# Patient Record
Sex: MALE | Race: BLACK OR AFRICAN AMERICAN | NOT HISPANIC OR LATINO | Employment: FULL TIME | URBAN - METROPOLITAN AREA
[De-identification: names, ages, dates, MRNs, and addresses within clinical notes are randomized per-mention and may not be internally consistent; named-entity substitution may affect disease eponyms.]

---

## 2023-03-13 ENCOUNTER — NURSING HOME VISIT (OUTPATIENT)
Dept: POST ACUTE CARE | Facility: EXTERNAL LOCATION | Age: 63
End: 2023-03-13
Payer: COMMERCIAL

## 2023-03-13 DIAGNOSIS — I63.9 CEREBROVASCULAR ACCIDENT (CVA), UNSPECIFIED MECHANISM (MULTI): ICD-10-CM

## 2023-03-13 DIAGNOSIS — R53.1 WEAKNESS: ICD-10-CM

## 2023-03-13 DIAGNOSIS — H10.33 ACUTE BACTERIAL CONJUNCTIVITIS OF BOTH EYES: Primary | ICD-10-CM

## 2023-03-13 PROCEDURE — 99308 SBSQ NF CARE LOW MDM 20: CPT | Performed by: NURSE PRACTITIONER

## 2023-03-13 NOTE — LETTER
Patient: Edenilson Ness  : 1960    Encounter Date: 2023    Progress Note  Subjective  Chief complaint: Edenilson Ness is a 62 y.o. male who is a long term resident patient being seen and evaluated for eye irritation    HPI:  Patient presents with complaint of eyes red and itchy with drainage.  Nursing staff reports that the patient's eye was glued shut yesterday morning.      Objective  Physical Exam  Bilateral eyes reddened with yellow drainage noted  Alert  Neck supple  Respirations nonlabored  Mood appropriate      Assessment/Plan  Problem List Items Addressed This Visit       Conjunctivitis - Primary     Start TobraDex drops         CVA (cerebral vascular accident) (CMS/[facility])     Stable         Weakness     Wheelchair for mobility          Medications, treatments, and labs reviewed  Continue medications and treatments as listed in PCC        Electronically Signed By: LUCRETIA Griffiths   3/16/23  7:33 PM

## 2023-03-16 PROBLEM — R53.1 WEAKNESS: Status: ACTIVE | Noted: 2023-03-16

## 2023-03-16 PROBLEM — H10.9 CONJUNCTIVITIS: Status: ACTIVE | Noted: 2023-03-16

## 2023-03-16 PROBLEM — I63.9 CVA (CEREBRAL VASCULAR ACCIDENT) (MULTI): Status: ACTIVE | Noted: 2023-03-16

## 2023-03-16 NOTE — PROGRESS NOTES
Progress Note  Subjective   Chief complaint: Edenilson Ness is a 62 y.o. male who is a long term resident patient being seen and evaluated for eye irritation    HPI:  Patient presents with complaint of eyes red and itchy with drainage.  Nursing staff reports that the patient's eye was glued shut yesterday morning.      Objective   Physical Exam  Bilateral eyes reddened with yellow drainage noted  Alert  Neck supple  Respirations nonlabored  Mood appropriate      Assessment/Plan   Problem List Items Addressed This Visit       Conjunctivitis - Primary     Start TobraDex drops         CVA (cerebral vascular accident) (CMS/[facility])     Stable         Weakness     Wheelchair for mobility          Medications, treatments, and labs reviewed  Continue medications and treatments as listed in PCC

## 2023-03-17 ENCOUNTER — NURSING HOME VISIT (OUTPATIENT)
Dept: POST ACUTE CARE | Facility: EXTERNAL LOCATION | Age: 63
End: 2023-03-17
Payer: COMMERCIAL

## 2023-03-17 DIAGNOSIS — Z79.4 TYPE 2 DIABETES MELLITUS WITHOUT COMPLICATION, WITH LONG-TERM CURRENT USE OF INSULIN (MULTI): ICD-10-CM

## 2023-03-17 DIAGNOSIS — Z01.89 ROUTINE LAB DRAW: ICD-10-CM

## 2023-03-17 DIAGNOSIS — E11.9 TYPE 2 DIABETES MELLITUS WITHOUT COMPLICATION, WITH LONG-TERM CURRENT USE OF INSULIN (MULTI): ICD-10-CM

## 2023-03-17 PROCEDURE — 99308 SBSQ NF CARE LOW MDM 20: CPT | Performed by: INTERNAL MEDICINE

## 2023-03-17 NOTE — LETTER
Patient: Edenilson Ness  : 1960    Encounter Date: 2023    Subjective  Chief complaint: Edenilson Ness is a 62 y.o. male who is a long term resident patient being seen and evaluated for multiple medical problems.  Patient presents for general medical care    HPI:  Patient's recent labs cholesterol 92, triglycerides 63, HDL 32, LDL 1.5, VLDL 13.  Patient presents for f/u DM, Patient denies vision changes, excessive thirst, sweating, urinary frequency. A1c is 6.7. No acute distress.         Review of Systems  All systems reviewed and negative except for what was mentioned in the HPI    Vital signs: 134/73    Objective  Physical Exam  Constitutional:       General: He is not in acute distress.  Eyes:      Extraocular Movements: Extraocular movements intact.   Cardiovascular:      Rate and Rhythm: Regular rhythm.   Pulmonary:      Effort: Pulmonary effort is normal.      Breath sounds: Normal breath sounds.   Abdominal:      General: Bowel sounds are normal.      Palpations: Abdomen is soft.   Musculoskeletal:      Cervical back: Neck supple.      Right lower leg: No edema.      Left lower leg: No edema.   Neurological:      Mental Status: He is alert.   Psychiatric:         Mood and Affect: Mood normal.         Behavior: Behavior is cooperative.         Assessment/Plan  Problem List Items Addressed This Visit          Endocrine/Metabolic    Type 2 diabetes mellitus without complication, with long-term current use of insulin (CMS/[facility])     Blood sugars controlled  Continue to monitor BS            Other    Routine lab draw     WNL - NNO          Medications, treatments, and labs reviewed  Continue medications and treatments as listed in PCC    Scribe Attestation  By signing my name below, IHeather Scribe   attest that this documentation has been prepared under the direction and in the presence of Alfredo Bro MD.    Provider Attestation - Scribe documentation  All medical record entries made by the  Scribe were at my direction and personally dictated by me. I have reviewed the chart and agree that the record accurately reflects my personal performance of the history, physical exam, discussion and plan.      Electronically Signed By: Alfredo Bro MD   3/22/23  2:44 PM

## 2023-03-21 PROBLEM — Z01.89 ROUTINE LAB DRAW: Status: ACTIVE | Noted: 2023-03-21

## 2023-03-21 PROBLEM — Z79.4 TYPE 2 DIABETES MELLITUS WITHOUT COMPLICATION, WITH LONG-TERM CURRENT USE OF INSULIN (MULTI): Status: ACTIVE | Noted: 2023-03-21

## 2023-03-21 PROBLEM — E11.9 TYPE 2 DIABETES MELLITUS WITHOUT COMPLICATION, WITH LONG-TERM CURRENT USE OF INSULIN (MULTI): Status: ACTIVE | Noted: 2023-03-21

## 2023-03-21 NOTE — PROGRESS NOTES
Subjective   Chief complaint: Edenilson Ness is a 62 y.o. male who is a long term resident patient being seen and evaluated for multiple medical problems.  Patient presents for general medical care    HPI:  Patient's recent labs cholesterol 92, triglycerides 63, HDL 32, LDL 1.5, VLDL 13.  Patient presents for f/u DM, Patient denies vision changes, excessive thirst, sweating, urinary frequency. A1c is 6.7. No acute distress.         Review of Systems  All systems reviewed and negative except for what was mentioned in the HPI    Vital signs: 134/73    Objective   Physical Exam  Constitutional:       General: He is not in acute distress.  Eyes:      Extraocular Movements: Extraocular movements intact.   Cardiovascular:      Rate and Rhythm: Regular rhythm.   Pulmonary:      Effort: Pulmonary effort is normal.      Breath sounds: Normal breath sounds.   Abdominal:      General: Bowel sounds are normal.      Palpations: Abdomen is soft.   Musculoskeletal:      Cervical back: Neck supple.      Right lower leg: No edema.      Left lower leg: No edema.   Neurological:      Mental Status: He is alert.   Psychiatric:         Mood and Affect: Mood normal.         Behavior: Behavior is cooperative.         Assessment/Plan   Problem List Items Addressed This Visit          Endocrine/Metabolic    Type 2 diabetes mellitus without complication, with long-term current use of insulin (CMS/[facility])     Blood sugars controlled  Continue to monitor BS            Other    Routine lab draw     WNL - NNO          Medications, treatments, and labs reviewed  Continue medications and treatments as listed in PCC    Scribe Attestation  By signing my name below, IHeather Scribe   attest that this documentation has been prepared under the direction and in the presence of Alfredo Bro MD.    Provider Attestation - Scribe documentation  All medical record entries made by the Scribe were at my direction and personally dictated by me. I have  reviewed the chart and agree that the record accurately reflects my personal performance of the history, physical exam, discussion and plan.

## 2023-04-19 ENCOUNTER — NURSING HOME VISIT (OUTPATIENT)
Dept: POST ACUTE CARE | Facility: EXTERNAL LOCATION | Age: 63
End: 2023-04-19
Payer: COMMERCIAL

## 2023-04-19 DIAGNOSIS — R53.1 WEAKNESS: ICD-10-CM

## 2023-04-19 PROCEDURE — 99308 SBSQ NF CARE LOW MDM 20: CPT | Performed by: INTERNAL MEDICINE

## 2023-04-19 NOTE — LETTER
Patient: Edenilson Ness  : 1960    Encounter Date: 2023    PROGRESS NOTE    Subjective  Chief complaint: Edenilson Ness is a 62 y.o. male who is a long term care patient being seen and evaluated for weakness    HPI:  Patient working in OT for weakness.She requires minimal to contact guard assist for transfers and ADL's No new issues at this time. She uses wheelchair for mobility. No acute distress.       Objective  Vital signs: 124/76, 98%    Physical Exam  Constitutional:       General: He is not in acute distress.  Eyes:      Extraocular Movements: Extraocular movements intact.   Cardiovascular:      Rate and Rhythm: Normal rate and regular rhythm.   Pulmonary:      Effort: Pulmonary effort is normal.      Breath sounds: Normal breath sounds.   Abdominal:      General: Bowel sounds are normal.      Palpations: Abdomen is soft.   Musculoskeletal:      Cervical back: Neck supple.      Right lower leg: No edema.      Left lower leg: No edema.   Neurological:      Mental Status: He is alert.   Psychiatric:         Mood and Affect: Mood normal.         Behavior: Behavior is cooperative.         Assessment/Plan  Problem List Items Addressed This Visit          Other    Weakness     Medications, treatments, and labs reviewed  Continue medications and treatments as listed in [facility]    Scribe Attestation  By signing my name below, IHeather Scribe   attest that this documentation has been prepared under the direction and in the presence of Alfredo Bro MD.    Provider Attestation - Scribe documentation  All medical record entries made by the Scribe were at my direction and personally dictated by me. I have reviewed the chart and agree that the record accurately reflects my personal performance of the history, physical exam, discussion and plan.    1. Weakness               Electronically Signed By: Alfredo Bro MD   23 10:51 AM

## 2023-04-21 NOTE — PROGRESS NOTES
PROGRESS NOTE    Subjective   Chief complaint: Edenilson Ness is a 62 y.o. male who is a long term care patient being seen and evaluated for weakness    HPI:  Patient working in OT for weakness.She requires minimal to contact guard assist for transfers and ADL's No new issues at this time. She uses wheelchair for mobility. No acute distress.       Objective   Vital signs: 124/76, 98%    Physical Exam  Constitutional:       General: He is not in acute distress.  Eyes:      Extraocular Movements: Extraocular movements intact.   Cardiovascular:      Rate and Rhythm: Normal rate and regular rhythm.   Pulmonary:      Effort: Pulmonary effort is normal.      Breath sounds: Normal breath sounds.   Abdominal:      General: Bowel sounds are normal.      Palpations: Abdomen is soft.   Musculoskeletal:      Cervical back: Neck supple.      Right lower leg: No edema.      Left lower leg: No edema.   Neurological:      Mental Status: He is alert.   Psychiatric:         Mood and Affect: Mood normal.         Behavior: Behavior is cooperative.         Assessment/Plan   Problem List Items Addressed This Visit          Other    Weakness     Medications, treatments, and labs reviewed  Continue medications and treatments as listed in [facility]    Scribe Attestation  By signing my name below, I, Meryl Blackburnibdhara   attest that this documentation has been prepared under the direction and in the presence of Alfredo Bro MD.    Provider Attestation - Scribe documentation  All medical record entries made by the Scribe were at my direction and personally dictated by me. I have reviewed the chart and agree that the record accurately reflects my personal performance of the history, physical exam, discussion and plan.    1. Weakness

## 2023-04-26 ENCOUNTER — NURSING HOME VISIT (OUTPATIENT)
Dept: POST ACUTE CARE | Facility: EXTERNAL LOCATION | Age: 63
End: 2023-04-26
Payer: COMMERCIAL

## 2023-04-26 DIAGNOSIS — I63.9 CEREBROVASCULAR ACCIDENT (CVA), UNSPECIFIED MECHANISM (MULTI): ICD-10-CM

## 2023-04-26 DIAGNOSIS — R53.1 WEAKNESS: ICD-10-CM

## 2023-04-26 PROCEDURE — 99308 SBSQ NF CARE LOW MDM 20: CPT | Performed by: INTERNAL MEDICINE

## 2023-04-26 NOTE — LETTER
Patient: Edenilson Ness  : 1960    Encounter Date: 2023    PROGRESS NOTE    Subjective  Chief complaint: Edenilson Ness is a 62 y.o. male who is a long term care patient being seen and evaluated for weakness    HPI:  Patient working in therapy due to weakness. HE is doing well and ambulates several feet with walker and stand by to contact guard assistance. No new issues or concerns at this time.       Objective  Vital signs: 123/74%, 97%    Physical Exam  Constitutional:       General: He is not in acute distress.  Eyes:      Extraocular Movements: Extraocular movements intact.   Cardiovascular:      Rate and Rhythm: Normal rate and regular rhythm.   Pulmonary:      Effort: Pulmonary effort is normal.      Breath sounds: Normal breath sounds.   Abdominal:      General: Bowel sounds are normal.      Palpations: Abdomen is soft.   Musculoskeletal:      Cervical back: Neck supple.      Right lower leg: No edema.      Left lower leg: No edema.   Neurological:      Mental Status: He is alert.   Psychiatric:         Mood and Affect: Mood normal.         Behavior: Behavior is cooperative.         Assessment/Plan  Problem List Items Addressed This Visit          Nervous    CVA (cerebral vascular accident) (CMS/[facility])     Stable  therapy            Other    Weakness     Wheelchair for mobility  Therapy          Medications, treatments, and labs reviewed  Continue medications and treatments as listed in PCC    Scribe Attestation  By signing my name below, I, Mitch Blackburn   attest that this documentation has been prepared under the direction and in the presence of Alfredo Bro MD.    Provider Attestation - Scribe documentation  All medical record entries made by the Scribe were at my direction and personally dictated by me. I have reviewed the chart and agree that the record accurately reflects my personal performance of the history, physical exam, discussion and plan.    1. Cerebrovascular accident (CVA),  unspecified mechanism (CMS/HCC)        2. Weakness               Electronically Signed By: Alfredo Bro MD   4/27/23  1:49 PM

## 2023-04-27 NOTE — PROGRESS NOTES
PROGRESS NOTE    Subjective   Chief complaint: Edenilson Ness is a 62 y.o. male who is a long term care patient being seen and evaluated for weakness    HPI:  Patient working in therapy due to weakness. HE is doing well and ambulates several feet with walker and stand by to contact guard assistance. No new issues or concerns at this time.       Objective   Vital signs: 123/74%, 97%    Physical Exam  Constitutional:       General: He is not in acute distress.  Eyes:      Extraocular Movements: Extraocular movements intact.   Cardiovascular:      Rate and Rhythm: Normal rate and regular rhythm.   Pulmonary:      Effort: Pulmonary effort is normal.      Breath sounds: Normal breath sounds.   Abdominal:      General: Bowel sounds are normal.      Palpations: Abdomen is soft.   Musculoskeletal:      Cervical back: Neck supple.      Right lower leg: No edema.      Left lower leg: No edema.   Neurological:      Mental Status: He is alert.   Psychiatric:         Mood and Affect: Mood normal.         Behavior: Behavior is cooperative.         Assessment/Plan   Problem List Items Addressed This Visit          Nervous    CVA (cerebral vascular accident) (CMS/HCC)     Stable  therapy            Other    Weakness     Wheelchair for mobility  Therapy          Medications, treatments, and labs reviewed  Continue medications and treatments as listed in [facility]    Scribe Attestation  By signing my name below, IHeather Scribe   attest that this documentation has been prepared under the direction and in the presence of Alfredo Bro MD.    Provider Attestation - Scribe documentation  All medical record entries made by the Scribe were at my direction and personally dictated by me. I have reviewed the chart and agree that the record accurately reflects my personal performance of the history, physical exam, discussion and plan.    1. Cerebrovascular accident (CVA), unspecified mechanism (CMS/HCC)        2. Weakness

## 2023-05-03 ENCOUNTER — NURSING HOME VISIT (OUTPATIENT)
Dept: POST ACUTE CARE | Facility: EXTERNAL LOCATION | Age: 63
End: 2023-05-03
Payer: COMMERCIAL

## 2023-05-03 DIAGNOSIS — E11.9 TYPE 2 DIABETES MELLITUS WITHOUT COMPLICATION, WITH LONG-TERM CURRENT USE OF INSULIN (MULTI): ICD-10-CM

## 2023-05-03 DIAGNOSIS — Z79.4 TYPE 2 DIABETES MELLITUS WITHOUT COMPLICATION, WITH LONG-TERM CURRENT USE OF INSULIN (MULTI): ICD-10-CM

## 2023-05-03 DIAGNOSIS — I10 PRIMARY HYPERTENSION: ICD-10-CM

## 2023-05-03 DIAGNOSIS — I63.9 CEREBROVASCULAR ACCIDENT (CVA), UNSPECIFIED MECHANISM (MULTI): ICD-10-CM

## 2023-05-03 PROCEDURE — 99309 SBSQ NF CARE MODERATE MDM 30: CPT | Performed by: INTERNAL MEDICINE

## 2023-05-03 NOTE — LETTER
Patient: Edenilson Ness  : 1960    Encounter Date: 2023    PROGRESS NOTE    Subjective  Chief complaint: Edenilson Ness is a 62 y.o. male who is a long term care patient being seen and evaluated for monthly general medical care and follow-up.    HPI:      Patient presents for general medical care and f/u.  Patient seen and examined at bedside.  No issues per nursing.  Patient has no acute complaints.  HTN, Denies chest pain and headache.   patient with DM, denies vision changes, excessive thirst, sweating, urinary frequency. hx CVA, denies changes in weakness and speech. No acute distress      Objective  Vital signs: 129/75, 98%    Physical Exam  Constitutional:       General: He is not in acute distress.  Eyes:      Extraocular Movements: Extraocular movements intact.   Cardiovascular:      Rate and Rhythm: Normal rate and regular rhythm.   Pulmonary:      Effort: Pulmonary effort is normal.      Breath sounds: Normal breath sounds.   Abdominal:      General: Bowel sounds are normal.      Palpations: Abdomen is soft.   Musculoskeletal:      Cervical back: Neck supple.      Right lower leg: No edema.      Left lower leg: No edema.   Neurological:      Mental Status: He is alert.   Psychiatric:         Mood and Affect: Mood normal.         Behavior: Behavior is cooperative.         Assessment/Plan  Problem List Items Addressed This Visit          Nervous    CVA (cerebral vascular accident) (CMS/HCC)     Stable  therapy            Circulatory    HTN (hypertension)     Monitor BP  Continue antihypertensives            Endocrine/Metabolic    Type 2 diabetes mellitus without complication, with long-term current use of insulin (CMS/[facility])     Blood sugars controlled  Continue to monitor BS          Medications, treatments, and labs reviewed  Continue medications and treatments as listed in [facility]    Scribe Attestation  By signing my name below, I, Heather Obrien, Scribe   attest that this documentation has been  prepared under the direction and in the presence of Alfredo Bro MD.    Provider Attestation - Scribe documentation  All medical record entries made by the Scribe were at my direction and personally dictated by me. I have reviewed the chart and agree that the record accurately reflects my personal performance of the history, physical exam, discussion and plan.    1. Cerebrovascular accident (CVA), unspecified mechanism (CMS/HCC)        2. Type 2 diabetes mellitus without complication, with long-term current use of insulin (CMS/HCC)        3. Primary hypertension               Electronically Signed By: Alfredo Bro MD   5/5/23  3:18 PM

## 2023-05-04 PROBLEM — I10 HTN (HYPERTENSION): Status: ACTIVE | Noted: 2023-05-04

## 2023-05-05 NOTE — PROGRESS NOTES
PROGRESS NOTE    Subjective   Chief complaint: Edenilson Ness is a 62 y.o. male who is a long term care patient being seen and evaluated for monthly general medical care and follow-up.    HPI:      Patient presents for general medical care and f/u.  Patient seen and examined at bedside.  No issues per nursing.  Patient has no acute complaints.  HTN, Denies chest pain and headache.   patient with DM, denies vision changes, excessive thirst, sweating, urinary frequency. hx CVA, denies changes in weakness and speech. No acute distress      Objective   Vital signs: 129/75, 98%    Physical Exam  Constitutional:       General: He is not in acute distress.  Eyes:      Extraocular Movements: Extraocular movements intact.   Cardiovascular:      Rate and Rhythm: Normal rate and regular rhythm.   Pulmonary:      Effort: Pulmonary effort is normal.      Breath sounds: Normal breath sounds.   Abdominal:      General: Bowel sounds are normal.      Palpations: Abdomen is soft.   Musculoskeletal:      Cervical back: Neck supple.      Right lower leg: No edema.      Left lower leg: No edema.   Neurological:      Mental Status: He is alert.   Psychiatric:         Mood and Affect: Mood normal.         Behavior: Behavior is cooperative.         Assessment/Plan   Problem List Items Addressed This Visit          Nervous    CVA (cerebral vascular accident) (CMS/HCC)     Stable  therapy            Circulatory    HTN (hypertension)     Monitor BP  Continue antihypertensives            Endocrine/Metabolic    Type 2 diabetes mellitus without complication, with long-term current use of insulin (CMS/[facility])     Blood sugars controlled  Continue to monitor BS          Medications, treatments, and labs reviewed  Continue medications and treatments as listed in PCC    Scribe Attestation  By signing my name below, Heather RAMIREZ, Scribdhara   attest that this documentation has been prepared under the direction and in the presence of Alfredo Bro  MD.    Provider Attestation - Scribe documentation  All medical record entries made by the Scribe were at my direction and personally dictated by me. I have reviewed the chart and agree that the record accurately reflects my personal performance of the history, physical exam, discussion and plan.    1. Cerebrovascular accident (CVA), unspecified mechanism (CMS/HCC)        2. Type 2 diabetes mellitus without complication, with long-term current use of insulin (CMS/HCC)        3. Primary hypertension

## 2023-05-10 ENCOUNTER — NURSING HOME VISIT (OUTPATIENT)
Dept: POST ACUTE CARE | Facility: EXTERNAL LOCATION | Age: 63
End: 2023-05-10
Payer: COMMERCIAL

## 2023-05-10 DIAGNOSIS — R53.1 WEAKNESS: ICD-10-CM

## 2023-05-10 DIAGNOSIS — I63.9 CEREBROVASCULAR ACCIDENT (CVA), UNSPECIFIED MECHANISM (MULTI): ICD-10-CM

## 2023-05-10 PROCEDURE — 99308 SBSQ NF CARE LOW MDM 20: CPT | Performed by: INTERNAL MEDICINE

## 2023-05-10 NOTE — LETTER
Patient: Edenilson Ness  : 1960    Encounter Date: 05/10/2023    PROGRESS NOTE    Subjective  Chief complaint: Edenilson Ness is a 62 y.o. male who is a long term care patient being seen and evaluated for weakness    HPI:    Patient  with history of CVA and right-sided weakness continues to work  in therapy due to weakness.  Patient does stand and walk with walker and standby to contact-guard assist.  Patient is getting stronger and has no new issues or concerns.  No acute distress.      Objective  Vital signs:   127/69, 99%    Physical Exam  Constitutional:       General: He is not in acute distress.  Eyes:      Extraocular Movements: Extraocular movements intact.   Cardiovascular:      Rate and Rhythm: Normal rate and regular rhythm.   Pulmonary:      Effort: Pulmonary effort is normal.      Breath sounds: Normal breath sounds.   Abdominal:      General: Bowel sounds are normal.      Palpations: Abdomen is soft.   Musculoskeletal:      Cervical back: Neck supple.      Right lower leg: Edema present.      Left lower leg: Edema present.   Neurological:      Mental Status: He is alert.   Psychiatric:         Mood and Affect: Mood normal.         Behavior: Behavior is cooperative.         Assessment/Plan  Problem List Items Addressed This Visit          Nervous    CVA (cerebral vascular accident) (CMS/[facility])     Stable  therapy            Other    Weakness     Wheelchair for mobility  Therapy          Medications, treatments, and labs reviewed  Continue medications and treatments as listed in PCC    Scribe Attestation  By signing my name below, I, Mitch Blackburn   attest that this documentation has been prepared under the direction and in the presence of Heather Obrien MA.    Provider Attestation - Scribe documentation  All medical record entries made by the Scribe were at my direction and personally dictated by me. I have reviewed the chart and agree that the record accurately reflects my personal  performance of the history, physical exam, discussion and plan.    1. Cerebrovascular accident (CVA), unspecified mechanism (CMS/HCC)        2. Weakness               Electronically Signed By: Heather Obrien MA   5/15/23  3:09 PM

## 2023-05-15 NOTE — PROGRESS NOTES
PROGRESS NOTE    Subjective   Chief complaint: Edenilson Ness is a 62 y.o. male who is a long term care patient being seen and evaluated for weakness    HPI:    Patient  with history of CVA and right-sided weakness continues to work  in therapy due to weakness.  Patient does stand and walk with walker and standby to contact-guard assist.  Patient is getting stronger and has no new issues or concerns.  No acute distress.      Objective   Vital signs:   127/69, 99%    Physical Exam  Constitutional:       General: He is not in acute distress.  Eyes:      Extraocular Movements: Extraocular movements intact.   Cardiovascular:      Rate and Rhythm: Normal rate and regular rhythm.   Pulmonary:      Effort: Pulmonary effort is normal.      Breath sounds: Normal breath sounds.   Abdominal:      General: Bowel sounds are normal.      Palpations: Abdomen is soft.   Musculoskeletal:      Cervical back: Neck supple.      Right lower leg: Edema present.      Left lower leg: Edema present.   Neurological:      Mental Status: He is alert.   Psychiatric:         Mood and Affect: Mood normal.         Behavior: Behavior is cooperative.         Assessment/Plan   Problem List Items Addressed This Visit          Nervous    CVA (cerebral vascular accident) (CMS/HCC)     Stable  therapy            Other    Weakness     Wheelchair for mobility  Therapy          Medications, treatments, and labs reviewed  Continue medications and treatments as listed in [facility]    Scribe Attestation  By signing my name below, I, Mitch Blackburn   attest that this documentation has been prepared under the direction and in the presence of Heather Obrien MA.    Provider Attestation - Scribe documentation  All medical record entries made by the Scribe were at my direction and personally dictated by me. I have reviewed the chart and agree that the record accurately reflects my personal performance of the history, physical exam, discussion and plan.    1.  Cerebrovascular accident (CVA), unspecified mechanism (CMS/HCC)        2. Weakness

## 2023-05-17 ENCOUNTER — NURSING HOME VISIT (OUTPATIENT)
Dept: POST ACUTE CARE | Facility: EXTERNAL LOCATION | Age: 63
End: 2023-05-17
Payer: COMMERCIAL

## 2023-05-17 DIAGNOSIS — Z79.4 TYPE 2 DIABETES MELLITUS WITHOUT COMPLICATION, WITH LONG-TERM CURRENT USE OF INSULIN (MULTI): ICD-10-CM

## 2023-05-17 DIAGNOSIS — I10 PRIMARY HYPERTENSION: ICD-10-CM

## 2023-05-17 DIAGNOSIS — E11.9 TYPE 2 DIABETES MELLITUS WITHOUT COMPLICATION, WITH LONG-TERM CURRENT USE OF INSULIN (MULTI): ICD-10-CM

## 2023-05-17 PROCEDURE — 99308 SBSQ NF CARE LOW MDM 20: CPT | Performed by: INTERNAL MEDICINE

## 2023-05-17 NOTE — LETTER
Patient: Edenilson Ness  : 1960    Encounter Date: 2023    PROGRESS NOTE    Subjective  Chief complaint: Edenilson Ness is a 62 y.o. male who is a long term care patient being seen and evaluated for general medical care    HPI:   I was asked to see patient and evaluate recent labs. patient with diabetes recent A1c is 8.7. Patient Hgb is slightly down from previous at 10.8. All other labs unremarkable. He is doing well and denies constitutional symptoms.       Objective  Vital signs: 136/79, 98%    Physical Exam  Constitutional:       General: He is not in acute distress.  Eyes:      Extraocular Movements: Extraocular movements intact.   Cardiovascular:      Rate and Rhythm: Normal rate and regular rhythm.   Pulmonary:      Effort: Pulmonary effort is normal.      Breath sounds: Normal breath sounds.   Abdominal:      General: Bowel sounds are normal.      Palpations: Abdomen is soft.   Musculoskeletal:      Cervical back: Normal range of motion and neck supple.      Right lower leg: Edema present.      Left lower leg: Edema present.      Comments: Left sided weakness   Neurological:      Mental Status: He is alert.   Psychiatric:         Mood and Affect: Mood normal.         Behavior: Behavior is cooperative.         Assessment/Plan  Problem List Items Addressed This Visit          Circulatory    HTN (hypertension)     Monitor BP  Continue antihypertensives            Endocrine/Metabolic    Type 2 diabetes mellitus without complication, with long-term current use of insulin (CMS/[facility])     Monitor sweets and carb intake  Continue to monitor BS  Continue current medications for DM          Medications, treatments, and labs reviewed  Continue medications and treatments as listed in PCC    Scribe Attestation  By signing my name below, IHeather Scribe   attest that this documentation has been prepared under the direction and in the presence of Alfredo Bro MD.    Provider Attestation - Scribe  documentation  All medical record entries made by the Scribe were at my direction and personally dictated by me. I have reviewed the chart and agree that the record accurately reflects my personal performance of the history, physical exam, discussion and plan.    1. Primary hypertension        2. Type 2 diabetes mellitus without complication, with long-term current use of insulin (CMS/[facility])               Electronically Signed By: Alfredo Bro MD   5/18/23  4:52 PM

## 2023-05-18 PROBLEM — D63.8 ANEMIA OF CHRONIC DISEASE: Status: ACTIVE | Noted: 2023-05-18

## 2023-05-18 NOTE — PROGRESS NOTES
PROGRESS NOTE    Subjective   Chief complaint: Edenilson Ness is a 62 y.o. male who is a long term care patient being seen and evaluated for general medical care    HPI:   I was asked to see patient and evaluate recent labs. patient with diabetes recent A1c is 8.7. Patient Hgb is slightly down from previous at 10.8. All other labs unremarkable. He is doing well and denies constitutional symptoms.       Objective   Vital signs: 136/79, 98%    Physical Exam  Constitutional:       General: He is not in acute distress.  Eyes:      Extraocular Movements: Extraocular movements intact.   Cardiovascular:      Rate and Rhythm: Normal rate and regular rhythm.   Pulmonary:      Effort: Pulmonary effort is normal.      Breath sounds: Normal breath sounds.   Abdominal:      General: Bowel sounds are normal.      Palpations: Abdomen is soft.   Musculoskeletal:      Cervical back: Normal range of motion and neck supple.      Right lower leg: Edema present.      Left lower leg: Edema present.      Comments: Left sided weakness   Neurological:      Mental Status: He is alert.   Psychiatric:         Mood and Affect: Mood normal.         Behavior: Behavior is cooperative.         Assessment/Plan   Problem List Items Addressed This Visit          Circulatory    HTN (hypertension)     Monitor BP  Continue antihypertensives            Endocrine/Metabolic    Type 2 diabetes mellitus without complication, with long-term current use of insulin (CMS/[facility])     Monitor sweets and carb intake  Continue to monitor BS  Continue current medications for DM          Medications, treatments, and labs reviewed  Continue medications and treatments as listed in P[facility]    Scribe Attestation  By signing my name below, Heather RAMIREZ Scribe   attest that this documentation has been prepared under the direction and in the presence of Alfredo Bro MD.    Provider Attestation - Scribe documentation  All medical record entries made by the Scribe were at my  direction and personally dictated by me. I have reviewed the chart and agree that the record accurately reflects my personal performance of the history, physical exam, discussion and plan.    1. Primary hypertension        2. Type 2 diabetes mellitus without complication, with long-term current use of insulin (CMS/[facility])

## 2023-05-19 ENCOUNTER — NURSING HOME VISIT (OUTPATIENT)
Dept: POST ACUTE CARE | Facility: EXTERNAL LOCATION | Age: 63
End: 2023-05-19
Payer: COMMERCIAL

## 2023-05-19 DIAGNOSIS — D63.8 ANEMIA OF CHRONIC DISEASE: ICD-10-CM

## 2023-05-19 PROCEDURE — 99308 SBSQ NF CARE LOW MDM 20: CPT | Performed by: INTERNAL MEDICINE

## 2023-05-19 NOTE — LETTER
Patient: Edenilson Ness  : 1960    Encounter Date: 2023    PROGRESS NOTE    Subjective  Chief complaint: Edenilson Ness is a 62 y.o. male who is a long term care patient being seen and evaluated for [facility] medical care    HPI:  Patient recent labs revealed anemia. Denies fatigue. His H&H is 10.8 and 31.9. No other concerns at this time. No acute distress.       Objective  Vital signs: 124/78, 98%    Physical Exam  Constitutional:       General: He is not in acute distress.  Eyes:      Extraocular Movements: Extraocular movements intact.   Cardiovascular:      Rate and Rhythm: Normal rate and regular rhythm.   Pulmonary:      Effort: Pulmonary effort is normal.      Breath sounds: Normal breath sounds.   Abdominal:      General: Bowel sounds are normal.      Palpations: Abdomen is soft.   Musculoskeletal:      Cervical back: Neck supple.      Right lower leg: No edema.      Left lower leg: No edema.      Comments: Left sided weakness   Neurological:      Mental Status: He is alert.   Psychiatric:         Mood and Affect: Mood normal.         Behavior: Behavior is cooperative.         Assessment/Plan  Problem List Items Addressed This Visit          Hematologic    Anemia of chronic disease     Labs reviewed  Monitor for now  H&H low if continues to drop will obtain anemia work up          Medications, treatments, and labs reviewed  Continue medications and treatments as listed in PCC    Scribe Attestation  By signing my name below, I, Mitch Blackburn   attest that this documentation has been prepared under the direction and in the presence of Alfredo Bro MD.    Provider Attestation - Scribe documentation  All medical record entries made by the Scribe were at my direction and personally dictated by me. I have reviewed the chart and agree that the record accurately reflects my personal performance of the history, physical exam, discussion and plan.    1. Anemia of chronic disease                Electronically Signed By: Alfredo Bro MD   5/23/23  3:51 PM

## 2023-05-23 NOTE — PROGRESS NOTES
PROGRESS NOTE    Subjective   Chief complaint: Edenilson Ness is a 62 y.o. male who is a long term care patient being seen and evaluated for [facility] medical care    HPI:  Patient recent labs revealed anemia. Denies fatigue. His H&H is 10.8 and 31.9. No other concerns at this time. No acute distress.       Objective   Vital signs: 124/78, 98%    Physical Exam  Constitutional:       General: He is not in acute distress.  Eyes:      Extraocular Movements: Extraocular movements intact.   Cardiovascular:      Rate and Rhythm: Normal rate and regular rhythm.   Pulmonary:      Effort: Pulmonary effort is normal.      Breath sounds: Normal breath sounds.   Abdominal:      General: Bowel sounds are normal.      Palpations: Abdomen is soft.   Musculoskeletal:      Cervical back: Neck supple.      Right lower leg: No edema.      Left lower leg: No edema.      Comments: Left sided weakness   Neurological:      Mental Status: He is alert.   Psychiatric:         Mood and Affect: Mood normal.         Behavior: Behavior is cooperative.         Assessment/Plan   Problem List Items Addressed This Visit          Hematologic    Anemia of chronic disease     Labs reviewed  Monitor for now  H&H low if continues to drop will obtain anemia work up          Medications, treatments, and labs reviewed  Continue medications and treatments as listed in [facility]    Scribe Attestation  By signing my name below, IHeather Scribe   attest that this documentation has been prepared under the direction and in the presence of Alfredo Bro MD.    Provider Attestation - Scribe documentation  All medical record entries made by the Scribe were at my direction and personally dictated by me. I have reviewed the chart and agree that the record accurately reflects my personal performance of the history, physical exam, discussion and plan.    1. Anemia of chronic disease

## 2023-05-24 ENCOUNTER — NURSING HOME VISIT (OUTPATIENT)
Dept: POST ACUTE CARE | Facility: EXTERNAL LOCATION | Age: 63
End: 2023-05-24
Payer: COMMERCIAL

## 2023-05-24 DIAGNOSIS — R53.1 WEAKNESS: ICD-10-CM

## 2023-05-24 DIAGNOSIS — I63.9 CEREBROVASCULAR ACCIDENT (CVA), UNSPECIFIED MECHANISM (MULTI): ICD-10-CM

## 2023-05-24 PROCEDURE — 99308 SBSQ NF CARE LOW MDM 20: CPT | Performed by: INTERNAL MEDICINE

## 2023-05-24 NOTE — LETTER
Patient: Edenilson Ness  : 1960    Encounter Date: 2023    PROGRESS NOTE    Subjective  Chief complaint: Edenilson Ness is a 62 y.o. male who is a long term care patient being seen and evaluated for weakness    HPI:  Patient with hx of R CVA and left sided weakness is working in therapy due. He does ambulate with walker and SBA to [facility]. No new concerns at this time. No acute distress.  No acute distress.       Objective  Vital signs: 124/78, 98%    Physical Exam  Constitutional:       General: He is not in acute distress.  Eyes:      Extraocular Movements: Extraocular movements intact.   Cardiovascular:      Rate and Rhythm: Normal rate and regular rhythm.   Pulmonary:      Effort: Pulmonary effort is normal.      Breath sounds: Normal breath sounds.   Abdominal:      General: Bowel sounds are normal.      Palpations: Abdomen is soft.   Musculoskeletal:      Cervical back: Neck supple.      Right lower leg: No edema.      Left lower leg: No edema.      Comments: Left sided weakness   Neurological:      Mental Status: He is alert.   Psychiatric:         Mood and Affect: Mood normal.         Behavior: Behavior is cooperative.         Assessment/Plan  Problem List Items Addressed This Visit          Nervous    CVA (cerebral vascular accident) (CMS/HCC)     Stable, monitor for changes in weakness and speech  Continue therapy            Other    Weakness     Wheelchair for mobility  Continue Therapy        Medications, treatments, and labs reviewed  Continue medications and treatments as listed in P[facility]    Scribe Attestation  By signing my name below, IHeather Scribe   attest that this documentation has been prepared under the direction and in the presence of Alfredo Bro MD.    Provider Attestation - Scribe documentation  All medical record entries made by the Scribe were at my direction and personally dictated by me. I have reviewed the chart and agree that the record accurately reflects my personal  performance of the history, physical exam, discussion and plan.    1. Cerebrovascular accident (CVA), unspecified mechanism (CMS/HCC)        2. Weakness               Electronically Signed By: Alfredo Bro MD   5/25/23  2:49 PM

## 2023-05-25 NOTE — PROGRESS NOTES
PROGRESS NOTE    Subjective   Chief complaint: Edenilson Ness is a 62 y.o. male who is a long term care patient being seen and evaluated for weakness    HPI:  Patient with hx of R CVA and left sided weakness is working in therapy due. He does ambulate with walker and SBA to [facility]. No new concerns at this time. No acute distress.  No acute distress.       Objective   Vital signs: 124/78, 98%    Physical Exam  Constitutional:       General: He is not in acute distress.  Eyes:      Extraocular Movements: Extraocular movements intact.   Cardiovascular:      Rate and Rhythm: Normal rate and regular rhythm.   Pulmonary:      Effort: Pulmonary effort is normal.      Breath sounds: Normal breath sounds.   Abdominal:      General: Bowel sounds are normal.      Palpations: Abdomen is soft.   Musculoskeletal:      Cervical back: Neck supple.      Right lower leg: No edema.      Left lower leg: No edema.      Comments: Left sided weakness   Neurological:      Mental Status: He is alert.   Psychiatric:         Mood and Affect: Mood normal.         Behavior: Behavior is cooperative.         Assessment/Plan   Problem List Items Addressed This Visit          Nervous    CVA (cerebral vascular accident) (CMS/HCC)     Stable, monitor for changes in weakness and speech  Continue therapy            Other    Weakness     Wheelchair for mobility  Continue Therapy        Medications, treatments, and labs reviewed  Continue medications and treatments as listed in PCC    Scribe Attestation  By signing my name below, I, Mitch Blackburn   attest that this documentation has been prepared under the direction and in the presence of Alfredo Bro MD.    Provider Attestation - Scribe documentation  All medical record entries made by the Scribe were at my direction and personally dictated by me. I have reviewed the chart and agree that the record accurately reflects my personal performance of the history, physical exam, discussion and  plan.    1. Cerebrovascular accident (CVA), unspecified mechanism (CMS/HCC)        2. Weakness

## 2023-05-31 ENCOUNTER — NURSING HOME VISIT (OUTPATIENT)
Dept: POST ACUTE CARE | Facility: EXTERNAL LOCATION | Age: 63
End: 2023-05-31
Payer: COMMERCIAL

## 2023-05-31 DIAGNOSIS — R53.1 WEAKNESS: ICD-10-CM

## 2023-05-31 PROCEDURE — 99308 SBSQ NF CARE LOW MDM 20: CPT | Performed by: INTERNAL MEDICINE

## 2023-06-02 ENCOUNTER — NURSING HOME VISIT (OUTPATIENT)
Dept: POST ACUTE CARE | Facility: EXTERNAL LOCATION | Age: 63
End: 2023-06-02
Payer: COMMERCIAL

## 2023-06-02 DIAGNOSIS — Z79.4 TYPE 2 DIABETES MELLITUS WITHOUT COMPLICATION, WITH LONG-TERM CURRENT USE OF INSULIN (MULTI): ICD-10-CM

## 2023-06-02 DIAGNOSIS — I10 PRIMARY HYPERTENSION: ICD-10-CM

## 2023-06-02 DIAGNOSIS — E11.9 TYPE 2 DIABETES MELLITUS WITHOUT COMPLICATION, WITH LONG-TERM CURRENT USE OF INSULIN (MULTI): ICD-10-CM

## 2023-06-02 DIAGNOSIS — I63.9 CEREBROVASCULAR ACCIDENT (CVA), UNSPECIFIED MECHANISM (MULTI): ICD-10-CM

## 2023-06-02 PROCEDURE — 99309 SBSQ NF CARE MODERATE MDM 30: CPT | Performed by: INTERNAL MEDICINE

## 2023-06-02 NOTE — LETTER
Patient: Edenilson Ness  : 1960    Encounter Date: 2023    PROGRESS NOTE    Subjective  Chief complaint: Edenilson Ness is a 62 y.o. male who is a long term care patient being seen and evaluated for monthly general medical care and follow-up.    HPI:      Patient presents for general medical care and f/u.  Patient seen and examined at bedside.  No issues per nursing.  Patient has no acute complaints.  HTN, Denies chest pain and headache.   patient with DM, denies vision changes, excessive thirst, sweating, urinary frequency. hx CVA, denies changes in weakness and speech. No acute distress      Objective  Vital signs: 133/74, 98%    Physical Exam  Constitutional:       General: He is not in acute distress.  Eyes:      Extraocular Movements: Extraocular movements intact.   Cardiovascular:      Rate and Rhythm: Normal rate and regular rhythm.   Pulmonary:      Effort: Pulmonary effort is normal.      Breath sounds: Normal breath sounds.   Abdominal:      General: Bowel sounds are normal.      Palpations: Abdomen is soft.   Musculoskeletal:      Cervical back: Neck supple.      Right lower leg: No edema.      Left lower leg: No edema.   Neurological:      Mental Status: He is alert.   Psychiatric:         Mood and Affect: Mood normal.         Behavior: Behavior is cooperative.         Assessment/Plan  Problem List Items Addressed This Visit          Nervous    CVA (cerebral vascular accident) (CMS/HCC)     Stable,   monitor for changes in weakness and speech              Circulatory    HTN (hypertension)     Monitor BP  Continue antihypertensives            Endocrine/Metabolic    Type 2 diabetes mellitus without complication, with long-term current use of insulin (CMS/HCC)     Monitor sweets and carb intake  Continue to monitor BS  Continue current medications for DM        Medications, treatments, and labs reviewed  Continue medications and treatments as listed in P[facility]    Scribe Attestation  By signing my name  below, I, Mitch Blackburn   attest that this documentation has been prepared under the direction and in the presence of Alfredo Bro MD.    Provider Attestation - Scribe documentation  All medical record entries made by the Scribe were at my direction and personally dictated by me. I have reviewed the chart and agree that the record accurately reflects my personal performance of the history, physical exam, discussion and plan.    1. Type 2 diabetes mellitus without complication, with long-term current use of insulin (CMS/[facility])        2. Primary hypertension        3. Cerebrovascular accident (CVA), unspecified mechanism (CMS/[facility])               Electronically Signed By: Alfredo Bro MD   6/6/23  5:24 PM

## 2023-06-02 NOTE — PROGRESS NOTES
PROGRESS NOTE    Subjective   Chief complaint: Edenilson Ness is a 62 y.o. male who is a long term care patient being seen and evaluated for weakness    HPI:  Patient working with therapy due to weakness. Denies pain or discomfort. He is walking further distance with walker and SBA to [facility]. No new concerns today.       Objective   Vital signs: 123/76, 98%    Physical Exam  Constitutional:       General: He is not in acute distress.  Eyes:      Extraocular Movements: Extraocular movements intact.   Cardiovascular:      Rate and Rhythm: Normal rate and regular rhythm.   Pulmonary:      Effort: Pulmonary effort is normal.      Breath sounds: Normal breath sounds.   Abdominal:      General: Bowel sounds are normal.      Palpations: Abdomen is soft.   Musculoskeletal:      Cervical back: Neck supple.      Right lower leg: Edema present.      Left lower leg: Edema present.   Neurological:      Mental Status: He is alert.   Psychiatric:         Mood and Affect: Mood normal.         Behavior: Behavior is cooperative.         Assessment/Plan   Problem List Items Addressed This Visit          Other    Weakness     Wheelchair for mobility  Continue Therapy          Medications, treatments, and labs reviewed  Continue medications and treatments as listed in [facility]    Scribe Attestation  By signing my name below, I, Mitch Blackburn   attest that this documentation has been prepared under the direction and in the presence of Alfredo Bro MD.    Provider Attestation - Scribe documentation  All medical record entries made by the Scribe were at my direction and personally dictated by me. I have reviewed the chart and agree that the record accurately reflects my personal performance of the history, physical exam, discussion and plan.    1. Weakness

## 2023-06-06 NOTE — PROGRESS NOTES
PROGRESS NOTE    Subjective   Chief complaint: Edenlison Ness is a 62 y.o. male who is a long term care patient being seen and evaluated for monthly general medical care and follow-up.    HPI:      Patient presents for general medical care and f/u.  Patient seen and examined at bedside.  No issues per nursing.  Patient has no acute complaints.  HTN, Denies chest pain and headache.   patient with DM, denies vision changes, excessive thirst, sweating, urinary frequency. hx CVA, denies changes in weakness and speech. No acute distress      Objective   Vital signs: 133/74, 98%    Physical Exam  Constitutional:       General: He is not in acute distress.  Eyes:      Extraocular Movements: Extraocular movements intact.   Cardiovascular:      Rate and Rhythm: Normal rate and regular rhythm.   Pulmonary:      Effort: Pulmonary effort is normal.      Breath sounds: Normal breath sounds.   Abdominal:      General: Bowel sounds are normal.      Palpations: Abdomen is soft.   Musculoskeletal:      Cervical back: Neck supple.      Right lower leg: No edema.      Left lower leg: No edema.   Neurological:      Mental Status: He is alert.   Psychiatric:         Mood and Affect: Mood normal.         Behavior: Behavior is cooperative.         Assessment/Plan   Problem List Items Addressed This Visit          Nervous    CVA (cerebral vascular accident) (CMS/HCC)     Stable,   monitor for changes in weakness and speech              Circulatory    HTN (hypertension)     Monitor BP  Continue antihypertensives            Endocrine/Metabolic    Type 2 diabetes mellitus without complication, with long-term current use of insulin (CMS/[facility])     Monitor sweets and carb intake  Continue to monitor BS  Continue current medications for DM        Medications, treatments, and labs reviewed  Continue medications and treatments as listed in P[facility]    Scribe Attestation  By signing my name below, I, Heather Obrien, Scribe   attest that this documentation  has been prepared under the direction and in the presence of Alfredo Bro MD.    Provider Attestation - Scribe documentation  All medical record entries made by the Scribe were at my direction and personally dictated by me. I have reviewed the chart and agree that the record accurately reflects my personal performance of the history, physical exam, discussion and plan.    1. Type 2 diabetes mellitus without complication, with long-term current use of insulin (CMS/HCC)        2. Primary hypertension        3. Cerebrovascular accident (CVA), unspecified mechanism (CMS/HCC)

## 2023-06-07 ENCOUNTER — NURSING HOME VISIT (OUTPATIENT)
Dept: POST ACUTE CARE | Facility: EXTERNAL LOCATION | Age: 63
End: 2023-06-07
Payer: COMMERCIAL

## 2023-06-07 DIAGNOSIS — R53.1 WEAKNESS: ICD-10-CM

## 2023-06-07 DIAGNOSIS — I63.9 CEREBROVASCULAR ACCIDENT (CVA), UNSPECIFIED MECHANISM (MULTI): ICD-10-CM

## 2023-06-07 PROCEDURE — 99308 SBSQ NF CARE LOW MDM 20: CPT | Performed by: INTERNAL MEDICINE

## 2023-06-07 NOTE — LETTER
Patient: Edenilson Ness  : 1960    Encounter Date: 2023    PROGRESS NOTE    Subjective  Chief complaint: Edenilson Ness is a 62 y.o. male who is a long term care patient being seen and evaluated for weakness    HPI:  Patient with hx of CVA, working with therapy due to weakness. Denies pain or discomfort. He is walking with walker and SBA to [facility]. No new concerns today. Denies constitutional symptoms.      Objective  Vital signs: 123/76, 98%    Physical Exam  Constitutional:       General: He is not in acute distress.  Eyes:      Extraocular Movements: Extraocular movements intact.   Cardiovascular:      Rate and Rhythm: Normal rate and regular rhythm.   Pulmonary:      Effort: Pulmonary effort is normal.      Breath sounds: Normal breath sounds.   Abdominal:      General: Bowel sounds are normal.      Palpations: Abdomen is soft.   Musculoskeletal:      Cervical back: Neck supple.      Right lower leg: Edema present.      Left lower leg: Edema present.   Neurological:      Mental Status: He is alert.   Psychiatric:         Mood and Affect: Mood normal.         Behavior: Behavior is cooperative.         Assessment/Plan  Problem List Items Addressed This Visit          Nervous    CVA (cerebral vascular accident) (CMS/HCC)     Stable,   monitor for changes in weakness and speech  Therapy to continue            Other    Weakness     Wheelchair for mobility  Continue Therapy        Medications, treatments, and labs reviewed  Continue medications and treatments as listed in [facility]    Scribe Attestation  By signing my name below, I, Mitch Blackburn   attest that this documentation has been prepared under the direction and in the presence of Alfredo Bro MD.    Provider Attestation - Scribe documentation  All medical record entries made by the Scribe were at my direction and personally dictated by me. I have reviewed the chart and agree that the record accurately reflects my personal performance of the  history, physical exam, discussion and plan.    1. Weakness        2. Cerebrovascular accident (CVA), unspecified mechanism (CMS/HCC)               Electronically Signed By: Alfredo Bro MD   6/9/23  6:58 PM

## 2023-06-09 NOTE — PROGRESS NOTES
PROGRESS NOTE    Subjective   Chief complaint: Edenilson Ness is a 62 y.o. male who is a long term care patient being seen and evaluated for weakness    HPI:  Patient with hx of CVA, working with therapy due to weakness. Denies pain or discomfort. He is walking with walker and SBA to [facility]. No new concerns today. Denies constitutional symptoms.      Objective   Vital signs: 123/76, 98%    Physical Exam  Constitutional:       General: He is not in acute distress.  Eyes:      Extraocular Movements: Extraocular movements intact.   Cardiovascular:      Rate and Rhythm: Normal rate and regular rhythm.   Pulmonary:      Effort: Pulmonary effort is normal.      Breath sounds: Normal breath sounds.   Abdominal:      General: Bowel sounds are normal.      Palpations: Abdomen is soft.   Musculoskeletal:      Cervical back: Neck supple.      Right lower leg: Edema present.      Left lower leg: Edema present.   Neurological:      Mental Status: He is alert.   Psychiatric:         Mood and Affect: Mood normal.         Behavior: Behavior is cooperative.         Assessment/Plan   Problem List Items Addressed This Visit          Nervous    CVA (cerebral vascular accident) (CMS/HCC)     Stable,   monitor for changes in weakness and speech  Therapy to continue            Other    Weakness     Wheelchair for mobility  Continue Therapy        Medications, treatments, and labs reviewed  Continue medications and treatments as listed in [facility]    Scribe Attestation  By signing my name below, I, Mitch Blackburn   attest that this documentation has been prepared under the direction and in the presence of Alfredo Bro MD.    Provider Attestation - Scribe documentation  All medical record entries made by the Scribe were at my direction and personally dictated by me. I have reviewed the chart and agree that the record accurately reflects my personal performance of the history, physical exam, discussion and plan.    1. Weakness        2.  Cerebrovascular accident (CVA), unspecified mechanism (CMS/HCC)

## 2023-06-14 ENCOUNTER — NURSING HOME VISIT (OUTPATIENT)
Dept: POST ACUTE CARE | Facility: EXTERNAL LOCATION | Age: 63
End: 2023-06-14
Payer: COMMERCIAL

## 2023-06-14 DIAGNOSIS — R53.1 WEAKNESS: ICD-10-CM

## 2023-06-14 DIAGNOSIS — I63.9 CEREBROVASCULAR ACCIDENT (CVA), UNSPECIFIED MECHANISM (MULTI): ICD-10-CM

## 2023-06-14 PROCEDURE — 99308 SBSQ NF CARE LOW MDM 20: CPT | Performed by: INTERNAL MEDICINE

## 2023-06-14 NOTE — LETTER
Patient: Edenilson Ness  : 1960    Encounter Date: 2023    PROGRESS NOTE    Subjective  Chief complaint: Edenilson Ness is a 62 y.o. male who is a long term care patient being seen and evaluated for weakness    HPI:  Patient with hx of CVA, working with therapy due to weakness.  He is able to ambulate several feet with walker and SBA to [facility]. No new concerns today. Denies constitutional symptoms.      Objective  Vital signs: 124/73, 98%    Physical Exam  Constitutional:       General: He is not in acute distress.  Eyes:      Extraocular Movements: Extraocular movements intact.   Cardiovascular:      Rate and Rhythm: Normal rate and regular rhythm.   Pulmonary:      Effort: Pulmonary effort is normal.      Breath sounds: Normal breath sounds.   Abdominal:      General: Bowel sounds are normal.      Palpations: Abdomen is soft.   Musculoskeletal:      Cervical back: Neck supple.      Right lower leg: Edema present.      Left lower leg: Edema present.   Neurological:      Mental Status: He is alert.   Psychiatric:         Mood and Affect: Mood normal.         Behavior: Behavior is cooperative.         Assessment/Plan  Problem List Items Addressed This Visit          Nervous    CVA (cerebral vascular accident) (CMS/HCC)     Stable,   monitor for changes in weakness and speech  Therapy to continue            Other    Weakness     Wheelchair for mobility  Continue Therapy        Medications, treatments, and labs reviewed  Continue medications and treatments as listed in [facility]    Scribe Attestation  By signing my name below, I, Mitch Blackburn   attest that this documentation has been prepared under the direction and in the presence of Alfredo Bro MD.    Provider Attestation - Scribe documentation  All medical record entries made by the Scribe were at my direction and personally dictated by me. I have reviewed the chart and agree that the record accurately reflects my personal performance of the history,  physical exam, discussion and plan.    1. Cerebrovascular accident (CVA), unspecified mechanism (CMS/HCC)        2. Weakness               Electronically Signed By: Alfredo Bro MD   6/19/23 12:00 PM

## 2023-06-19 NOTE — PROGRESS NOTES
PROGRESS NOTE    Subjective   Chief complaint: Edenilson Ness is a 62 y.o. male who is a long term care patient being seen and evaluated for weakness    HPI:  Patient with hx of CVA, working with therapy due to weakness.  He is able to ambulate several feet with walker and SBA to [facility]. No new concerns today. Denies constitutional symptoms.      Objective   Vital signs: 124/73, 98%    Physical Exam  Constitutional:       General: He is not in acute distress.  Eyes:      Extraocular Movements: Extraocular movements intact.   Cardiovascular:      Rate and Rhythm: Normal rate and regular rhythm.   Pulmonary:      Effort: Pulmonary effort is normal.      Breath sounds: Normal breath sounds.   Abdominal:      General: Bowel sounds are normal.      Palpations: Abdomen is soft.   Musculoskeletal:      Cervical back: Neck supple.      Right lower leg: Edema present.      Left lower leg: Edema present.   Neurological:      Mental Status: He is alert.   Psychiatric:         Mood and Affect: Mood normal.         Behavior: Behavior is cooperative.         Assessment/Plan   Problem List Items Addressed This Visit          Nervous    CVA (cerebral vascular accident) (CMS/HCC)     Stable,   monitor for changes in weakness and speech  Therapy to continue            Other    Weakness     Wheelchair for mobility  Continue Therapy        Medications, treatments, and labs reviewed  Continue medications and treatments as listed in [facility]    Scribe Attestation  By signing my name below, I, Mitch Blackburn   attest that this documentation has been prepared under the direction and in the presence of Alfredo Bro MD.    Provider Attestation - Scribe documentation  All medical record entries made by the Scribe were at my direction and personally dictated by me. I have reviewed the chart and agree that the record accurately reflects my personal performance of the history, physical exam, discussion and plan.    1. Cerebrovascular accident  (CVA), unspecified mechanism (CMS/HCC)        2. Weakness

## 2023-06-23 ENCOUNTER — NURSING HOME VISIT (OUTPATIENT)
Dept: POST ACUTE CARE | Facility: EXTERNAL LOCATION | Age: 63
End: 2023-06-23
Payer: COMMERCIAL

## 2023-06-23 DIAGNOSIS — I63.9 CEREBROVASCULAR ACCIDENT (CVA), UNSPECIFIED MECHANISM (MULTI): ICD-10-CM

## 2023-06-23 DIAGNOSIS — R53.1 WEAKNESS: ICD-10-CM

## 2023-06-23 PROCEDURE — 99308 SBSQ NF CARE LOW MDM 20: CPT | Performed by: INTERNAL MEDICINE

## 2023-06-23 NOTE — PROGRESS NOTES
PROGRESS NOTE    Subjective   Chief complaint: Edenilson Ness is a 62 y.o. male who is a long term care patient being seen and evaluated for weakness    HPI:    Patient with history of CVA continues to work in physical therapy.  Patient is getting stronger and is up ambulating with walker and standby to contact-guard assist.  No new issues or concerns.  No acute distress.      Objective   Vital signs: 133/76, 98%    Physical Exam  Constitutional:       General: He is not in acute distress.  Eyes:      Extraocular Movements: Extraocular movements intact.   Cardiovascular:      Rate and Rhythm: Normal rate and regular rhythm.   Pulmonary:      Effort: Pulmonary effort is normal.      Breath sounds: Normal breath sounds.   Abdominal:      General: Bowel sounds are normal.      Palpations: Abdomen is soft.   Musculoskeletal:      Cervical back: Neck supple.      Right lower leg: Edema present.      Left lower leg: Edema present.   Neurological:      Mental Status: He is alert.   Psychiatric:         Mood and Affect: Mood normal.         Behavior: Behavior is cooperative.         Assessment/Plan   Problem List Items Addressed This Visit          Nervous    CVA (cerebral vascular accident) (CMS/HCC)     Stable,   monitor for changes in weakness and speech  Therapy to continue            Other    Weakness     Wheelchair for mobility  Continue Therapy          Medications, treatments, and labs reviewed  Continue medications and treatments as listed in [facility]    Scribe Attestation  By signing my name below, I, Mitch Blackburn   attest that this documentation has been prepared under the direction and in the presence of Alfredo Bro MD.    Provider Attestation - Scribe documentation  All medical record entries made by the Scribe were at my direction and personally dictated by me. I have reviewed the chart and agree that the record accurately reflects my personal performance of the history, physical exam, discussion and  plan.    1. Cerebrovascular accident (CVA), unspecified mechanism (CMS/HCC)        2. Weakness

## 2023-06-23 NOTE — LETTER
Patient: Edenilson Ness  : 1960    Encounter Date: 2023    PROGRESS NOTE    Subjective  Chief complaint: Edenilson Ness is a 62 y.o. male who is a long term care patient being seen and evaluated for weakness    HPI:    Patient with history of CVA continues to work in physical therapy.  Patient is getting stronger and is up ambulating with walker and standby to contact-guard assist.  No new issues or concerns.  No acute distress.      Objective  Vital signs: 133/76, 98%    Physical Exam  Constitutional:       General: He is not in acute distress.  Eyes:      Extraocular Movements: Extraocular movements intact.   Cardiovascular:      Rate and Rhythm: Normal rate and regular rhythm.   Pulmonary:      Effort: Pulmonary effort is normal.      Breath sounds: Normal breath sounds.   Abdominal:      General: Bowel sounds are normal.      Palpations: Abdomen is soft.   Musculoskeletal:      Cervical back: Neck supple.      Right lower leg: Edema present.      Left lower leg: Edema present.   Neurological:      Mental Status: He is alert.   Psychiatric:         Mood and Affect: Mood normal.         Behavior: Behavior is cooperative.         Assessment/Plan  Problem List Items Addressed This Visit          Nervous    CVA (cerebral vascular accident) (CMS/HCC)     Stable,   monitor for changes in weakness and speech  Therapy to continue            Other    Weakness     Wheelchair for mobility  Continue Therapy          Medications, treatments, and labs reviewed  Continue medications and treatments as listed in [facility]    Scribe Attestation  By signing my name below, IHeather Scribe   attest that this documentation has been prepared under the direction and in the presence of Alfredo Bro MD.    Provider Attestation - Scribe documentation  All medical record entries made by the Scribe were at my direction and personally dictated by me. I have reviewed the chart and agree that the record accurately reflects my  personal performance of the history, physical exam, discussion and plan.    1. Cerebrovascular accident (CVA), unspecified mechanism (CMS/HCC)        2. Weakness               Electronically Signed By: Alfredo Bro MD   6/23/23  5:54 PM

## 2023-06-28 ENCOUNTER — NURSING HOME VISIT (OUTPATIENT)
Dept: POST ACUTE CARE | Facility: EXTERNAL LOCATION | Age: 63
End: 2023-06-28
Payer: COMMERCIAL

## 2023-06-28 DIAGNOSIS — R53.1 WEAKNESS: ICD-10-CM

## 2023-06-28 DIAGNOSIS — I63.9 CEREBROVASCULAR ACCIDENT (CVA), UNSPECIFIED MECHANISM (MULTI): ICD-10-CM

## 2023-06-28 PROCEDURE — 99308 SBSQ NF CARE LOW MDM 20: CPT | Performed by: INTERNAL MEDICINE

## 2023-06-28 NOTE — LETTER
Patient: Edenilson Ness  : 1960    Encounter Date: 2023    PROGRESS NOTE    Subjective  Chief complaint: Edenilson Ness is a 62 y.o. male who is a long term care patient being seen and evaluated for weakness    HPI:    Patient with history of CVA continues to work in physical therapy.  Patient is up ambulating with walker and standby to contact-guard assist.  No new issues or concerns.  No acute distress.   Denies increased weakness or changes in speech.      Objective  Vital signs: 136/76, 98%    Physical Exam  Constitutional:       General: He is not in acute distress.  Eyes:      Extraocular Movements: Extraocular movements intact.   Cardiovascular:      Rate and Rhythm: Normal rate and regular rhythm.   Pulmonary:      Effort: Pulmonary effort is normal.      Breath sounds: Normal breath sounds.   Abdominal:      General: Bowel sounds are normal.      Palpations: Abdomen is soft.   Musculoskeletal:      Cervical back: Neck supple.      Right lower leg: Edema present.      Left lower leg: Edema present.   Neurological:      Mental Status: He is alert.   Psychiatric:         Mood and Affect: Mood normal.         Behavior: Behavior is cooperative.         Assessment/Plan  Problem List Items Addressed This Visit          Neuro    CVA (cerebral vascular accident) (CMS/HCC)     Stable,   monitor for changes in weakness and speech  Therapy to continue            Symptoms and Signs    Weakness     Wheelchair for mobility  Continue Therapy        Medications, treatments, and labs reviewed  Continue medications and treatments as listed in [facility]    Scribe Attestation  By signing my name below, IHeather Scribe   attest that this documentation has been prepared under the direction and in the presence of Alfredo Bro MD.    Provider Attestation - Scribe documentation  All medical record entries made by the Scribe were at my direction and personally dictated by me. I have reviewed the chart and agree that  the record accurately reflects my personal performance of the history, physical exam, discussion and plan.    1. Cerebrovascular accident (CVA), unspecified mechanism (CMS/HCC)        2. Weakness               Electronically Signed By: Alfredo Bro MD   6/28/23  5:28 PM

## 2023-06-28 NOTE — PROGRESS NOTES
PROGRESS NOTE    Subjective   Chief complaint: Edenilson Ness is a 62 y.o. male who is a long term care patient being seen and evaluated for weakness    HPI:    Patient with history of CVA continues to work in physical therapy.  Patient is up ambulating with walker and standby to contact-guard assist.  No new issues or concerns.  No acute distress.   Denies increased weakness or changes in speech.      Objective   Vital signs: 136/76, 98%    Physical Exam  Constitutional:       General: He is not in acute distress.  Eyes:      Extraocular Movements: Extraocular movements intact.   Cardiovascular:      Rate and Rhythm: Normal rate and regular rhythm.   Pulmonary:      Effort: Pulmonary effort is normal.      Breath sounds: Normal breath sounds.   Abdominal:      General: Bowel sounds are normal.      Palpations: Abdomen is soft.   Musculoskeletal:      Cervical back: Neck supple.      Right lower leg: Edema present.      Left lower leg: Edema present.   Neurological:      Mental Status: He is alert.   Psychiatric:         Mood and Affect: Mood normal.         Behavior: Behavior is cooperative.         Assessment/Plan   Problem List Items Addressed This Visit          Neuro    CVA (cerebral vascular accident) (CMS/HCC)     Stable,   monitor for changes in weakness and speech  Therapy to continue            Symptoms and Signs    Weakness     Wheelchair for mobility  Continue Therapy        Medications, treatments, and labs reviewed  Continue medications and treatments as listed in [facility]    Scribe Attestation  By signing my name below, I, Mitch Blackburn   attest that this documentation has been prepared under the direction and in the presence of Alfredo Bro MD.    Provider Attestation - Scribe documentation  All medical record entries made by the Scribe were at my direction and personally dictated by me. I have reviewed the chart and agree that the record accurately reflects my personal performance of the  history, physical exam, discussion and plan.    1. Cerebrovascular accident (CVA), unspecified mechanism (CMS/HCC)        2. Weakness

## 2023-07-05 ENCOUNTER — NURSING HOME VISIT (OUTPATIENT)
Dept: POST ACUTE CARE | Facility: EXTERNAL LOCATION | Age: 63
End: 2023-07-05
Payer: COMMERCIAL

## 2023-07-05 DIAGNOSIS — Z79.4 TYPE 2 DIABETES MELLITUS WITHOUT COMPLICATION, WITH LONG-TERM CURRENT USE OF INSULIN (MULTI): ICD-10-CM

## 2023-07-05 DIAGNOSIS — I10 PRIMARY HYPERTENSION: ICD-10-CM

## 2023-07-05 DIAGNOSIS — E11.9 TYPE 2 DIABETES MELLITUS WITHOUT COMPLICATION, WITH LONG-TERM CURRENT USE OF INSULIN (MULTI): ICD-10-CM

## 2023-07-05 DIAGNOSIS — I63.9 CEREBROVASCULAR ACCIDENT (CVA), UNSPECIFIED MECHANISM (MULTI): ICD-10-CM

## 2023-07-05 PROCEDURE — 99309 SBSQ NF CARE MODERATE MDM 30: CPT | Performed by: INTERNAL MEDICINE

## 2023-07-05 NOTE — PROGRESS NOTES
PROGRESS NOTE    Subjective   Chief complaint: Edenilson Ness is a 62 y.o. male who is a long term care patient being seen and evaluated for monthly general medical care and follow-up.    HPI:      Patient presents for general medical care and f/u.  Patient seen and examined at bedside.  No issues per nursing.  Patient has no acute complaints.  HTN, Denies chest pain and headache.   patient with DM, denies vision changes, excessive thirst, sweating, urinary frequency. hx CVA, denies changes in weakness and speech. No acute distress      Objective   Vital signs: 136/74, 98%    Physical Exam  Constitutional:       General: He is not in acute distress.  Eyes:      Extraocular Movements: Extraocular movements intact.   Cardiovascular:      Rate and Rhythm: Normal rate and regular rhythm.   Pulmonary:      Effort: Pulmonary effort is normal.      Breath sounds: Normal breath sounds.   Abdominal:      General: Bowel sounds are normal.      Palpations: Abdomen is soft.   Musculoskeletal:      Cervical back: Neck supple.      Right lower leg: No edema.      Left lower leg: No edema.   Neurological:      Mental Status: He is alert.   Psychiatric:         Mood and Affect: Mood normal.         Behavior: Behavior is cooperative.         Assessment/Plan   Problem List Items Addressed This Visit          Cardiac and Vasculature    HTN (hypertension)     Monitor BP  Continue antihypertensives            Endocrine/Metabolic    Type 2 diabetes mellitus without complication, with long-term current use of insulin (CMS/[facility])     Monitor sweets and carb intake  Continue to monitor BS  Continue current medications for DM            Neuro    CVA (cerebral vascular accident) (CMS/[facility])     Stable,   monitor for changes in weakness and speech          Medications, treatments, and labs reviewed  Continue medications and treatments as listed in [facility]    Scribe Attestation  By signing my name below, I, Heather Obrien, Scribe   attest that this  documentation has been prepared under the direction and in the presence of Alfredo Bro MD.    Provider Attestation - Scribe documentation  All medical record entries made by the Scribe were at my direction and personally dictated by me. I have reviewed the chart and agree that the record accurately reflects my personal performance of the history, physical exam, discussion and plan.    1. Cerebrovascular accident (CVA), unspecified mechanism (CMS/[facility])        2. Primary hypertension        3. Type 2 diabetes mellitus without complication, with long-term current use of insulin (CMS/HCC)

## 2023-07-05 NOTE — LETTER
Patient: Edenilson Ness  : 1960    Encounter Date: 2023    PROGRESS NOTE    Subjective  Chief complaint: Edenilson Ness is a 62 y.o. male who is a long term care patient being seen and evaluated for monthly general medical care and follow-up.    HPI:      Patient presents for general medical care and f/u.  Patient seen and examined at bedside.  No issues per nursing.  Patient has no acute complaints.  HTN, Denies chest pain and headache.   patient with DM, denies vision changes, excessive thirst, sweating, urinary frequency. hx CVA, denies changes in weakness and speech. No acute distress      Objective  Vital signs: 136/74, 98%    Physical Exam  Constitutional:       General: He is not in acute distress.  Eyes:      Extraocular Movements: Extraocular movements intact.   Cardiovascular:      Rate and Rhythm: Normal rate and regular rhythm.   Pulmonary:      Effort: Pulmonary effort is normal.      Breath sounds: Normal breath sounds.   Abdominal:      General: Bowel sounds are normal.      Palpations: Abdomen is soft.   Musculoskeletal:      Cervical back: Neck supple.      Right lower leg: No edema.      Left lower leg: No edema.   Neurological:      Mental Status: He is alert.   Psychiatric:         Mood and Affect: Mood normal.         Behavior: Behavior is cooperative.         Assessment/Plan  Problem List Items Addressed This Visit          Cardiac and Vasculature    HTN (hypertension)     Monitor BP  Continue antihypertensives            Endocrine/Metabolic    Type 2 diabetes mellitus without complication, with long-term current use of insulin (CMS/[facility])     Monitor sweets and carb intake  Continue to monitor BS  Continue current medications for DM            Neuro    CVA (cerebral vascular accident) (CMS/[facility])     Stable,   monitor for changes in weakness and speech          Medications, treatments, and labs reviewed  Continue medications and treatments as listed in [facility]    Scribe Attestation  By  signing my name below, I, Mitch Blackburn   attest that this documentation has been prepared under the direction and in the presence of Alfredo Bro MD.    Provider Attestation - Scribe documentation  All medical record entries made by the Scribe were at my direction and personally dictated by me. I have reviewed the chart and agree that the record accurately reflects my personal performance of the history, physical exam, discussion and plan.    1. Cerebrovascular accident (CVA), unspecified mechanism (CMS/HCC)        2. Primary hypertension        3. Type 2 diabetes mellitus without complication, with long-term current use of insulin (CMS/HCC)               Electronically Signed By: Alfredo Bro MD   7/5/23  5:44 PM

## 2023-07-07 ENCOUNTER — NURSING HOME VISIT (OUTPATIENT)
Dept: POST ACUTE CARE | Facility: EXTERNAL LOCATION | Age: 63
End: 2023-07-07
Payer: COMMERCIAL

## 2023-07-07 DIAGNOSIS — R53.1 WEAKNESS: ICD-10-CM

## 2023-07-07 DIAGNOSIS — I63.9 CEREBROVASCULAR ACCIDENT (CVA), UNSPECIFIED MECHANISM (MULTI): ICD-10-CM

## 2023-07-07 PROCEDURE — 99308 SBSQ NF CARE LOW MDM 20: CPT | Performed by: INTERNAL MEDICINE

## 2023-07-07 NOTE — PROGRESS NOTES
PROGRESS NOTE    Subjective   Chief complaint: Edenilson Ness is a 62 y.o. male who is a long term care patient being seen and evaluated for weakness    HPI:  Patient is working in physical therapy due to weakness and history of CVA.  He denies increased weakness or changes in speech.  He is ambulating several feet with walker and standby to contact-guard assist.  No acute distress.      Objective   Vital signs: 126/74, 98%    Physical Exam  Constitutional:       General: He is not in acute distress.  Eyes:      Extraocular Movements: Extraocular movements intact.   Cardiovascular:      Rate and Rhythm: Normal rate and regular rhythm.   Pulmonary:      Effort: Pulmonary effort is normal.      Breath sounds: Normal breath sounds.   Abdominal:      General: Bowel sounds are normal.      Palpations: Abdomen is soft.   Musculoskeletal:      Cervical back: Neck supple.      Right lower leg: No edema.      Left lower leg: No edema.   Neurological:      Mental Status: He is alert.   Psychiatric:         Mood and Affect: Mood normal.         Behavior: Behavior is cooperative.         Assessment/Plan   Problem List Items Addressed This Visit          Neuro    CVA (cerebral vascular accident) (CMS/HCC)     Stable,   monitor for changes in weakness and speech              Symptoms and Signs    Weakness     Wheelchair for mobility  Continue Therapy          Medications, treatments, and labs reviewed  Continue medications and treatments as listed in [facility]    Scribe Attestation  By signing my name below, I, Mitch Blackburn   attest that this documentation has been prepared under the direction and in the presence of Alfredo Bro MD.    Provider Attestation - Scribe documentation  All medical record entries made by the Scribe were at my direction and personally dictated by me. I have reviewed the chart and agree that the record accurately reflects my personal performance of the history, physical exam, discussion and  plan.    1. Cerebrovascular accident (CVA), unspecified mechanism (CMS/HCC)        2. Weakness

## 2023-07-07 NOTE — LETTER
Patient: Edenilson Ness  : 1960    Encounter Date: 2023    PROGRESS NOTE    Subjective  Chief complaint: Edenilson Ness is a 62 y.o. male who is a long term care patient being seen and evaluated for weakness    HPI:  Patient is working in physical therapy due to weakness and history of CVA.  He denies increased weakness or changes in speech.  He is ambulating several feet with walker and standby to contact-guard assist.  No acute distress.      Objective  Vital signs: 126/74, 98%    Physical Exam  Constitutional:       General: He is not in acute distress.  Eyes:      Extraocular Movements: Extraocular movements intact.   Cardiovascular:      Rate and Rhythm: Normal rate and regular rhythm.   Pulmonary:      Effort: Pulmonary effort is normal.      Breath sounds: Normal breath sounds.   Abdominal:      General: Bowel sounds are normal.      Palpations: Abdomen is soft.   Musculoskeletal:      Cervical back: Neck supple.      Right lower leg: No edema.      Left lower leg: No edema.   Neurological:      Mental Status: He is alert.   Psychiatric:         Mood and Affect: Mood normal.         Behavior: Behavior is cooperative.         Assessment/Plan  Problem List Items Addressed This Visit          Neuro    CVA (cerebral vascular accident) (CMS/HCC)     Stable,   monitor for changes in weakness and speech              Symptoms and Signs    Weakness     Wheelchair for mobility  Continue Therapy          Medications, treatments, and labs reviewed  Continue medications and treatments as listed in [facility]    Scribe Attestation  By signing my name below, IHeather Scribe   attest that this documentation has been prepared under the direction and in the presence of Alfredo Bro MD.    Provider Attestation - Scribe documentation  All medical record entries made by the Scribe were at my direction and personally dictated by me. I have reviewed the chart and agree that the record accurately reflects my personal  performance of the history, physical exam, discussion and plan.    1. Cerebrovascular accident (CVA), unspecified mechanism (CMS/HCC)        2. Weakness               Electronically Signed By: Alfredo Bro MD   7/8/23  2:23 PM

## 2023-07-12 ENCOUNTER — NURSING HOME VISIT (OUTPATIENT)
Dept: POST ACUTE CARE | Facility: EXTERNAL LOCATION | Age: 63
End: 2023-07-12
Payer: COMMERCIAL

## 2023-07-12 DIAGNOSIS — R53.1 WEAKNESS: ICD-10-CM

## 2023-07-12 DIAGNOSIS — I63.9 CEREBROVASCULAR ACCIDENT (CVA), UNSPECIFIED MECHANISM (MULTI): ICD-10-CM

## 2023-07-12 PROCEDURE — 99308 SBSQ NF CARE LOW MDM 20: CPT | Performed by: INTERNAL MEDICINE

## 2023-07-12 NOTE — PROGRESS NOTES
PROGRESS NOTE    Subjective   Chief complaint: Edenilson Ness is a 62 y.o. male who is a long term care patient being seen and evaluated for weakness    HPI:  Patient is working in physical therapy due to weakness and history of CVA.  He is able to ambulate several feet with walker and standby to contact-guard assist.  No acute distress. No new concerns at this time.       Objective   Vital signs: 124/76, 98%    Physical Exam  Constitutional:       General: He is not in acute distress.  Eyes:      Extraocular Movements: Extraocular movements intact.   Cardiovascular:      Rate and Rhythm: Normal rate and regular rhythm.   Pulmonary:      Effort: Pulmonary effort is normal.      Breath sounds: Normal breath sounds.   Abdominal:      General: Bowel sounds are normal.      Palpations: Abdomen is soft.   Musculoskeletal:      Cervical back: Neck supple.      Right lower leg: No edema.      Left lower leg: No edema.   Neurological:      Mental Status: He is alert.   Psychiatric:         Mood and Affect: Mood normal.         Behavior: Behavior is cooperative.         Assessment/Plan   Problem List Items Addressed This Visit          Neuro    CVA (cerebral vascular accident) (CMS/HCC)     Stable,   monitor for changes in weakness and speech  Continue therapy            Symptoms and Signs    Weakness     Wheelchair for mobility  Continue Therapy        Medications, treatments, and labs reviewed  Continue medications and treatments as listed in [facility]    Scribe Attestation  By signing my name below, I, Mitch Blackburn   attest that this documentation has been prepared under the direction and in the presence of Alfredo Bro MD.    Provider Attestation - Scribe documentation  All medical record entries made by the Scribe were at my direction and personally dictated by me. I have reviewed the chart and agree that the record accurately reflects my personal performance of the history, physical exam, discussion and plan.    1.  Cerebrovascular accident (CVA), unspecified mechanism (CMS/HCC)        2. Weakness

## 2023-07-12 NOTE — LETTER
Patient: Edenilson Ness  : 1960    Encounter Date: 2023    PROGRESS NOTE    Subjective  Chief complaint: Edenilson Ness is a 62 y.o. male who is a long term care patient being seen and evaluated for weakness    HPI:  Patient is working in physical therapy due to weakness and history of CVA.  He is able to ambulate several feet with walker and standby to contact-guard assist.  No acute distress. No new concerns at this time.       Objective  Vital signs: 124/76, 98%    Physical Exam  Constitutional:       General: He is not in acute distress.  Eyes:      Extraocular Movements: Extraocular movements intact.   Cardiovascular:      Rate and Rhythm: Normal rate and regular rhythm.   Pulmonary:      Effort: Pulmonary effort is normal.      Breath sounds: Normal breath sounds.   Abdominal:      General: Bowel sounds are normal.      Palpations: Abdomen is soft.   Musculoskeletal:      Cervical back: Neck supple.      Right lower leg: No edema.      Left lower leg: No edema.   Neurological:      Mental Status: He is alert.   Psychiatric:         Mood and Affect: Mood normal.         Behavior: Behavior is cooperative.         Assessment/Plan  Problem List Items Addressed This Visit          Neuro    CVA (cerebral vascular accident) (CMS/HCC)     Stable,   monitor for changes in weakness and speech  Continue therapy            Symptoms and Signs    Weakness     Wheelchair for mobility  Continue Therapy        Medications, treatments, and labs reviewed  Continue medications and treatments as listed in [facility]    Scribe Attestation  By signing my name below, IHeather Scribe   attest that this documentation has been prepared under the direction and in the presence of Alfredo Bro MD.    Provider Attestation - Scribe documentation  All medical record entries made by the Scribe were at my direction and personally dictated by me. I have reviewed the chart and agree that the record accurately reflects my personal  performance of the history, physical exam, discussion and plan.    1. Cerebrovascular accident (CVA), unspecified mechanism (CMS/HCC)        2. Weakness               Electronically Signed By: Alfredo Bro MD   7/12/23  5:11 PM

## 2023-08-02 ENCOUNTER — NURSING HOME VISIT (OUTPATIENT)
Dept: POST ACUTE CARE | Facility: EXTERNAL LOCATION | Age: 63
End: 2023-08-02
Payer: COMMERCIAL

## 2023-08-02 DIAGNOSIS — I10 PRIMARY HYPERTENSION: ICD-10-CM

## 2023-08-02 DIAGNOSIS — Z79.4 TYPE 2 DIABETES MELLITUS WITHOUT COMPLICATION, WITH LONG-TERM CURRENT USE OF INSULIN (MULTI): ICD-10-CM

## 2023-08-02 DIAGNOSIS — E11.9 TYPE 2 DIABETES MELLITUS WITHOUT COMPLICATION, WITH LONG-TERM CURRENT USE OF INSULIN (MULTI): ICD-10-CM

## 2023-08-02 DIAGNOSIS — I63.9 CEREBROVASCULAR ACCIDENT (CVA), UNSPECIFIED MECHANISM (MULTI): Primary | ICD-10-CM

## 2023-08-02 PROCEDURE — 99309 SBSQ NF CARE MODERATE MDM 30: CPT | Performed by: INTERNAL MEDICINE

## 2023-08-02 NOTE — LETTER
Patient: Edenilson Ness  : 1960    Encounter Date: 2023    PROGRESS NOTE    Subjective  Chief complaint: Edenilson Ness is a 62 y.o. male who is a long term care patient being seen and evaluated for monthly general medical care and follow-up.    HPI:      Patient presents for general medical care and f/u.  Patient seen and examined at bedside.  No issues per nursing.  Patient has no acute complaints.  HTN, Denies chest pain and headache.   patient with DM, denies vision changes, excessive thirst, sweating, urinary frequency. hx CVA, denies changes in weakness and speech. No acute distress      Objective  Vital signs: 132/73, 98%    Physical Exam  Constitutional:       General: He is not in acute distress.  Eyes:      Extraocular Movements: Extraocular movements intact.   Cardiovascular:      Rate and Rhythm: Normal rate and regular rhythm.   Pulmonary:      Effort: Pulmonary effort is normal.      Breath sounds: Normal breath sounds.   Abdominal:      General: Bowel sounds are normal.      Palpations: Abdomen is soft.   Musculoskeletal:      Cervical back: Neck supple.      Right lower leg: Edema present.      Left lower leg: Edema present.   Neurological:      Mental Status: He is alert.   Psychiatric:         Mood and Affect: Mood normal.         Behavior: Behavior is cooperative.         Assessment/Plan  Problem List Items Addressed This Visit       CVA (cerebral vascular accident) (CMS/[facility]) - Primary     Stable,   monitor for changes in weakness and speech  Continue therapy         Type 2 diabetes mellitus without complication, with long-term current use of insulin (CMS/[facility])     Monitor sweets and carb intake  Continue to monitor BS  Continue current medications for DM         HTN (hypertension)     Monitor BP  Continue antihypertensives        Medications, treatments, and labs reviewed  Continue medications and treatments as listed in [facility]    Scribe Attestation  By signing my name below, Heather RAMIREZ  Mitch Obrien   attest that this documentation has been prepared under the direction and in the presence of Alfredo Bro MD.    Provider Attestation - Scribe documentation  All medical record entries made by the Scribe were at my direction and personally dictated by me. I have reviewed the chart and agree that the record accurately reflects my personal performance of the history, physical exam, discussion and plan.    1. Cerebrovascular accident (CVA), unspecified mechanism (CMS/HCC)        2. Primary hypertension        3. Type 2 diabetes mellitus without complication, with long-term current use of insulin (CMS/HCC)               Electronically Signed By: Alfredo Bro MD   8/3/23 10:39 AM

## 2023-08-02 NOTE — PROGRESS NOTES
PROGRESS NOTE    Subjective   Chief complaint: Edenilson Ness is a 62 y.o. male who is a long term care patient being seen and evaluated for monthly general medical care and follow-up.    HPI:      Patient presents for general medical care and f/u.  Patient seen and examined at bedside.  No issues per nursing.  Patient has no acute complaints.  HTN, Denies chest pain and headache.   patient with DM, denies vision changes, excessive thirst, sweating, urinary frequency. hx CVA, denies changes in weakness and speech. No acute distress      Objective   Vital signs: 132/73, 98%    Physical Exam  Constitutional:       General: He is not in acute distress.  Eyes:      Extraocular Movements: Extraocular movements intact.   Cardiovascular:      Rate and Rhythm: Normal rate and regular rhythm.   Pulmonary:      Effort: Pulmonary effort is normal.      Breath sounds: Normal breath sounds.   Abdominal:      General: Bowel sounds are normal.      Palpations: Abdomen is soft.   Musculoskeletal:      Cervical back: Neck supple.      Right lower leg: Edema present.      Left lower leg: Edema present.   Neurological:      Mental Status: He is alert.   Psychiatric:         Mood and Affect: Mood normal.         Behavior: Behavior is cooperative.         Assessment/Plan   Problem List Items Addressed This Visit       CVA (cerebral vascular accident) (CMS/[facility]) - Primary     Stable,   monitor for changes in weakness and speech  Continue therapy         Type 2 diabetes mellitus without complication, with long-term current use of insulin (CMS/[facility])     Monitor sweets and carb intake  Continue to monitor BS  Continue current medications for DM         HTN (hypertension)     Monitor BP  Continue antihypertensives        Medications, treatments, and labs reviewed  Continue medications and treatments as listed in [facility]    Scribe Attestation  By signing my name below, I, Heather Obrien, Scribe   attest that this documentation has been prepared under  the direction and in the presence of Alfredo Bro MD.    Provider Attestation - Scribe documentation  All medical record entries made by the Scribe were at my direction and personally dictated by me. I have reviewed the chart and agree that the record accurately reflects my personal performance of the history, physical exam, discussion and plan.    1. Cerebrovascular accident (CVA), unspecified mechanism (CMS/HCC)        2. Primary hypertension        3. Type 2 diabetes mellitus without complication, with long-term current use of insulin (CMS/HCC)

## 2023-08-18 ENCOUNTER — NURSING HOME VISIT (OUTPATIENT)
Dept: POST ACUTE CARE | Facility: EXTERNAL LOCATION | Age: 63
End: 2023-08-18
Payer: COMMERCIAL

## 2023-08-18 DIAGNOSIS — I10 PRIMARY HYPERTENSION: Primary | ICD-10-CM

## 2023-08-18 DIAGNOSIS — I63.9 CEREBROVASCULAR ACCIDENT (CVA), UNSPECIFIED MECHANISM (MULTI): ICD-10-CM

## 2023-08-18 DIAGNOSIS — Z53.20 MEDICATION REFUSED: ICD-10-CM

## 2023-08-18 PROCEDURE — 99308 SBSQ NF CARE LOW MDM 20: CPT | Performed by: INTERNAL MEDICINE

## 2023-08-18 NOTE — LETTER
Patient: Edenilson Ness  : 1960    Encounter Date: 2023    PROGRESS NOTE    Subjective  Chief complaint: Edenilson Ness is a 62 y.o. male who is a long term care patient being seen and evaluated for refused meds    HPI:  Nurse reported that patient with hx of CVA, refused his medications. Teaching provided and patient understands the risks. Denies chest pain or headache, changes in speech or increased weakness. No acute distress.       Objective  Vital signs: 132/74, 98%    Physical Exam  Constitutional:       General: He is not in acute distress.  Eyes:      Extraocular Movements: Extraocular movements intact.   Cardiovascular:      Rate and Rhythm: Normal rate and regular rhythm.   Pulmonary:      Effort: Pulmonary effort is normal.      Breath sounds: Normal breath sounds.   Abdominal:      General: Bowel sounds are normal.      Palpations: Abdomen is soft.   Musculoskeletal:      Cervical back: Neck supple.      Right lower leg: No edema.      Left lower leg: No edema.   Neurological:      Mental Status: He is alert.   Psychiatric:         Mood and Affect: Mood normal.         Behavior: Behavior is cooperative.         Assessment/Plan  Problem List Items Addressed This Visit       CVA (cerebral vascular accident) (CMS/HCC)     Stable,   monitor for changes in weakness and speech  Continue therapy         HTN (hypertension) - Primary     Monitor BP  Continue antihypertensives         Medication refused     Understands risks          Medications, treatments, and labs reviewed  Continue medications and treatments as listed in [facility]    Scribe Attestation  By signing my name below, IHeather Scribe   attest that this documentation has been prepared under the direction and in the presence of Alfredo Bro MD.    Provider Attestation - Scribe documentation  All medical record entries made by the Scribe were at my direction and personally dictated by me. I have reviewed the chart and agree that the  record accurately reflects my personal performance of the history, physical exam, discussion and plan.    1. Primary hypertension        2. Cerebrovascular accident (CVA), unspecified mechanism (CMS/HCC)        3. Medication refused               Electronically Signed By: Alfredo Bro MD   8/21/23 12:12 PM

## 2023-08-21 PROBLEM — Z53.20 MEDICATION REFUSED: Status: ACTIVE | Noted: 2023-08-21

## 2023-08-21 NOTE — PROGRESS NOTES
PROGRESS NOTE    Subjective   Chief complaint: Edenilson Ness is a 62 y.o. male who is a long term care patient being seen and evaluated for refused meds    HPI:  Nurse reported that patient with hx of CVA, refused his medications. Teaching provided and patient understands the risks. Denies chest pain or headache, changes in speech or increased weakness. No acute distress.       Objective   Vital signs: 132/74, 98%    Physical Exam  Constitutional:       General: He is not in acute distress.  Eyes:      Extraocular Movements: Extraocular movements intact.   Cardiovascular:      Rate and Rhythm: Normal rate and regular rhythm.   Pulmonary:      Effort: Pulmonary effort is normal.      Breath sounds: Normal breath sounds.   Abdominal:      General: Bowel sounds are normal.      Palpations: Abdomen is soft.   Musculoskeletal:      Cervical back: Neck supple.      Right lower leg: No edema.      Left lower leg: No edema.   Neurological:      Mental Status: He is alert.   Psychiatric:         Mood and Affect: Mood normal.         Behavior: Behavior is cooperative.         Assessment/Plan   Problem List Items Addressed This Visit       CVA (cerebral vascular accident) (CMS/HCC)     Stable,   monitor for changes in weakness and speech  Continue therapy         HTN (hypertension) - Primary     Monitor BP  Continue antihypertensives         Medication refused     Understands risks          Medications, treatments, and labs reviewed  Continue medications and treatments as listed in [facility]    Scribe Attestation  By signing my name below, I, Mitch Blackburn   attest that this documentation has been prepared under the direction and in the presence of Alfredo Bro MD.    Provider Attestation - Scribe documentation  All medical record entries made by the Scribe were at my direction and personally dictated by me. I have reviewed the chart and agree that the record accurately reflects my personal performance of the history,  physical exam, discussion and plan.    1. Primary hypertension        2. Cerebrovascular accident (CVA), unspecified mechanism (CMS/HCC)        3. Medication refused

## 2023-08-31 ENCOUNTER — NURSING HOME VISIT (OUTPATIENT)
Dept: POST ACUTE CARE | Facility: EXTERNAL LOCATION | Age: 63
End: 2023-08-31
Payer: COMMERCIAL

## 2023-08-31 DIAGNOSIS — Z78.9 ADMITS TO ALCOHOL CONSUMPTION: Primary | ICD-10-CM

## 2023-08-31 DIAGNOSIS — I10 PRIMARY HYPERTENSION: ICD-10-CM

## 2023-08-31 DIAGNOSIS — I63.9 CEREBROVASCULAR ACCIDENT (CVA), UNSPECIFIED MECHANISM (MULTI): ICD-10-CM

## 2023-08-31 PROCEDURE — 99308 SBSQ NF CARE LOW MDM 20: CPT | Performed by: NURSE PRACTITIONER

## 2023-08-31 NOTE — LETTER
Patient: Edenilson Ness  : 1960    Encounter Date: 2023    PROGRESS NOTE    Subjective  Chief complaint: Edenilson Ness is a 62 y.o. male who is a long term care patient being seen and evaluated for alcohol consumption    HPI:  HPI  Nurse called on  and reported that patient was found to have been drinking alcohol.  Order was given to monitor him closely.  He did not have any adverse effects.  Patient states that he is feeling fine and has no new concerns today.    Objective  Vital signs: 138/72    Physical Exam  Constitutional:       General: He is not in acute distress.  Eyes:      Extraocular Movements: Extraocular movements intact.   Cardiovascular:      Rate and Rhythm: Normal rate and regular rhythm.   Pulmonary:      Effort: Pulmonary effort is normal.      Breath sounds: Normal breath sounds.   Musculoskeletal:      Cervical back: Neck supple.   Neurological:      Mental Status: He is alert.   Psychiatric:         Mood and Affect: Mood normal.         Behavior: Behavior is cooperative.         Assessment/Plan  Problem List Items Addressed This Visit       Admits to alcohol consumption - Primary     No adverse reaction         CVA (cerebral vascular accident) (CMS/[facility])     Stable         HTN (hypertension)     Controlled  Monitor BP          Medications, treatments, and labs reviewed  Continue medications and treatments as listed in EMR    LUCRETIA Griffiths      Electronically Signed By: LUCRETIA Griffiths   23  4:04 PM

## 2023-09-06 ENCOUNTER — NURSING HOME VISIT (OUTPATIENT)
Dept: POST ACUTE CARE | Facility: EXTERNAL LOCATION | Age: 63
End: 2023-09-06
Payer: COMMERCIAL

## 2023-09-06 DIAGNOSIS — Z79.4 TYPE 2 DIABETES MELLITUS WITHOUT COMPLICATION, WITH LONG-TERM CURRENT USE OF INSULIN (MULTI): Primary | ICD-10-CM

## 2023-09-06 DIAGNOSIS — E11.9 TYPE 2 DIABETES MELLITUS WITHOUT COMPLICATION, WITH LONG-TERM CURRENT USE OF INSULIN (MULTI): Primary | ICD-10-CM

## 2023-09-06 DIAGNOSIS — I63.9 CEREBROVASCULAR ACCIDENT (CVA), UNSPECIFIED MECHANISM (MULTI): ICD-10-CM

## 2023-09-06 DIAGNOSIS — I10 PRIMARY HYPERTENSION: ICD-10-CM

## 2023-09-06 PROCEDURE — 99309 SBSQ NF CARE MODERATE MDM 30: CPT | Performed by: INTERNAL MEDICINE

## 2023-09-06 NOTE — LETTER
Patient: Edenilson Ness  : 1960    Encounter Date: 2023    PROGRESS NOTE    Subjective  Chief complaint: Edenilson Ness is a 62 y.o. male who is a long term care patient being seen and evaluated for monthly general medical care and follow-up.    HPI:      Patient presents for general medical care and f/u.  Patient seen and examined at bedside.  No issues per nursing.  Patient has no acute complaints.  HTN, Denies chest pain and headache.   patient with DM, denies vision changes, excessive thirst, sweating, urinary frequency. hx CVA, denies changes in weakness and speech. No acute distress      Objective  Vital signs: 130/74, 98%    Physical Exam  Constitutional:       General: He is not in acute distress.  Eyes:      Extraocular Movements: Extraocular movements intact.   Cardiovascular:      Rate and Rhythm: Normal rate and regular rhythm.   Pulmonary:      Effort: Pulmonary effort is normal.      Breath sounds: Normal breath sounds.   Abdominal:      General: Bowel sounds are normal.      Palpations: Abdomen is soft.   Musculoskeletal:      Cervical back: Neck supple.      Right lower leg: Edema present.      Left lower leg: Edema present.   Neurological:      Mental Status: He is alert.   Psychiatric:         Mood and Affect: Mood normal.         Behavior: Behavior is cooperative.         Assessment/Plan  Problem List Items Addressed This Visit       CVA (cerebral vascular accident) (CMS/HCC)     Stable,   monitor for changes in weakness and speech  Continue therapy         Type 2 diabetes mellitus without complication, with long-term current use of insulin (CMS/[facility]) - Primary     Monitor sweets and carb intake  Continue to monitor BS  Continue current medications for DM         HTN (hypertension)     Monitor BP  Continue antihypertensives        Medications, treatments, and labs reviewed  Continue medications and treatments as listed in [facility]    Scribe Attestation  By signing my name below, Heather RAMIREZ  Mitch Obrien   attest that this documentation has been prepared under the direction and in the presence of Alfredo Bro MD.    Provider Attestation - Scribe documentation  All medical record entries made by the Scribe were at my direction and personally dictated by me. I have reviewed the chart and agree that the record accurately reflects my personal performance of the history, physical exam, discussion and plan.    1. Type 2 diabetes mellitus without complication, with long-term current use of insulin (CMS/[facility])        2. Primary hypertension        3. Cerebrovascular accident (CVA), unspecified mechanism (CMS/[facility])               Electronically Signed By: Alfredo Bro MD   9/7/23  3:07 PM

## 2023-09-07 NOTE — PROGRESS NOTES
PROGRESS NOTE    Subjective   Chief complaint: Edenilson Ness is a 62 y.o. male who is a long term care patient being seen and evaluated for monthly general medical care and follow-up.    HPI:      Patient presents for general medical care and f/u.  Patient seen and examined at bedside.  No issues per nursing.  Patient has no acute complaints.  HTN, Denies chest pain and headache.   patient with DM, denies vision changes, excessive thirst, sweating, urinary frequency. hx CVA, denies changes in weakness and speech. No acute distress      Objective   Vital signs: 130/74, 98%    Physical Exam  Constitutional:       General: He is not in acute distress.  Eyes:      Extraocular Movements: Extraocular movements intact.   Cardiovascular:      Rate and Rhythm: Normal rate and regular rhythm.   Pulmonary:      Effort: Pulmonary effort is normal.      Breath sounds: Normal breath sounds.   Abdominal:      General: Bowel sounds are normal.      Palpations: Abdomen is soft.   Musculoskeletal:      Cervical back: Neck supple.      Right lower leg: Edema present.      Left lower leg: Edema present.   Neurological:      Mental Status: He is alert.   Psychiatric:         Mood and Affect: Mood normal.         Behavior: Behavior is cooperative.         Assessment/Plan   Problem List Items Addressed This Visit       CVA (cerebral vascular accident) (CMS/HCC)     Stable,   monitor for changes in weakness and speech  Continue therapy         Type 2 diabetes mellitus without complication, with long-term current use of insulin (CMS/[facility]) - Primary     Monitor sweets and carb intake  Continue to monitor BS  Continue current medications for DM         HTN (hypertension)     Monitor BP  Continue antihypertensives        Medications, treatments, and labs reviewed  Continue medications and treatments as listed in [facility]    Scribe Attestation  By signing my name below, I, Heather Obrien, Scribe   attest that this documentation has been prepared  under the direction and in the presence of Alfredo Bro MD.    Provider Attestation - Scribe documentation  All medical record entries made by the Scribe were at my direction and personally dictated by me. I have reviewed the chart and agree that the record accurately reflects my personal performance of the history, physical exam, discussion and plan.    1. Type 2 diabetes mellitus without complication, with long-term current use of insulin (CMS/HCC)        2. Primary hypertension        3. Cerebrovascular accident (CVA), unspecified mechanism (CMS/HCC)

## 2023-09-08 PROBLEM — Z78.9 ADMITS TO ALCOHOL CONSUMPTION: Status: ACTIVE | Noted: 2023-09-08

## 2023-09-08 NOTE — PROGRESS NOTES
PROGRESS NOTE    Subjective   Chief complaint: Edenilson Ness is a 62 y.o. male who is a long term care patient being seen and evaluated for alcohol consumption    HPI:  HPI  Nurse called on 8/31 and reported that patient was found to have been drinking alcohol.  Order was given to monitor him closely.  He did not have any adverse effects.  Patient states that he is feeling fine and has no new concerns today.    Objective   Vital signs: 138/72    Physical Exam  Constitutional:       General: He is not in acute distress.  Eyes:      Extraocular Movements: Extraocular movements intact.   Cardiovascular:      Rate and Rhythm: Normal rate and regular rhythm.   Pulmonary:      Effort: Pulmonary effort is normal.      Breath sounds: Normal breath sounds.   Musculoskeletal:      Cervical back: Neck supple.   Neurological:      Mental Status: He is alert.   Psychiatric:         Mood and Affect: Mood normal.         Behavior: Behavior is cooperative.         Assessment/Plan   Problem List Items Addressed This Visit       Admits to alcohol consumption - Primary     No adverse reaction         CVA (cerebral vascular accident) (CMS/[facility])     Stable         HTN (hypertension)     Controlled  Monitor BP          Medications, treatments, and labs reviewed  Continue medications and treatments as listed in EMR    VANNA Griffiths-CNP

## 2023-09-21 ENCOUNTER — NURSING HOME VISIT (OUTPATIENT)
Dept: POST ACUTE CARE | Facility: EXTERNAL LOCATION | Age: 63
End: 2023-09-21
Payer: COMMERCIAL

## 2023-09-21 DIAGNOSIS — Z78.9 ADMITS TO ALCOHOL CONSUMPTION: Primary | ICD-10-CM

## 2023-09-21 DIAGNOSIS — I10 PRIMARY HYPERTENSION: ICD-10-CM

## 2023-09-21 PROCEDURE — 99307 SBSQ NF CARE SF MDM 10: CPT | Performed by: NURSE PRACTITIONER

## 2023-09-21 NOTE — LETTER
Patient: Edenilson Ness  : 1960    Encounter Date: 2023    PROGRESS NOTE    Subjective  Chief complaint: Edenilson Ness is a 62 y.o. male who is a long term care patient being seen and evaluated for ETOH use.    HPI:  HPI   Nurse reporting whiskey bottle found in patient's trash.  Patient seen and examined at bedside, nontoxic, in no apparent distress.  No further concerns reported.    Objective  Vital signs:   18, 111/74, 97.4, 72, 95%  Physical Exam  Constitutional:       General: He is not in acute distress.     Appearance: He is not toxic-appearing.   Eyes:      Extraocular Movements: Extraocular movements intact.   Cardiovascular:      Rate and Rhythm: Normal rate and regular rhythm.   Pulmonary:      Effort: Pulmonary effort is normal.      Breath sounds: Normal breath sounds.   Musculoskeletal:      Cervical back: Neck supple.   Neurological:      Mental Status: He is alert.   Psychiatric:         Mood and Affect: Mood normal.         Behavior: Behavior is cooperative.         Assessment/Plan  Problem List Items Addressed This Visit       Admits to alcohol consumption - Primary     No adverse reaction  Non-toxic appearing         HTN (hypertension)     Controlled  Monitor BP          Medications, treatments, and labs reviewed  Continue medications and treatments as listed in EMR    Scribe Attestation  IYana Scribe   attest that this documentation has been prepared under the direction and in the presence of LUCRETIA Griffiths    Provider Attestation - Scribe documentation  All medical record entries made by the Scribe were at my direction and personally dictated by me. I have reviewed the chart and agree that the record accurately reflects my personal performance of the history, physical exam, discussion and plan.   LUCRETIA Griffiths        Electronically Signed By: LUCRETIA Griffiths   23  9:11 AM

## 2023-09-22 ENCOUNTER — NURSING HOME VISIT (OUTPATIENT)
Dept: POST ACUTE CARE | Facility: EXTERNAL LOCATION | Age: 63
End: 2023-09-22
Payer: COMMERCIAL

## 2023-09-22 DIAGNOSIS — I10 PRIMARY HYPERTENSION: ICD-10-CM

## 2023-09-22 DIAGNOSIS — E11.9 TYPE 2 DIABETES MELLITUS WITHOUT COMPLICATION, WITH LONG-TERM CURRENT USE OF INSULIN (MULTI): Primary | ICD-10-CM

## 2023-09-22 DIAGNOSIS — Z79.4 TYPE 2 DIABETES MELLITUS WITHOUT COMPLICATION, WITH LONG-TERM CURRENT USE OF INSULIN (MULTI): Primary | ICD-10-CM

## 2023-09-22 PROCEDURE — 99308 SBSQ NF CARE LOW MDM 20: CPT | Performed by: INTERNAL MEDICINE

## 2023-09-22 NOTE — ASSESSMENT & PLAN NOTE
Monitor sweets and carb intake  Continue to monitor BS  Continue current medications for DM   monitor A1c routinely

## 2023-09-22 NOTE — LETTER
Patient: Edenilson Ness  : 1960    Encounter Date: 2023    PROGRESS NOTE    Subjective  Chief complaint: Edenilson Ness is a 62 y.o. male who is a long term care patient being seen and evaluated for   Diabetes    HPI:   patient with diabetes recent A1c's as reported by nurse is 7.0.  Blood sugars fairly well controlled.   Denies any urinary frequency or increased thirst. Patient denies chest pain or headache.  No acute distress.      Objective  Vital signs:  134/79, 98%    Physical Exam  Constitutional:       General: He is not in acute distress.  Eyes:      Extraocular Movements: Extraocular movements intact.   Cardiovascular:      Rate and Rhythm: Normal rate and regular rhythm.   Pulmonary:      Effort: Pulmonary effort is normal.      Breath sounds: Normal breath sounds.   Abdominal:      General: Bowel sounds are normal.      Palpations: Abdomen is soft.   Musculoskeletal:      Cervical back: Neck supple.      Right lower leg: Edema present.      Left lower leg: Edema present.   Neurological:      Mental Status: He is alert.   Psychiatric:         Mood and Affect: Mood normal.         Behavior: Behavior is cooperative.         Assessment/Plan  Problem List Items Addressed This Visit       Type 2 diabetes mellitus without complication, with long-term current use of insulin (CMS/[facility]) - Primary     Monitor sweets and carb intake  Continue to monitor BS  Continue current medications for DM   monitor A1c routinely         HTN (hypertension)     Controlled  Monitor BP          Medications, treatments, and labs reviewed  Continue medications and treatments as listed in [facility]    Scribe Attestation  By signing my name below, Heather RAMIREZ Scribe   attest that this documentation has been prepared under the direction and in the presence of Alfredo Bro MD.    Provider Attestation - Scribe documentation  All medical record entries made by the Scribe were at my direction and personally dictated by me. I  have reviewed the chart and agree that the record accurately reflects my personal performance of the history, physical exam, discussion and plan.    1. Type 2 diabetes mellitus without complication, with long-term current use of insulin (CMS/[facility])        2. Primary hypertension               Electronically Signed By: Alfredo Bro MD   9/22/23  5:07 PM

## 2023-09-22 NOTE — PROGRESS NOTES
PROGRESS NOTE    Subjective   Chief complaint: Edenilson Ness is a 62 y.o. male who is a long term care patient being seen and evaluated for   Diabetes    HPI:   patient with diabetes recent A1c's as reported by nurse is 7.0.  Blood sugars fairly well controlled.   Denies any urinary frequency or increased thirst. Patient denies chest pain or headache.  No acute distress.      Objective   Vital signs:  134/79, 98%    Physical Exam  Constitutional:       General: He is not in acute distress.  Eyes:      Extraocular Movements: Extraocular movements intact.   Cardiovascular:      Rate and Rhythm: Normal rate and regular rhythm.   Pulmonary:      Effort: Pulmonary effort is normal.      Breath sounds: Normal breath sounds.   Abdominal:      General: Bowel sounds are normal.      Palpations: Abdomen is soft.   Musculoskeletal:      Cervical back: Neck supple.      Right lower leg: Edema present.      Left lower leg: Edema present.   Neurological:      Mental Status: He is alert.   Psychiatric:         Mood and Affect: Mood normal.         Behavior: Behavior is cooperative.         Assessment/Plan   Problem List Items Addressed This Visit       Type 2 diabetes mellitus without complication, with long-term current use of insulin (CMS/[facility]) - Primary     Monitor sweets and carb intake  Continue to monitor BS  Continue current medications for DM   monitor A1c routinely         HTN (hypertension)     Controlled  Monitor BP          Medications, treatments, and labs reviewed  Continue medications and treatments as listed in P[facility]    Scribe Attestation  By signing my name below, I, Mitch Blackburn   attest that this documentation has been prepared under the direction and in the presence of Alfredo Bro MD.    Provider Attestation - Scribe documentation  All medical record entries made by the Scribe were at my direction and personally dictated by me. I have reviewed the chart and agree that the record accurately reflects  my personal performance of the history, physical exam, discussion and plan.    1. Type 2 diabetes mellitus without complication, with long-term current use of insulin (CMS/[facility])        2. Primary hypertension

## 2023-09-26 NOTE — PROGRESS NOTES
PROGRESS NOTE    Subjective   Chief complaint: Edenislon Ness is a 62 y.o. male who is a long term care patient being seen and evaluated for ETOH use.    HPI:  HPI   Nurse reporting whiskey bottle found in patient's trash.  Patient seen and examined at bedside, nontoxic, in no apparent distress.  No further concerns reported.    Objective   Vital signs:   18, 111/74, 97.4, 72, 95%  Physical Exam  Constitutional:       General: He is not in acute distress.     Appearance: He is not toxic-appearing.   Eyes:      Extraocular Movements: Extraocular movements intact.   Cardiovascular:      Rate and Rhythm: Normal rate and regular rhythm.   Pulmonary:      Effort: Pulmonary effort is normal.      Breath sounds: Normal breath sounds.   Musculoskeletal:      Cervical back: Neck supple.   Neurological:      Mental Status: He is alert.   Psychiatric:         Mood and Affect: Mood normal.         Behavior: Behavior is cooperative.         Assessment/Plan   Problem List Items Addressed This Visit       Admits to alcohol consumption - Primary     No adverse reaction  Non-toxic appearing         HTN (hypertension)     Controlled  Monitor BP          Medications, treatments, and labs reviewed  Continue medications and treatments as listed in EMR    Scribe Attestation  IYana Scribe   attest that this documentation has been prepared under the direction and in the presence of LUCRETIA Griffiths    Provider Attestation - Scribe documentation  All medical record entries made by the Scribe were at my direction and personally dictated by me. I have reviewed the chart and agree that the record accurately reflects my personal performance of the history, physical exam, discussion and plan.   LUCRETIA Griffiths

## 2023-09-27 ENCOUNTER — NURSING HOME VISIT (OUTPATIENT)
Dept: POST ACUTE CARE | Facility: EXTERNAL LOCATION | Age: 63
End: 2023-09-27
Payer: COMMERCIAL

## 2023-09-27 DIAGNOSIS — I63.9 CEREBROVASCULAR ACCIDENT (CVA), UNSPECIFIED MECHANISM (MULTI): ICD-10-CM

## 2023-09-27 DIAGNOSIS — R22.31 LOCALIZED SWELLING ON RIGHT HAND: Primary | ICD-10-CM

## 2023-09-27 DIAGNOSIS — I10 PRIMARY HYPERTENSION: ICD-10-CM

## 2023-09-27 PROCEDURE — 99308 SBSQ NF CARE LOW MDM 20: CPT | Performed by: INTERNAL MEDICINE

## 2023-09-27 NOTE — LETTER
Patient: Edenilson Ness  : 1960    Encounter Date: 2023    PROGRESS NOTE    Subjective  Chief complaint: Edenilson Ness is a 62 y.o. male who is a long term care patient being seen and evaluated for .  Swollen right hand    HPI:  .  Patient  with  right-sided weakness 2/2 presents with complaint of right hand swelling.  He denies pain and range of motion is intact.  No redness or bruising noted.  Denies trauma or fall.  .  Denies increased pain, or changes in speech.  Denies chest pain or headache. No acute distress.      Objective  Vital signs:  134/72, 98%    Physical Exam  Constitutional:       General: He is not in acute distress.  Eyes:      Extraocular Movements: Extraocular movements intact.   Cardiovascular:      Rate and Rhythm: Normal rate and regular rhythm.   Pulmonary:      Effort: Pulmonary effort is normal.      Breath sounds: Normal breath sounds.   Abdominal:      General: Bowel sounds are normal.      Palpations: Abdomen is soft.   Musculoskeletal:      Cervical back: Neck supple.      Right lower leg: No edema.      Left lower leg: No edema.      Comments:   Right-sided weakness 2/2 CVA   right hand   +swelling  No redness or pain  ROM intact   Neurological:      Mental Status: He is alert.   Psychiatric:         Mood and Affect: Mood normal.         Behavior: Behavior is cooperative.         Assessment/Plan  Problem List Items Addressed This Visit       CVA (cerebral vascular accident) (CMS/HCC)     Stable   monitor for changes in speech or increased weakness         HTN (hypertension)     .  Continue current meds  Monitor BP         Localized swelling on right hand - Primary      likely postural  .  No pain, redness or bruising noted on exam   obtain x-ray to rule out fracture          Medications, treatments, and labs reviewed  Continue medications and treatments as listed in [facility]    Scribe Attestation  By signing my name below, IHeather, Scribe   attest that this  documentation has been prepared under the direction and in the presence of Alfredo Bro MD.    Provider Attestation - Scribe documentation  All medical record entries made by the Scribe were at my direction and personally dictated by me. I have reviewed the chart and agree that the record accurately reflects my personal performance of the history, physical exam, discussion and plan.    1. Localized swelling on right hand        2. Primary hypertension        3. Cerebrovascular accident (CVA), unspecified mechanism (CMS/HCC)               Electronically Signed By: Alfredo Bro MD   9/28/23 10:50 AM

## 2023-09-28 ENCOUNTER — NURSING HOME VISIT (OUTPATIENT)
Dept: POST ACUTE CARE | Facility: EXTERNAL LOCATION | Age: 63
End: 2023-09-28
Payer: COMMERCIAL

## 2023-09-28 DIAGNOSIS — I10 PRIMARY HYPERTENSION: ICD-10-CM

## 2023-09-28 DIAGNOSIS — R22.31 LOCALIZED SWELLING ON RIGHT HAND: Primary | ICD-10-CM

## 2023-09-28 PROCEDURE — 99308 SBSQ NF CARE LOW MDM 20: CPT | Performed by: NURSE PRACTITIONER

## 2023-09-28 NOTE — ASSESSMENT & PLAN NOTE
likely postural  .  No pain, redness or bruising noted on exam   obtain x-ray to rule out fracture

## 2023-09-28 NOTE — LETTER
Patient: Edenilson Ness  : 1960    Encounter Date: 2023    PROGRESS NOTE    Subjective  Chief complaint: Edenilson Ness is a 62 y.o. male who is a long term care patient being seen and evaluated for right hand swelling.    HPI:  HPI   Patient presents with complaint of right hand swelling.  X-ray obtained and negative for acute abnormality.  Patient denies pain and injury.  No further concerns reported.  Objective  Vital signs:   17, 130/78, 97.9, 78, 95%  Physical Exam  Constitutional:       General: He is not in acute distress.     Appearance: He is not toxic-appearing.   Eyes:      Extraocular Movements: Extraocular movements intact.   Cardiovascular:      Rate and Rhythm: Normal rate and regular rhythm.   Pulmonary:      Effort: Pulmonary effort is normal.      Breath sounds: Normal breath sounds.   Musculoskeletal:      Cervical back: Neck supple.      Comments: Right hand +swelling, non-tender, good ROM, no discoloration, no swelling in arm   Neurological:      Mental Status: He is alert.   Psychiatric:         Mood and Affect: Mood normal.         Behavior: Behavior is cooperative.         Assessment/Plan  Problem List Items Addressed This Visit       HTN (hypertension)     Controlled  Monitor BP         Localized swelling on right hand - Primary     X-ray negative  No pain  Continue to monitor          Medications, treatments, and labs reviewed  Continue medications and treatments as listed in EMR    Scribe Attestation  IYana Scribe   attest that this documentation has been prepared under the direction and in the presence of VANNA Griffiths-CNP    Provider Attestation - Scribe documentation  All medical record entries made by the Scribe were at my direction and personally dictated by me. I have reviewed the chart and agree that the record accurately reflects my personal performance of the history, physical exam, discussion and plan.   Elba Herrera,  APRN-CNP        Electronically Signed By: LUCRETIA Griffiths   10/6/23 11:10 AM

## 2023-09-28 NOTE — PROGRESS NOTES
PROGRESS NOTE    Subjective   Chief complaint: Edenilson Ness is a 62 y.o. male who is a long term care patient being seen and evaluated for .  Swollen right hand    HPI:  .  Patient  with  right-sided weakness 2/2 presents with complaint of right hand swelling.  He denies pain and range of motion is intact.  No redness or bruising noted.  Denies trauma or fall.  .  Denies increased pain, or changes in speech.  Denies chest pain or headache. No acute distress.      Objective   Vital signs:  134/72, 98%    Physical Exam  Constitutional:       General: He is not in acute distress.  Eyes:      Extraocular Movements: Extraocular movements intact.   Cardiovascular:      Rate and Rhythm: Normal rate and regular rhythm.   Pulmonary:      Effort: Pulmonary effort is normal.      Breath sounds: Normal breath sounds.   Abdominal:      General: Bowel sounds are normal.      Palpations: Abdomen is soft.   Musculoskeletal:      Cervical back: Neck supple.      Right lower leg: No edema.      Left lower leg: No edema.      Comments:   Right-sided weakness 2/2 CVA   right hand   +swelling  No redness or pain  ROM intact   Neurological:      Mental Status: He is alert.   Psychiatric:         Mood and Affect: Mood normal.         Behavior: Behavior is cooperative.         Assessment/Plan   Problem List Items Addressed This Visit       CVA (cerebral vascular accident) (CMS/HCC)     Stable   monitor for changes in speech or increased weakness         HTN (hypertension)     .  Continue current meds  Monitor BP         Localized swelling on right hand - Primary      likely postural  .  No pain, redness or bruising noted on exam   obtain x-ray to rule out fracture          Medications, treatments, and labs reviewed  Continue medications and treatments as listed in [facility]    Scribe Attestation  By signing my name below, IHeather, Scribe   attest that this documentation has been prepared under the direction and in the presence of  Alfredo Bro MD.    Provider Attestation - Scribe documentation  All medical record entries made by the Scribe were at my direction and personally dictated by me. I have reviewed the chart and agree that the record accurately reflects my personal performance of the history, physical exam, discussion and plan.    1. Localized swelling on right hand        2. Primary hypertension        3. Cerebrovascular accident (CVA), unspecified mechanism (CMS/HCC)

## 2023-09-29 ENCOUNTER — NURSING HOME VISIT (OUTPATIENT)
Dept: POST ACUTE CARE | Facility: EXTERNAL LOCATION | Age: 63
End: 2023-09-29
Payer: COMMERCIAL

## 2023-09-29 DIAGNOSIS — I63.9 CEREBROVASCULAR ACCIDENT (CVA), UNSPECIFIED MECHANISM (MULTI): ICD-10-CM

## 2023-09-29 DIAGNOSIS — Z79.4 TYPE 2 DIABETES MELLITUS WITHOUT COMPLICATION, WITH LONG-TERM CURRENT USE OF INSULIN (MULTI): ICD-10-CM

## 2023-09-29 DIAGNOSIS — E11.9 TYPE 2 DIABETES MELLITUS WITHOUT COMPLICATION, WITH LONG-TERM CURRENT USE OF INSULIN (MULTI): ICD-10-CM

## 2023-09-29 DIAGNOSIS — R22.31 LOCALIZED SWELLING ON RIGHT HAND: ICD-10-CM

## 2023-09-29 PROCEDURE — 99308 SBSQ NF CARE LOW MDM 20: CPT | Performed by: INTERNAL MEDICINE

## 2023-09-29 NOTE — PROGRESS NOTES
PROGRESS NOTE    Subjective   Chief complaint: Edenilson Ness is a 62 y.o. male who is a long term care patient being seen and evaluated for follow up of right hand swelling.     HPI:  Patient seen and examined at bedside.  Patient continues with right hand edema. Xray completed and was negative. No new concerns at this time. Denies n,v,f,c,pain.       Objective   Vital signs: 118/69,72,96%    Physical Exam  Constitutional:       General: He is not in acute distress.  Eyes:      Extraocular Movements: Extraocular movements intact.   Cardiovascular:      Rate and Rhythm: Normal rate and regular rhythm.   Pulmonary:      Effort: Pulmonary effort is normal.      Breath sounds: Normal breath sounds.   Abdominal:      General: Bowel sounds are normal.      Palpations: Abdomen is soft.   Musculoskeletal:      Cervical back: Neck supple.      Right lower leg: No edema.      Left lower leg: No edema.      Comments:   Right-sided weakness 2/2 CVA   right hand   +swelling  No redness or pain  ROM intact   Neurological:      Mental Status: He is alert.   Psychiatric:         Mood and Affect: Mood normal.         Behavior: Behavior is cooperative.         Assessment/Plan   Problem List Items Addressed This Visit       CVA (cerebral vascular accident) (CMS/HCC)     Stable  monitor for changes in speech or increased weakness         Type 2 diabetes mellitus without complication, with long-term current use of insulin (CMS/HCC)     Monitor sweets and carb intake  Continue to monitor BS  Continue current medications for DM   monitor A1c routinely         Localized swelling on right hand     Postural edema  Elevate   Monitor edema           Medications, treatments, and labs reviewed  Continue medications and treatments as listed in EMR      Scribe Attestation  I, Mitch Sotelo   attest that this documentation has been prepared under the direction and in the presence of Alfredo Bro MD.     Provider Attestation - Scribe  documentation  All medical record entries made by the Scribe were at my direction and personally dictated by me. I have reviewed the chart and agree that the record accurately reflects my personal performance of the history, physical exam, discussion and plan.   Alfredo Bro MD

## 2023-09-29 NOTE — LETTER
Patient: Edenilson Ness  : 1960    Encounter Date: 2023    PROGRESS NOTE    Subjective  Chief complaint: Edenilson Ness is a 62 y.o. male who is a long term care patient being seen and evaluated for follow up of right hand swelling.     HPI:  Patient seen and examined at bedside.  Patient continues with right hand edema. Xray completed and was negative. No new concerns at this time. Denies n,v,f,c,pain.       Objective  Vital signs: 118/69,72,96%    Physical Exam  Constitutional:       General: He is not in acute distress.  Eyes:      Extraocular Movements: Extraocular movements intact.   Cardiovascular:      Rate and Rhythm: Normal rate and regular rhythm.   Pulmonary:      Effort: Pulmonary effort is normal.      Breath sounds: Normal breath sounds.   Abdominal:      General: Bowel sounds are normal.      Palpations: Abdomen is soft.   Musculoskeletal:      Cervical back: Neck supple.      Right lower leg: No edema.      Left lower leg: No edema.      Comments:   Right-sided weakness 2/2 CVA   right hand   +swelling  No redness or pain  ROM intact   Neurological:      Mental Status: He is alert.   Psychiatric:         Mood and Affect: Mood normal.         Behavior: Behavior is cooperative.         Assessment/Plan  Problem List Items Addressed This Visit       CVA (cerebral vascular accident) (CMS/HCC)     Stable  monitor for changes in speech or increased weakness         Type 2 diabetes mellitus without complication, with long-term current use of insulin (CMS/[facility])     Monitor sweets and carb intake  Continue to monitor BS  Continue current medications for DM   monitor A1c routinely         Localized swelling on right hand     Postural edema  Elevate   Monitor edema           Medications, treatments, and labs reviewed  Continue medications and treatments as listed in EMR      Scribe Attestation  I, Mitch Sotelo   attest that this documentation has been prepared under the direction and in  the presence of Alfredo Bro MD.     Provider Attestation - Scribe documentation  All medical record entries made by the Scribe were at my direction and personally dictated by me. I have reviewed the chart and agree that the record accurately reflects my personal performance of the history, physical exam, discussion and plan.   Alfredo Bro MD      Electronically Signed By: Alfredo Bro MD   9/29/23  1:50 PM

## 2023-09-30 PROBLEM — Z53.20 MEDICATION REFUSED: Status: RESOLVED | Noted: 2023-08-21 | Resolved: 2023-09-30

## 2023-09-30 PROBLEM — Z01.89 ROUTINE LAB DRAW: Status: RESOLVED | Noted: 2023-03-21 | Resolved: 2023-09-30

## 2023-10-04 ENCOUNTER — NURSING HOME VISIT (OUTPATIENT)
Dept: POST ACUTE CARE | Facility: EXTERNAL LOCATION | Age: 63
End: 2023-10-04
Payer: COMMERCIAL

## 2023-10-04 DIAGNOSIS — E11.9 TYPE 2 DIABETES MELLITUS WITHOUT COMPLICATION, WITH LONG-TERM CURRENT USE OF INSULIN (MULTI): ICD-10-CM

## 2023-10-04 DIAGNOSIS — Z79.4 TYPE 2 DIABETES MELLITUS WITHOUT COMPLICATION, WITH LONG-TERM CURRENT USE OF INSULIN (MULTI): ICD-10-CM

## 2023-10-04 DIAGNOSIS — I10 PRIMARY HYPERTENSION: ICD-10-CM

## 2023-10-04 DIAGNOSIS — I63.9 CEREBROVASCULAR ACCIDENT (CVA), UNSPECIFIED MECHANISM (MULTI): ICD-10-CM

## 2023-10-04 DIAGNOSIS — R19.7 DIARRHEA, UNSPECIFIED TYPE: Primary | ICD-10-CM

## 2023-10-04 PROCEDURE — 99309 SBSQ NF CARE MODERATE MDM 30: CPT | Performed by: INTERNAL MEDICINE

## 2023-10-04 NOTE — PROGRESS NOTES
PROGRESS NOTE    Subjective   Chief complaint: Edenilson Ness is a 62 y.o. male who is a long term care patient being seen and evaluated for right hand swelling.    HPI:  HPI   Patient presents with complaint of right hand swelling.  X-ray obtained and negative for acute abnormality.  Patient denies pain and injury.  No further concerns reported.  Objective   Vital signs:   17, 130/78, 97.9, 78, 95%  Physical Exam  Constitutional:       General: He is not in acute distress.     Appearance: He is not toxic-appearing.   Eyes:      Extraocular Movements: Extraocular movements intact.   Cardiovascular:      Rate and Rhythm: Normal rate and regular rhythm.   Pulmonary:      Effort: Pulmonary effort is normal.      Breath sounds: Normal breath sounds.   Musculoskeletal:      Cervical back: Neck supple.      Comments: Right hand +swelling, non-tender, good ROM, no discoloration, no swelling in arm   Neurological:      Mental Status: He is alert.   Psychiatric:         Mood and Affect: Mood normal.         Behavior: Behavior is cooperative.         Assessment/Plan   Problem List Items Addressed This Visit       HTN (hypertension)     Controlled  Monitor BP         Localized swelling on right hand - Primary     X-ray negative  No pain  Continue to monitor          Medications, treatments, and labs reviewed  Continue medications and treatments as listed in EMR    Scribe Attestation  Yana RAMIREZ Scribe   attest that this documentation has been prepared under the direction and in the presence of LUCRETIA Griffiths    Provider Attestation - Scribe documentation  All medical record entries made by the Scribe were at my direction and personally dictated by me. I have reviewed the chart and agree that the record accurately reflects my personal performance of the history, physical exam, discussion and plan.   LUCRETIA Griffiths

## 2023-10-04 NOTE — LETTER
Patient: Edenilson Ness  : 1960    Encounter Date: 10/04/2023    PROGRESS NOTE    Subjective  Chief complaint: Edenilson Ness is a 62 y.o. male who is a long term care patient being seen and evaluated for monthly general medical care and follow-up.    HPI:      Patient presents for general medical care and f/u.  Patient seen and examined at bedside.  No issues per nursing.  Patient has no acute complaints.  HTN, Denies chest pain and headache.   patient with DM, denies vision changes, excessive thirst, sweating, urinary frequency. hx CVA, denies changes in weakness and speech.  patient with complaint of diarrhea, stool softeners ob hold x 3 days.   Denies abdominal pain, nausea or vomiting.No acute distress      Objective  Vital signs: 130/74, 98%    Physical Exam  Constitutional:       General: He is not in acute distress.  Eyes:      Extraocular Movements: Extraocular movements intact.   Cardiovascular:      Rate and Rhythm: Normal rate and regular rhythm.   Pulmonary:      Effort: Pulmonary effort is normal.      Breath sounds: Normal breath sounds.   Abdominal:      General: Bowel sounds are normal.      Palpations: Abdomen is soft.   Musculoskeletal:      Cervical back: Neck supple.      Right lower leg: Edema present.      Left lower leg: Edema present.   Neurological:      Mental Status: He is alert.   Psychiatric:         Mood and Affect: Mood normal.         Behavior: Behavior is cooperative.         Assessment/Plan  Problem List Items Addressed This Visit       CVA (cerebral vascular accident) (CMS/[facility])     Stable  monitor for changes in speech or increased weakness         Type 2 diabetes mellitus without complication, with long-term current use of insulin (CMS/HCC)     Monitor sweets and carb intake  Continue to monitor BS  Continue current medications for DM   monitor A1c routinely         HTN (hypertension)     Controlled  Monitor BP         Diarrhea - Primary     .  Stool softeners on hold x3  days        Medications, treatments, and labs reviewed  Continue medications and treatments as listed in P[facility]    Scribe Attestation  By signing my name below, I, Mitch Blackburn   attest that this documentation has been prepared under the direction and in the presence of Alfredo Bro MD.    Provider Attestation - Scribe documentation  All medical record entries made by the Scribe were at my direction and personally dictated by me. I have reviewed the chart and agree that the record accurately reflects my personal performance of the history, physical exam, discussion and plan.    1. Diarrhea, unspecified type        2. Cerebrovascular accident (CVA), unspecified mechanism (CMS/HCC)        3. Primary hypertension        4. Type 2 diabetes mellitus without complication, with long-term current use of insulin (CMS/HCC)               Electronically Signed By: Alfredo Bro MD   10/5/23  1:00 PM

## 2023-10-05 PROBLEM — R19.7 DIARRHEA: Status: ACTIVE | Noted: 2023-10-05

## 2023-10-05 NOTE — PROGRESS NOTES
PROGRESS NOTE    Subjective   Chief complaint: Edenilson Ness is a 62 y.o. male who is a long term care patient being seen and evaluated for monthly general medical care and follow-up.    HPI:      Patient presents for general medical care and f/u.  Patient seen and examined at bedside.  No issues per nursing.  Patient has no acute complaints.  HTN, Denies chest pain and headache.   patient with DM, denies vision changes, excessive thirst, sweating, urinary frequency. hx CVA, denies changes in weakness and speech.  patient with complaint of diarrhea, stool softeners ob hold x 3 days.   Denies abdominal pain, nausea or vomiting.No acute distress      Objective   Vital signs: 130/74, 98%    Physical Exam  Constitutional:       General: He is not in acute distress.  Eyes:      Extraocular Movements: Extraocular movements intact.   Cardiovascular:      Rate and Rhythm: Normal rate and regular rhythm.   Pulmonary:      Effort: Pulmonary effort is normal.      Breath sounds: Normal breath sounds.   Abdominal:      General: Bowel sounds are normal.      Palpations: Abdomen is soft.   Musculoskeletal:      Cervical back: Neck supple.      Right lower leg: Edema present.      Left lower leg: Edema present.   Neurological:      Mental Status: He is alert.   Psychiatric:         Mood and Affect: Mood normal.         Behavior: Behavior is cooperative.         Assessment/Plan   Problem List Items Addressed This Visit       CVA (cerebral vascular accident) (CMS/[facility])     Stable  monitor for changes in speech or increased weakness         Type 2 diabetes mellitus without complication, with long-term current use of insulin (CMS/[facility])     Monitor sweets and carb intake  Continue to monitor BS  Continue current medications for DM   monitor A1c routinely         HTN (hypertension)     Controlled  Monitor BP         Diarrhea - Primary     .  Stool softeners on hold x3 days        Medications, treatments, and labs reviewed  Continue  medications and treatments as listed in [facility]    Scribe Attestation  By signing my name below, I, Mitch Blackburn   attest that this documentation has been prepared under the direction and in the presence of Alfredo Bro MD.    Provider Attestation - Scribe documentation  All medical record entries made by the Scribe were at my direction and personally dictated by me. I have reviewed the chart and agree that the record accurately reflects my personal performance of the history, physical exam, discussion and plan.    1. Diarrhea, unspecified type        2. Cerebrovascular accident (CVA), unspecified mechanism (CMS/HCC)        3. Primary hypertension        4. Type 2 diabetes mellitus without complication, with long-term current use of insulin (CMS/HCC)

## 2023-10-06 ENCOUNTER — NURSING HOME VISIT (OUTPATIENT)
Dept: POST ACUTE CARE | Facility: EXTERNAL LOCATION | Age: 63
End: 2023-10-06
Payer: COMMERCIAL

## 2023-10-06 DIAGNOSIS — I63.9 CEREBROVASCULAR ACCIDENT (CVA), UNSPECIFIED MECHANISM (MULTI): Primary | ICD-10-CM

## 2023-10-06 DIAGNOSIS — R19.7 DIARRHEA, UNSPECIFIED TYPE: ICD-10-CM

## 2023-10-06 DIAGNOSIS — I10 PRIMARY HYPERTENSION: ICD-10-CM

## 2023-10-06 PROCEDURE — 99308 SBSQ NF CARE LOW MDM 20: CPT | Performed by: INTERNAL MEDICINE

## 2023-10-06 NOTE — PROGRESS NOTES
PROGRESS NOTE    Subjective   Chief complaint: Edenilson Ness is a 62 y.o. male who is a long term care patient being seen and evaluated for  diarrhea    HPI:  .  Patient with history of CVA had complaints of diarrhea.  Stool softeners are being held x3 days.  He denies nausea, vomiting or stomach pain.  Denies increased weakness or changes in speech. .  Denies chest pain or headache. No acute distress.      Objective   Vital signs:  132/72, 98%    Physical Exam  Constitutional:       General: He is not in acute distress.  Eyes:      Extraocular Movements: Extraocular movements intact.   Cardiovascular:      Rate and Rhythm: Normal rate and regular rhythm.   Pulmonary:      Effort: Pulmonary effort is normal.      Breath sounds: Normal breath sounds.   Abdominal:      General: Bowel sounds are normal.      Palpations: Abdomen is soft.   Musculoskeletal:      Cervical back: Neck supple.      Right lower leg: No edema.      Left lower leg: No edema.      Comments:  right-sided weakness   Neurological:      Mental Status: He is alert.   Psychiatric:         Mood and Affect: Mood normal.         Behavior: Behavior is cooperative.         Assessment/Plan   Problem List Items Addressed This Visit       CVA (cerebral vascular accident) (CMS/[facility]) - Primary     Stable  monitor for changes in speech or increased weakness         HTN (hypertension)       Continue current meds  Monitor BP         Diarrhea     .  Stool softeners on hold x3 days          Medications, treatments, and labs reviewed  Continue medications and treatments as listed in [facility]    Scribe Attestation  By signing my name below, IHeather Scribe   attest that this documentation has been prepared under the direction and in the presence of Alfredo Bro MD.    Provider Attestation - Scribe documentation  All medical record entries made by the Scribe were at my direction and personally dictated by me. I have reviewed the chart and agree that the record  accurately reflects my personal performance of the history, physical exam, discussion and plan.    1. Cerebrovascular accident (CVA), unspecified mechanism (CMS/HCC)        2. Primary hypertension        3. Diarrhea, unspecified type

## 2023-10-06 NOTE — LETTER
Patient: Edenilson Ness  : 1960    Encounter Date: 10/06/2023    PROGRESS NOTE    Subjective  Chief complaint: Edenilson Ness is a 62 y.o. male who is a long term care patient being seen and evaluated for  diarrhea    HPI:  .  Patient with history of CVA had complaints of diarrhea.  Stool softeners are being held x3 days.  He denies nausea, vomiting or stomach pain.  Denies increased weakness or changes in speech. .  Denies chest pain or headache. No acute distress.      Objective  Vital signs:  132/72, 98%    Physical Exam  Constitutional:       General: He is not in acute distress.  Eyes:      Extraocular Movements: Extraocular movements intact.   Cardiovascular:      Rate and Rhythm: Normal rate and regular rhythm.   Pulmonary:      Effort: Pulmonary effort is normal.      Breath sounds: Normal breath sounds.   Abdominal:      General: Bowel sounds are normal.      Palpations: Abdomen is soft.   Musculoskeletal:      Cervical back: Neck supple.      Right lower leg: No edema.      Left lower leg: No edema.      Comments:  right-sided weakness   Neurological:      Mental Status: He is alert.   Psychiatric:         Mood and Affect: Mood normal.         Behavior: Behavior is cooperative.         Assessment/Plan  Problem List Items Addressed This Visit       CVA (cerebral vascular accident) (CMS/[facility]) - Primary     Stable  monitor for changes in speech or increased weakness         HTN (hypertension)       Continue current meds  Monitor BP         Diarrhea     .  Stool softeners on hold x3 days          Medications, treatments, and labs reviewed  Continue medications and treatments as listed in [facility]    Scribe Attestation  By signing my name below, Heather RAMIREZ Scribe   attest that this documentation has been prepared under the direction and in the presence of Alfredo Bro MD.    Provider Attestation - Scribe documentation  All medical record entries made by the Scribe were at my direction and personally  dictated by me. I have reviewed the chart and agree that the record accurately reflects my personal performance of the history, physical exam, discussion and plan.    1. Cerebrovascular accident (CVA), unspecified mechanism (CMS/HCC)        2. Primary hypertension        3. Diarrhea, unspecified type               Electronically Signed By: Alfredo Bro MD   10/6/23  2:56 PM

## 2023-10-11 ENCOUNTER — NURSING HOME VISIT (OUTPATIENT)
Dept: POST ACUTE CARE | Facility: EXTERNAL LOCATION | Age: 63
End: 2023-10-11
Payer: COMMERCIAL

## 2023-10-11 DIAGNOSIS — I63.9 CEREBROVASCULAR ACCIDENT (CVA), UNSPECIFIED MECHANISM (MULTI): Primary | ICD-10-CM

## 2023-10-11 DIAGNOSIS — Z78.9 ADMITS TO ALCOHOL CONSUMPTION: ICD-10-CM

## 2023-10-11 PROCEDURE — 99308 SBSQ NF CARE LOW MDM 20: CPT | Performed by: INTERNAL MEDICINE

## 2023-10-11 NOTE — PROGRESS NOTES
PROGRESS NOTE    Subjective   Chief complaint: Edenilson Ness is a 63 y.o. male who is a long term care patient being seen and evaluated for drinking alcohol    HPI:   nurse reported patient with hx of CVA, has been non compliant with his orders. Patient has been hiding alcohol in water bottles and drinking every day. He does have order for alcohol once a week. Denies increased weakness or changes in speech. No acute distress.      Objective   Vital signs:  127/72, 98%    Physical Exam  Constitutional:       General: He is not in acute distress.  Eyes:      Extraocular Movements: Extraocular movements intact.   Cardiovascular:      Rate and Rhythm: Normal rate and regular rhythm.   Pulmonary:      Effort: Pulmonary effort is normal.      Breath sounds: Normal breath sounds.   Abdominal:      General: Bowel sounds are normal.      Palpations: Abdomen is soft.   Musculoskeletal:      Cervical back: Neck supple.      Right lower leg: No edema.      Left lower leg: No edema.   Neurological:      Mental Status: He is alert.   Psychiatric:         Mood and Affect: Mood normal.         Behavior: Behavior is cooperative.         Assessment/Plan   Problem List Items Addressed This Visit       CVA (cerebral vascular accident) (CMS/HCC) - Primary     Stable  monitor for changes in speech or increased weakness  Order to drink alcohol once a week and he has been non complaint and drinking alcohol daily - nurses to closely monitor his intake and water bottles         Admits to alcohol consumption     No adverse reaction  Non-toxic appearing  Nurses to monitor his intake closely          Medications, treatments, and labs reviewed  Continue medications and treatments as listed in [facility]    Scribe Attestation  By signing my name below, IHeather, Scribe   attest that this documentation has been prepared under the direction and in the presence of Alfredo Bro MD.    Provider Attestation - Scribe documentation  All medical  record entries made by the Scribe were at my direction and personally dictated by me. I have reviewed the chart and agree that the record accurately reflects my personal performance of the history, physical exam, discussion and plan.    1. Cerebrovascular accident (CVA), unspecified mechanism (CMS/HCC)        2. Admits to alcohol consumption

## 2023-10-11 NOTE — ASSESSMENT & PLAN NOTE
Stable  monitor for changes in speech or increased weakness  Order to drink alcohol once a week and he has been non complaint and drinking alcohol daily - nurses to closely monitor his intake and water bottles

## 2023-10-11 NOTE — LETTER
Patient: Edenilson Ness  : 1960    Encounter Date: 10/11/2023    PROGRESS NOTE    Subjective  Chief complaint: Edenilson Ness is a 63 y.o. male who is a long term care patient being seen and evaluated for drinking alcohol    HPI:   nurse reported patient with hx of CVA, has been non compliant with his orders. Patient has been hiding alcohol in water bottles and drinking every day. He does have order for alcohol once a week. Denies increased weakness or changes in speech. No acute distress.      Objective  Vital signs:  127/72, 98%    Physical Exam  Constitutional:       General: He is not in acute distress.  Eyes:      Extraocular Movements: Extraocular movements intact.   Cardiovascular:      Rate and Rhythm: Normal rate and regular rhythm.   Pulmonary:      Effort: Pulmonary effort is normal.      Breath sounds: Normal breath sounds.   Abdominal:      General: Bowel sounds are normal.      Palpations: Abdomen is soft.   Musculoskeletal:      Cervical back: Neck supple.      Right lower leg: No edema.      Left lower leg: No edema.   Neurological:      Mental Status: He is alert.   Psychiatric:         Mood and Affect: Mood normal.         Behavior: Behavior is cooperative.         Assessment/Plan  Problem List Items Addressed This Visit       CVA (cerebral vascular accident) (CMS/[facility]) - Primary     Stable  monitor for changes in speech or increased weakness  Order to drink alcohol once a week and he has been non complaint and drinking alcohol daily - nurses to closely monitor his intake and water bottles         Admits to alcohol consumption     No adverse reaction  Non-toxic appearing  Nurses to monitor his intake closely          Medications, treatments, and labs reviewed  Continue medications and treatments as listed in [facility]    Scribe Attestation  By signing my name below, IHeather, Scribe   attest that this documentation has been prepared under the direction and in the presence of Alfredo Bro  MD.    Provider Attestation - Scribe documentation  All medical record entries made by the Scribe were at my direction and personally dictated by me. I have reviewed the chart and agree that the record accurately reflects my personal performance of the history, physical exam, discussion and plan.    1. Cerebrovascular accident (CVA), unspecified mechanism (CMS/HCC)        2. Admits to alcohol consumption               Electronically Signed By: Alfredo Bro MD   10/11/23  4:52 PM

## 2023-11-01 ENCOUNTER — NURSING HOME VISIT (OUTPATIENT)
Dept: POST ACUTE CARE | Facility: EXTERNAL LOCATION | Age: 63
End: 2023-11-01
Payer: COMMERCIAL

## 2023-11-01 DIAGNOSIS — I63.9 CEREBROVASCULAR ACCIDENT (CVA), UNSPECIFIED MECHANISM (MULTI): ICD-10-CM

## 2023-11-01 DIAGNOSIS — Z79.4 TYPE 2 DIABETES MELLITUS WITHOUT COMPLICATION, WITH LONG-TERM CURRENT USE OF INSULIN (MULTI): ICD-10-CM

## 2023-11-01 DIAGNOSIS — E11.9 TYPE 2 DIABETES MELLITUS WITHOUT COMPLICATION, WITH LONG-TERM CURRENT USE OF INSULIN (MULTI): ICD-10-CM

## 2023-11-01 DIAGNOSIS — I10 PRIMARY HYPERTENSION: Primary | ICD-10-CM

## 2023-11-01 PROCEDURE — 99309 SBSQ NF CARE MODERATE MDM 30: CPT | Performed by: INTERNAL MEDICINE

## 2023-11-01 NOTE — LETTER
Patient: Edenilson Ness  : 1960    Encounter Date: 2023    PROGRESS NOTE    Subjective  Chief complaint: Edenilson Ness is a 63 y.o. male who is a long term care patient being seen and evaluated for monthly general medical care and follow-up.    HPI:      Patient presents for general medical care and f/u.  Patient seen and examined at bedside.  No issues per nursing.  Patient has no acute complaints.  HTN, Denies chest pain and headache.   patient with DM, denies vision changes, excessive thirst, sweating, urinary frequency. hx CVA, denies changes in weakness and speech.  No acute distress      Objective  Vital signs: 126/74, 98%    Physical Exam  Constitutional:       General: He is not in acute distress.  Eyes:      Extraocular Movements: Extraocular movements intact.   Cardiovascular:      Rate and Rhythm: Normal rate and regular rhythm.   Pulmonary:      Effort: Pulmonary effort is normal.      Breath sounds: Normal breath sounds.   Abdominal:      General: Bowel sounds are normal.      Palpations: Abdomen is soft.   Musculoskeletal:      Cervical back: Neck supple.      Right lower leg: Edema present.      Left lower leg: Edema present.   Neurological:      Mental Status: He is alert.   Psychiatric:         Mood and Affect: Mood normal.         Behavior: Behavior is cooperative.         Assessment/Plan  Problem List Items Addressed This Visit       CVA (cerebral vascular accident) (CMS/[facility])     Stable  monitor for changes in speech or increased weakness           Type 2 diabetes mellitus without complication, with long-term current use of insulin (CMS/[facility])     Monitor sweets and carb intake  Continue to monitor BS  Continue current medications for DM   monitor A1c routinely         HTN (hypertension) - Primary       Continue current meds  Monitor BP        Medications, treatments, and labs reviewed  Continue medications and treatments as listed in [facility]    Scribe Attestation  By signing my name below,  I, Meryl Blackburnibe   attest that this documentation has been prepared under the direction and in the presence of Alfredo Bro MD.    Provider Attestation - Scribe documentation  All medical record entries made by the Scribe were at my direction and personally dictated by me. I have reviewed the chart and agree that the record accurately reflects my personal performance of the history, physical exam, discussion and plan.    1. Primary hypertension        2. Cerebrovascular accident (CVA), unspecified mechanism (CMS/HCC)        3. Type 2 diabetes mellitus without complication, with long-term current use of insulin (CMS/HCC)               Electronically Signed By: Alfredo Bro MD   11/1/23  3:20 PM

## 2023-11-01 NOTE — PROGRESS NOTES
PROGRESS NOTE    Subjective   Chief complaint: Edenilson Ness is a 63 y.o. male who is a long term care patient being seen and evaluated for monthly general medical care and follow-up.    HPI:      Patient presents for general medical care and f/u.  Patient seen and examined at bedside.  No issues per nursing.  Patient has no acute complaints.  HTN, Denies chest pain and headache.   patient with DM, denies vision changes, excessive thirst, sweating, urinary frequency. hx CVA, denies changes in weakness and speech.  No acute distress      Objective   Vital signs: 126/74, 98%    Physical Exam  Constitutional:       General: He is not in acute distress.  Eyes:      Extraocular Movements: Extraocular movements intact.   Cardiovascular:      Rate and Rhythm: Normal rate and regular rhythm.   Pulmonary:      Effort: Pulmonary effort is normal.      Breath sounds: Normal breath sounds.   Abdominal:      General: Bowel sounds are normal.      Palpations: Abdomen is soft.   Musculoskeletal:      Cervical back: Neck supple.      Right lower leg: Edema present.      Left lower leg: Edema present.   Neurological:      Mental Status: He is alert.   Psychiatric:         Mood and Affect: Mood normal.         Behavior: Behavior is cooperative.         Assessment/Plan   Problem List Items Addressed This Visit       CVA (cerebral vascular accident) (CMS/HCC)     Stable  monitor for changes in speech or increased weakness           Type 2 diabetes mellitus without complication, with long-term current use of insulin (CMS/[facility])     Monitor sweets and carb intake  Continue to monitor BS  Continue current medications for DM   monitor A1c routinely         HTN (hypertension) - Primary       Continue current meds  Monitor BP        Medications, treatments, and labs reviewed  Continue medications and treatments as listed in [facility]    Scribe Attestation  By signing my name below, I, Heather Obrien, Scribe   attest that this documentation has been  prepared under the direction and in the presence of Alfredo Bro MD.    Provider Attestation - Scribe documentation  All medical record entries made by the Scribe were at my direction and personally dictated by me. I have reviewed the chart and agree that the record accurately reflects my personal performance of the history, physical exam, discussion and plan.    1. Primary hypertension        2. Cerebrovascular accident (CVA), unspecified mechanism (CMS/[facility])        3. Type 2 diabetes mellitus without complication, with long-term current use of insulin (CMS/[facility])

## 2023-12-01 ENCOUNTER — NURSING HOME VISIT (OUTPATIENT)
Dept: POST ACUTE CARE | Facility: EXTERNAL LOCATION | Age: 63
End: 2023-12-01
Payer: COMMERCIAL

## 2023-12-01 DIAGNOSIS — I10 PRIMARY HYPERTENSION: ICD-10-CM

## 2023-12-01 DIAGNOSIS — E11.9 TYPE 2 DIABETES MELLITUS WITHOUT COMPLICATION, WITH LONG-TERM CURRENT USE OF INSULIN (MULTI): Primary | ICD-10-CM

## 2023-12-01 DIAGNOSIS — I63.9 CEREBROVASCULAR ACCIDENT (CVA), UNSPECIFIED MECHANISM (MULTI): ICD-10-CM

## 2023-12-01 DIAGNOSIS — Z79.4 TYPE 2 DIABETES MELLITUS WITHOUT COMPLICATION, WITH LONG-TERM CURRENT USE OF INSULIN (MULTI): Primary | ICD-10-CM

## 2023-12-01 PROCEDURE — 99309 SBSQ NF CARE MODERATE MDM 30: CPT | Performed by: INTERNAL MEDICINE

## 2023-12-01 NOTE — PROGRESS NOTES
PROGRESS NOTE    Subjective   Chief complaint: Edenilson Ness is a 63 y.o. male who is a long term care patient being seen and evaluated for monthly general medical care and follow-up.    HPI:      Patient presents for general medical care and f/u.  Patient seen and examined at bedside.  No issues per nursing.  Patient has no acute complaints.  HTN, Denies chest pain and headache.   patient with DM, denies vision changes, excessive thirst, sweating, urinary frequency. hx CVA, denies changes in weakness and speech.  No acute distress      Objective   Vital signs: 124/73, 98%    Physical Exam  Constitutional:       General: He is not in acute distress.  Eyes:      Extraocular Movements: Extraocular movements intact.   Cardiovascular:      Rate and Rhythm: Normal rate and regular rhythm.   Pulmonary:      Effort: Pulmonary effort is normal.      Breath sounds: Normal breath sounds.   Abdominal:      General: Bowel sounds are normal.      Palpations: Abdomen is soft.   Musculoskeletal:      Cervical back: Neck supple.      Right lower leg: Edema present.      Left lower leg: Edema present.   Neurological:      Mental Status: He is alert.   Psychiatric:         Mood and Affect: Mood normal.         Behavior: Behavior is cooperative.         Assessment/Plan   Problem List Items Addressed This Visit       CVA (cerebral vascular accident) (CMS/HCC)     Stable  monitor for changes in speech or increased weakness           Type 2 diabetes mellitus without complication, with long-term current use of insulin (CMS/[facility]) - Primary     Monitor sweets and carb intake  Continue to monitor BS  Continue current medications for DM   monitor A1c routinely         HTN (hypertension)       Continue current meds  Monitor BP        Medications, treatments, and labs reviewed  Continue medications and treatments as listed in [facility]    Scribe Attestation  By signing my name below, I, Heather Obrien, Scribe   attest that this documentation has been  prepared under the direction and in the presence of Alfredo Bro MD.    Provider Attestation - Scribe documentation  All medical record entries made by the Scribe were at my direction and personally dictated by me. I have reviewed the chart and agree that the record accurately reflects my personal performance of the history, physical exam, discussion and plan.    1. Type 2 diabetes mellitus without complication, with long-term current use of insulin (CMS/[facility])        2. Primary hypertension        3. Cerebrovascular accident (CVA), unspecified mechanism (CMS/HCC)

## 2023-12-01 NOTE — LETTER
Patient: Edenilson Ness  : 1960    Encounter Date: 2023    PROGRESS NOTE    Subjective  Chief complaint: Edenilson Ness is a 63 y.o. male who is a long term care patient being seen and evaluated for monthly general medical care and follow-up.    HPI:      Patient presents for general medical care and f/u.  Patient seen and examined at bedside.  No issues per nursing.  Patient has no acute complaints.  HTN, Denies chest pain and headache.   patient with DM, denies vision changes, excessive thirst, sweating, urinary frequency. hx CVA, denies changes in weakness and speech.  No acute distress      Objective  Vital signs: 124/73, 98%    Physical Exam  Constitutional:       General: He is not in acute distress.  Eyes:      Extraocular Movements: Extraocular movements intact.   Cardiovascular:      Rate and Rhythm: Normal rate and regular rhythm.   Pulmonary:      Effort: Pulmonary effort is normal.      Breath sounds: Normal breath sounds.   Abdominal:      General: Bowel sounds are normal.      Palpations: Abdomen is soft.   Musculoskeletal:      Cervical back: Neck supple.      Right lower leg: Edema present.      Left lower leg: Edema present.   Neurological:      Mental Status: He is alert.   Psychiatric:         Mood and Affect: Mood normal.         Behavior: Behavior is cooperative.         Assessment/Plan  Problem List Items Addressed This Visit       CVA (cerebral vascular accident) (CMS/[facility])     Stable  monitor for changes in speech or increased weakness           Type 2 diabetes mellitus without complication, with long-term current use of insulin (CMS/[facility]) - Primary     Monitor sweets and carb intake  Continue to monitor BS  Continue current medications for DM   monitor A1c routinely         HTN (hypertension)       Continue current meds  Monitor BP        Medications, treatments, and labs reviewed  Continue medications and treatments as listed in PCC    Scribe Attestation  By signing my name below,  I, Meryl Blackburnibe   attest that this documentation has been prepared under the direction and in the presence of Alfredo Bro MD.    Provider Attestation - Scribe documentation  All medical record entries made by the Scribe were at my direction and personally dictated by me. I have reviewed the chart and agree that the record accurately reflects my personal performance of the history, physical exam, discussion and plan.    1. Type 2 diabetes mellitus without complication, with long-term current use of insulin (CMS/[facility])        2. Primary hypertension        3. Cerebrovascular accident (CVA), unspecified mechanism (CMS/[facility])               Electronically Signed By: Alfredo Bro MD   12/2/23 12:41 PM

## 2024-01-03 ENCOUNTER — NURSING HOME VISIT (OUTPATIENT)
Dept: POST ACUTE CARE | Facility: EXTERNAL LOCATION | Age: 64
End: 2024-01-03
Payer: COMMERCIAL

## 2024-01-03 DIAGNOSIS — Z79.4 TYPE 2 DIABETES MELLITUS WITHOUT COMPLICATION, WITH LONG-TERM CURRENT USE OF INSULIN (MULTI): ICD-10-CM

## 2024-01-03 DIAGNOSIS — I63.9 CEREBROVASCULAR ACCIDENT (CVA), UNSPECIFIED MECHANISM (MULTI): ICD-10-CM

## 2024-01-03 DIAGNOSIS — I10 PRIMARY HYPERTENSION: Primary | ICD-10-CM

## 2024-01-03 DIAGNOSIS — E11.9 TYPE 2 DIABETES MELLITUS WITHOUT COMPLICATION, WITH LONG-TERM CURRENT USE OF INSULIN (MULTI): ICD-10-CM

## 2024-01-03 PROCEDURE — 99309 SBSQ NF CARE MODERATE MDM 30: CPT | Performed by: INTERNAL MEDICINE

## 2024-01-03 NOTE — LETTER
Patient: Edenilson Ness  : 1960    Encounter Date: 2024    PROGRESS NOTE    Subjective  Chief complaint: Edenilson Ness is a 63 y.o. male who is a long term care patient being seen and evaluated for monthly general medical care and follow-up    HPI:  HPI  Patient presents for general medical care and f/u.  Patient seen and examined at bedside.  No issues per nursing.  Patient has no acute complaints.  Hx CVA, denies changes in weakness and speech.  HTN BP at goal.  Denies chest pain and headache.  Patient with DM, denies vision changes, excessive thirst, sweating, urinary frequency.  Patient in no acute distress.    Objective  Vital signs: 123/65, 97.2, 75, 18, 94%    Physical Exam  Constitutional:       General: He is not in acute distress.  Eyes:      Extraocular Movements: Extraocular movements intact.   Cardiovascular:      Rate and Rhythm: Normal rate and regular rhythm.   Pulmonary:      Effort: Pulmonary effort is normal.      Breath sounds: Normal breath sounds.   Abdominal:      General: Bowel sounds are normal.      Palpations: Abdomen is soft.   Musculoskeletal:      Cervical back: Neck supple.      Right lower leg: Edema present.      Left lower leg: Edema present.   Neurological:      Mental Status: He is alert.      Motor: Weakness present.   Psychiatric:         Mood and Affect: Mood normal.         Behavior: Behavior is cooperative.         Assessment/Plan  Problem List Items Addressed This Visit       CVA (cerebral vascular accident) (CMS/HCC)     Stable  monitor for changes in speech or increased weakness  Eliquis  Bleeding precautions  Statin         Type 2 diabetes mellitus without complication, with long-term current use of insulin (CMS/HCC)     Monitor sweets and carb intake  Continue to monitor BS   monitor A1c routinely         HTN (hypertension) - Primary     Coreg  Isosorbide dinitrate  Sacubitril-valsartan  Hydralazine  Spironolactone  Monitor BP          Medications,  treatments, and labs reviewed  Continue medications and treatments as listed in EMR    Scribe Attestation  I, Mitch Valle   attest that this documentation has been prepared under the direction and in the presence of Alfredo Bro MD    Provider Attestation - Scribe documentation  All medical record entries made by the Scribe were at my direction and personally dictated by me. I have reviewed the chart and agree that the record accurately reflects my personal performance of the history, physical exam, discussion and plan.   Alfredo Bro MD            Electronically Signed By: Alfredo Bro MD   1/3/24  6:58 PM

## 2024-01-03 NOTE — ASSESSMENT & PLAN NOTE
Stable  monitor for changes in speech or increased weakness  Eliquis  Bleeding precautions  Statin

## 2024-01-03 NOTE — PROGRESS NOTES
PROGRESS NOTE    Subjective   Chief complaint: Edenilson Ness is a 63 y.o. male who is a long term care patient being seen and evaluated for monthly general medical care and follow-up    HPI:  HPI  Patient presents for general medical care and f/u.  Patient seen and examined at bedside.  No issues per nursing.  Patient has no acute complaints.  Hx CVA, denies changes in weakness and speech.  HTN BP at goal.  Denies chest pain and headache.  Patient with DM, denies vision changes, excessive thirst, sweating, urinary frequency.  Patient in no acute distress.    Objective   Vital signs: 123/65, 97.2, 75, 18, 94%    Physical Exam  Constitutional:       General: He is not in acute distress.  Eyes:      Extraocular Movements: Extraocular movements intact.   Cardiovascular:      Rate and Rhythm: Normal rate and regular rhythm.   Pulmonary:      Effort: Pulmonary effort is normal.      Breath sounds: Normal breath sounds.   Abdominal:      General: Bowel sounds are normal.      Palpations: Abdomen is soft.   Musculoskeletal:      Cervical back: Neck supple.      Right lower leg: Edema present.      Left lower leg: Edema present.   Neurological:      Mental Status: He is alert.      Motor: Weakness present.   Psychiatric:         Mood and Affect: Mood normal.         Behavior: Behavior is cooperative.         Assessment/Plan   Problem List Items Addressed This Visit       CVA (cerebral vascular accident) (CMS/HCC)     Stable  monitor for changes in speech or increased weakness  Eliquis  Bleeding precautions  Statin         Type 2 diabetes mellitus without complication, with long-term current use of insulin (CMS/[facility])     Monitor sweets and carb intake  Continue to monitor BS   monitor A1c routinely         HTN (hypertension) - Primary     Coreg  Isosorbide dinitrate  Sacubitril-valsartan  Hydralazine  Spironolactone  Monitor BP          Medications, treatments, and labs reviewed  Continue medications and treatments as listed  in EMR    Scribe Attestation  I, Mitch Valle   attest that this documentation has been prepared under the direction and in the presence of Alfredo Bro MD    Provider Attestation - Scribe documentation  All medical record entries made by the Scribe were at my direction and personally dictated by me. I have reviewed the chart and agree that the record accurately reflects my personal performance of the history, physical exam, discussion and plan.   Alfredo Bro MD

## 2024-01-05 ENCOUNTER — NURSING HOME VISIT (OUTPATIENT)
Dept: POST ACUTE CARE | Facility: EXTERNAL LOCATION | Age: 64
End: 2024-01-05
Payer: COMMERCIAL

## 2024-01-05 DIAGNOSIS — I63.9 CEREBROVASCULAR ACCIDENT (CVA), UNSPECIFIED MECHANISM (MULTI): ICD-10-CM

## 2024-01-05 DIAGNOSIS — I10 PRIMARY HYPERTENSION: ICD-10-CM

## 2024-01-05 DIAGNOSIS — R53.1 WEAKNESS: Primary | ICD-10-CM

## 2024-01-05 PROCEDURE — 99309 SBSQ NF CARE MODERATE MDM 30: CPT | Performed by: INTERNAL MEDICINE

## 2024-01-05 NOTE — PROGRESS NOTES
PROGRESS NOTE    Subjective   Chief complaint: Edenilson Ness is a 63 y.o. male who is a long term care patient being seen and evaluated for weakness    HPI:  HPI  Patient is continuing to work in therapy.  Skilled interventions focused on analysis and training and cueing hierarchy to increase independence with ADLs.  Therapy also is focusing on cross midline to facilitate independence and functional skills performance.  Patient also working on dynamic functional activities to increase strength and ROM and flexibility.  Patient was seen and examined at bedside, no apparent distress.  Denies chest pain or shortness of breath.  Denies nausea or vomiting.    Objective   Vital signs: 122/74, 97.8 80, 17, 96%    Physical Exam  Constitutional:       General: He is not in acute distress.  Eyes:      Extraocular Movements: Extraocular movements intact.   Cardiovascular:      Rate and Rhythm: Normal rate and regular rhythm.   Pulmonary:      Effort: Pulmonary effort is normal.      Breath sounds: Normal breath sounds.   Abdominal:      General: Bowel sounds are normal.      Palpations: Abdomen is soft.   Musculoskeletal:      Cervical back: Neck supple.      Right lower leg: Edema present.      Left lower leg: Edema present.   Neurological:      Mental Status: He is alert.   Psychiatric:         Mood and Affect: Mood normal.         Behavior: Behavior is cooperative.         Assessment/Plan   Problem List Items Addressed This Visit       Weakness - Primary     Wheelchair for mobility  Continue Therapy         CVA (cerebral vascular accident) (CMS/[facility])     Stable  monitor for changes in speech or increased weakness  Eliquis  Bleeding precautions  Statin         HTN (hypertension)     Coreg  Isosorbide dinitrate  Sacubitril-valsartan  Hydralazine  Spironolactone  Monitor BP          Medications, treatments, and labs reviewed  Continue medications and treatments as listed in EMR    Neelima Pepper Scribe    attest that this documentation has been prepared under the direction and in the presence of Alfredo Bro MD    Provider Attestation - Scribe documentation  All medical record entries made by the Scribe were at my direction and personally dictated by me. I have reviewed the chart and agree that the record accurately reflects my personal performance of the history, physical exam, discussion and plan.   Alfredo Bro MD

## 2024-01-05 NOTE — LETTER
Patient: Edenilson Ness  : 1960    Encounter Date: 2024    PROGRESS NOTE    Subjective  Chief complaint: Edenilson Ness is a 63 y.o. male who is a long term care patient being seen and evaluated for weakness    HPI:  HPI  Patient is continuing to work in therapy.  Skilled interventions focused on analysis and training and cueing hierarchy to increase independence with ADLs.  Therapy also is focusing on cross midline to facilitate independence and functional skills performance.  Patient also working on dynamic functional activities to increase strength and ROM and flexibility.  Patient was seen and examined at bedside, no apparent distress.  Denies chest pain or shortness of breath.  Denies nausea or vomiting.    Objective  Vital signs: 122/74, 97.8 80, 17, 96%    Physical Exam  Constitutional:       General: He is not in acute distress.  Eyes:      Extraocular Movements: Extraocular movements intact.   Cardiovascular:      Rate and Rhythm: Normal rate and regular rhythm.   Pulmonary:      Effort: Pulmonary effort is normal.      Breath sounds: Normal breath sounds.   Abdominal:      General: Bowel sounds are normal.      Palpations: Abdomen is soft.   Musculoskeletal:      Cervical back: Neck supple.      Right lower leg: Edema present.      Left lower leg: Edema present.   Neurological:      Mental Status: He is alert.   Psychiatric:         Mood and Affect: Mood normal.         Behavior: Behavior is cooperative.         Assessment/Plan  Problem List Items Addressed This Visit       Weakness - Primary     Wheelchair for mobility  Continue Therapy         CVA (cerebral vascular accident) (CMS/[facility])     Stable  monitor for changes in speech or increased weakness  Eliquis  Bleeding precautions  Statin         HTN (hypertension)     Coreg  Isosorbide dinitrate  Sacubitril-valsartan  Hydralazine  Spironolactone  Monitor BP          Medications, treatments, and labs reviewed  Continue medications and  treatments as listed in EMR    Scribe Attestation  I, Mitch Valle   attest that this documentation has been prepared under the direction and in the presence of Alfredo Bro MD    Provider Attestation - Scribe documentation  All medical record entries made by the Scribe were at my direction and personally dictated by me. I have reviewed the chart and agree that the record accurately reflects my personal performance of the history, physical exam, discussion and plan.   Alfredo Bro MD            Electronically Signed By: Alfredo Bro MD   1/5/24  3:04 PM

## 2024-01-10 ENCOUNTER — NURSING HOME VISIT (OUTPATIENT)
Dept: POST ACUTE CARE | Facility: EXTERNAL LOCATION | Age: 64
End: 2024-01-10
Payer: COMMERCIAL

## 2024-01-10 DIAGNOSIS — R53.1 WEAKNESS: Primary | ICD-10-CM

## 2024-01-10 DIAGNOSIS — I63.9 CEREBROVASCULAR ACCIDENT (CVA), UNSPECIFIED MECHANISM (MULTI): ICD-10-CM

## 2024-01-10 DIAGNOSIS — I10 PRIMARY HYPERTENSION: ICD-10-CM

## 2024-01-10 PROCEDURE — 99309 SBSQ NF CARE MODERATE MDM 30: CPT | Performed by: INTERNAL MEDICINE

## 2024-01-10 NOTE — PROGRESS NOTES
PROGRESS NOTE    Subjective   Chief complaint: Edenilson Ness is a 63 y.o. male who is a long term care patient being seen and evaluated for weakness    HPI:  HPI  Patient is continuing to work in therapy.  Therapy is focusing on skilled interventions on analysis and training and cueing hierarchy to increase independence with ADLs.  Patient is also working on close kinetic chain activities to increase functional skills, dynamic functional activities to increase strength, range of motion.  Patient was seen and examined at bedside, no apparent distress.  Denies chest pain or shortness of breath.  Denies nausea or vomiting    Objective   Vital signs: 136/67, 98.0, 81, 18, 97%    Physical Exam  Constitutional:       General: He is not in acute distress.  Eyes:      Extraocular Movements: Extraocular movements intact.   Cardiovascular:      Rate and Rhythm: Normal rate and regular rhythm.   Pulmonary:      Effort: Pulmonary effort is normal.      Breath sounds: Normal breath sounds.   Abdominal:      General: Bowel sounds are normal.      Palpations: Abdomen is soft.   Musculoskeletal:      Cervical back: Neck supple.      Right lower leg: Edema present.      Left lower leg: Edema present.   Neurological:      Mental Status: He is alert.      Motor: Weakness present.   Psychiatric:         Mood and Affect: Mood normal.         Behavior: Behavior is cooperative.         Assessment/Plan   Problem List Items Addressed This Visit       Weakness - Primary     Wheelchair for mobility  Continue Therapy         CVA (cerebral vascular accident) (CMS/[facility])     Stable  monitor for changes in speech or increased weakness  Eliquis  Bleeding precautions  Statin         HTN (hypertension)     Coreg  Isosorbide dinitrate  Sacubitril-valsartan  Hydralazine  Spironolactone  Monitor BP          Medications, treatments, and labs reviewed  Continue medications and treatments as listed in EMR    Mitch Fuller I, Mitch Valle    attest that this documentation has been prepared under the direction and in the presence of Alfredo Bro MD    Provider Attestation - Scribe documentation  All medical record entries made by the Scribe were at my direction and personally dictated by me. I have reviewed the chart and agree that the record accurately reflects my personal performance of the history, physical exam, discussion and plan.   Alfredo Bro MD

## 2024-01-10 NOTE — LETTER
Patient: Edenilson Ness  : 1960    Encounter Date: 01/10/2024    PROGRESS NOTE    Subjective  Chief complaint: Edenilson Ness is a 63 y.o. male who is a long term care patient being seen and evaluated for weakness    HPI:  HPI  Patient is continuing to work in therapy.  Therapy is focusing on skilled interventions on analysis and training and cueing hierarchy to increase independence with ADLs.  Patient is also working on close kinetic chain activities to increase functional skills, dynamic functional activities to increase strength, range of motion.  Patient was seen and examined at bedside, no apparent distress.  Denies chest pain or shortness of breath.  Denies nausea or vomiting    Objective  Vital signs: 136/67, 98.0, 81, 18, 97%    Physical Exam  Constitutional:       General: He is not in acute distress.  Eyes:      Extraocular Movements: Extraocular movements intact.   Cardiovascular:      Rate and Rhythm: Normal rate and regular rhythm.   Pulmonary:      Effort: Pulmonary effort is normal.      Breath sounds: Normal breath sounds.   Abdominal:      General: Bowel sounds are normal.      Palpations: Abdomen is soft.   Musculoskeletal:      Cervical back: Neck supple.      Right lower leg: Edema present.      Left lower leg: Edema present.   Neurological:      Mental Status: He is alert.      Motor: Weakness present.   Psychiatric:         Mood and Affect: Mood normal.         Behavior: Behavior is cooperative.         Assessment/Plan  Problem List Items Addressed This Visit       Weakness - Primary     Wheelchair for mobility  Continue Therapy         CVA (cerebral vascular accident) (CMS/[facility])     Stable  monitor for changes in speech or increased weakness  Eliquis  Bleeding precautions  Statin         HTN (hypertension)     Coreg  Isosorbide dinitrate  Sacubitril-valsartan  Hydralazine  Spironolactone  Monitor BP          Medications, treatments, and labs reviewed  Continue medications and treatments  as listed in EMR    Scribe Attestation  I, Mitch Valle   attest that this documentation has been prepared under the direction and in the presence of Alfredo Bro MD    Provider Attestation - Scribe documentation  All medical record entries made by the Scribe were at my direction and personally dictated by me. I have reviewed the chart and agree that the record accurately reflects my personal performance of the history, physical exam, discussion and plan.   Alfredo Bro MD            Electronically Signed By: Alfredo Bro MD   1/10/24  2:44 PM

## 2024-01-17 ENCOUNTER — NURSING HOME VISIT (OUTPATIENT)
Dept: POST ACUTE CARE | Facility: EXTERNAL LOCATION | Age: 64
End: 2024-01-17
Payer: COMMERCIAL

## 2024-01-17 DIAGNOSIS — R53.1 WEAKNESS: Primary | ICD-10-CM

## 2024-01-17 DIAGNOSIS — I63.9 CEREBROVASCULAR ACCIDENT (CVA), UNSPECIFIED MECHANISM (MULTI): ICD-10-CM

## 2024-01-17 DIAGNOSIS — I10 PRIMARY HYPERTENSION: ICD-10-CM

## 2024-01-17 PROCEDURE — 99308 SBSQ NF CARE LOW MDM 20: CPT | Performed by: INTERNAL MEDICINE

## 2024-01-17 NOTE — PROGRESS NOTES
PROGRESS NOTE    Subjective   Chief complaint: Edenilson Ness is a 63 y.o. male who is a long term care patient being seen and evaluated for weakness.    HPI:  HPI  Patient continues to work in therapy due to weakness.  Therapy is focusing on dynamic balance activities during sitting and dynamic functional activities to increase strength and ROM and flexibility.  Patient is also working on strengthening activities to increase functional task performance.  Patient was seen and examined at bedside, no apparent distress.  Denies chest pain or shortness of breath.  Denies nausea or vomiting.    Objective   Vital signs: 122/71, 97.3, 77, 16, 97%    Physical Exam  Constitutional:       General: He is not in acute distress.  Eyes:      Extraocular Movements: Extraocular movements intact.   Cardiovascular:      Rate and Rhythm: Normal rate and regular rhythm.   Pulmonary:      Effort: Pulmonary effort is normal.      Breath sounds: Normal breath sounds.   Abdominal:      General: Bowel sounds are normal.      Palpations: Abdomen is soft.   Musculoskeletal:      Cervical back: Neck supple.      Right lower leg: Edema present.      Left lower leg: Edema present.   Neurological:      Mental Status: He is alert.      Motor: Weakness present.   Psychiatric:         Mood and Affect: Mood normal.         Behavior: Behavior is cooperative.         Assessment/Plan   Problem List Items Addressed This Visit       Weakness - Primary     Wheelchair for mobility  Continue working in therapy         CVA (cerebral vascular accident) (CMS/[facility])     Stable  monitor for changes in speech or increased weakness  Eliquis  Bleeding precautions  Statin         HTN (hypertension)     Coreg  Isosorbide dinitrate  Sacubitril-valsartan  Hydralazine  Spironolactone  Monitor BP          Medications, treatments, and labs reviewed  Continue medications and treatments as listed in EMR    Scribe Attestation  I, Mitch Valle   attest that this  documentation has been prepared under the direction and in the presence of Alfredo Bro MD    Provider Attestation - Scribe documentation  All medical record entries made by the Scribe were at my direction and personally dictated by me. I have reviewed the chart and agree that the record accurately reflects my personal performance of the history, physical exam, discussion and plan.   Alfredo Bro MD

## 2024-01-17 NOTE — LETTER
Patient: Edenilson Ness  : 1960    Encounter Date: 2024    PROGRESS NOTE    Subjective  Chief complaint: Edenilson Ness is a 63 y.o. male who is a long term care patient being seen and evaluated for weakness.    HPI:  HPI  Patient continues to work in therapy due to weakness.  Therapy is focusing on dynamic balance activities during sitting and dynamic functional activities to increase strength and ROM and flexibility.  Patient is also working on strengthening activities to increase functional task performance.  Patient was seen and examined at bedside, no apparent distress.  Denies chest pain or shortness of breath.  Denies nausea or vomiting.    Objective  Vital signs: 122/71, 97.3, 77, 16, 97%    Physical Exam  Constitutional:       General: He is not in acute distress.  Eyes:      Extraocular Movements: Extraocular movements intact.   Cardiovascular:      Rate and Rhythm: Normal rate and regular rhythm.   Pulmonary:      Effort: Pulmonary effort is normal.      Breath sounds: Normal breath sounds.   Abdominal:      General: Bowel sounds are normal.      Palpations: Abdomen is soft.   Musculoskeletal:      Cervical back: Neck supple.      Right lower leg: Edema present.      Left lower leg: Edema present.   Neurological:      Mental Status: He is alert.      Motor: Weakness present.   Psychiatric:         Mood and Affect: Mood normal.         Behavior: Behavior is cooperative.         Assessment/Plan  Problem List Items Addressed This Visit       Weakness - Primary     Wheelchair for mobility  Continue working in therapy         CVA (cerebral vascular accident) (CMS/[facility])     Stable  monitor for changes in speech or increased weakness  Eliquis  Bleeding precautions  Statin         HTN (hypertension)     Coreg  Isosorbide dinitrate  Sacubitril-valsartan  Hydralazine  Spironolactone  Monitor BP          Medications, treatments, and labs reviewed  Continue medications and treatments as listed in  EMR    Scribe Attestation  I, Mitch Valle   attest that this documentation has been prepared under the direction and in the presence of Alfredo Bro MD    Provider Attestation - Scribe documentation  All medical record entries made by the Scribe were at my direction and personally dictated by me. I have reviewed the chart and agree that the record accurately reflects my personal performance of the history, physical exam, discussion and plan.   Alfredo Bro MD            Electronically Signed By: Alfredo Bro MD   1/17/24  1:49 PM

## 2024-01-24 ENCOUNTER — NURSING HOME VISIT (OUTPATIENT)
Dept: POST ACUTE CARE | Facility: EXTERNAL LOCATION | Age: 64
End: 2024-01-24
Payer: COMMERCIAL

## 2024-01-24 DIAGNOSIS — R53.1 WEAKNESS: Primary | ICD-10-CM

## 2024-01-24 DIAGNOSIS — I63.9 CEREBROVASCULAR ACCIDENT (CVA), UNSPECIFIED MECHANISM (MULTI): ICD-10-CM

## 2024-01-24 DIAGNOSIS — I10 PRIMARY HYPERTENSION: ICD-10-CM

## 2024-01-24 PROCEDURE — 99308 SBSQ NF CARE LOW MDM 20: CPT | Performed by: INTERNAL MEDICINE

## 2024-01-24 NOTE — LETTER
Patient: Edenilson Ness  : 1960    Encounter Date: 2024    PROGRESS NOTE    Subjective  Chief complaint: Edenilson Ness is a 63 y.o. male who is a long term care patient being seen and evaluated for weakness.    HPI:  HPI  Therapy continues to work with patient due to generalized weakness.  OT is focusing on dynamic balance activities during sitting, close kinetic chain activities to increase functional skills and dynamic functional activities to increase strength and ROM and flexibility.  Patient was seen and examined at bedside, no apparent distress.    Objective  Vital signs: 129/87, 98.7, 67, 18, 96%    Physical Exam  Constitutional:       General: He is not in acute distress.  Eyes:      Extraocular Movements: Extraocular movements intact.   Cardiovascular:      Rate and Rhythm: Normal rate and regular rhythm.   Pulmonary:      Effort: Pulmonary effort is normal.      Breath sounds: Normal breath sounds.   Abdominal:      General: Bowel sounds are normal.      Palpations: Abdomen is soft.   Musculoskeletal:      Cervical back: Neck supple.      Right lower leg: Edema present.      Left lower leg: Edema present.   Neurological:      Mental Status: He is alert.      Motor: Weakness present.   Psychiatric:         Mood and Affect: Mood normal.         Behavior: Behavior is cooperative.         Assessment/Plan  Problem List Items Addressed This Visit       Weakness - Primary     Wheelchair for mobility  Continue working in therapy towards goals established.         CVA (cerebral vascular accident) (CMS/[facility])     Stable  monitor for changes in speech or increased weakness  Eliquis  Bleeding precautions  Statin         HTN (hypertension)     Coreg  Isosorbide dinitrate  Sacubitril-valsartan  Hydralazine  Spironolactone  Monitor BP          Medications, treatments, and labs reviewed  Continue medications and treatments as listed in EMR    Scribe Attestation  I, Mitch Valle   attest that this  documentation has been prepared under the direction and in the presence of Alfredo Bro MD    Provider Attestation - Scribe documentation  All medical record entries made by the Scribe were at my direction and personally dictated by me. I have reviewed the chart and agree that the record accurately reflects my personal performance of the history, physical exam, discussion and plan.   Alfredo Bro MD            Electronically Signed By: Alfredo Bro MD   1/24/24  2:02 PM

## 2024-01-24 NOTE — PROGRESS NOTES
PROGRESS NOTE    Subjective   Chief complaint: Edenilson Ness is a 63 y.o. male who is a long term care patient being seen and evaluated for weakness.    HPI:  HPI  Therapy continues to work with patient due to generalized weakness.  OT is focusing on dynamic balance activities during sitting, close kinetic chain activities to increase functional skills and dynamic functional activities to increase strength and ROM and flexibility.  Patient was seen and examined at bedside, no apparent distress.    Objective   Vital signs: 129/87, 98.7, 67, 18, 96%    Physical Exam  Constitutional:       General: He is not in acute distress.  Eyes:      Extraocular Movements: Extraocular movements intact.   Cardiovascular:      Rate and Rhythm: Normal rate and regular rhythm.   Pulmonary:      Effort: Pulmonary effort is normal.      Breath sounds: Normal breath sounds.   Abdominal:      General: Bowel sounds are normal.      Palpations: Abdomen is soft.   Musculoskeletal:      Cervical back: Neck supple.      Right lower leg: Edema present.      Left lower leg: Edema present.   Neurological:      Mental Status: He is alert.      Motor: Weakness present.   Psychiatric:         Mood and Affect: Mood normal.         Behavior: Behavior is cooperative.         Assessment/Plan   Problem List Items Addressed This Visit       Weakness - Primary     Wheelchair for mobility  Continue working in therapy towards goals established.         CVA (cerebral vascular accident) (CMS/[facility])     Stable  monitor for changes in speech or increased weakness  Eliquis  Bleeding precautions  Statin         HTN (hypertension)     Coreg  Isosorbide dinitrate  Sacubitril-valsartan  Hydralazine  Spironolactone  Monitor BP          Medications, treatments, and labs reviewed  Continue medications and treatments as listed in EMR    Scribe Attestation  JAMES, Mitch Valle   attest that this documentation has been prepared under the direction and in the presence  of Alfredo Bro MD    Provider Attestation - Scribe documentation  All medical record entries made by the Scribe were at my direction and personally dictated by me. I have reviewed the chart and agree that the record accurately reflects my personal performance of the history, physical exam, discussion and plan.   Alfredo Bro MD

## 2024-01-31 ENCOUNTER — NURSING HOME VISIT (OUTPATIENT)
Dept: POST ACUTE CARE | Facility: EXTERNAL LOCATION | Age: 64
End: 2024-01-31
Payer: COMMERCIAL

## 2024-01-31 DIAGNOSIS — E11.9 TYPE 2 DIABETES MELLITUS WITHOUT COMPLICATION, WITH LONG-TERM CURRENT USE OF INSULIN (MULTI): ICD-10-CM

## 2024-01-31 DIAGNOSIS — R53.1 WEAKNESS: Primary | ICD-10-CM

## 2024-01-31 DIAGNOSIS — I63.9 CEREBROVASCULAR ACCIDENT (CVA), UNSPECIFIED MECHANISM (MULTI): ICD-10-CM

## 2024-01-31 DIAGNOSIS — I10 PRIMARY HYPERTENSION: ICD-10-CM

## 2024-01-31 DIAGNOSIS — Z79.4 TYPE 2 DIABETES MELLITUS WITHOUT COMPLICATION, WITH LONG-TERM CURRENT USE OF INSULIN (MULTI): ICD-10-CM

## 2024-01-31 PROCEDURE — 99308 SBSQ NF CARE LOW MDM 20: CPT | Performed by: INTERNAL MEDICINE

## 2024-01-31 NOTE — LETTER
Patient: Edenilson Ness  : 1960    Encounter Date: 2024    PROGRESS NOTE    Subjective  Chief complaint: Edenilson Ness is a 63 y.o. male who is a long term care patient being seen and evaluated for weakness.    HPI:  HPI  Therapy has been working with the patient to improve strength and endurance with ADLs, transfers, and mobility.  Patient continues to work toward goals.  Patient was seen and examined at the bedside, appears to be in no apparent distress.  Denies chest pain or shortness of breath.  Patient is stable and has no new complaints.  Nursing staff voices no new concerns today.    Objective  Vital signs: 129/79, 97.8, 66, 18, 97%    Physical Exam  Constitutional:       General: He is not in acute distress.  Eyes:      Extraocular Movements: Extraocular movements intact.   Cardiovascular:      Rate and Rhythm: Normal rate and regular rhythm.   Pulmonary:      Effort: Pulmonary effort is normal.      Breath sounds: Normal breath sounds.   Abdominal:      General: Bowel sounds are normal.      Palpations: Abdomen is soft.   Musculoskeletal:      Cervical back: Neck supple.      Right lower leg: Edema present.      Left lower leg: Edema present.   Neurological:      Mental Status: He is alert.      Motor: Weakness present.   Psychiatric:         Mood and Affect: Mood normal.         Behavior: Behavior is cooperative.         Assessment/Plan  Problem List Items Addressed This Visit       Weakness - Primary     Wheelchair for mobility  Continue working in therapy          CVA (cerebral vascular accident) (CMS/HCC)     Stable  monitor for changes in speech or increased weakness  Eliquis  Bleeding precautions  Statin         Type 2 diabetes mellitus without complication, with long-term current use of insulin (CMS/HCC)     Monitor sweets and carb intake  Continue to monitor BS   monitor A1c routinely         HTN (hypertension)     Coreg  Isosorbide  dinitrate  Sacubitril-valsartan  Hydralazine  Spironolactone  Monitor BP          Medications, treatments, and labs reviewed  Continue medications and treatments as listed in EMR    Scribe Attestation  I, Mitch Valle   attest that this documentation has been prepared under the direction and in the presence of Alfredo Bro MD    Provider Attestation - Scribe documentation  All medical record entries made by the Scribe were at my direction and personally dictated by me. I have reviewed the chart and agree that the record accurately reflects my personal performance of the history, physical exam, discussion and plan.   Alfredo Bro MD            Electronically Signed By: Alfredo Bro MD   1/31/24  5:01 PM

## 2024-01-31 NOTE — PROGRESS NOTES
PROGRESS NOTE    Subjective   Chief complaint: Edenilson Ness is a 63 y.o. male who is a long term care patient being seen and evaluated for weakness.    HPI:  HPI  Therapy has been working with the patient to improve strength and endurance with ADLs, transfers, and mobility.  Patient continues to work toward goals.  Patient was seen and examined at the bedside, appears to be in no apparent distress.  Denies chest pain or shortness of breath.  Patient is stable and has no new complaints.  Nursing staff voices no new concerns today.    Objective   Vital signs: 129/79, 97.8, 66, 18, 97%    Physical Exam  Constitutional:       General: He is not in acute distress.  Eyes:      Extraocular Movements: Extraocular movements intact.   Cardiovascular:      Rate and Rhythm: Normal rate and regular rhythm.   Pulmonary:      Effort: Pulmonary effort is normal.      Breath sounds: Normal breath sounds.   Abdominal:      General: Bowel sounds are normal.      Palpations: Abdomen is soft.   Musculoskeletal:      Cervical back: Neck supple.      Right lower leg: Edema present.      Left lower leg: Edema present.   Neurological:      Mental Status: He is alert.      Motor: Weakness present.   Psychiatric:         Mood and Affect: Mood normal.         Behavior: Behavior is cooperative.         Assessment/Plan   Problem List Items Addressed This Visit       Weakness - Primary     Wheelchair for mobility  Continue working in therapy          CVA (cerebral vascular accident) (CMS/HCC)     Stable  monitor for changes in speech or increased weakness  Eliquis  Bleeding precautions  Statin         Type 2 diabetes mellitus without complication, with long-term current use of insulin (CMS/HCC)     Monitor sweets and carb intake  Continue to monitor BS   monitor A1c routinely         HTN (hypertension)     Coreg  Isosorbide dinitrate  Sacubitril-valsartan  Hydralazine  Spironolactone  Monitor BP          Medications, treatments, and labs  reviewed  Continue medications and treatments as listed in EMR    Scribe Attestation  I, Mitch Valle   attest that this documentation has been prepared under the direction and in the presence of Alfredo Bro MD    Provider Attestation - Scribe documentation  All medical record entries made by the Scribe were at my direction and personally dictated by me. I have reviewed the chart and agree that the record accurately reflects my personal performance of the history, physical exam, discussion and plan.   Alfredo Bro MD

## 2024-02-07 ENCOUNTER — NURSING HOME VISIT (OUTPATIENT)
Dept: POST ACUTE CARE | Facility: EXTERNAL LOCATION | Age: 64
End: 2024-02-07
Payer: COMMERCIAL

## 2024-02-07 DIAGNOSIS — I63.9 CEREBROVASCULAR ACCIDENT (CVA), UNSPECIFIED MECHANISM (MULTI): ICD-10-CM

## 2024-02-07 DIAGNOSIS — I10 PRIMARY HYPERTENSION: ICD-10-CM

## 2024-02-07 DIAGNOSIS — R53.1 WEAKNESS: Primary | ICD-10-CM

## 2024-02-07 PROCEDURE — 99308 SBSQ NF CARE LOW MDM 20: CPT | Performed by: INTERNAL MEDICINE

## 2024-02-07 NOTE — PROGRESS NOTES
PROGRESS NOTE    Subjective   Chief complaint: Edenilson Ness is a 63 y.o. male who is a long term care patient being seen and evaluated for weakness.    HPI:  HPI  Patient is working in therapy due to generalized weakness.  Therapy is focusing on stand pivot transfers from wheelchair to toilet with max assist needed to complete.  Patient is also working on sit to stand using pull-up bar in bathroom, requiring mod assist to turn with max verbal cues needed.  Patient completed bilateral upper extremity strengthening exercises with moderate resistant therapy bands in all planes for 20 reps x 2 sets.  Patient seen and examined at bedside.  Patient in no apparent distress.  Denies chest pain or shortness of breath.  Afebrile.    Objective   Vital signs: 148/79, 98.4, 74, 18    Physical Exam  Constitutional:       General: He is not in acute distress.  Eyes:      Extraocular Movements: Extraocular movements intact.   Cardiovascular:      Rate and Rhythm: Normal rate and regular rhythm.   Pulmonary:      Effort: Pulmonary effort is normal.      Breath sounds: Normal breath sounds.   Abdominal:      General: Bowel sounds are normal.      Palpations: Abdomen is soft.   Musculoskeletal:      Cervical back: Neck supple.      Right lower leg: Edema present.      Left lower leg: Edema present.   Neurological:      Mental Status: He is alert.      Motor: Weakness present.   Psychiatric:         Mood and Affect: Mood normal.         Behavior: Behavior is cooperative.         Assessment/Plan   Problem List Items Addressed This Visit       Weakness - Primary     Wheelchair for mobility  Continue working in therapy          CVA (cerebral vascular accident) (CMS/[facility])     Stable  monitor for changes in speech or increased weakness  Eliquis  Bleeding precautions  Statin         HTN (hypertension)     Coreg  Isosorbide dinitrate  Sacubitril-valsartan  Hydralazine  Spironolactone  Monitor BP          Medications, treatments, and labs  reviewed  Continue medications and treatments as listed in EMR    Scribe Attestation  I, Mitch Valle   attest that this documentation has been prepared under the direction and in the presence of Alfredo Bro MD    Provider Attestation - Scribe documentation  All medical record entries made by the Scribe were at my direction and personally dictated by me. I have reviewed the chart and agree that the record accurately reflects my personal performance of the history, physical exam, discussion and plan.   Alfredo Bro MD

## 2024-02-07 NOTE — LETTER
Patient: Edenilson Ness  : 1960    Encounter Date: 2024    PROGRESS NOTE    Subjective  Chief complaint: Edenilson Ness is a 63 y.o. male who is a long term care patient being seen and evaluated for weakness.    HPI:  HPI  Patient is working in therapy due to generalized weakness.  Therapy is focusing on stand pivot transfers from wheelchair to toilet with max assist needed to complete.  Patient is also working on sit to stand using pull-up bar in bathroom, requiring mod assist to turn with max verbal cues needed.  Patient completed bilateral upper extremity strengthening exercises with moderate resistant therapy bands in all planes for 20 reps x 2 sets.  Patient seen and examined at bedside.  Patient in no apparent distress.  Denies chest pain or shortness of breath.  Afebrile.    Objective  Vital signs: 148/79, 98.4, 74, 18    Physical Exam  Constitutional:       General: He is not in acute distress.  Eyes:      Extraocular Movements: Extraocular movements intact.   Cardiovascular:      Rate and Rhythm: Normal rate and regular rhythm.   Pulmonary:      Effort: Pulmonary effort is normal.      Breath sounds: Normal breath sounds.   Abdominal:      General: Bowel sounds are normal.      Palpations: Abdomen is soft.   Musculoskeletal:      Cervical back: Neck supple.      Right lower leg: Edema present.      Left lower leg: Edema present.   Neurological:      Mental Status: He is alert.      Motor: Weakness present.   Psychiatric:         Mood and Affect: Mood normal.         Behavior: Behavior is cooperative.         Assessment/Plan  Problem List Items Addressed This Visit       Weakness - Primary     Wheelchair for mobility  Continue working in therapy          CVA (cerebral vascular accident) (CMS/[facility])     Stable  monitor for changes in speech or increased weakness  Eliquis  Bleeding precautions  Statin         HTN (hypertension)     Coreg  Isosorbide  dinitrate  Sacubitril-valsartan  Hydralazine  Spironolactone  Monitor BP          Medications, treatments, and labs reviewed  Continue medications and treatments as listed in EMR    Scribe Attestation  I, Mitch Valle   attest that this documentation has been prepared under the direction and in the presence of Alfredo Bro MD    Provider Attestation - Scribe documentation  All medical record entries made by the Scribe were at my direction and personally dictated by me. I have reviewed the chart and agree that the record accurately reflects my personal performance of the history, physical exam, discussion and plan.   Alfredo Bro MD            Electronically Signed By: Alfredo Bro MD   2/7/24  3:57 PM

## 2024-02-08 ENCOUNTER — NURSING HOME VISIT (OUTPATIENT)
Dept: POST ACUTE CARE | Facility: EXTERNAL LOCATION | Age: 64
End: 2024-02-08
Payer: COMMERCIAL

## 2024-02-08 DIAGNOSIS — I10 PRIMARY HYPERTENSION: ICD-10-CM

## 2024-02-08 DIAGNOSIS — R19.7 DIARRHEA, UNSPECIFIED TYPE: Primary | ICD-10-CM

## 2024-02-08 PROCEDURE — 99308 SBSQ NF CARE LOW MDM 20: CPT | Performed by: NURSE PRACTITIONER

## 2024-02-08 NOTE — PROGRESS NOTES
PROGRESS NOTE    Subjective   Chief complaint: Edenilson Ness is a 63 y.o. male who is a long term care patient being seen and evaluated for liquid stool.    HPI:  HPI  Nursing called, reported patient had liquid stools, orders placed for Imodium x 1 and obtain stool for C. difficile.  Medications were reviewed and patient is on Colace and senna.  He has not had any further stools and denies abdominal pain.  Denies nausea vomiting fever chills.    Objective   Vital signs: 120/80, 97.9, 88, 18, 98%    Physical Exam  Constitutional:       General: He is not in acute distress.  Eyes:      Extraocular Movements: Extraocular movements intact.   Cardiovascular:      Rate and Rhythm: Normal rate.   Pulmonary:      Effort: Pulmonary effort is normal.   Abdominal:      General: Bowel sounds are normal. There is no distension.      Palpations: Abdomen is soft.      Tenderness: There is no abdominal tenderness.   Musculoskeletal:      Cervical back: Neck supple.   Neurological:      Mental Status: He is alert.   Psychiatric:         Mood and Affect: Mood normal.         Behavior: Behavior is cooperative.         Assessment/Plan   Problem List Items Addressed This Visit       HTN (hypertension)     Coreg  Isosorbide dinitrate  Sacubitril-valsartan  Hydralazine  Spironolactone  Monitor BP         Diarrhea - Primary     DC stool for C. difficile  DC Colace   Change senna to as needed   Monitor          Medications, treatments, and labs reviewed  Continue medications and treatments as listed in EMR    Scribe Attestation  Neelima RAMIREZ Scribe   attest that this documentation has been prepared under the direction and in the presence of VANNA Griffiths-CNP    Provider Attestation - Scribe documentation  All medical record entries made by the Scribe were at my direction and personally dictated by me. I have reviewed the chart and agree that the record accurately reflects my personal performance of the history, physical  exam, discussion and plan.   Elba Herrera, APRN-CNP

## 2024-02-08 NOTE — LETTER
Patient: Edenilson Ness  : 1960    Encounter Date: 2024    PROGRESS NOTE    Subjective  Chief complaint: Edenilson Ness is a 63 y.o. male who is a long term care patient being seen and evaluated for liquid stool.    HPI:  HPI  Nursing called, reported patient had liquid stools, orders placed for Imodium x 1 and obtain stool for C. difficile.  Medications were reviewed and patient is on Colace and senna.  He has not had any further stools and denies abdominal pain.  Denies nausea vomiting fever chills.    Objective  Vital signs: 120/80, 97.9, 88, 18, 98%    Physical Exam  Constitutional:       General: He is not in acute distress.  Eyes:      Extraocular Movements: Extraocular movements intact.   Cardiovascular:      Rate and Rhythm: Normal rate.   Pulmonary:      Effort: Pulmonary effort is normal.   Abdominal:      General: Bowel sounds are normal. There is no distension.      Palpations: Abdomen is soft.      Tenderness: There is no abdominal tenderness.   Musculoskeletal:      Cervical back: Neck supple.   Neurological:      Mental Status: He is alert.   Psychiatric:         Mood and Affect: Mood normal.         Behavior: Behavior is cooperative.         Assessment/Plan  Problem List Items Addressed This Visit       HTN (hypertension)     Coreg  Isosorbide dinitrate  Sacubitril-valsartan  Hydralazine  Spironolactone  Monitor BP         Diarrhea - Primary     DC stool for C. difficile  DC Colace   Change senna to as needed   Monitor          Medications, treatments, and labs reviewed  Continue medications and treatments as listed in EMR    Scribe Attestation  INeelima Scribe   attest that this documentation has been prepared under the direction and in the presence of VANNA Griffiths-CNP    Provider Attestation - Scribe documentation  All medical record entries made by the Scribe were at my direction and personally dictated by me. I have reviewed the chart and agree that the record  accurately reflects my personal performance of the history, physical exam, discussion and plan.   LUCRETIA Griffiths            Electronically Signed By: LUCRETIA Griffiths   2/9/24  5:04 PM

## 2024-02-09 ENCOUNTER — NURSING HOME VISIT (OUTPATIENT)
Dept: POST ACUTE CARE | Facility: EXTERNAL LOCATION | Age: 64
End: 2024-02-09
Payer: COMMERCIAL

## 2024-02-09 DIAGNOSIS — E11.9 TYPE 2 DIABETES MELLITUS WITHOUT COMPLICATION, WITH LONG-TERM CURRENT USE OF INSULIN (MULTI): ICD-10-CM

## 2024-02-09 DIAGNOSIS — D63.8 ANEMIA OF CHRONIC DISEASE: ICD-10-CM

## 2024-02-09 DIAGNOSIS — I10 PRIMARY HYPERTENSION: ICD-10-CM

## 2024-02-09 DIAGNOSIS — R19.7 DIARRHEA, UNSPECIFIED TYPE: ICD-10-CM

## 2024-02-09 DIAGNOSIS — I63.9 CEREBROVASCULAR ACCIDENT (CVA), UNSPECIFIED MECHANISM (MULTI): Primary | ICD-10-CM

## 2024-02-09 DIAGNOSIS — Z79.4 TYPE 2 DIABETES MELLITUS WITHOUT COMPLICATION, WITH LONG-TERM CURRENT USE OF INSULIN (MULTI): ICD-10-CM

## 2024-02-09 PROCEDURE — 99309 SBSQ NF CARE MODERATE MDM 30: CPT | Performed by: INTERNAL MEDICINE

## 2024-02-09 NOTE — LETTER
Patient: Edenilson Ness  : 1960    Encounter Date: 2024    PROGRESS NOTE    Subjective  Chief complaint: Edenilson Ness is a 63 y.o. male who is a long term care patient being seen and evaluated for monthly general medical care and follow-up    HPI:  HPI  Patient presents for general medical care and f/u.  Patient seen and examined at bedside.  No issues per nursing.  Patient also seen in follow-up of Marilynn, was given Imodium and orders placed to obtain stool for C. difficile, once reviewing medications patient is on senna and Colace so changed order to discontinue stool and discontinue Colace and change senna to as needed.  Hx CVA, denies changes in weakness and speech.  Anemia stable, denies fatigue, sob, and palpitations.  HTN BP at goal.  Denies chest pain and headache.  DM, denies polydipsia polyuria polyphagia.  No acute distress.    Objective  Vital signs: 136/88, 98.1, 78, 17, 97%    Physical Exam  Constitutional:       General: He is not in acute distress.  Eyes:      Extraocular Movements: Extraocular movements intact.   Cardiovascular:      Rate and Rhythm: Normal rate and regular rhythm.   Pulmonary:      Effort: Pulmonary effort is normal.      Breath sounds: Normal breath sounds.   Abdominal:      General: Bowel sounds are normal.      Palpations: Abdomen is soft.   Musculoskeletal:      Cervical back: Neck supple.   Neurological:      Mental Status: He is alert.   Psychiatric:         Mood and Affect: Mood normal.         Behavior: Behavior is cooperative.         Assessment/Plan  Problem List Items Addressed This Visit       CVA (cerebral vascular accident) (CMS/[facility]) - Primary     Stable  monitor for changes in speech or increased weakness  Eliquis  Bleeding precautions  Statin         Type 2 diabetes mellitus without complication, with long-term current use of insulin (CMS/[facility])     Monitor sweets and carb intake  Continue to monitor BS   monitor A1c routinely         HTN (hypertension)      Coreg  Isosorbide dinitrate  Sacubitril-valsartan  Hydralazine  Spironolactone  Monitor BP         Anemia of chronic disease     Stable  Continue to monitor labs         Diarrhea     DC stool for C. difficile  DC Colace   Change senna to as needed   Monitor          Medications, treatments, and labs reviewed  Continue medications and treatments as listed in EMR    Scribe Attestation  Neelima RAMIREZ Scribe   attest that this documentation has been prepared under the direction and in the presence of Alfredo Bro MD    Provider Attestation - Scribe documentation  All medical record entries made by the Scribe were at my direction and personally dictated by me. I have reviewed the chart and agree that the record accurately reflects my personal performance of the history, physical exam, discussion and plan.   Alfredo Bro MD            Electronically Signed By: Alfredo Bro MD   2/10/24  9:23 AM

## 2024-02-09 NOTE — PROGRESS NOTES
PROGRESS NOTE    Subjective   Chief complaint: Edenilson Ness is a 63 y.o. male who is a long term care patient being seen and evaluated for monthly general medical care and follow-up    HPI:  HPI  Patient presents for general medical care and f/u.  Patient seen and examined at bedside.  No issues per nursing.  Patient also seen in follow-up of Marilynn, was given Imodium and orders placed to obtain stool for C. difficile, once reviewing medications patient is on senna and Colace so changed order to discontinue stool and discontinue Colace and change senna to as needed.  Hx CVA, denies changes in weakness and speech.  Anemia stable, denies fatigue, sob, and palpitations.  HTN BP at goal.  Denies chest pain and headache.  DM, denies polydipsia polyuria polyphagia.  No acute distress.    Objective   Vital signs: 136/88, 98.1, 78, 17, 97%    Physical Exam  Constitutional:       General: He is not in acute distress.  Eyes:      Extraocular Movements: Extraocular movements intact.   Cardiovascular:      Rate and Rhythm: Normal rate and regular rhythm.   Pulmonary:      Effort: Pulmonary effort is normal.      Breath sounds: Normal breath sounds.   Abdominal:      General: Bowel sounds are normal.      Palpations: Abdomen is soft.   Musculoskeletal:      Cervical back: Neck supple.   Neurological:      Mental Status: He is alert.   Psychiatric:         Mood and Affect: Mood normal.         Behavior: Behavior is cooperative.         Assessment/Plan   Problem List Items Addressed This Visit       CVA (cerebral vascular accident) (CMS/HCC) - Primary     Stable  monitor for changes in speech or increased weakness  Eliquis  Bleeding precautions  Statin         Type 2 diabetes mellitus without complication, with long-term current use of insulin (CMS/[facility])     Monitor sweets and carb intake  Continue to monitor BS   monitor A1c routinely         HTN (hypertension)     Coreg  Isosorbide  dinitrate  Sacubitril-valsartan  Hydralazine  Spironolactone  Monitor BP         Anemia of chronic disease     Stable  Continue to monitor labs         Diarrhea     DC stool for C. difficile  DC Colace   Change senna to as needed   Monitor          Medications, treatments, and labs reviewed  Continue medications and treatments as listed in EMR    Scribe Attestation  Neelima RAMIREZ Scribe   attest that this documentation has been prepared under the direction and in the presence of Alfredo Bro MD    Provider Attestation - Scribe documentation  All medical record entries made by the Scribe were at my direction and personally dictated by me. I have reviewed the chart and agree that the record accurately reflects my personal performance of the history, physical exam, discussion and plan.   Alfredo Bro MD

## 2024-02-14 ENCOUNTER — NURSING HOME VISIT (OUTPATIENT)
Dept: POST ACUTE CARE | Facility: EXTERNAL LOCATION | Age: 64
End: 2024-02-14
Payer: COMMERCIAL

## 2024-02-14 DIAGNOSIS — I63.9 CEREBROVASCULAR ACCIDENT (CVA), UNSPECIFIED MECHANISM (MULTI): ICD-10-CM

## 2024-02-14 DIAGNOSIS — R53.1 WEAKNESS: Primary | ICD-10-CM

## 2024-02-14 DIAGNOSIS — I10 PRIMARY HYPERTENSION: ICD-10-CM

## 2024-02-14 DIAGNOSIS — M62.838 MUSCLE SPASTICITY: ICD-10-CM

## 2024-02-14 PROCEDURE — 99308 SBSQ NF CARE LOW MDM 20: CPT | Performed by: INTERNAL MEDICINE

## 2024-02-14 NOTE — PROGRESS NOTES
PROGRESS NOTE    Subjective   Chief complaint: Edenilson Ness is a 63 y.o. male who is a long term care patient being seen and evaluated for weakness.    HPI:  HPI  Patient has been working in therapy due to generalized weakness.  Therapy is working on analysis and training and cueing hierarchy to increase independence with ADLs.  Patient is also working on dynamic functional activities to increase strength and ROM and flexibility.  Patient is working on transfer training.  Reported that patient is more spastic with therapy during transfers.  Patient is to start baclofen twice daily.  Patient seen and examined at bedside, appears to be in no apparent distress.    Objective   Vital signs: 134/82, 97.8, 87, 18, 97%    Physical Exam  Constitutional:       General: He is not in acute distress.  Eyes:      Extraocular Movements: Extraocular movements intact.   Cardiovascular:      Rate and Rhythm: Normal rate and regular rhythm.   Pulmonary:      Effort: Pulmonary effort is normal.      Breath sounds: Normal breath sounds.   Abdominal:      General: Bowel sounds are normal.      Palpations: Abdomen is soft.   Musculoskeletal:      Cervical back: Neck supple.   Neurological:      Mental Status: He is alert.   Psychiatric:         Mood and Affect: Mood normal.         Behavior: Behavior is cooperative.         Assessment/Plan   Problem List Items Addressed This Visit       Weakness - Primary     Continue working in therapy         CVA (cerebral vascular accident) (CMS/[facility])     Stable  monitor for changes in speech or increased weakness  Eliquis  Bleeding precautions  Statin         HTN (hypertension)     Coreg  Isosorbide dinitrate  Sacubitril-valsartan  Hydralazine  Spironolactone  Monitor BP         Muscle spasticity     Start baclofen twice daily          Medications, treatments, and labs reviewed  Continue medications and treatments as listed in EMR    Scribe Attestation  I, Mitch Valle   attest that this  documentation has been prepared under the direction and in the presence of Alfredo Bro MD    Provider Attestation - Scribe documentation  All medical record entries made by the Scribe were at my direction and personally dictated by me. I have reviewed the chart and agree that the record accurately reflects my personal performance of the history, physical exam, discussion and plan.   Alfredo Bor MD

## 2024-02-14 NOTE — LETTER
Patient: Edenilson Ness  : 1960    Encounter Date: 2024    PROGRESS NOTE    Subjective  Chief complaint: Edenilson Ness is a 63 y.o. male who is a long term care patient being seen and evaluated for weakness.    HPI:  HPI  Patient has been working in therapy due to generalized weakness.  Therapy is working on analysis and training and cueing hierarchy to increase independence with ADLs.  Patient is also working on dynamic functional activities to increase strength and ROM and flexibility.  Patient is working on transfer training.  Reported that patient is more spastic with therapy during transfers.  Patient is to start baclofen twice daily.  Patient seen and examined at bedside, appears to be in no apparent distress.    Objective  Vital signs: 134/82, 97.8, 87, 18, 97%    Physical Exam  Constitutional:       General: He is not in acute distress.  Eyes:      Extraocular Movements: Extraocular movements intact.   Cardiovascular:      Rate and Rhythm: Normal rate and regular rhythm.   Pulmonary:      Effort: Pulmonary effort is normal.      Breath sounds: Normal breath sounds.   Abdominal:      General: Bowel sounds are normal.      Palpations: Abdomen is soft.   Musculoskeletal:      Cervical back: Neck supple.   Neurological:      Mental Status: He is alert.   Psychiatric:         Mood and Affect: Mood normal.         Behavior: Behavior is cooperative.         Assessment/Plan  Problem List Items Addressed This Visit       Weakness - Primary     Continue working in therapy         CVA (cerebral vascular accident) (CMS/[facility])     Stable  monitor for changes in speech or increased weakness  Eliquis  Bleeding precautions  Statin         HTN (hypertension)     Coreg  Isosorbide dinitrate  Sacubitril-valsartan  Hydralazine  Spironolactone  Monitor BP         Muscle spasticity     Start baclofen twice daily          Medications, treatments, and labs reviewed  Continue medications and treatments as listed in  EMR    Scribe Attestation  I, Mitch Valle   attest that this documentation has been prepared under the direction and in the presence of Alfredo Bro MD    Provider Attestation - Scribe documentation  All medical record entries made by the Scribe were at my direction and personally dictated by me. I have reviewed the chart and agree that the record accurately reflects my personal performance of the history, physical exam, discussion and plan.   Alfredo Bro MD            Electronically Signed By: Alfredo Bro MD   2/14/24  3:28 PM

## 2024-02-21 ENCOUNTER — NURSING HOME VISIT (OUTPATIENT)
Dept: POST ACUTE CARE | Facility: EXTERNAL LOCATION | Age: 64
End: 2024-02-21
Payer: COMMERCIAL

## 2024-02-21 DIAGNOSIS — I63.9 CEREBROVASCULAR ACCIDENT (CVA), UNSPECIFIED MECHANISM (MULTI): ICD-10-CM

## 2024-02-21 DIAGNOSIS — E11.9 TYPE 2 DIABETES MELLITUS WITHOUT COMPLICATION, WITH LONG-TERM CURRENT USE OF INSULIN (MULTI): ICD-10-CM

## 2024-02-21 DIAGNOSIS — Z79.4 TYPE 2 DIABETES MELLITUS WITHOUT COMPLICATION, WITH LONG-TERM CURRENT USE OF INSULIN (MULTI): ICD-10-CM

## 2024-02-21 DIAGNOSIS — I10 PRIMARY HYPERTENSION: ICD-10-CM

## 2024-02-21 DIAGNOSIS — R53.1 WEAKNESS: Primary | ICD-10-CM

## 2024-02-21 PROCEDURE — 99309 SBSQ NF CARE MODERATE MDM 30: CPT | Performed by: INTERNAL MEDICINE

## 2024-02-21 NOTE — PROGRESS NOTES
PROGRESS NOTE    Subjective   Chief complaint: Edenilson Ness is a 63 y.o. male who is a long term care patient being seen and evaluated for weakness.    HPI:  HPI  Therapy is working with patient on dynamic functional activities to increase strength, ROM and flexibility and transfer training to increase functional performance.  Patient is actively precipitating.  Patient was seen and examined at bedside, appears to be in no acute distress.  Patient has no new concerns at this time.    Objective   Vital signs: 128/78, 98.0, 78, 17, 98%    Physical Exam  Constitutional:       General: He is not in acute distress.  Eyes:      Extraocular Movements: Extraocular movements intact.   Cardiovascular:      Rate and Rhythm: Normal rate and regular rhythm.   Pulmonary:      Effort: Pulmonary effort is normal.      Breath sounds: Normal breath sounds.   Abdominal:      General: Bowel sounds are normal.      Palpations: Abdomen is soft.   Musculoskeletal:      Cervical back: Neck supple.   Neurological:      Mental Status: He is alert.      Motor: Weakness present.   Psychiatric:         Mood and Affect: Mood normal.         Behavior: Behavior is cooperative.         Assessment/Plan   Problem List Items Addressed This Visit       Weakness - Primary     Continue to progress towards goals in therapy         CVA (cerebral vascular accident) (CMS/HCC)     Stable  monitor for changes in speech or increased weakness  Eliquis  Bleeding precautions  Statin         Type 2 diabetes mellitus without complication, with long-term current use of insulin (CMS/HCC)     Monitor sweets and carb intake  Continue to monitor BS   monitor A1c routinely         HTN (hypertension)     Coreg  Isosorbide dinitrate  Sacubitril-valsartan  Hydralazine  Spironolactone  Monitor BP          Medications, treatments, and labs reviewed  Continue medications and treatments as listed in EMR    Scribe Attestation  I, Mitch Valle   attest that this  documentation has been prepared under the direction and in the presence of Alfredo Bro MD    Provider Attestation - Scribe documentation  All medical record entries made by the Scribe were at my direction and personally dictated by me. I have reviewed the chart and agree that the record accurately reflects my personal performance of the history, physical exam, discussion and plan.   Alfredo Bro MD

## 2024-02-21 NOTE — LETTER
Patient: Edenilson Ness  : 1960    Encounter Date: 2024    PROGRESS NOTE    Subjective  Chief complaint: Edenilson Ness is a 63 y.o. male who is a long term care patient being seen and evaluated for weakness.    HPI:  HPI  Therapy is working with patient on dynamic functional activities to increase strength, ROM and flexibility and transfer training to increase functional performance.  Patient is actively precipitating.  Patient was seen and examined at bedside, appears to be in no acute distress.  Patient has no new concerns at this time.    Objective  Vital signs: 128/78, 98.0, 78, 17, 98%    Physical Exam  Constitutional:       General: He is not in acute distress.  Eyes:      Extraocular Movements: Extraocular movements intact.   Cardiovascular:      Rate and Rhythm: Normal rate and regular rhythm.   Pulmonary:      Effort: Pulmonary effort is normal.      Breath sounds: Normal breath sounds.   Abdominal:      General: Bowel sounds are normal.      Palpations: Abdomen is soft.   Musculoskeletal:      Cervical back: Neck supple.   Neurological:      Mental Status: He is alert.      Motor: Weakness present.   Psychiatric:         Mood and Affect: Mood normal.         Behavior: Behavior is cooperative.         Assessment/Plan  Problem List Items Addressed This Visit       Weakness - Primary     Continue to progress towards goals in therapy         CVA (cerebral vascular accident) (CMS/HCC)     Stable  monitor for changes in speech or increased weakness  Eliquis  Bleeding precautions  Statin         Type 2 diabetes mellitus without complication, with long-term current use of insulin (CMS/HCC)     Monitor sweets and carb intake  Continue to monitor BS   monitor A1c routinely         HTN (hypertension)     Coreg  Isosorbide dinitrate  Sacubitril-valsartan  Hydralazine  Spironolactone  Monitor BP          Medications, treatments, and labs reviewed  Continue medications and treatments as listed in  EMR    Scribe Attestation  I, Mitch Valle   attest that this documentation has been prepared under the direction and in the presence of Alfredo Bro MD    Provider Attestation - Scribe documentation  All medical record entries made by the Scribe were at my direction and personally dictated by me. I have reviewed the chart and agree that the record accurately reflects my personal performance of the history, physical exam, discussion and plan.   Alfredo Bro MD            Electronically Signed By: Alfredo Bro MD   2/21/24  4:12 PM

## 2024-02-28 ENCOUNTER — NURSING HOME VISIT (OUTPATIENT)
Dept: POST ACUTE CARE | Facility: EXTERNAL LOCATION | Age: 64
End: 2024-02-28
Payer: COMMERCIAL

## 2024-02-28 DIAGNOSIS — I63.9 CEREBROVASCULAR ACCIDENT (CVA), UNSPECIFIED MECHANISM (MULTI): ICD-10-CM

## 2024-02-28 DIAGNOSIS — I10 PRIMARY HYPERTENSION: ICD-10-CM

## 2024-02-28 DIAGNOSIS — R53.1 WEAKNESS: Primary | ICD-10-CM

## 2024-02-28 PROCEDURE — 99308 SBSQ NF CARE LOW MDM 20: CPT | Performed by: INTERNAL MEDICINE

## 2024-02-28 NOTE — LETTER
Patient: Edenilson Ness  : 1960    Encounter Date: 2024    PROGRESS NOTE    Subjective  Chief complaint: Edenilson Ness is a 63 y.o. male who is a long term care patient being seen and evaluated for weakness.    HPI:  HPI  Patient is currently working with OT due to generalized weakness.  Patient is responding well to skilled interventions.  Patient is working on dynamic functional activities to increase strength range of motion flexibility.  Patient is also performing close kinetic chain activities to increase functional skills and range of motion.  Patient seen and examined at bedside, appears to be in no acute distress.  Denies chest pain or shortness of breath.  Afebrile.  Nursing staff voicing no new concerns at this time.    Objective  Vital signs: 128/78, 98.0, 78, 17, 98%    Physical Exam  Constitutional:       General: He is not in acute distress.  Eyes:      Extraocular Movements: Extraocular movements intact.   Cardiovascular:      Rate and Rhythm: Normal rate and regular rhythm.   Pulmonary:      Effort: Pulmonary effort is normal.      Breath sounds: Normal breath sounds.   Abdominal:      General: Bowel sounds are normal.      Palpations: Abdomen is soft.   Musculoskeletal:      Cervical back: Neck supple.   Neurological:      Mental Status: He is alert.      Motor: Weakness present.   Psychiatric:         Mood and Affect: Mood normal.         Behavior: Behavior is cooperative.         Assessment/Plan  Problem List Items Addressed This Visit       Weakness - Primary     Continue working with OT, responding to interventions         CVA (cerebral vascular accident) (CMS/[facility])     Stable  monitor for changes in speech or increased weakness  Eliquis  Bleeding precautions  Statin         HTN (hypertension)     Coreg  Isosorbide dinitrate  Sacubitril-valsartan  Hydralazine  Spironolactone  Monitor BP          Medications, treatments, and labs reviewed  Continue medications and treatments as listed  in EMR    Scribe Attestation  I, Mitch Valle   attest that this documentation has been prepared under the direction and in the presence of Alfredo Bro MD    Provider Attestation - Scribe documentation  All medical record entries made by the Scribe were at my direction and personally dictated by me. I have reviewed the chart and agree that the record accurately reflects my personal performance of the history, physical exam, discussion and plan.   Alfredo Bro MD            Electronically Signed By: Alfredo Bro MD   2/28/24  4:19 PM

## 2024-02-28 NOTE — PROGRESS NOTES
PROGRESS NOTE    Subjective   Chief complaint: Edenilson Ness is a 63 y.o. male who is a long term care patient being seen and evaluated for weakness.    HPI:  HPI  Patient is currently working with OT due to generalized weakness.  Patient is responding well to skilled interventions.  Patient is working on dynamic functional activities to increase strength range of motion flexibility.  Patient is also performing close kinetic chain activities to increase functional skills and range of motion.  Patient seen and examined at bedside, appears to be in no acute distress.  Denies chest pain or shortness of breath.  Afebrile.  Nursing staff voicing no new concerns at this time.    Objective   Vital signs: 128/78, 98.0, 78, 17, 98%    Physical Exam  Constitutional:       General: He is not in acute distress.  Eyes:      Extraocular Movements: Extraocular movements intact.   Cardiovascular:      Rate and Rhythm: Normal rate and regular rhythm.   Pulmonary:      Effort: Pulmonary effort is normal.      Breath sounds: Normal breath sounds.   Abdominal:      General: Bowel sounds are normal.      Palpations: Abdomen is soft.   Musculoskeletal:      Cervical back: Neck supple.   Neurological:      Mental Status: He is alert.      Motor: Weakness present.   Psychiatric:         Mood and Affect: Mood normal.         Behavior: Behavior is cooperative.         Assessment/Plan   Problem List Items Addressed This Visit       Weakness - Primary     Continue working with OT, responding to interventions         CVA (cerebral vascular accident) (CMS/[facility])     Stable  monitor for changes in speech or increased weakness  Eliquis  Bleeding precautions  Statin         HTN (hypertension)     Coreg  Isosorbide dinitrate  Sacubitril-valsartan  Hydralazine  Spironolactone  Monitor BP          Medications, treatments, and labs reviewed  Continue medications and treatments as listed in EMR    Mitch NapierestNeelima Jackson Scribe attest  that this documentation has been prepared under the direction and in the presence of Alfredo Bro MD    Provider Attestation - Scribe documentation  All medical record entries made by the Scribe were at my direction and personally dictated by me. I have reviewed the chart and agree that the record accurately reflects my personal performance of the history, physical exam, discussion and plan.   Alfredo Bro MD

## 2024-03-01 ENCOUNTER — APPOINTMENT (OUTPATIENT)
Dept: RADIOLOGY | Facility: HOSPITAL | Age: 64
DRG: 689 | End: 2024-03-01
Payer: COMMERCIAL

## 2024-03-01 ENCOUNTER — NURSING HOME VISIT (OUTPATIENT)
Dept: POST ACUTE CARE | Facility: EXTERNAL LOCATION | Age: 64
End: 2024-03-01
Payer: COMMERCIAL

## 2024-03-01 ENCOUNTER — HOSPITAL ENCOUNTER (INPATIENT)
Facility: HOSPITAL | Age: 64
LOS: 4 days | Discharge: NURSING, REHAB OR LONG-TERM CARE | DRG: 689 | End: 2024-03-05
Attending: EMERGENCY MEDICINE | Admitting: STUDENT IN AN ORGANIZED HEALTH CARE EDUCATION/TRAINING PROGRAM
Payer: COMMERCIAL

## 2024-03-01 ENCOUNTER — APPOINTMENT (OUTPATIENT)
Dept: CARDIOLOGY | Facility: HOSPITAL | Age: 64
DRG: 689 | End: 2024-03-01
Payer: COMMERCIAL

## 2024-03-01 DIAGNOSIS — N39.0 URINARY TRACT INFECTION WITHOUT HEMATURIA, SITE UNSPECIFIED: ICD-10-CM

## 2024-03-01 DIAGNOSIS — I63.9 CEREBROVASCULAR ACCIDENT (CVA), UNSPECIFIED MECHANISM (MULTI): ICD-10-CM

## 2024-03-01 DIAGNOSIS — D63.8 ANEMIA OF CHRONIC DISEASE: ICD-10-CM

## 2024-03-01 DIAGNOSIS — E11.9 TYPE 2 DIABETES MELLITUS WITHOUT COMPLICATION, WITH LONG-TERM CURRENT USE OF INSULIN (MULTI): ICD-10-CM

## 2024-03-01 DIAGNOSIS — Z79.4 TYPE 2 DIABETES MELLITUS WITHOUT COMPLICATION, WITH LONG-TERM CURRENT USE OF INSULIN (MULTI): ICD-10-CM

## 2024-03-01 DIAGNOSIS — I67.9 CEREBROVASCULAR DISEASE, UNSPECIFIED: ICD-10-CM

## 2024-03-01 DIAGNOSIS — I11.0 HYPERTENSIVE HEART DISEASE WITH CONGESTIVE HEART FAILURE, UNSPECIFIED HEART FAILURE TYPE (MULTI): Primary | ICD-10-CM

## 2024-03-01 DIAGNOSIS — I63.89 AC ISCH MULTI VASC TERRITORIES STROKE (MULTI): Primary | ICD-10-CM

## 2024-03-01 LAB
ALBUMIN SERPL BCP-MCNC: 3.6 G/DL (ref 3.4–5)
ALP SERPL-CCNC: 98 U/L (ref 33–136)
ALT SERPL W P-5'-P-CCNC: 11 U/L (ref 10–52)
ANION GAP BLDA CALCULATED.4IONS-SCNC: 7 MMO/L (ref 10–25)
ANION GAP SERPL CALC-SCNC: 9 MMOL/L (ref 10–20)
APTT PPP: 40 SECONDS (ref 27–38)
ARTERIAL PATENCY WRIST A: POSITIVE
AST SERPL W P-5'-P-CCNC: 11 U/L (ref 9–39)
BASE EXCESS BLDA CALC-SCNC: 6.6 MMOL/L (ref -2–3)
BASOPHILS # BLD AUTO: 0.02 X10*3/UL (ref 0–0.1)
BASOPHILS NFR BLD AUTO: 0.3 %
BILIRUB SERPL-MCNC: 0.6 MG/DL (ref 0–1.2)
BNP SERPL-MCNC: 91 PG/ML (ref 0–99)
BODY TEMPERATURE: 37 DEGREES CELSIUS
BUN SERPL-MCNC: 21 MG/DL (ref 6–23)
CA-I BLDA-SCNC: 1.38 MMOL/L (ref 1.1–1.33)
CALCIUM SERPL-MCNC: 9.6 MG/DL (ref 8.6–10.3)
CARDIAC TROPONIN I PNL SERPL HS: 10 NG/L (ref 0–20)
CHLORIDE BLDA-SCNC: 102 MMOL/L (ref 98–107)
CHLORIDE SERPL-SCNC: 104 MMOL/L (ref 98–107)
CO2 SERPL-SCNC: 27 MMOL/L (ref 21–32)
CREAT SERPL-MCNC: 1 MG/DL (ref 0.5–1.3)
EGFRCR SERPLBLD CKD-EPI 2021: 86 ML/MIN/1.73M*2
EOSINOPHIL # BLD AUTO: 0.02 X10*3/UL (ref 0–0.7)
EOSINOPHIL NFR BLD AUTO: 0.3 %
ERYTHROCYTE [DISTWIDTH] IN BLOOD BY AUTOMATED COUNT: 13.9 % (ref 11.5–14.5)
ETHANOL SERPL-MCNC: <10 MG/DL
FLUAV RNA RESP QL NAA+PROBE: NOT DETECTED
FLUBV RNA RESP QL NAA+PROBE: NOT DETECTED
GLUCOSE BLD MANUAL STRIP-MCNC: 106 MG/DL (ref 74–99)
GLUCOSE BLDA-MCNC: 96 MG/DL (ref 74–99)
GLUCOSE SERPL-MCNC: 137 MG/DL (ref 74–99)
HCO3 BLDA-SCNC: 33.5 MMOL/L (ref 22–26)
HCT VFR BLD AUTO: 34.2 % (ref 41–52)
HCT VFR BLD EST: 36 % (ref 41–52)
HGB BLD-MCNC: 11.2 G/DL (ref 13.5–17.5)
HGB BLDA-MCNC: 11.9 G/DL (ref 13.5–17.5)
IMM GRANULOCYTES # BLD AUTO: 0.02 X10*3/UL (ref 0–0.7)
IMM GRANULOCYTES NFR BLD AUTO: 0.3 % (ref 0–0.9)
INHALED O2 CONCENTRATION: 21 %
INR PPP: 1.4 (ref 0.9–1.1)
LACTATE BLDA-SCNC: 1.1 MMOL/L (ref 0.4–2)
LIPASE SERPL-CCNC: 5 U/L (ref 9–82)
LYMPHOCYTES # BLD AUTO: 0.92 X10*3/UL (ref 1.2–4.8)
LYMPHOCYTES NFR BLD AUTO: 12.3 %
MCH RBC QN AUTO: 30.8 PG (ref 26–34)
MCHC RBC AUTO-ENTMCNC: 32.7 G/DL (ref 32–36)
MCV RBC AUTO: 94 FL (ref 80–100)
MONOCYTES # BLD AUTO: 0.36 X10*3/UL (ref 0.1–1)
MONOCYTES NFR BLD AUTO: 4.8 %
NEUTROPHILS # BLD AUTO: 6.13 X10*3/UL (ref 1.2–7.7)
NEUTROPHILS NFR BLD AUTO: 82 %
NRBC BLD-RTO: 0 /100 WBCS (ref 0–0)
OXYHGB MFR BLDA: 33.5 % (ref 94–98)
PCO2 BLDA: 58 MM HG (ref 38–42)
PH BLDA: 7.37 PH (ref 7.38–7.42)
PLATELET # BLD AUTO: 184 X10*3/UL (ref 150–450)
PO2 BLDA: 28 MM HG (ref 85–95)
POCT GLUCOSE: 106 MG/DL (ref 74–99)
POTASSIUM BLDA-SCNC: 4.3 MMOL/L (ref 3.5–5.3)
POTASSIUM SERPL-SCNC: 4.4 MMOL/L (ref 3.5–5.3)
PROT SERPL-MCNC: 6.5 G/DL (ref 6.4–8.2)
PROTHROMBIN TIME: 15.9 SECONDS (ref 9.8–12.8)
RBC # BLD AUTO: 3.64 X10*6/UL (ref 4.5–5.9)
SAO2 % BLDA: 34 % (ref 94–100)
SARS-COV-2 RNA RESP QL NAA+PROBE: NOT DETECTED
SODIUM BLDA-SCNC: 138 MMOL/L (ref 136–145)
SODIUM SERPL-SCNC: 136 MMOL/L (ref 136–145)
SPECIMEN DRAWN FROM PATIENT: ABNORMAL
WBC # BLD AUTO: 7.5 X10*3/UL (ref 4.4–11.3)

## 2024-03-01 PROCEDURE — 84484 ASSAY OF TROPONIN QUANT: CPT | Performed by: EMERGENCY MEDICINE

## 2024-03-01 PROCEDURE — 2060000001 HC INTERMEDIATE ICU ROOM DAILY

## 2024-03-01 PROCEDURE — 87636 SARSCOV2 & INF A&B AMP PRB: CPT | Performed by: NURSE PRACTITIONER

## 2024-03-01 PROCEDURE — 85610 PROTHROMBIN TIME: CPT | Performed by: EMERGENCY MEDICINE

## 2024-03-01 PROCEDURE — 70551 MRI BRAIN STEM W/O DYE: CPT | Performed by: STUDENT IN AN ORGANIZED HEALTH CARE EDUCATION/TRAINING PROGRAM

## 2024-03-01 PROCEDURE — 99309 SBSQ NF CARE MODERATE MDM 30: CPT | Performed by: INTERNAL MEDICINE

## 2024-03-01 PROCEDURE — 82077 ASSAY SPEC XCP UR&BREATH IA: CPT | Performed by: EMERGENCY MEDICINE

## 2024-03-01 PROCEDURE — 85730 THROMBOPLASTIN TIME PARTIAL: CPT | Performed by: EMERGENCY MEDICINE

## 2024-03-01 PROCEDURE — 70496 CT ANGIOGRAPHY HEAD: CPT | Performed by: RADIOLOGY

## 2024-03-01 PROCEDURE — 80053 COMPREHEN METABOLIC PANEL: CPT | Performed by: EMERGENCY MEDICINE

## 2024-03-01 PROCEDURE — 83880 ASSAY OF NATRIURETIC PEPTIDE: CPT | Performed by: EMERGENCY MEDICINE

## 2024-03-01 PROCEDURE — 36600 WITHDRAWAL OF ARTERIAL BLOOD: CPT

## 2024-03-01 PROCEDURE — 70498 CT ANGIOGRAPHY NECK: CPT | Performed by: RADIOLOGY

## 2024-03-01 PROCEDURE — 2500000001 HC RX 250 WO HCPCS SELF ADMINISTERED DRUGS (ALT 637 FOR MEDICARE OP): Performed by: STUDENT IN AN ORGANIZED HEALTH CARE EDUCATION/TRAINING PROGRAM

## 2024-03-01 PROCEDURE — 70496 CT ANGIOGRAPHY HEAD: CPT

## 2024-03-01 PROCEDURE — 36415 COLL VENOUS BLD VENIPUNCTURE: CPT | Performed by: EMERGENCY MEDICINE

## 2024-03-01 PROCEDURE — 70450 CT HEAD/BRAIN W/O DYE: CPT

## 2024-03-01 PROCEDURE — 70551 MRI BRAIN STEM W/O DYE: CPT

## 2024-03-01 PROCEDURE — 82947 ASSAY GLUCOSE BLOOD QUANT: CPT

## 2024-03-01 PROCEDURE — 2500000004 HC RX 250 GENERAL PHARMACY W/ HCPCS (ALT 636 FOR OP/ED): Performed by: STUDENT IN AN ORGANIZED HEALTH CARE EDUCATION/TRAINING PROGRAM

## 2024-03-01 PROCEDURE — 84132 ASSAY OF SERUM POTASSIUM: CPT | Performed by: STUDENT IN AN ORGANIZED HEALTH CARE EDUCATION/TRAINING PROGRAM

## 2024-03-01 PROCEDURE — 85025 COMPLETE CBC W/AUTO DIFF WBC: CPT | Performed by: EMERGENCY MEDICINE

## 2024-03-01 PROCEDURE — 83690 ASSAY OF LIPASE: CPT | Performed by: EMERGENCY MEDICINE

## 2024-03-01 PROCEDURE — 99223 1ST HOSP IP/OBS HIGH 75: CPT | Performed by: PSYCHIATRY & NEUROLOGY

## 2024-03-01 PROCEDURE — 93005 ELECTROCARDIOGRAM TRACING: CPT

## 2024-03-01 PROCEDURE — 99222 1ST HOSP IP/OBS MODERATE 55: CPT | Performed by: STUDENT IN AN ORGANIZED HEALTH CARE EDUCATION/TRAINING PROGRAM

## 2024-03-01 PROCEDURE — 99285 EMERGENCY DEPT VISIT HI MDM: CPT | Mod: 25

## 2024-03-01 PROCEDURE — 2550000001 HC RX 255 CONTRASTS: Performed by: EMERGENCY MEDICINE

## 2024-03-01 RX ORDER — FUROSEMIDE 40 MG/1
40 TABLET ORAL DAILY
COMMUNITY

## 2024-03-01 RX ORDER — NAPROXEN SODIUM 220 MG/1
162 TABLET, FILM COATED ORAL DAILY
Status: DISCONTINUED | OUTPATIENT
Start: 2024-03-01 | End: 2024-03-05 | Stop reason: HOSPADM

## 2024-03-01 RX ORDER — GUAIFENESIN/DEXTROMETHORPHAN 100-10MG/5
5 SYRUP ORAL EVERY 4 HOURS PRN
Status: DISCONTINUED | OUTPATIENT
Start: 2024-03-01 | End: 2024-03-05 | Stop reason: HOSPADM

## 2024-03-01 RX ORDER — SENNOSIDES 8.6 MG/1
1 TABLET ORAL DAILY PRN
COMMUNITY

## 2024-03-01 RX ORDER — DEXTROSE 40 %
GEL (GRAM) ORAL
COMMUNITY

## 2024-03-01 RX ORDER — NAPROXEN SODIUM 220 MG/1
324 TABLET, FILM COATED ORAL ONCE
Status: DISCONTINUED | OUTPATIENT
Start: 2024-03-01 | End: 2024-03-01

## 2024-03-01 RX ORDER — ATORVASTATIN CALCIUM 80 MG/1
80 TABLET, FILM COATED ORAL NIGHTLY
COMMUNITY

## 2024-03-01 RX ORDER — LANOLIN ALCOHOL/MO/W.PET/CERES
100 CREAM (GRAM) TOPICAL DAILY
COMMUNITY

## 2024-03-01 RX ORDER — ACETAMINOPHEN 325 MG/1
650 TABLET ORAL EVERY 4 HOURS PRN
Status: DISCONTINUED | OUTPATIENT
Start: 2024-03-01 | End: 2024-03-05 | Stop reason: HOSPADM

## 2024-03-01 RX ORDER — ATORVASTATIN CALCIUM 40 MG/1
80 TABLET, FILM COATED ORAL NIGHTLY
Status: DISCONTINUED | OUTPATIENT
Start: 2024-03-01 | End: 2024-03-05 | Stop reason: HOSPADM

## 2024-03-01 RX ORDER — CARVEDILOL 25 MG/1
50 TABLET ORAL
COMMUNITY

## 2024-03-01 RX ORDER — ACETAMINOPHEN 160 MG/5ML
650 SUSPENSION ORAL EVERY 4 HOURS PRN
Status: DISCONTINUED | OUTPATIENT
Start: 2024-03-01 | End: 2024-03-05 | Stop reason: HOSPADM

## 2024-03-01 RX ORDER — PANTOPRAZOLE SODIUM 40 MG/10ML
40 INJECTION, POWDER, LYOPHILIZED, FOR SOLUTION INTRAVENOUS DAILY PRN
Status: DISCONTINUED | OUTPATIENT
Start: 2024-03-01 | End: 2024-03-05 | Stop reason: HOSPADM

## 2024-03-01 RX ORDER — HYDRALAZINE HYDROCHLORIDE 25 MG/1
25 TABLET, FILM COATED ORAL EVERY 8 HOURS
COMMUNITY

## 2024-03-01 RX ORDER — GUAIFENESIN 600 MG/1
600 TABLET, EXTENDED RELEASE ORAL EVERY 12 HOURS PRN
Status: DISCONTINUED | OUTPATIENT
Start: 2024-03-01 | End: 2024-03-05 | Stop reason: HOSPADM

## 2024-03-01 RX ORDER — METFORMIN HYDROCHLORIDE 500 MG/1
500 TABLET ORAL 2 TIMES DAILY
COMMUNITY

## 2024-03-01 RX ORDER — SODIUM CHLORIDE 9 MG/ML
50 INJECTION, SOLUTION INTRAVENOUS CONTINUOUS
Status: DISCONTINUED | OUTPATIENT
Start: 2024-03-01 | End: 2024-03-02

## 2024-03-01 RX ORDER — ISOSORBIDE DINITRATE 20 MG/1
20 TABLET ORAL 3 TIMES DAILY
COMMUNITY

## 2024-03-01 RX ORDER — BISACODYL 5 MG
10 TABLET, DELAYED RELEASE (ENTERIC COATED) ORAL DAILY PRN
Status: DISCONTINUED | OUTPATIENT
Start: 2024-03-01 | End: 2024-03-05 | Stop reason: HOSPADM

## 2024-03-01 RX ORDER — BACLOFEN 5 MG/1
5 TABLET ORAL 2 TIMES DAILY
COMMUNITY

## 2024-03-01 RX ORDER — ZINC OXIDE 20 G/100G
OINTMENT TOPICAL
COMMUNITY

## 2024-03-01 RX ORDER — TALC
3 POWDER (GRAM) TOPICAL DAILY
Status: DISCONTINUED | OUTPATIENT
Start: 2024-03-01 | End: 2024-03-05 | Stop reason: HOSPADM

## 2024-03-01 RX ORDER — ONDANSETRON HYDROCHLORIDE 2 MG/ML
4 INJECTION, SOLUTION INTRAVENOUS EVERY 8 HOURS PRN
Status: DISCONTINUED | OUTPATIENT
Start: 2024-03-01 | End: 2024-03-05 | Stop reason: HOSPADM

## 2024-03-01 RX ORDER — ONDANSETRON 4 MG/1
4 TABLET, FILM COATED ORAL EVERY 8 HOURS PRN
Status: DISCONTINUED | OUTPATIENT
Start: 2024-03-01 | End: 2024-03-05 | Stop reason: HOSPADM

## 2024-03-01 RX ORDER — PANTOPRAZOLE SODIUM 40 MG/1
40 TABLET, DELAYED RELEASE ORAL DAILY PRN
Status: DISCONTINUED | OUTPATIENT
Start: 2024-03-01 | End: 2024-03-05 | Stop reason: HOSPADM

## 2024-03-01 RX ORDER — ACETAMINOPHEN 650 MG/1
650 SUPPOSITORY RECTAL EVERY 4 HOURS PRN
Status: DISCONTINUED | OUTPATIENT
Start: 2024-03-01 | End: 2024-03-05 | Stop reason: HOSPADM

## 2024-03-01 RX ORDER — ACETAMINOPHEN, DIPHENHYDRAMINE HCL, PHENYLEPHRINE HCL 325; 25; 5 MG/1; MG/1; MG/1
10 TABLET ORAL NIGHTLY
COMMUNITY

## 2024-03-01 RX ORDER — SPIRONOLACTONE 25 MG/1
25 TABLET ORAL DAILY
COMMUNITY

## 2024-03-01 RX ORDER — ASPIRIN 300 MG/1
150 SUPPOSITORY RECTAL DAILY
Status: DISCONTINUED | OUTPATIENT
Start: 2024-03-01 | End: 2024-03-05 | Stop reason: HOSPADM

## 2024-03-01 RX ADMIN — APIXABAN 5 MG: 5 TABLET, FILM COATED ORAL at 20:31

## 2024-03-01 RX ADMIN — ASPIRIN 81 MG CHEWABLE TABLET 162 MG: 81 TABLET CHEWABLE at 18:08

## 2024-03-01 RX ADMIN — ATORVASTATIN CALCIUM 80 MG: 40 TABLET, FILM COATED ORAL at 20:31

## 2024-03-01 RX ADMIN — SODIUM CHLORIDE 50 ML/HR: 9 INJECTION, SOLUTION INTRAVENOUS at 18:09

## 2024-03-01 RX ADMIN — IOHEXOL 50 ML: 350 INJECTION, SOLUTION INTRAVENOUS at 11:44

## 2024-03-01 RX ADMIN — Medication 3 MG: at 20:31

## 2024-03-01 SDOH — SOCIAL STABILITY: SOCIAL INSECURITY: ABUSE: ADULT

## 2024-03-01 SDOH — SOCIAL STABILITY: SOCIAL INSECURITY: WERE YOU ABLE TO COMPLETE ALL THE BEHAVIORAL HEALTH SCREENINGS?: YES

## 2024-03-01 SDOH — SOCIAL STABILITY: SOCIAL INSECURITY: HAVE YOU HAD THOUGHTS OF HARMING ANYONE ELSE?: NO

## 2024-03-01 ASSESSMENT — PATIENT HEALTH QUESTIONNAIRE - PHQ9
SUM OF ALL RESPONSES TO PHQ9 QUESTIONS 1 & 2: 0
2. FEELING DOWN, DEPRESSED OR HOPELESS: NOT AT ALL
1. LITTLE INTEREST OR PLEASURE IN DOING THINGS: NOT AT ALL

## 2024-03-01 ASSESSMENT — COGNITIVE AND FUNCTIONAL STATUS - GENERAL
HELP NEEDED FOR BATHING: A LOT
MOVING TO AND FROM BED TO CHAIR: TOTAL
STANDING UP FROM CHAIR USING ARMS: TOTAL
DRESSING REGULAR UPPER BODY CLOTHING: A LOT
WALKING IN HOSPITAL ROOM: TOTAL
PATIENT BASELINE BEDBOUND: UNABLE TO ASSESS AT THIS TIME
MOVING FROM LYING ON BACK TO SITTING ON SIDE OF FLAT BED WITH BEDRAILS: A LOT
TOILETING: A LOT
CLIMB 3 TO 5 STEPS WITH RAILING: TOTAL
EATING MEALS: A LITTLE
PERSONAL GROOMING: A LOT
DAILY ACTIVITIY SCORE: 13
MOBILITY SCORE: 8
TURNING FROM BACK TO SIDE WHILE IN FLAT BAD: A LOT
DRESSING REGULAR LOWER BODY CLOTHING: A LOT

## 2024-03-01 ASSESSMENT — PAIN SCALES - GENERAL
PAINLEVEL_OUTOF10: 0 - NO PAIN

## 2024-03-01 ASSESSMENT — COLUMBIA-SUICIDE SEVERITY RATING SCALE - C-SSRS
6. HAVE YOU EVER DONE ANYTHING, STARTED TO DO ANYTHING, OR PREPARED TO DO ANYTHING TO END YOUR LIFE?: NO
1. IN THE PAST MONTH, HAVE YOU WISHED YOU WERE DEAD OR WISHED YOU COULD GO TO SLEEP AND NOT WAKE UP?: NO
2. HAVE YOU ACTUALLY HAD ANY THOUGHTS OF KILLING YOURSELF?: NO

## 2024-03-01 ASSESSMENT — ACTIVITIES OF DAILY LIVING (ADL)
HEARING - LEFT EAR: FUNCTIONAL
BATHING: UNABLE TO ASSESS
TOILETING: UNABLE TO ASSESS
DRESSING YOURSELF: UNABLE TO ASSESS
WALKS IN HOME: UNABLE TO ASSESS
LACK_OF_TRANSPORTATION: NO
PATIENT'S MEMORY ADEQUATE TO SAFELY COMPLETE DAILY ACTIVITIES?: UNABLE TO ASSESS
HEARING - RIGHT EAR: FUNCTIONAL
ASSISTIVE_DEVICE: OTHER (COMMENT)
ADEQUATE_TO_COMPLETE_ADL: UNABLE TO ASSESS
GROOMING: UNABLE TO ASSESS
FEEDING YOURSELF: UNABLE TO ASSESS
JUDGMENT_ADEQUATE_SAFELY_COMPLETE_DAILY_ACTIVITIES: UNABLE TO ASSESS

## 2024-03-01 ASSESSMENT — LIFESTYLE VARIABLES
EVER HAD A DRINK FIRST THING IN THE MORNING TO STEADY YOUR NERVES TO GET RID OF A HANGOVER: NO
HAVE PEOPLE ANNOYED YOU BY CRITICIZING YOUR DRINKING: NO
HAVE YOU EVER FELT YOU SHOULD CUT DOWN ON YOUR DRINKING: NO
SKIP TO QUESTIONS 9-10: 1
AUDIT-C TOTAL SCORE: 0
HOW OFTEN DO YOU HAVE A DRINK CONTAINING ALCOHOL: NEVER
HOW MANY STANDARD DRINKS CONTAINING ALCOHOL DO YOU HAVE ON A TYPICAL DAY: PATIENT DOES NOT DRINK
HOW OFTEN DO YOU HAVE 6 OR MORE DRINKS ON ONE OCCASION: NEVER
AUDIT-C TOTAL SCORE: 0
EVER FELT BAD OR GUILTY ABOUT YOUR DRINKING: NO

## 2024-03-01 ASSESSMENT — PAIN - FUNCTIONAL ASSESSMENT
PAIN_FUNCTIONAL_ASSESSMENT: 0-10
PAIN_FUNCTIONAL_ASSESSMENT: 0-10

## 2024-03-01 ASSESSMENT — ENCOUNTER SYMPTOMS
WEAKNESS: 1
SPEECH DIFFICULTY: 1

## 2024-03-01 ASSESSMENT — VISUAL ACUITY: VA_NORMAL: 1

## 2024-03-01 NOTE — PROGRESS NOTES
PHARMACY STROKE RESPONSE      Patient Name: Edenilson Ness  MRN: [id number]  Location: 02/[facility]    Patient Weight (kg):   Wt Readings from Last 1 Encounters:   03/01/24 101 kg (221 lb 9 oz)        An acute Brain Attack has been activated, pharmacy participated in multidisciplinary team bedside response for Edenilson Ness.  Contraindications for fibrinolytic therapy have been reviewed by pharmacy and any issues relating to medication therapy have been discussed directly with the provider(s) caring for this patient.     Pharmacy aided in the bedside response for fibrinolytic therapy. Patient did not receive fibrinolysis. On Eliquis. Not a candidate.          Thank you for allowing me to take part in the care of this patient.     Raquel Rogers, PharmD  3/1/2024  11:41 AM         References:    Neurological [place] Stroke Tools   Neurological [place] IV Thrombolysis Checklist

## 2024-03-01 NOTE — PROGRESS NOTES
Edenilson Ness is a 63 y.o. male admitted for No Principal Problem: There is no principal problem currently on the Problem List. Please update the Problem List and refresh.. Pharmacy reviewed the patient's [id number] medications and allergies for accuracy.    The list below reflects the PTA list prior to pharmacy medication history. A summary a changes to the PTA medication list has been listed below. Please review each medication in order reconciliation for additional clarification and justification.    Source of information:    Medications added:   ATORVASTATIN 80MG  1QHS  BACLOFEN 5MG 1BID  COREG 25MG  2 TABS BID  APIXABAN 5MG 1 BID  GLUCAGON KIT 1 INJECT 1MG/ML PRN  GLUCOSE GEL 40% 1 APPLICATION PRN  HYDRALAZINE 25MG  1Q8  ISOSORBIDE DINITRATE 20MG 1Q8  FUROSEMIDE 40MG  1QD  MELATONIN 10MG 1 at bedtime  METFORMIN 500MG 1 TAB BID  SACUBITRIL-VALSARTAN 24-26 1 BID  SENNA TABLET 8.6MG  1 TAB every day PRN  SPIROLACTONE 25MG 1 every day  THIAMINE 100MG 1 every day  ZINC OXIDE 20% APPLY TO SCROTUM AND GERARD AREA TOPICALLY EVERY DAY AND NIGHT SHIFT      Medications modified:    Medications to be removed:    Medications of concern:      None       Yamini Beauchamp CPhT

## 2024-03-01 NOTE — LETTER
Patient: Edenilson Ness  : 1960    Encounter Date: 2024    PROGRESS NOTE    Subjective  Chief complaint: Edenilson Ness is a 63 y.o. male who is a long term care patient being seen and evaluated for monthly general medical care and follow-up    HPI:  HPI  Patient presents for general medical care and f/u.  Patient seen and examined at bedside.  No issues per nursing.  Patient has no acute complaints.  Patient requires assistance with ADLs.  DM, denies polydipsia polyuria polyphagia.  HTN BP at goal.  Denies chest pain and headache.  Hx CVA, denies changes in weakness and speech.  CHF stable, denies sob, orthopnea, weight gain.  Anemia stable, denies fatigue, sob, and palpitations.  No acute distress.    Objective  Vital signs: 182/92, 98.0, 15    Physical Exam  Constitutional:       General: He is not in acute distress.  Eyes:      Extraocular Movements: Extraocular movements intact.   Cardiovascular:      Rate and Rhythm: Normal rate and regular rhythm.   Pulmonary:      Effort: Pulmonary effort is normal.      Breath sounds: Normal breath sounds.   Abdominal:      General: Bowel sounds are normal.      Palpations: Abdomen is soft.   Musculoskeletal:      Cervical back: Neck supple.   Neurological:      Mental Status: He is alert.   Psychiatric:         Mood and Affect: Mood normal.         Behavior: Behavior is cooperative.         Assessment/Plan  Problem List Items Addressed This Visit       CVA (cerebral vascular accident) (CMS/[facility])     monitor for changes in speech or increased weakness  Eliquis  Bleeding precautions  Statin         Type 2 diabetes mellitus without complication, with long-term current use of insulin (CMS/[facility])     Monitor sweets and carb intake  Continue to monitor BS   monitor A1c routinely         Hypertensive heart disease with CHF (CMS/[facility]) - Primary     Coreg  Isosorbide dinitrate  Sacubitril-valsartan  Hydralazine  Spironolactone  Monitor BP         Anemia of chronic disease      Continue to monitor labs          Medications, treatments, and labs reviewed  Continue medications and treatments as listed in EMR    Scribe Attestation  I, Mitch Valle   attest that this documentation has been prepared under the direction and in the presence of Alfredo Bro MD    Provider Attestation - Scribe documentation  All medical record entries made by the Scribe were at my direction and personally dictated by me. I have reviewed the chart and agree that the record accurately reflects my personal performance of the history, physical exam, discussion and plan.   Alfredo Bro MD            Electronically Signed By: Alfredo Bro MD   3/8/24  2:05 PM

## 2024-03-01 NOTE — ED PROVIDER NOTES
HPI   Chief Complaint   Patient presents with    Stroke     Pt from a nursing home. LKW 9:45am today.        HPI     HISTORY OF PRESENT ILLNESS:  Patient is a 63-year-old -American male presenting to the emergency department by EMS for a stroke alert.  Patient was last seen by nursing staff at 9:45 AM as a last known well, though there is some question that someone may have seen him normal at 1040.  This morning, the patient had eaten breakfast and had an emesis.  He was rolled down to play bingo when he was noted to be slurring his speech, and leaning towards the left side.  EMS brought him in.  He is on Eliquis.  He does have a prior stroke but was reported to not have slurring of speech    Past Medical History: CVA, hypertension, CHF, A-fib, a flutter, diabetes    __________________________________________________________  PHYSICAL EXAM:    Appearance: -American male, appears stated age,  Skin: Intact,  dry skin, no lesions, rash, petechiae or purpura.   Eyes: PERRLA, EOMs intact,  Conjunctiva pink with no redness or exudates.    HENT: Normocephalic, atraumatic. Nares patent   Neck: Supple. Trachea at midline.   Pulmonary: Lung sounds are clear bilaterally.  There is no rales, rhonchi, or wheezing.  Cardiac: Regular rate and rhythm, no rubs, murmurs, or gallops. No JVD,   Abdomen: Abdomen is soft, nontender, and nondistended.  No palpable organomegaly.  No rebound or guarding.  No CVA tenderness. Nonsurgical abdomen  Genitourinary: Exam deferred.  Musculoskeletal: no edema, pain, cyanosis, or deformity in extremities. Pulses full and equal.   Neurological: Patient appears to have a left-sided facial droop in addition to trouble with speech    Interval: Baseline  Time: 1140  Person Administering Scale: Amadou Aguirre DO     1a  Level of consciousness: 0=alert; keenly responsive  1b. LOC questions:  0=Performs both tasks correctly  1c. LOC commands: 1=Performs one task correctly  2.  Best  Gaze: 0=normal  3. Visual: 0=No visual loss  4. Facial Palsy: 1=Minor paralysis (flattened nasolabial fold, asymmetric on smiling)  5a. Motor left arm: 1=Drift, limb holds 90 (or 45) degrees but drifts down before full 10 seconds: does not hit bed  5b.  Motor right arm: 0=No drift, limb holds 90 (or 45) degrees for full 10 seconds  6a. motor left le=Drift, limb holds 90 (or 45) degrees but drifts down before full 10 seconds: does not hit bed  6b  Motor right le=Drift, limb holds 90 (or 45) degrees but drifts down before full 10 seconds: does not hit bed  7. Limb Ataxia: 0=Absent  8.  Sensory: 0=Normal; no sensory loss  9. Best Language:  1=Mild to moderate aphasia; some obvious loss of fluency or facility of comprehension without significant limitation on ideas expressed or form of expression.  10. Dysarthria: 1=Mild to moderate, patient slurs at least some words and at worst, can be understood with some difficulty  11. Extinction and Inattention: 0=No abnormality    Total:   7        VAN: VAN: Positive         __________________________________________________________  MEDICAL DECISION MAKING:    Patient was seen and examined. History was obtained from the EMS squad.  Per them, patient was last seen normal at 9:45 AM (reportedly 30 minutes to 1 and half hours prior to ED arrival, which this is the earliest time).  Patient was then noted to be altered with slurring of speech and leaning towards the left side while playing bingo.   Patient not a candidate for TNK as he is anticoagulated on Eliquis, confirmed both by primary care physician note and by nursing home papers from today.    Laboratory results were overall unremarkable without white count, negative alcohol level.  Patient CT head imaging was negative for intracranial bleed and there is no large vessel occlusion.  Patient reevaluated and continued to have speech difficulty.  Case discussed with IMS who agreed to have the patient admitted to the  [facility] for stroke evaluation.    Chronic Medical Conditions Significantly Affecting Care: Prior stroke, likely left-sided weakness, hypertension hyperlipidemia    External Records Reviewed: I reviewed recent and relevant outside records including: Primary care physician note from February 28, 2024, discharge summary from September 20, 2019    Amadou Aguirre  Emergency Medicine               Martha Coma Scale Score: 10                     Patient History   Past Medical History:   Diagnosis Date    Unspecified fracture of right lower leg, initial encounter for closed fracture     Leg fracture, right     Past Surgical History:   Procedure Laterality Date    KNEE SURGERY  10/07/2015    Knee Surgery    MR HEAD ANGIO WO IV CONTRAST  8/29/2019    MR HEAD ANGIO WO IV CONTRAST 8/29/2019 [facility] CLINICAL LEGACY    MR NECK ANGIO WO IV CONTRAST  8/29/2019    MR NECK ANGIO WO IV CONTRAST 8/29/2019 [facility] CLINICAL LEGACY     No family history on file.  Social History     Tobacco Use    Smoking status: Not on file    Smokeless tobacco: Not on file   Substance Use Topics    Alcohol use: Not on file    Drug use: Not on file       Physical Exam   ED Triage Vitals [03/01/24 1137]   Temp Heart Rate Respirations BP   -- 66 18 130/72      SpO2 Temp src Heart Rate Source Patient Position   -- -- -- --      BP Location FiO2 (%)     -- --       Physical Exam    ED Course & [facility]   ED Course as of 03/01/24 1313   Fri Mar 01, 2024   1203 The patient twelve-lead EKG inter by myself shows sinus rhythm, ventricular rate of 59, left axis deviation, first-degree AV block with a LA interval of 211 ms, normal QRS duration, normal QT, no STEMI. [WJ]      ED Course User Index  [WJ] Amadou Aguirre DO       Medical Decision Making      Procedure  Procedures     Amadou Aguirre DO  03/02/24 0650

## 2024-03-01 NOTE — H&P
History Of Present Illness  63 years old -American male with prior history of stroke, nursing home resident, wheelchair-bound, presented to the hospital with increased weakness on the left side and report of slurred speech.  In the emergency department blood work including basic metabolic panel, liver function test, complete blood count was essentially insignificant.  Patient CT head with contrast showed no acute intracranial hemorrhage or mass effect.  CT angiogram showed mild narrowing of the left ICA.  Patient is admitted for further evaluation and to rule out new strokes.    I did evaluate the patient in the emergency department, he was slightly lethargic but very easily arousable.  When he is aroused, he is able to answer questions appropriately and follow commands.  He tells me that his speech is a slightly worse than his baseline.  He is not sure whether he is more weaker on the left side now or not.    Past medical history include atrial flutter for which she is on Eliquis, prior history of stroke with left-sided hemiparesis, wheelchair-bound, history of hypertension, coronary artery disease with stent placement, possible congestive heart failure    .     Past Medical History  He has a past medical history of CHF (congestive heart failure) (CMS/[facility]), Diabetes mellitus (CMS/[facility]), Stroke (CMS/[facility]) (09/20/2019), and Unspecified fracture of right lower leg, initial encounter for closed fracture.    Surgical History  He has a past surgical history that includes Knee surgery (10/07/2015); MR angio head wo IV contrast (8/29/2019); and MR angio neck wo IV contrast (8/29/2019).     Social History  He reports that he has quit smoking. His smoking use included cigarettes. He has never used smokeless tobacco. No history on file for alcohol use and drug use.    Family History  No family history on file.     Allergies  Patient has no known allergies.    Review of Systems   All other systems reviewed and are  negative.       Physical Exam  Constitutional:       Appearance: Normal appearance.   HENT:      Head: Normocephalic and atraumatic.      Nose: Nose normal.   Eyes:      Extraocular Movements: Extraocular movements intact.      Pupils: Pupils are equal, round, and reactive to light.   Cardiovascular:      Rate and Rhythm: Normal rate and regular rhythm.      Heart sounds: Normal heart sounds.   Pulmonary:      Effort: Pulmonary effort is normal.      Breath sounds: Normal breath sounds.   Abdominal:      General: Bowel sounds are normal.      Palpations: Abdomen is soft.   Musculoskeletal:      Cervical back: Neck supple.   Skin:     General: Skin is warm and dry.   Neurological:      Mental Status: He is alert.      Comments: Slurred speech and left-sided weakness   Psychiatric:         Mood and Affect: Mood normal.          Last Recorded Vitals  /74   Pulse 64   Temp 36.7 °C (98 °F)   Resp 18   Wt 101 kg (221 lb 9 oz)   SpO2 99%     Relevant Results             Assessment/Plan       Problem list    Slurred speech and questionable worsening left-sided weakness concerning for acute CVA  Paroxysmal A-fib/flutter currently in sinus rhythm  Hypertension  History of coronary artery disease    Treatment plan    At this time patient will be admitted to stepdown unit.  Neurology consultation.  Brain MRI will be ordered.  Echocardiogram with bubble study as well.  If he passes bedside swallow evaluation then we could consider aspirin and statin in addition to his Eliquis.    He is slightly lethargic.  Will obtain arterial blood gas to rule out hypercapnia.    Speech therapy consult for swallowing evaluation.    Physical therapy consult.         Keron Koroma MD

## 2024-03-01 NOTE — CONSULTS
Inpatient consult to Neurology  Consult performed by: Alivia Wall, VANNA-CNP  Consult ordered by: Keron Koroma MD      History Of Present Illness  Edenilson Ness is a 63 y.o. male with history of prior stroke (with left sided hemiparesis), atrial flutter (on Eliquis), HTN, CAD and possible CHF [facility] increased weakness on left side and slurred speech than his baseline.     History obtained from patient, chart review and discussion with ED physician. Pt is from SNF, wheelchair bound at baseline. Pt resting in ED bed, no family present. Speech is slurred. Pt states this is worse for him and states weakness is to his R side not left, left side deficits from prior stroke. On examination, no significant R weakness noted.     HCT without any acute findings. CTA with mild narrowing of the L ICA.     Past Medical History  Past Medical History:   Diagnosis Date    CHF (congestive heart failure) (CMS/[facility])     Diabetes mellitus (CMS/[facility])     Stroke (CMS/[facility]) 09/20/2019    affects left side per SNF paperwork    Unspecified fracture of right lower leg, initial encounter for closed fracture     Leg fracture, right     Surgical History  Past Surgical History:   Procedure Laterality Date    KNEE SURGERY  10/07/2015    Knee Surgery    MR HEAD ANGIO WO IV CONTRAST  8/29/2019    MR HEAD ANGIO WO IV CONTRAST 8/29/2019 [facility]ICAL [facility]GAC[facility]    MR NECK ANGIO WO IV CONTRAST  8/29/2019    MR NECK ANGIO WO IV CONTRAST 8/29/2019 [facility] CLINICAL LEGAC[facility]     Family History   No family history on file.    Social History  Social History     Tobacco Use    Smoking status: Former     Types: Cigarettes    Smokeless tobacco: Never     Allergies  Patient has no known allergies.  (Not in a hospital admission)    No current facility-administered medications on file prior to encounter.     Current Outpatient Medications on File Prior to Encounter   Medication Sig Dispense Refill    apixaban (Eliquis) 5 mg tablet Take 1 tablet (5 mg)  by mouth 2 times a day.      atorvastatin (Lipitor) 80 mg tablet Take 1 tablet (80 mg) by mouth once daily at bedtime.      baclofen (Lioresal) 5 mg tablet Take 1 tablet (5 mg) by mouth 2 times a day.      carvedilol (Coreg) 25 mg tablet Take 2 tablets (50 mg) by mouth 2 times a day with meals.      furosemide (Lasix) 40 mg tablet Take 1 tablet (40 mg) by mouth once daily.      GLUCAGON, HCL, EMERGENCY KIT INJ Inject 1 mL into the muscle if needed (low blood sugar).      glucose (Glucose GeL) 40 % gel oral gel Take by mouth. GIVE 1 APPLICATION BY MOUTH PRN      hydrALAZINE (Apresoline) 25 mg tablet Take 1 tablet (25 mg) by mouth every 8 hours.      isosorbide dinitrate (Isordil) 20 mg tablet Take 1 tablet (20 mg) by mouth 3 times a day.      melatonin 10 mg tablet Take 1 tablet (10 mg) by mouth once daily at bedtime.      metFORMIN (Glucophage) 500 mg tablet Take 1 tablet (500 mg) by mouth 2 times a day.      sacubitriL-valsartan (Entresto) 24-26 mg tablet Take 1 tablet by mouth 2 times a day.      sennosides (Senokot) 8.6 mg tablet Take 1 tablet (8.6 mg) by mouth once daily as needed for constipation.      spironolactone (Aldactone) 25 mg tablet Take 1 tablet (25 mg) by mouth once daily.      thiamine 100 mg tablet Take 1 tablet (100 mg) by mouth once daily.      zinc oxide 20 % ointment Apply topically. APPLY TO SCROTUM  AND GERARD AREA TOPICALLY DAILY EVERY DAY AND NIGHT SHIFT           Review of Systems   Neurological:  Positive for speech difficulty and weakness.   All other systems reviewed and are negative.    Neurological Exam  Mental Status  Awake, alert and oriented to person, place and time. Moderate dysarthria present. Able to name objects. Follows complex commands.    Cranial Nerves  CN II: Visual acuity is normal. Visual fields full to confrontation.  CN III, IV, VI: Extraocular movements intact bilaterally. Normal lids and orbits bilaterally. Pupils equal round and reactive to light bilaterally.  CN V:  "Facial sensation is normal.  CN VII: Full and symmetric facial movement.  CN VIII: Hearing is normal.  CN IX, X: Palate elevates symmetrically. Normal gag reflex.  CN XI: Shoulder shrug strength is normal.  CN XII: Tongue midline without atrophy or fasciculations.    Motor  Normal muscle bulk throughout. No fasciculations present. Increased muscle tone. LUE. No abnormal involuntary movements.  Strength is 5/5 to BUE and 4+/5 to BLE proximally, RLE distal 4+/5 and LLE distal 5/5.    Sensory  Light touch is normal in upper and lower extremities.     Reflexes                                            Right                      Left  Brachioradialis                    1+                         1+  Biceps                                 1+                         1+  Triceps                                1+                         1+  Patellar                                1+                         1+    Coordination  Right: Finger-to-nose normal.Left: Finger-to-nose normal.    Gait    Not tested, wheelchair bound at baseline.    Physical Exam  Eyes:      General: Lids are normal.      Extraocular Movements: Extraocular movements intact.      Pupils: Pupils are equal, round, and reactive to light.   Neurological:      Cranial Nerves: Dysarthria present.      Deep Tendon Reflexes:      Reflex Scores:       Tricep reflexes are 1+ on the right side and 1+ on the left side.       Bicep reflexes are 1+ on the right side and 1+ on the left side.       Brachioradialis reflexes are 1+ on the right side and 1+ on the left side.       Patellar reflexes are 1+ on the right side and 1+ on the left side.        Last Recorded Vitals  Blood pressure 114/74, pulse 64, temperature 36.7 °C (98 °F), resp. rate 18, height 1.803 m (5' 11\"), weight 101 kg (221 lb 9 oz), SpO2 99 %.    Relevant Results    Scheduled medications  apixaban, 5 mg, oral, q12h  aspirin, 162 mg, oral, Daily   Or  aspirin, 150 mg, rectal, Daily  atorvastatin, 80 " mg, oral, Nightly  melatonin, 3 mg, oral, Daily  perflutren lipid microspheres, 0.5-10 mL of dilution, intravenous, Once in imaging  perflutren protein A microsphere, 0.5 mL, intravenous, Once in imaging  sulfur hexafluoride microsphr, 2 mL, intravenous, Once in imaging      Continuous medications  sodium chloride 0.9%, 50 mL/hr      PRN medications  PRN medications: acetaminophen **OR** acetaminophen **OR** acetaminophen, benzocaine-menthol, bisacodyl, dextromethorphan-guaifenesin, guaiFENesin, ondansetron **OR** ondansetron, pantoprazole **OR** pantoprazole    Results for orders placed or performed during the hospital encounter of 03/01/24 (from the past 24 hour(s))   CBC and Auto Differential   Result Value Ref Range    WBC 7.5 4.4 - 11.3 x10*3/uL    nRBC 0.0 0.0 - 0.0 /100 WBCs    RBC 3.64 (L) 4.50 - 5.90 x10*6/uL    Hemoglobin 11.2 (L) 13.5 - 17.5 g/dL    Hematocrit 34.2 (L) 41.0 - 52.0 %    MCV 94 80 - 100 fL    MCH 30.8 26.0 - 34.0 pg    MCHC 32.7 32.0 - 36.0 g/dL    RDW 13.9 11.5 - 14.5 %    Platelets 184 150 - 450 x10*3/uL    Neutrophils % 82.0 40.0 - 80.0 %    Immature Granulocytes %, Automated 0.3 0.0 - 0.9 %    Lymphocytes % 12.3 13.0 - 44.0 %    Monocytes % 4.8 2.0 - 10.0 %    Eosinophils % 0.3 0.0 - 6.0 %    Basophils % 0.3 0.0 - 2.0 %    Neutrophils Absolute 6.13 1.20 - 7.70 x10*3/uL    Immature Granulocytes Absolute, Automated 0.02 0.00 - 0.70 x10*3/uL    Lymphocytes Absolute 0.92 (L) 1.20 - 4.80 x10*3/uL    Monocytes Absolute 0.36 0.10 - 1.00 x10*3/uL    Eosinophils Absolute 0.02 0.00 - 0.70 x10*3/uL    Basophils Absolute 0.02 0.00 - 0.10 x10*3/uL   Comprehensive metabolic panel   Result Value Ref Range    Glucose 137 (H) 74 - 99 mg/dL    Sodium 136 136 - 145 mmol/L    Potassium 4.4 3.5 - 5.3 mmol/L    Chloride 104 98 - 107 mmol/L    Bicarbonate 27 21 - 32 mmol/L    Anion Gap 9 (L) 10 - 20 mmol/L    Urea Nitrogen 21 6 - 23 mg/dL    Creatinine 1.00 0.50 - 1.30 mg/dL    eGFR 86 >60 mL/min/1.73m*2     Calcium 9.6 8.6 - 10.3 mg/dL    Albumin 3.6 3.4 - 5.0 g/dL    Alkaline Phosphatase 98 33 - 136 U/L    Total Protein 6.5 6.4 - 8.2 g/dL    AST 11 9 - 39 U/L    Bilirubin, Total 0.6 0.0 - 1.2 mg/dL    ALT 11 10 - 52 U/L   Troponin I, High Sensitivity   Result Value Ref Range    Troponin I, High Sensitivity 10 0 - 20 ng/L   Protime-INR   Result Value Ref Range    Protime 15.9 (H) 9.8 - 12.8 seconds    INR 1.4 (H) 0.9 - 1.1   APTT   Result Value Ref Range    aPTT 40 (H) 27 - 38 seconds   Alcohol   Result Value Ref Range    Alcohol <10 <=10 mg/dL   B-Type Natriuretic Peptide   Result Value Ref Range    BNP 91 0 - 99 pg/mL   Lipase   Result Value Ref Range    Lipase 5 (L) 9 - 82 U/L   ECG 12 lead   Result Value Ref Range    Ventricular Rate 59 BPM    Atrial Rate 60 BPM    NH Interval 211 ms    QRS Duration 98 ms    QT Interval 426 ms    QTC Calculation(Bazett) 422 ms    P Axis 56 degrees    R Axis -41 degrees    T Axis 38 degrees    QRS Count 9 beats    Q Onset 253 ms    T Offset 466 ms    QTC Fredericia 423 ms   POCT GLUCOSE   Result Value Ref Range    POCT Glucose 106 (H) 74 - 99 mg/dL   POCT glucose   Result Value Ref Range    POCT Glucose 106 (A) 74 - 99 mg/dL                         [place] Coma Scale  Best Eye Response: To verbal stimuli  Best Verbal Response: Oriented  Best Motor Response: Follows commands  [place] Coma Scale Score: 14                 I have personally reviewed the following imaging results ECG 12 lead    Result Date: 3/1/2024  Sinus rhythm Left anterior fascicular block Low voltage, precordial leads Consider anterior infarct    CT brain attack head wo IV contrast    Result Date: 3/1/2024  Interpreted By:  Levon Waters, STUDY: CT BRAIN ATTACK ANGIO HEAD AND NECK W AND WO IV CONTRAST; CT BRAIN ATTACK HEAD WO IV CONTRAST; ;  3/1/2024 11:53 am; 3/1/2024 11:48 am   INDICATION: Signs/Symptoms:Last known well 945, left-sided slurring speech, upper extremity weakness. Prior stroke and is on Eliquis;  Signs/Symptoms:Last known well 945, left-sided slurring speech, upper extremity weakness.  Prior stroke and is on Eliquis.   COMPARISON: CT head from 03/01/2024. MRI brain and MRA head and neck from 08/29/2019.   ACCESSION NUMBER(S): [id number]; [id number]   ORDERING CLINICIAN: KENTON STRATTON   TECHNIQUE: CTA of the head and neck was performed after administration of 50 mL of Omnipaque 350 intravenously. Segmented MIPs are provided.   FINDINGS: CTA head:   There are small foci of atherosclerotic calcifications located within the precavernous, cavernous, and paraophthalmic segments of the bilateral internal carotid arteries without luminal narrowing. There is more concentric atherosclerotic calcification located within the supraclinoid segment of the left ICA which may cause mild luminal narrowing. Otherwise, there is no striking narrowing of the visualized internal carotid arteries.   There is no narrowing of the visualized portions of the bilateral anterior or middle cerebral arteries. There are small foci of atherosclerotic calcifications located within the bilateral intradural vertebral arteries without luminal narrowing. There is mild luminal irregularity of the basilar artery primarily due to noncalcified plaque. There is no narrowing of the visualized portions of the bilateral posterior cerebral arteries. The lumen of the P1 segment of the left posterior cerebral artery is smaller compared to the right, likely normal variant as there is a left posterior communicating artery which is visualized.   There are no aneurysms.   CTA neck:   Aortic arch and origins of the great vessels from the aortic arch are without luminal narrowing. There are atherosclerotic calcifications within these vascular structures but there is no striking luminal narrowing. Bilateral common carotid arteries are without luminal narrowing. There are small foci of atherosclerotic calcification located within the left common carotid artery.  There are small foci of atherosclerotic calcification located within the bilateral carotid bulbs without luminal narrowing. There is retropharyngeal course of the right internal carotid artery. Bilateral vertebral arteries arise from the subclavian arteries and demonstrate no luminal narrowing.   Additional findings:   There are osseous degenerative changes of the cervical spine.       At most mild narrowing of the supraclinoid segment of the left ICA due to atherosclerotic calcifications. Otherwise, no striking narrowing of the visualized arteries in the head or neck.   This study was interpreted at [facility].     MACRO: None   Signed by: Levon Waters 3/1/2024 12:10 PM Dictation workstation:   [id number]    CT brain attack angio head and neck W and WO IV contrast    Result Date: 3/1/2024  Interpreted By:  Levon Waters, STUDY: CT BRAIN ATTACK ANGIO HEAD AND NECK W AND WO IV CONTRAST; CT BRAIN ATTACK HEAD WO IV CONTRAST; ;  3/1/2024 11:53 am; 3/1/2024 11:48 am   INDICATION: Signs/Symptoms:Last known well 945, left-sided slurring speech, upper extremity weakness. Prior stroke and is on Eliquis; Signs/Symptoms:Last known well 945, left-sided slurring speech, upper extremity weakness.  Prior stroke and is on Eliquis.   COMPARISON: CT head from 03/01/2024. MRI brain and MRA head and neck from 08/29/2019.   ACCESSION NUMBER(S): [id number]; [id number]   ORDERING CLINICIAN: KENTON STRATTON   TECHNIQUE: CTA of the head and neck was performed after administration of 50 mL of Omnipaque 350 intravenously. Segmented MIPs are provided.   FINDINGS: CTA head:   There are small foci of atherosclerotic calcifications located within the precavernous, cavernous, and paraophthalmic segments of the bilateral internal carotid arteries without luminal narrowing. There is more concentric atherosclerotic calcification located within the supraclinoid segment of the left ICA which may cause mild luminal  narrowing. Otherwise, there is no striking narrowing of the visualized internal carotid arteries.   There is no narrowing of the visualized portions of the bilateral anterior or middle cerebral arteries. There are small foci of atherosclerotic calcifications located within the bilateral intradural vertebral arteries without luminal narrowing. There is mild luminal irregularity of the basilar artery primarily due to noncalcified plaque. There is no narrowing of the visualized portions of the bilateral posterior cerebral arteries. The lumen of the P1 segment of the left posterior cerebral artery is smaller compared to the right, likely normal variant as there is a left posterior communicating artery which is visualized.   There are no aneurysms.   CTA neck:   Aortic arch and origins of the great vessels from the aortic arch are without luminal narrowing. There are atherosclerotic calcifications within these vascular structures but there is no striking luminal narrowing. Bilateral common carotid arteries are without luminal narrowing. There are small foci of atherosclerotic calcification located within the left common carotid artery. There are small foci of atherosclerotic calcification located within the bilateral carotid bulbs without luminal narrowing. There is retropharyngeal course of the right internal carotid artery. Bilateral vertebral arteries arise from the subclavian arteries and demonstrate no luminal narrowing.   Additional findings:   There are osseous degenerative changes of the cervical spine.       At most mild narrowing of the supraclinoid segment of the left ICA due to atherosclerotic calcifications. Otherwise, no striking narrowing of the visualized arteries in the head or neck.   This study was interpreted at [facility].     MACRO: None   Signed by: Levon Waters 3/1/2024 12:10 PM Dictation workstation:   [id number]       Assessment/Plan   Principal Problem:     Ac isch multi vasc territories stroke (CMS/HCC)    IMPRESSION:  63 y.o. male with history of prior stroke (with left sided hemiparesis), atrial flutter (on Eliquis), HTN, CAD and possible CHF presented to [facility] ED for increased weakness on left side and slurred speech than his baseline.  HCT without acute findings.   CTA with mild narrowing of supraclinoid segment of LICA, no other significant stenosis or LVO.     RECOMMENDATIONS:  MRI brain wo contrast pending  TTE pending.   Continue home eliquis, agree with added aspirin for now.   Continue statin.   Continue stroke risk management and modification.   Check UA and flu/COVID for possible sources of infection or metabolic abnormality.     Discussed with patient.   Pt seen and managed with Dr. Long.     I spent >75 minutes in the professional and overall care of this patient including examination, patient/family discussion, result and image review, and treatment planning.     VANNA Powell-CNP    I saw and examined the patient with the NP and was present for the entirety of the clinical encounter.  The note above has been edited as appropriate.  The plan of care was mostly mine with input from the NP.    I agree with the above.  The patient has new difficulties with speech and feels that his right side is weak.  His neurological examination is abnormal and noted above.  I did review the patient's CT scan of the brain as well as CT angiogram of the neck and brain.  The patient does need an MRI of the brain and an echocardiogram.  We will need to rule out COVID and urinary tract infection.  I would certainly continue all of his medicines and stroke risk factor modification.  The patient needs a PT and OT consult.  I discussed all these issues in detail with the patient and answered all of his questions.  I will continue to follow the patient while he is in the hospital.  The patient will follow-up with the neurology NP in 1 month.

## 2024-03-02 ENCOUNTER — APPOINTMENT (OUTPATIENT)
Dept: CARDIOLOGY | Facility: HOSPITAL | Age: 64
DRG: 689 | End: 2024-03-02
Payer: COMMERCIAL

## 2024-03-02 LAB
ANION GAP SERPL CALC-SCNC: 8 MMOL/L (ref 10–20)
APPEARANCE UR: ABNORMAL
BACTERIA #/AREA URNS AUTO: ABNORMAL /HPF
BILIRUB UR STRIP.AUTO-MCNC: NEGATIVE MG/DL
BUN SERPL-MCNC: 20 MG/DL (ref 6–23)
CALCIUM SERPL-MCNC: 9.6 MG/DL (ref 8.6–10.3)
CHLORIDE SERPL-SCNC: 108 MMOL/L (ref 98–107)
CHOLEST SERPL-MCNC: 101 MG/DL (ref 0–199)
CHOLESTEROL/HDL RATIO: 2.5
CO2 SERPL-SCNC: 29 MMOL/L (ref 21–32)
COLOR UR: YELLOW
CREAT SERPL-MCNC: 0.93 MG/DL (ref 0.5–1.3)
EGFRCR SERPLBLD CKD-EPI 2021: >90 ML/MIN/1.73M*2
EJECTION FRACTION APICAL 4 CHAMBER: 56
EJECTION FRACTION: 52 %
ERYTHROCYTE [DISTWIDTH] IN BLOOD BY AUTOMATED COUNT: 14.1 % (ref 11.5–14.5)
EST. AVERAGE GLUCOSE BLD GHB EST-MCNC: 166 MG/DL
GLUCOSE SERPL-MCNC: 105 MG/DL (ref 74–99)
GLUCOSE UR STRIP.AUTO-MCNC: NEGATIVE MG/DL
HBA1C MFR BLD: 7.4 %
HCT VFR BLD AUTO: 33.7 % (ref 41–52)
HDLC SERPL-MCNC: 40.1 MG/DL
HGB BLD-MCNC: 10.9 G/DL (ref 13.5–17.5)
KETONES UR STRIP.AUTO-MCNC: NEGATIVE MG/DL
LDLC SERPL CALC-MCNC: 49 MG/DL
LEFT ATRIUM VOLUME AREA LENGTH INDEX BSA: 50.7 ML/M2
LEFT VENTRICLE INTERNAL DIMENSION DIASTOLE: 4 CM (ref 3.5–6)
LEFT VENTRICULAR OUTFLOW TRACT DIAMETER: 2 CM
LEUKOCYTE ESTERASE UR QL STRIP.AUTO: ABNORMAL
MAGNESIUM SERPL-MCNC: 1.83 MG/DL (ref 1.6–2.4)
MCH RBC QN AUTO: 30.7 PG (ref 26–34)
MCHC RBC AUTO-ENTMCNC: 32.3 G/DL (ref 32–36)
MCV RBC AUTO: 95 FL (ref 80–100)
MITRAL VALVE E/A RATIO: 1.26
MITRAL VALVE E/E' RATIO: 10.15
NITRITE UR QL STRIP.AUTO: POSITIVE
NON HDL CHOLESTEROL: 61 MG/DL (ref 0–149)
NRBC BLD-RTO: 0 /100 WBCS (ref 0–0)
PH UR STRIP.AUTO: 6 [PH]
PLATELET # BLD AUTO: 185 X10*3/UL (ref 150–450)
POTASSIUM SERPL-SCNC: 4 MMOL/L (ref 3.5–5.3)
PROT UR STRIP.AUTO-MCNC: ABNORMAL MG/DL
RBC # BLD AUTO: 3.55 X10*6/UL (ref 4.5–5.9)
RBC # UR STRIP.AUTO: NEGATIVE /UL
RBC #/AREA URNS AUTO: ABNORMAL /HPF
RIGHT VENTRICLE FREE WALL PEAK S': 9.36 CM/S
RIGHT VENTRICLE PEAK SYSTOLIC PRESSURE: 32.8 MMHG
SODIUM SERPL-SCNC: 141 MMOL/L (ref 136–145)
SP GR UR STRIP.AUTO: 1.02
TRICUSPID ANNULAR PLANE SYSTOLIC EXCURSION: 1.6 CM
TRIGL SERPL-MCNC: 61 MG/DL (ref 0–149)
UROBILINOGEN UR STRIP.AUTO-MCNC: 2 MG/DL
VLDL: 12 MG/DL (ref 0–40)
WBC # BLD AUTO: 5.6 X10*3/UL (ref 4.4–11.3)
WBC #/AREA URNS AUTO: >50 /HPF

## 2024-03-02 PROCEDURE — 80061 LIPID PANEL: CPT | Performed by: NURSE PRACTITIONER

## 2024-03-02 PROCEDURE — 93306 TTE W/DOPPLER COMPLETE: CPT

## 2024-03-02 PROCEDURE — 92610 EVALUATE SWALLOWING FUNCTION: CPT | Mod: GN | Performed by: PHARMACIST

## 2024-03-02 PROCEDURE — 97166 OT EVAL MOD COMPLEX 45 MIN: CPT | Mod: GO

## 2024-03-02 PROCEDURE — 36415 COLL VENOUS BLD VENIPUNCTURE: CPT | Performed by: STUDENT IN AN ORGANIZED HEALTH CARE EDUCATION/TRAINING PROGRAM

## 2024-03-02 PROCEDURE — 92523 SPEECH SOUND LANG COMPREHEN: CPT | Mod: GN | Performed by: PHARMACIST

## 2024-03-02 PROCEDURE — 2500000001 HC RX 250 WO HCPCS SELF ADMINISTERED DRUGS (ALT 637 FOR MEDICARE OP): Performed by: STUDENT IN AN ORGANIZED HEALTH CARE EDUCATION/TRAINING PROGRAM

## 2024-03-02 PROCEDURE — 2060000001 HC INTERMEDIATE ICU ROOM DAILY

## 2024-03-02 PROCEDURE — 97161 PT EVAL LOW COMPLEX 20 MIN: CPT | Mod: GP | Performed by: PHYSICAL THERAPIST

## 2024-03-02 PROCEDURE — 83735 ASSAY OF MAGNESIUM: CPT | Performed by: STUDENT IN AN ORGANIZED HEALTH CARE EDUCATION/TRAINING PROGRAM

## 2024-03-02 PROCEDURE — 80048 BASIC METABOLIC PNL TOTAL CA: CPT | Performed by: STUDENT IN AN ORGANIZED HEALTH CARE EDUCATION/TRAINING PROGRAM

## 2024-03-02 PROCEDURE — 2500000004 HC RX 250 GENERAL PHARMACY W/ HCPCS (ALT 636 FOR OP/ED): Performed by: STUDENT IN AN ORGANIZED HEALTH CARE EDUCATION/TRAINING PROGRAM

## 2024-03-02 PROCEDURE — 81001 URINALYSIS AUTO W/SCOPE: CPT | Performed by: EMERGENCY MEDICINE

## 2024-03-02 PROCEDURE — 83036 HEMOGLOBIN GLYCOSYLATED A1C: CPT | Performed by: STUDENT IN AN ORGANIZED HEALTH CARE EDUCATION/TRAINING PROGRAM

## 2024-03-02 PROCEDURE — 93306 TTE W/DOPPLER COMPLETE: CPT | Performed by: INTERNAL MEDICINE

## 2024-03-02 PROCEDURE — 85027 COMPLETE CBC AUTOMATED: CPT | Performed by: STUDENT IN AN ORGANIZED HEALTH CARE EDUCATION/TRAINING PROGRAM

## 2024-03-02 PROCEDURE — 99232 SBSQ HOSP IP/OBS MODERATE 35: CPT | Performed by: STUDENT IN AN ORGANIZED HEALTH CARE EDUCATION/TRAINING PROGRAM

## 2024-03-02 PROCEDURE — 99233 SBSQ HOSP IP/OBS HIGH 50: CPT | Performed by: PSYCHIATRY & NEUROLOGY

## 2024-03-02 PROCEDURE — 2500000002 HC RX 250 W HCPCS SELF ADMINISTERED DRUGS (ALT 637 FOR MEDICARE OP, ALT 636 FOR OP/ED): Performed by: STUDENT IN AN ORGANIZED HEALTH CARE EDUCATION/TRAINING PROGRAM

## 2024-03-02 RX ORDER — SPIRONOLACTONE 25 MG/1
25 TABLET ORAL DAILY
Status: DISCONTINUED | OUTPATIENT
Start: 2024-03-02 | End: 2024-03-05 | Stop reason: HOSPADM

## 2024-03-02 RX ORDER — CARVEDILOL 25 MG/1
50 TABLET ORAL
Status: DISCONTINUED | OUTPATIENT
Start: 2024-03-02 | End: 2024-03-05 | Stop reason: HOSPADM

## 2024-03-02 RX ORDER — ISOSORBIDE DINITRATE 20 MG/1
20 TABLET ORAL 3 TIMES DAILY
Status: DISCONTINUED | OUTPATIENT
Start: 2024-03-02 | End: 2024-03-03

## 2024-03-02 RX ORDER — FUROSEMIDE 40 MG/1
40 TABLET ORAL DAILY
Status: DISCONTINUED | OUTPATIENT
Start: 2024-03-02 | End: 2024-03-05 | Stop reason: HOSPADM

## 2024-03-02 RX ORDER — HYDRALAZINE HYDROCHLORIDE 25 MG/1
25 TABLET, FILM COATED ORAL EVERY 8 HOURS
Status: DISCONTINUED | OUTPATIENT
Start: 2024-03-02 | End: 2024-03-03

## 2024-03-02 RX ORDER — CEFTRIAXONE 1 G/50ML
1 INJECTION, SOLUTION INTRAVENOUS EVERY 24 HOURS
Status: DISCONTINUED | OUTPATIENT
Start: 2024-03-02 | End: 2024-03-05 | Stop reason: HOSPADM

## 2024-03-02 RX ADMIN — FUROSEMIDE 40 MG: 40 TABLET ORAL at 11:16

## 2024-03-02 RX ADMIN — APIXABAN 5 MG: 5 TABLET, FILM COATED ORAL at 08:31

## 2024-03-02 RX ADMIN — SPIRONOLACTONE 25 MG: 25 TABLET ORAL at 11:16

## 2024-03-02 RX ADMIN — APIXABAN 5 MG: 5 TABLET, FILM COATED ORAL at 20:23

## 2024-03-02 RX ADMIN — HUMAN ALBUMIN MICROSPHERES AND PERFLUTREN 0.5 ML: 10; .22 INJECTION, SOLUTION INTRAVENOUS at 09:38

## 2024-03-02 RX ADMIN — CARVEDILOL 50 MG: 25 TABLET, FILM COATED ORAL at 17:08

## 2024-03-02 RX ADMIN — ASPIRIN 81 MG CHEWABLE TABLET 162 MG: 81 TABLET CHEWABLE at 08:31

## 2024-03-02 RX ADMIN — CEFTRIAXONE SODIUM 1 G: 1 INJECTION, SOLUTION INTRAVENOUS at 11:16

## 2024-03-02 RX ADMIN — ISOSORBIDE DINITRATE 20 MG: 20 TABLET ORAL at 20:22

## 2024-03-02 RX ADMIN — Medication 3 MG: at 19:46

## 2024-03-02 RX ADMIN — SACUBITRIL AND VALSARTAN 1 TABLET: 24; 26 TABLET, FILM COATED ORAL at 11:16

## 2024-03-02 RX ADMIN — HYDRALAZINE HYDROCHLORIDE 25 MG: 25 TABLET, FILM COATED ORAL at 22:00

## 2024-03-02 RX ADMIN — CARVEDILOL 50 MG: 25 TABLET, FILM COATED ORAL at 11:16

## 2024-03-02 RX ADMIN — ATORVASTATIN CALCIUM 80 MG: 40 TABLET, FILM COATED ORAL at 20:23

## 2024-03-02 RX ADMIN — SACUBITRIL AND VALSARTAN 1 TABLET: 24; 26 TABLET, FILM COATED ORAL at 20:22

## 2024-03-02 RX ADMIN — HYDRALAZINE HYDROCHLORIDE 25 MG: 25 TABLET, FILM COATED ORAL at 14:22

## 2024-03-02 RX ADMIN — ISOSORBIDE DINITRATE 20 MG: 20 TABLET ORAL at 14:22

## 2024-03-02 ASSESSMENT — COGNITIVE AND FUNCTIONAL STATUS - GENERAL
STANDING UP FROM CHAIR USING ARMS: TOTAL
DAILY ACTIVITIY SCORE: 14
CLIMB 3 TO 5 STEPS WITH RAILING: TOTAL
HELP NEEDED FOR BATHING: A LOT
MOVING FROM LYING ON BACK TO SITTING ON SIDE OF FLAT BED WITH BEDRAILS: A LOT
PERSONAL GROOMING: TOTAL
EATING MEALS: A LITTLE
TURNING FROM BACK TO SIDE WHILE IN FLAT BAD: A LOT
STANDING UP FROM CHAIR USING ARMS: TOTAL
MOVING TO AND FROM BED TO CHAIR: TOTAL
MOVING TO AND FROM BED TO CHAIR: TOTAL
MOVING FROM LYING ON BACK TO SITTING ON SIDE OF FLAT BED WITH BEDRAILS: A LOT
PERSONAL GROOMING: A LOT
WALKING IN HOSPITAL ROOM: TOTAL
MOBILITY SCORE: 8
HELP NEEDED FOR BATHING: A LOT
CLIMB 3 TO 5 STEPS WITH RAILING: TOTAL
TOILETING: TOTAL
MOVING TO AND FROM BED TO CHAIR: TOTAL
TOILETING: A LOT
PERSONAL GROOMING: A LITTLE
MOBILITY SCORE: 8
MOBILITY SCORE: 8
MOVING FROM LYING ON BACK TO SITTING ON SIDE OF FLAT BED WITH BEDRAILS: A LOT
DAILY ACTIVITIY SCORE: 10
EATING MEALS: A LITTLE
TURNING FROM BACK TO SIDE WHILE IN FLAT BAD: A LOT
DRESSING REGULAR UPPER BODY CLOTHING: A LOT
TURNING FROM BACK TO SIDE WHILE IN FLAT BAD: A LOT
DRESSING REGULAR LOWER BODY CLOTHING: A LOT
DRESSING REGULAR LOWER BODY CLOTHING: TOTAL
DRESSING REGULAR UPPER BODY CLOTHING: A LOT
TOILETING: TOTAL
CLIMB 3 TO 5 STEPS WITH RAILING: TOTAL
DAILY ACTIVITIY SCORE: 11
STANDING UP FROM CHAIR USING ARMS: TOTAL
DRESSING REGULAR UPPER BODY CLOTHING: A LOT
WALKING IN HOSPITAL ROOM: TOTAL
DRESSING REGULAR LOWER BODY CLOTHING: TOTAL
EATING MEALS: A LITTLE
WALKING IN HOSPITAL ROOM: TOTAL
HELP NEEDED FOR BATHING: A LOT

## 2024-03-02 ASSESSMENT — PAIN - FUNCTIONAL ASSESSMENT
PAIN_FUNCTIONAL_ASSESSMENT: 0-10

## 2024-03-02 ASSESSMENT — PAIN SCALES - GENERAL
PAINLEVEL_OUTOF10: 0 - NO PAIN

## 2024-03-02 ASSESSMENT — ACTIVITIES OF DAILY LIVING (ADL): BATHING_ASSISTANCE: MAXIMAL

## 2024-03-02 NOTE — PROGRESS NOTES
Physical Therapy    Physical Therapy Evaluation    Patient Name: Edenilson Ness  MRN: [id number]  Today's Date: 3/2/2024   Time Calculation  Start Time: 1015  Stop Time: 1029  Time Calculation (min): 14 min    Assessment/Plan   PT Assessment  PT Assessment Results: Decreased strength, Decreased endurance, Impaired balance, Decreased mobility, Decreased range of motion  Rehab Prognosis: Good  Barriers to Discharge: none  Evaluation/Treatment Tolerance: Patient tolerated treatment well  Medical Staff Made Aware: Yes  End of Session Communication: Bedside nurse  Assessment Comment: Patient requires 2 assist for minimal activity, appears fairly close to recent baseline. Was working with therapy prior to admit, should continue after discharge.  End of Session Patient Position: Bed, 3 rail up, Alarm on  IP OR SWING BED PT PLAN  Inpatient or Swing Bed: Inpatient  PT Plan  Treatment/Interventions: Bed mobility, Transfer training, Balance training, Strengthening, Endurance training, Range of motion, Therapeutic exercise, Therapeutic activity  PT Plan: Skilled PT  PT Frequency: 2 times per week  PT Discharge Recommendations:  (return to facility with therapy as prior)  PT - OK to Discharge: Yes (when medically cleared)      General Visit Information:  General  Reason for Referral: Dx: Increased L sided weakness, slurred speech, R/O CVA  Referred By: Franci  Past Medical History Relevant to Rehab: CVA wiuth resultant L sided weakness, A flutter, HTN, CHF, CAD, DM  Co-Treatment:  (with OT for safety and mobility)  Prior to Session Communication: Bedside nurse  Patient Position Received: Bed, 3 rail up, Alarm on  General Comment: See in room 2001, tele, IV, external [place]    Home Living:  Home Living  Type of Home:  (Patient admitted from SNF/NH, states he was working with therapy on standing in parallel bars. Sit to stand lift to transfer from bed to manual W/C, able to propel self, gets assist for all ADLs.)    Prior Level of  Function:       Precautions:  Precautions  Precautions Comment: falls    Vital Signs:     Objective     Pain:  Pain Assessment  Pain Score: 0 - No pain    Cognition:  Cognition  Overall Cognitive Status: Within Functional Limits    General Assessments:  General Observation  General Observation: Sat EOB x 4-5 mins working on balance and posture with verbal and tactile cues   Activity Tolerance  Endurance: Tolerates 10 - 20 min exercise with multiple rests     Strength  Strength Comments: R LE quads grossly 3-/5, L quads 2-/5; noted decreased full extension TRES knees L>R        Postural Control  Postural Control:  (sitting balance variable needing Min to Max assist to maintain static sitting due to heavy lean to L)          Functional Assessments:     Bed Mobility  Bed Mobility:  (Supine to/from sit with Max assist x 2; rolling R with Max assist x 1-2; cues for sequencing, safety, balance, wt shifting, posture)  Transfers  Transfer:  (not appropriate due to needing sit to stand lift at NH)             Extremity/Trunk Assessments:                Outcome Measures:  [facility] Basic Mobility  Turning from your back to your side while in a flat bed without using bedrails: A lot  Moving from lying on your back to sitting on the side of a flat bed without using bedrails: A lot  Moving to and from bed to chair (including a wheelchair): Total  Standing up from a chair using your arms (e.g. wheelchair or bedside chair): Total  To walk in hospital room: Total  Climbing 3-5 steps with railing: Total  Basic Mobility - Total Score: 8                            Goals:  Encounter Problems       Encounter Problems (Active)       PT Problem       transfers       Start:  03/02/24    Expected End:  03/16/24       Patient will perform supine to/from sit transfers with Mod assist x 1-2          balance        Start:  03/02/24    Expected End:  03/16/24       Patient will demonstrate >/= fair static sitting balance to prepare for OOB activity  and increase safety          strengthening        Start:  03/02/24    Expected End:  03/16/24       Patient will perform 20+ reps of AROM/AAROM for TRES LE's to improve safety and functional independence              Pain - Adult            Education Documentation  Precautions, taught by Naomi Pinedo, PT at 3/2/2024 11:07 AM.  Learner: Patient  Readiness: Acceptance  Method: Explanation  Response: Needs Reinforcement    Mobility Training, taught by Naomi Pinedo, PT at 3/2/2024 11:07 AM.  Learner: Patient  Readiness: Acceptance  Method: Explanation  Response: Needs Reinforcement    Education Comments  No comments found.

## 2024-03-02 NOTE — PROGRESS NOTES
Edenilson Ness is a 63 y.o. male on day 1 of admission presenting with Ac isch multi vasc territories stroke (CMS/HCC).      Subjective   03/02/24: Patient seen this morning.  Eating his breakfast.  Does not have any complaint.  Able to follow commands.  He tells me his speech is at his baseline         Review of Systems   All other systems reviewed and are negative.         Objective     Last Recorded Vitals  /86 (BP Location: Right arm, Patient Position: Lying)   Pulse 66   Temp 36.1 °C (96.9 °F) (Temporal)   Resp 17   Wt 95.4 kg (210 lb 5.1 oz)   SpO2 96%     Image Results  MR brain wo IV contrast    Result Date: 3/1/2024  Interpreted By:  Lucy Reinoso, STUDY: MR BRAIN WO IV CONTRAST;  3/1/2024 7:16 pm   INDICATION: Signs/Symptoms:CVA.   COMPARISON: Same day noncontrast CT head; MRI of the brain dated 08/29/2019   ACCESSION NUMBER(S): [id number]   ORDERING CLINICIAN: JEAN-CLAUDE IZQUIERDO   TECHNIQUE: Axial T2, FLAIR, DWI, gradient echo T2 and sagittal and coronal T1 weighted images of brain were acquired.   FINDINGS: Exam is degraded by mild motion.   No diffusion restriction abnormality is present to suggest an acute infarct. Area of cystic encephalomalacia and gliosis in the posterior right temporal lobe likely represents chronic sequela of prior infarct/injury.   There is no evidence of intracranial hemorrhage. No mass effect or midline shift is identified.   Moderate brain parenchymal volume loss is present without abnormal ventricular dilatation. Basal cisterns are patent. No abnormal extra-axial fluid collection is identified.   Patchy and confluence areas of hyperintense FLAIR and T2 signal are present in the periventricular and subcortical white matter of bilateral cerebral hemispheres, nonspecific findings favored to represent sequela of microvascular disease.   Visualized large arterial flow voids, including intracranial carotid and basilar artery appear to be preserved.   No abnormal  fluid signal is present in the mastoid air cells and middle ear cavities bilaterally.       1. No diffusion restriction abnormality is present to suggest an acute infarct. 2. Patchy and confluence areas of hyperintense FLAIR and T2 signal are present in the periventricular and subcortical white matter of bilateral cerebral hemispheres, nonspecific findings favored to represent sequela of microvascular disease.   MACRO: None   Signed by: Lucy Reinoso 3/1/2024 7:36 PM Dictation workstation:   [id number]    ECG 12 lead    Result Date: 3/1/2024  Sinus rhythm Left anterior fascicular block Low voltage, precordial leads Consider anterior infarct    CT brain attack head wo IV contrast    Result Date: 3/1/2024  Interpreted By:  Levon Waters, STUDY: CT BRAIN ATTACK ANGIO HEAD AND NECK W AND WO IV CONTRAST; CT BRAIN ATTACK HEAD WO IV CONTRAST; ;  3/1/2024 11:53 am; 3/1/2024 11:48 am   INDICATION: Signs/Symptoms:Last known well 945, left-sided slurring speech, upper extremity weakness. Prior stroke and is on Eliquis; Signs/Symptoms:Last known well 945, left-sided slurring speech, upper extremity weakness.  Prior stroke and is on Eliquis.   COMPARISON: CT head from 03/01/2024. MRI brain and MRA head and neck from 08/29/2019.   ACCESSION NUMBER(S): [id number]; [id number]   ORDERING CLINICIAN: KENTON STRATTON   TECHNIQUE: CTA of the head and neck was performed after administration of 50 mL of Omnipaque 350 intravenously. Segmented MIPs are provided.   FINDINGS: CTA head:   There are small foci of atherosclerotic calcifications located within the precavernous, cavernous, and paraophthalmic segments of the bilateral internal carotid arteries without luminal narrowing. There is more concentric atherosclerotic calcification located within the supraclinoid segment of the left ICA which may cause mild luminal narrowing. Otherwise, there is no striking narrowing of the visualized internal carotid arteries.   There is no  narrowing of the visualized portions of the bilateral anterior or middle cerebral arteries. There are small foci of atherosclerotic calcifications located within the bilateral intradural vertebral arteries without luminal narrowing. There is mild luminal irregularity of the basilar artery primarily due to noncalcified plaque. There is no narrowing of the visualized portions of the bilateral posterior cerebral arteries. The lumen of the P1 segment of the left posterior cerebral artery is smaller compared to the right, likely normal variant as there is a left posterior communicating artery which is visualized.   There are no aneurysms.   CTA neck:   Aortic arch and origins of the great vessels from the aortic arch are without luminal narrowing. There are atherosclerotic calcifications within these vascular structures but there is no striking luminal narrowing. Bilateral common carotid arteries are without luminal narrowing. There are small foci of atherosclerotic calcification located within the left common carotid artery. There are small foci of atherosclerotic calcification located within the bilateral carotid bulbs without luminal narrowing. There is retropharyngeal course of the right internal carotid artery. Bilateral vertebral arteries arise from the subclavian arteries and demonstrate no luminal narrowing.   Additional findings:   There are osseous degenerative changes of the cervical spine.       At most mild narrowing of the supraclinoid segment of the left ICA due to atherosclerotic calcifications. Otherwise, no striking narrowing of the visualized arteries in the head or neck.   This study was interpreted at [facility].     MACRO: None   Signed by: Levon Waters 3/1/2024 12:10 PM Dictation workstation:   [id number]    CT brain attack angio head and neck W and WO IV contrast    Result Date: 3/1/2024  Interpreted By:  Levon Waters, STUDY: CT BRAIN ATTACK ANGIO HEAD AND  NECK W AND WO IV CONTRAST; CT BRAIN ATTACK HEAD WO IV CONTRAST; ;  3/1/2024 11:53 am; 3/1/2024 11:48 am   INDICATION: Signs/Symptoms:Last known well 945, left-sided slurring speech, upper extremity weakness. Prior stroke and is on Eliquis; Signs/Symptoms:Last known well 945, left-sided slurring speech, upper extremity weakness.  Prior stroke and is on Eliquis.   COMPARISON: CT head from 03/01/2024. MRI brain and MRA head and neck from 08/29/2019.   ACCESSION NUMBER(S): [id number]; [id number]   ORDERING CLINICIAN: KENTON STRATTON   TECHNIQUE: CTA of the head and neck was performed after administration of 50 mL of Omnipaque 350 intravenously. Segmented MIPs are provided.   FINDINGS: CTA head:   There are small foci of atherosclerotic calcifications located within the precavernous, cavernous, and paraophthalmic segments of the bilateral internal carotid arteries without luminal narrowing. There is more concentric atherosclerotic calcification located within the supraclinoid segment of the left ICA which may cause mild luminal narrowing. Otherwise, there is no striking narrowing of the visualized internal carotid arteries.   There is no narrowing of the visualized portions of the bilateral anterior or middle cerebral arteries. There are small foci of atherosclerotic calcifications located within the bilateral intradural vertebral arteries without luminal narrowing. There is mild luminal irregularity of the basilar artery primarily due to noncalcified plaque. There is no narrowing of the visualized portions of the bilateral posterior cerebral arteries. The lumen of the P1 segment of the left posterior cerebral artery is smaller compared to the right, likely normal variant as there is a left posterior communicating artery which is visualized.   There are no aneurysms.   CTA neck:   Aortic arch and origins of the great vessels from the aortic arch are without luminal narrowing. There are atherosclerotic calcifications  within these vascular structures but there is no striking luminal narrowing. Bilateral common carotid arteries are without luminal narrowing. There are small foci of atherosclerotic calcification located within the left common carotid artery. There are small foci of atherosclerotic calcification located within the bilateral carotid bulbs without luminal narrowing. There is retropharyngeal course of the right internal carotid artery. Bilateral vertebral arteries arise from the subclavian arteries and demonstrate no luminal narrowing.   Additional findings:   There are osseous degenerative changes of the cervical spine.       At most mild narrowing of the supraclinoid segment of the left ICA due to atherosclerotic calcifications. Otherwise, no striking narrowing of the visualized arteries in the head or neck.   This study was interpreted at [facility].     MACRO: None   Signed by: Levon Waters 3/1/2024 12:10 PM Dictation workstation:   [id number]       Lab Results  Results for orders placed or performed during the hospital encounter of 03/01/24 (from the past 24 hour(s))   CBC and Auto Differential   Result Value Ref Range    WBC 7.5 4.4 - 11.3 x10*3/uL    nRBC 0.0 0.0 - 0.0 /100 WBCs    RBC 3.64 (L) 4.50 - 5.90 x10*6/uL    Hemoglobin 11.2 (L) 13.5 - 17.5 g/dL    Hematocrit 34.2 (L) 41.0 - 52.0 %    MCV 94 80 - 100 fL    MCH 30.8 26.0 - 34.0 pg    MCHC 32.7 32.0 - 36.0 g/dL    RDW 13.9 11.5 - 14.5 %    Platelets 184 150 - 450 x10*3/uL    Neutrophils % 82.0 40.0 - 80.0 %    Immature Granulocytes %, Automated 0.3 0.0 - 0.9 %    Lymphocytes % 12.3 13.0 - 44.0 %    Monocytes % 4.8 2.0 - 10.0 %    Eosinophils % 0.3 0.0 - 6.0 %    Basophils % 0.3 0.0 - 2.0 %    Neutrophils Absolute 6.13 1.20 - 7.70 x10*3/uL    Immature Granulocytes Absolute, Automated 0.02 0.00 - 0.70 x10*3/uL    Lymphocytes Absolute 0.92 (L) 1.20 - 4.80 x10*3/uL    Monocytes Absolute 0.36 0.10 - 1.00 x10*3/uL     Eosinophils Absolute 0.02 0.00 - 0.70 x10*3/uL    Basophils Absolute 0.02 0.00 - 0.10 x10*3/uL   Comprehensive metabolic panel   Result Value Ref Range    Glucose 137 (H) 74 - 99 mg/dL    Sodium 136 136 - 145 mmol/L    Potassium 4.4 3.5 - 5.3 mmol/L    Chloride 104 98 - 107 mmol/L    Bicarbonate 27 21 - 32 mmol/L    Anion Gap 9 (L) 10 - 20 mmol/L    Urea Nitrogen 21 6 - 23 mg/dL    Creatinine 1.00 0.50 - 1.30 mg/dL    eGFR 86 >60 mL/min/1.73m*2    Calcium 9.6 8.6 - 10.3 mg/dL    Albumin 3.6 3.4 - 5.0 g/dL    Alkaline Phosphatase 98 33 - 136 U/L    Total Protein 6.5 6.4 - 8.2 g/dL    AST 11 9 - 39 U/L    Bilirubin, Total 0.6 0.0 - 1.2 mg/dL    ALT 11 10 - 52 U/L   Troponin I, High Sensitivity   Result Value Ref Range    Troponin I, High Sensitivity 10 0 - 20 ng/L   Protime-INR   Result Value Ref Range    Protime 15.9 (H) 9.8 - 12.8 seconds    INR 1.4 (H) 0.9 - 1.1   APTT   Result Value Ref Range    aPTT 40 (H) 27 - 38 seconds   Alcohol   Result Value Ref Range    Alcohol <10 <=10 mg/dL   B-Type Natriuretic Peptide   Result Value Ref Range    BNP 91 0 - 99 pg/mL   Lipase   Result Value Ref Range    Lipase 5 (L) 9 - 82 U/L   ECG 12 lead   Result Value Ref Range    Ventricular Rate 59 BPM    Atrial Rate 60 BPM    ND Interval 211 ms    QRS Duration 98 ms    QT Interval 426 ms    QTC Calculation(Bazett) 422 ms    P Axis 56 degrees    R Axis -41 degrees    T Axis 38 degrees    QRS Count 9 beats    Q Onset 253 ms    T Offset 466 ms    QTC Fredericia 423 ms   POCT GLUCOSE   Result Value Ref Range    POCT Glucose 106 (H) 74 - 99 mg/dL   POCT glucose   Result Value Ref Range    POCT Glucose 106 (A) 74 - 99 mg/dL   Sars-CoV-2 and Influenza A/B PCR   Result Value Ref Range    Flu A Result Not Detected Not Detected    Flu B Result Not Detected Not Detected    Coronavirus 2019, PCR Not Detected Not Detected   Blood Gas Arterial Full Panel   Result Value Ref Range    POCT pH, Arterial 7.37 (L) 7.38 - 7.42 pH    POCT pCO2, Arterial  58 (H) 38 - 42 mm Hg    POCT pO2, Arterial 28 (LL) 85 - 95 mm Hg    POCT SO2, Arterial 34 (L) 94 - 100 %    POCT Oxy Hemoglobin, Arterial 33.5 (L) 94.0 - 98.0 %    POCT Hematocrit Calculated, Arterial 36.0 (L) 41.0 - 52.0 %    POCT Sodium, Arterial 138 136 - 145 mmol/L    POCT Potassium, Arterial 4.3 3.5 - 5.3 mmol/L    POCT Chloride, Arterial 102 98 - 107 mmol/L    POCT Ionized Calcium, Arterial 1.38 (H) 1.10 - 1.33 mmol/L    POCT Glucose, Arterial 96 74 - 99 mg/dL    POCT Lactate, Arterial 1.1 0.4 - 2.0 mmol/L    POCT Base Excess, Arterial 6.6 (H) -2.0 - 3.0 mmol/L    POCT HCO3 Calculated, Arterial 33.5 (H) 22.0 - 26.0 mmol/L    POCT Hemoglobin, Arterial 11.9 (L) 13.5 - 17.5 g/dL    POCT Anion Gap, Arterial 7 (L) 10 - 25 mmo/L    Patient Temperature 37.0 degrees Celsius    FiO2 21 %    Site of Arterial Puncture Radial Right     Medardo's Test Positive    Magnesium   Result Value Ref Range    Magnesium 1.83 1.60 - 2.40 mg/dL   CBC   Result Value Ref Range    WBC 5.6 4.4 - 11.3 x10*3/uL    nRBC 0.0 0.0 - 0.0 /100 WBCs    RBC 3.55 (L) 4.50 - 5.90 x10*6/uL    Hemoglobin 10.9 (L) 13.5 - 17.5 g/dL    Hematocrit 33.7 (L) 41.0 - 52.0 %    MCV 95 80 - 100 fL    MCH 30.7 26.0 - 34.0 pg    MCHC 32.3 32.0 - 36.0 g/dL    RDW 14.1 11.5 - 14.5 %    Platelets 185 150 - 450 x10*3/uL   Basic metabolic panel   Result Value Ref Range    Glucose 105 (H) 74 - 99 mg/dL    Sodium 141 136 - 145 mmol/L    Potassium 4.0 3.5 - 5.3 mmol/L    Chloride 108 (H) 98 - 107 mmol/L    Bicarbonate 29 21 - 32 mmol/L    Anion Gap 8 (L) 10 - 20 mmol/L    Urea Nitrogen 20 6 - 23 mg/dL    Creatinine 0.93 0.50 - 1.30 mg/dL    eGFR >90 >60 mL/min/1.73m*2    Calcium 9.6 8.6 - 10.3 mg/dL   Lipid panel   Result Value Ref Range    Cholesterol 101 0 - 199 mg/dL    HDL-Cholesterol 40.1 mg/dL    Cholesterol/HDL Ratio 2.5     LDL Calculated 49 <=99 mg/dL    VLDL 12 0 - 40 mg/dL    Triglycerides 61 0 - 149 mg/dL    Non HDL Cholesterol 61 0 - 149 mg/dL   Urinalysis with  Reflex Microscopic   Result Value Ref Range    Color, Urine Yellow Straw, Yellow    Appearance, Urine Hazy (N) Clear    Specific Gravity, Urine 1.019 1.005 - 1.035    pH, Urine 6.0 5.0, 5.5, 6.0, 6.5, 7.0, 7.5, 8.0    Protein, Urine 30 (1+) (N) NEGATIVE mg/dL    Glucose, Urine NEGATIVE NEGATIVE mg/dL    Blood, Urine NEGATIVE NEGATIVE    Ketones, Urine NEGATIVE NEGATIVE mg/dL    Bilirubin, Urine NEGATIVE NEGATIVE    Urobilinogen, Urine 2.0 (N) <2.0 mg/dL    Nitrite, Urine POSITIVE (A) NEGATIVE    Leukocyte Esterase, Urine LARGE (3+) (A) NEGATIVE   Microscopic Only, Urine   Result Value Ref Range    WBC, Urine >50 (A) 1-5, NONE /HPF    RBC, Urine 1-2 NONE, 1-2, 3-5 /HPF    Bacteria, Urine 1+ (A) NONE SEEN /HPF   Transthoracic Echo (TTE) Complete   Result Value Ref Range    BSA 2.19 m2        Medications  Scheduled medications:  apixaban, 5 mg, oral, q12h  aspirin, 162 mg, oral, Daily   Or  aspirin, 150 mg, rectal, Daily  atorvastatin, 80 mg, oral, Nightly  carvedilol, 50 mg, oral, BID with meals  cefTRIAXone, 1 g, intravenous, q24h  furosemide, 40 mg, oral, Daily  hydrALAZINE, 25 mg, oral, q8h  isosorbide dinitrate, 20 mg, oral, TID  melatonin, 3 mg, oral, Daily  perflutren lipid microspheres, 0.5-10 mL of dilution, intravenous, Once in imaging  sacubitriL-valsartan, 1 tablet, oral, BID  spironolactone, 25 mg, oral, Daily  sulfur hexafluoride microsphr, 2 mL, intravenous, Once in imaging      Continuous medications:     PRN medications:  PRN medications: acetaminophen **OR** acetaminophen **OR** acetaminophen, benzocaine-menthol, bisacodyl, dextromethorphan-guaifenesin, guaiFENesin, ondansetron **OR** ondansetron, pantoprazole **OR** pantoprazole     Physical Exam  Constitutional:       Appearance: Normal appearance.   HENT:      Head: Normocephalic and atraumatic.      Nose: Nose normal.   Eyes:      Extraocular Movements: Extraocular movements intact.      Pupils: Pupils are equal, round, and reactive to light.    Cardiovascular:      Rate and Rhythm: Normal rate and regular rhythm.      Heart sounds: Normal heart sounds.   Pulmonary:      Effort: Pulmonary effort is normal.      Breath sounds: Normal breath sounds.   Abdominal:      General: Bowel sounds are normal.      Palpations: Abdomen is soft.   Musculoskeletal:      Cervical back: Neck supple.   Skin:     General: Skin is warm and dry.   Neurological:      Mental Status: He is alert. Mental status is at baseline.   Psychiatric:         Mood and Affect: Mood normal.                  Code Status  Full Code     Assessment/Plan     Problem list     Slurred speech and questionable worsening left-sided weakness concerning for acute CVA  Paroxysmal A-fib/flutter currently in sinus rhythm  UTI  Hypertension  History of coronary artery disease     Treatment plan     MRI did not show any acute stroke.  Echocardiogram is not done.  Workup also revealed urinary tract infection which could be contributing to his symptoms.  This morning he is awake alert and able to follow commands.  He passed bedside nursing swallow evaluation.  He thinks he is at his baseline at this time.    Start IV Rocephin.  Follow urine culture.    He does take multiple blood pressure medication including Entresto, Coreg, hydralazine, Imdur.  He also takes Lasix and Aldactone.  I will resume all these medications and monitor his kidney function, electrolytes and blood pressure.  Hypotension could have contributed to his symptoms as well.    Continue Eliquis.    Discussed with neurology.                           DVT ppx:        Please see orders for more complete plan    Keron Koroma MD

## 2024-03-02 NOTE — PROGRESS NOTES
Speech-Language Pathology Speech/Language/Cognition Evaluation    Patient Name: Edenilson Ness  MRN: [id number]  : 1960  Today's Date: 24  Start time: Start Time: 1035  Stop time: Stop Time: 1057  Time calculation (min) : Time Calculation (min): 22 min      ASSESSMENT  Impressions:  Pt presents with mild-moderate dysarthria which he reports is worsened from baseline. He would benefit from skilled ST to maximize speech intelligibility, improve communicative quality of life, and promote return to prior level of function.  Prognosis: Good      PLAN  Recommended communication strategies:   use slow speech rate and speak loudly/clearly    POC:  Frequency: 2x/week  Duration: 3 weeks  Discharge recommendation: Recommend LOW intensity ST upon DC in order to ensure safety with least restrictive diet.  Strengths: Cognition and Motivation  Barriers to participation in tx: Baseline impaired functional status    Goals (start date 3/2/24. Anticipated time frame for goal attainment: 3 weeks)  Pt will improve speech intelligibility to 100% given no cues for use of intelligibility strategies.   Status: Goal initiated this date   Progress this date: N/A   Pt will complete speech-related oral motor exercises to improve articulatory agility with 90% acc independently.   Status: Goal initiated this date   Progress this date: N/A      SUBJECTIVE    PMHx relevant to rehab: stroke, nursing home resident, wheelchair-bound, CHF, DM, RLE fracture     Chief complaint: Pt was admitted on 3/1/24 due to worsening slurred speech and weakness.     Relevant imaging results:  MRI brain 3/1/24: 1. No diffusion restriction abnormality is present to suggest an  acute infarct.  2. Patchy and confluence areas of hyperintense FLAIR and T2 signal  are present in the periventricular and subcortical white matter of  bilateral cerebral hemispheres, nonspecific findings favored to  represent sequela of microvascular disease.    General Visit  Information:    Referred By: Dr. Koroma  Living Environment: Skilled nursing facility  Reason for Referral: Swallow evaluation  Date of onset: 3/1/24  Date of order:3/2/24  Prior to Session Communication: Bedside nurse    RN cleared pt to participate in session.    Pt reported baseline slurred speech which worsened acutely prior to admission. He stated that his speech is improving but has not returned to baseline. Pt does not recall completing oral motor exercises after his CVA 3 years ago.    Pain Assessment  Pain Assessment: 0-10  Pain Score: 0 - No pain    Pt was alert, pleasant, and cooperative for session.  Orientation: Oriented to self, Oriented to location, Oriented to month, Oriented to year, Oriented to date/day, and Oriented to situation  Ability to follow functional commands: Follows complex commands after some simplification/repetition  Respiratory Status: Room air  Baseline Vocal Quality: Normal  Volitional Cough: Strong  Volitional Swallow: Within Functional Limits  Patient positioning: Upright in bed      OBJECTIVE  The following informal assessment tasks were completed:    Oral motor:  Lingual: Tongue protrudes to R side. Severely reduced strength against resistance on the R. Mildly reduced strength against resistance on the L.  Labial: Mildly impaired labial seal bilaterally; asymmetry noted on smile but not on pucker     Motor speech:  Intelligibility: 75% in known context and quiet environment  Typical utterance length: full sentences  Speech characteristics: rapid rate, weak syllable deletions intermittently, consonant distortions  Perceptual voice characteristics: WFL  MPT: 3-4 seconds average; able to extend to 6-7 seconds given max cues x1 attempt  DDKs: SMRs slow rate, steady rhythm, consonant distortions; AMRs slow rate, steady rhythm, consonant distortions    Receptive language:  Following 1-step commands: WFL  Following 2-step commands: 4/5  Complex yes/no questions: WFL    Expressive  language:  Seriated automatics: WFL  Confrontation naming: WFL  Responsive naming: WFL  Generative naming: WFL for concrete/familiar categories; 6/10 for abstract categories    Cognition:  Recall of demographic info: WFL    Treatment/Education:  Results and recommendations were relayed to: Patient and Physician; pt agreeable to POC  Education provided: No  Treatment provided: No  Next Treatment Priority: Lingual exercises, speech intelligibility strategies

## 2024-03-02 NOTE — PROGRESS NOTES
"Edenilson Ness is a 63 y.o. male on day 1 of admission presenting with Ac isch multi vasc territories stroke (CMS/HCC).      Subjective    The patient is a 63-year-old male with a history of a prior stroke with left-sided hemiparesis, atrial flutter, hypertension and CAD who presents to the ER with increasing weakness on the left side and slurred speech.  The patient had a CT scan of the brain done in the ER that was negative for any acute process.  The patient is doing well in terms of his left-sided weakness but still feels that he has some slurred speech.    Objective     Last Recorded Vitals  Blood pressure 145/86, pulse 66, temperature 36.1 °C (96.9 °F), temperature source Temporal, resp. rate 17, height 1.803 m (5' 11\"), weight 95.4 kg (210 lb 5.1 oz), SpO2 96 %.    Physical Exam  Neurological Exam  The patient's mental status testing shows that the patient is alert and oriented ×3 with no evidence of any aphasia but has a mild dysarthria.  The patient's cranial nerve testing 2, 3, 4, 5, 6, and 7 are all within normal limits.  The patient's motor testing shows 5/5 strength in the right upper extremity.  The patient's left upper extremity strength is 5-/5.  The patient's right lower extremity strength is 4+/5 proximally and 5/5 distally.  The patient's left lower extremity strength is 4+/5 proximally and 5/5 distally.        Relevant Results        Martha Coma Scale  Best Eye Response: Spontaneous  Best Verbal Response: Oriented  Best Motor Response: Follows commands  Martha Coma Scale Score: 15          No current facility-administered medications on file prior to encounter.     Current Outpatient Medications on File Prior to Encounter   Medication Sig Dispense Refill    apixaban (Eliquis) 5 mg tablet Take 1 tablet (5 mg) by mouth 2 times a day.      atorvastatin (Lipitor) 80 mg tablet Take 1 tablet (80 mg) by mouth once daily at bedtime.      baclofen (Lioresal) 5 mg tablet Take 1 tablet (5 mg) by mouth 2 " times a day.      carvedilol (Coreg) 25 mg tablet Take 2 tablets (50 mg) by mouth 2 times a day with meals.      furosemide (Lasix) 40 mg tablet Take 1 tablet (40 mg) by mouth once daily.      GLUCAGON, HCL, EMERGENCY KIT INJ Inject 1 mL into the muscle if needed (low blood sugar).      glucose (Glucose GeL) 40 % gel oral gel Take by mouth. GIVE 1 APPLICATION BY MOUTH PRN      hydrALAZINE (Apresoline) 25 mg tablet Take 1 tablet (25 mg) by mouth every 8 hours.      isosorbide dinitrate (Isordil) 20 mg tablet Take 1 tablet (20 mg) by mouth 3 times a day.      melatonin 10 mg tablet Take 1 tablet (10 mg) by mouth once daily at bedtime.      metFORMIN (Glucophage) 500 mg tablet Take 1 tablet (500 mg) by mouth 2 times a day.      sacubitriL-valsartan (Entresto) 24-26 mg tablet Take 1 tablet by mouth 2 times a day.      sennosides (Senokot) 8.6 mg tablet Take 1 tablet (8.6 mg) by mouth once daily as needed for constipation.      spironolactone (Aldactone) 25 mg tablet Take 1 tablet (25 mg) by mouth once daily.      thiamine 100 mg tablet Take 1 tablet (100 mg) by mouth once daily.      zinc oxide 20 % ointment Apply topically. APPLY TO SCROTUM  AND GERARD AREA TOPICALLY DAILY EVERY DAY AND NIGHT SHIFT         Results for orders placed or performed during the hospital encounter of 03/01/24 (from the past 96 hour(s))   CBC and Auto Differential   Result Value Ref Range    WBC 7.5 4.4 - 11.3 x10*3/uL    nRBC 0.0 0.0 - 0.0 /100 WBCs    RBC 3.64 (L) 4.50 - 5.90 x10*6/uL    Hemoglobin 11.2 (L) 13.5 - 17.5 g/dL    Hematocrit 34.2 (L) 41.0 - 52.0 %    MCV 94 80 - 100 fL    MCH 30.8 26.0 - 34.0 pg    MCHC 32.7 32.0 - 36.0 g/dL    RDW 13.9 11.5 - 14.5 %    Platelets 184 150 - 450 x10*3/uL    Neutrophils % 82.0 40.0 - 80.0 %    Immature Granulocytes %, Automated 0.3 0.0 - 0.9 %    Lymphocytes % 12.3 13.0 - 44.0 %    Monocytes % 4.8 2.0 - 10.0 %    Eosinophils % 0.3 0.0 - 6.0 %    Basophils % 0.3 0.0 - 2.0 %    Neutrophils Absolute  6.13 1.20 - 7.70 x10*3/uL    Immature Granulocytes Absolute, Automated 0.02 0.00 - 0.70 x10*3/uL    Lymphocytes Absolute 0.92 (L) 1.20 - 4.80 x10*3/uL    Monocytes Absolute 0.36 0.10 - 1.00 x10*3/uL    Eosinophils Absolute 0.02 0.00 - 0.70 x10*3/uL    Basophils Absolute 0.02 0.00 - 0.10 x10*3/uL   Comprehensive metabolic panel   Result Value Ref Range    Glucose 137 (H) 74 - 99 mg/dL    Sodium 136 136 - 145 mmol/L    Potassium 4.4 3.5 - 5.3 mmol/L    Chloride 104 98 - 107 mmol/L    Bicarbonate 27 21 - 32 mmol/L    Anion Gap 9 (L) 10 - 20 mmol/L    Urea Nitrogen 21 6 - 23 mg/dL    Creatinine 1.00 0.50 - 1.30 mg/dL    eGFR 86 >60 mL/min/1.73m*2    Calcium 9.6 8.6 - 10.3 mg/dL    Albumin 3.6 3.4 - 5.0 g/dL    Alkaline Phosphatase 98 33 - 136 U/L    Total Protein 6.5 6.4 - 8.2 g/dL    AST 11 9 - 39 U/L    Bilirubin, Total 0.6 0.0 - 1.2 mg/dL    ALT 11 10 - 52 U/L   Troponin I, High Sensitivity   Result Value Ref Range    Troponin I, High Sensitivity 10 0 - 20 ng/L   Protime-INR   Result Value Ref Range    Protime 15.9 (H) 9.8 - 12.8 seconds    INR 1.4 (H) 0.9 - 1.1   APTT   Result Value Ref Range    aPTT 40 (H) 27 - 38 seconds   Alcohol   Result Value Ref Range    Alcohol <10 <=10 mg/dL   B-Type Natriuretic Peptide   Result Value Ref Range    BNP 91 0 - 99 pg/mL   Lipase   Result Value Ref Range    Lipase 5 (L) 9 - 82 U/L   ECG 12 lead   Result Value Ref Range    Ventricular Rate 59 BPM    Atrial Rate 60 BPM    HI Interval 211 ms    QRS Duration 98 ms    QT Interval 426 ms    QTC Calculation(Bazett) 422 ms    P Axis 56 degrees    R Axis -41 degrees    T Axis 38 degrees    QRS Count 9 beats    Q Onset 253 ms    T Offset 466 ms    QTC Fredericia 423 ms   POCT GLUCOSE   Result Value Ref Range    POCT Glucose 106 (H) 74 - 99 mg/dL   POCT glucose   Result Value Ref Range    POCT Glucose 106 (A) 74 - 99 mg/dL   Sars-CoV-2 and Influenza A/B PCR   Result Value Ref Range    Flu A Result Not Detected Not Detected    Flu B Result  Not Detected Not Detected    Coronavirus 2019, PCR Not Detected Not Detected   Blood Gas Arterial Full Panel   Result Value Ref Range    POCT pH, Arterial 7.37 (L) 7.38 - 7.42 pH    POCT pCO2, Arterial 58 (H) 38 - 42 mm Hg    POCT pO2, Arterial 28 (LL) 85 - 95 mm Hg    POCT SO2, Arterial 34 (L) 94 - 100 %    POCT Oxy Hemoglobin, Arterial 33.5 (L) 94.0 - 98.0 %    POCT Hematocrit Calculated, Arterial 36.0 (L) 41.0 - 52.0 %    POCT Sodium, Arterial 138 136 - 145 mmol/L    POCT Potassium, Arterial 4.3 3.5 - 5.3 mmol/L    POCT Chloride, Arterial 102 98 - 107 mmol/L    POCT Ionized Calcium, Arterial 1.38 (H) 1.10 - 1.33 mmol/L    POCT Glucose, Arterial 96 74 - 99 mg/dL    POCT Lactate, Arterial 1.1 0.4 - 2.0 mmol/L    POCT Base Excess, Arterial 6.6 (H) -2.0 - 3.0 mmol/L    POCT HCO3 Calculated, Arterial 33.5 (H) 22.0 - 26.0 mmol/L    POCT Hemoglobin, Arterial 11.9 (L) 13.5 - 17.5 g/dL    POCT Anion Gap, Arterial 7 (L) 10 - 25 mmo/L    Patient Temperature 37.0 degrees Celsius    FiO2 21 %    Site of Arterial Puncture Radial Right     Medardo's Test Positive    Magnesium   Result Value Ref Range    Magnesium 1.83 1.60 - 2.40 mg/dL   CBC   Result Value Ref Range    WBC 5.6 4.4 - 11.3 x10*3/uL    nRBC 0.0 0.0 - 0.0 /100 WBCs    RBC 3.55 (L) 4.50 - 5.90 x10*6/uL    Hemoglobin 10.9 (L) 13.5 - 17.5 g/dL    Hematocrit 33.7 (L) 41.0 - 52.0 %    MCV 95 80 - 100 fL    MCH 30.7 26.0 - 34.0 pg    MCHC 32.3 32.0 - 36.0 g/dL    RDW 14.1 11.5 - 14.5 %    Platelets 185 150 - 450 x10*3/uL   Basic metabolic panel   Result Value Ref Range    Glucose 105 (H) 74 - 99 mg/dL    Sodium 141 136 - 145 mmol/L    Potassium 4.0 3.5 - 5.3 mmol/L    Chloride 108 (H) 98 - 107 mmol/L    Bicarbonate 29 21 - 32 mmol/L    Anion Gap 8 (L) 10 - 20 mmol/L    Urea Nitrogen 20 6 - 23 mg/dL    Creatinine 0.93 0.50 - 1.30 mg/dL    eGFR >90 >60 mL/min/1.73m*2    Calcium 9.6 8.6 - 10.3 mg/dL   Lipid panel   Result Value Ref Range    Cholesterol 101 0 - 199 mg/dL     HDL-Cholesterol 40.1 mg/dL    Cholesterol/HDL Ratio 2.5     LDL Calculated 49 <=99 mg/dL    VLDL 12 0 - 40 mg/dL    Triglycerides 61 0 - 149 mg/dL    Non HDL Cholesterol 61 0 - 149 mg/dL     I did review the images of the MRI of the brain.    MR brain wo IV contrast    Result Date: 3/1/2024  Interpreted By:  Lucy Reinoso, STUDY: MR BRAIN WO IV CONTRAST;  3/1/2024 7:16 pm   INDICATION: Signs/Symptoms:CVA.   COMPARISON: Same day noncontrast CT head; MRI of the brain dated 08/29/2019   ACCESSION NUMBER(S): [id number]   ORDERING CLINICIAN: JEAN-CLAUDE IZQUIERDO   TECHNIQUE: Axial T2, FLAIR, DWI, gradient echo T2 and sagittal and coronal T1 weighted images of brain were acquired.   FINDINGS: Exam is degraded by mild motion.   No diffusion restriction abnormality is present to suggest an acute infarct. Area of cystic encephalomalacia and gliosis in the posterior right temporal lobe likely represents chronic sequela of prior infarct/injury.   There is no evidence of intracranial hemorrhage. No mass effect or midline shift is identified.   Moderate brain parenchymal volume loss is present without abnormal ventricular dilatation. Basal cisterns are patent. No abnormal extra-axial fluid collection is identified.   Patchy and confluence areas of hyperintense FLAIR and T2 signal are present in the periventricular and subcortical white matter of bilateral cerebral hemispheres, nonspecific findings favored to represent sequela of microvascular disease.   Visualized large arterial flow voids, including intracranial carotid and basilar artery appear to be preserved.   No abnormal fluid signal is present in the mastoid air cells and middle ear cavities bilaterally.       1. No diffusion restriction abnormality is present to suggest an acute infarct. 2. Patchy and confluence areas of hyperintense FLAIR and T2 signal are present in the periventricular and subcortical white matter of bilateral cerebral hemispheres, nonspecific  findings favored to represent sequela of microvascular disease.   MACRO: None   Signed by: Lucy Reinoso 3/1/2024 7:36 PM Dictation workstation:   [id number]    ECG 12 lead    Result Date: 3/1/2024  Sinus rhythm Left anterior fascicular block Low voltage, precordial leads Consider anterior infarct    CT brain attack head wo IV contrast    Result Date: 3/1/2024  Interpreted By:  Levon Waters, STUDY: CT BRAIN ATTACK ANGIO HEAD AND NECK W AND WO IV CONTRAST; CT BRAIN ATTACK HEAD WO IV CONTRAST; ;  3/1/2024 11:53 am; 3/1/2024 11:48 am   INDICATION: Signs/Symptoms:Last known well 945, left-sided slurring speech, upper extremity weakness. Prior stroke and is on Eliquis; Signs/Symptoms:Last known well 945, left-sided slurring speech, upper extremity weakness.  Prior stroke and is on Eliquis.   COMPARISON: CT head from 03/01/2024. MRI brain and MRA head and neck from 08/29/2019.   ACCESSION NUMBER(S): [id number]; [id number]   ORDERING CLINICIAN: KENTON STRATTON   TECHNIQUE: CTA of the head and neck was performed after administration of 50 mL of Omnipaque 350 intravenously. Segmented MIPs are provided.   FINDINGS: CTA head:   There are small foci of atherosclerotic calcifications located within the precavernous, cavernous, and paraophthalmic segments of the bilateral internal carotid arteries without luminal narrowing. There is more concentric atherosclerotic calcification located within the supraclinoid segment of the left ICA which may cause mild luminal narrowing. Otherwise, there is no striking narrowing of the visualized internal carotid arteries.   There is no narrowing of the visualized portions of the bilateral anterior or middle cerebral arteries. There are small foci of atherosclerotic calcifications located within the bilateral intradural vertebral arteries without luminal narrowing. There is mild luminal irregularity of the basilar artery primarily due to noncalcified plaque. There is no narrowing of  the visualized portions of the bilateral posterior cerebral arteries. The lumen of the P1 segment of the left posterior cerebral artery is smaller compared to the right, likely normal variant as there is a left posterior communicating artery which is visualized.   There are no aneurysms.   CTA neck:   Aortic arch and origins of the great vessels from the aortic arch are without luminal narrowing. There are atherosclerotic calcifications within these vascular structures but there is no striking luminal narrowing. Bilateral common carotid arteries are without luminal narrowing. There are small foci of atherosclerotic calcification located within the left common carotid artery. There are small foci of atherosclerotic calcification located within the bilateral carotid bulbs without luminal narrowing. There is retropharyngeal course of the right internal carotid artery. Bilateral vertebral arteries arise from the subclavian arteries and demonstrate no luminal narrowing.   Additional findings:   There are osseous degenerative changes of the cervical spine.       At most mild narrowing of the supraclinoid segment of the left ICA due to atherosclerotic calcifications. Otherwise, no striking narrowing of the visualized arteries in the head or neck.   This study was interpreted at [facility].     MACRO: None   Signed by: Levon Waters 3/1/2024 12:10 PM Dictation workstation:   [id number]    CT brain attack angio head and neck W and WO IV contrast    Result Date: 3/1/2024  Interpreted By:  Levon Waters, STUDY: CT BRAIN ATTACK ANGIO HEAD AND NECK W AND WO IV CONTRAST; CT BRAIN ATTACK HEAD WO IV CONTRAST; ;  3/1/2024 11:53 am; 3/1/2024 11:48 am   INDICATION: Signs/Symptoms:Last known well 945, left-sided slurring speech, upper extremity weakness. Prior stroke and is on Eliquis; Signs/Symptoms:Last known well 945, left-sided slurring speech, upper extremity weakness.  Prior stroke and is on  Eliquis.   COMPARISON: CT head from 03/01/2024. MRI brain and MRA head and neck from 08/29/2019.   ACCESSION NUMBER(S): [id number]; [id number]   ORDERING CLINICIAN: KENTON STRATTON   TECHNIQUE: CTA of the head and neck was performed after administration of 50 mL of Omnipaque 350 intravenously. Segmented MIPs are provided.   FINDINGS: CTA head:   There are small foci of atherosclerotic calcifications located within the precavernous, cavernous, and paraophthalmic segments of the bilateral internal carotid arteries without luminal narrowing. There is more concentric atherosclerotic calcification located within the supraclinoid segment of the left ICA which may cause mild luminal narrowing. Otherwise, there is no striking narrowing of the visualized internal carotid arteries.   There is no narrowing of the visualized portions of the bilateral anterior or middle cerebral arteries. There are small foci of atherosclerotic calcifications located within the bilateral intradural vertebral arteries without luminal narrowing. There is mild luminal irregularity of the basilar artery primarily due to noncalcified plaque. There is no narrowing of the visualized portions of the bilateral posterior cerebral arteries. The lumen of the P1 segment of the left posterior cerebral artery is smaller compared to the right, likely normal variant as there is a left posterior communicating artery which is visualized.   There are no aneurysms.   CTA neck:   Aortic arch and origins of the great vessels from the aortic arch are without luminal narrowing. There are atherosclerotic calcifications within these vascular structures but there is no striking luminal narrowing. Bilateral common carotid arteries are without luminal narrowing. There are small foci of atherosclerotic calcification located within the left common carotid artery. There are small foci of atherosclerotic calcification located within the bilateral carotid bulbs without luminal  narrowing. There is retropharyngeal course of the right internal carotid artery. Bilateral vertebral arteries arise from the subclavian arteries and demonstrate no luminal narrowing.   Additional findings:   There are osseous degenerative changes of the cervical spine.       At most mild narrowing of the supraclinoid segment of the left ICA due to atherosclerotic calcifications. Otherwise, no striking narrowing of the visualized arteries in the head or neck.   This study was interpreted at [facility].     MACRO: None   Signed by: Levon Waters 3/1/2024 12:10 PM Dictation workstation:   [id number] I did review the images of the MRI of the brain.        The patient's echocardiogram is pending.    Assessment/Plan   The patient is a stable from a neurological standpoint and still feels that his speech is not back to his baseline.  The MRI of the brain was negative for an acute stroke.  The patient's CT angiogram of the neck and brain showed no significant stenosis.  The patient's echocardiogram is pending.  I would certainly continue his Eliquis, aspirin and atorvastatin.  I would certainly continue stroke risk factor modification.  The patient does need continued physical, occupational and speech therapy.  I would certainly rule out in the underlying infection and normalize any metabolic abnormalities.  I discussed all these issues in detail with the patient and answered all of his questions.  The patient can certainly be discharged back to the skilled nursing facility if the echocardiogram shows no evidence for significant abnormality.  I discussed all these issues in detail with the patient and answered all of his questions.  I will sign off for now.  The patient can follow-up with his primary care doctor within 2 weeks of discharge.  Please feel free to call me if you have any further questions regarding the care of this patient.      Micah Long MD

## 2024-03-02 NOTE — PROGRESS NOTES
03/02/24 0837   Discharge Planning   Living Arrangements Alone   Type of Residence Nursing home/residential care   Home or Post Acute Services Post acute facilities (Rehab/SNF/etc)   Type of Post Acute Facility Services Long term care   Patient expects to be discharged to:  --    Does the patient need discharge transport arranged? Yes   RoundTrip coordination needed? Yes   Has discharge transport been arranged? No   Patient Choice   Patient / Family choosing to utilize agency / facility established prior to hospitalization  --      Patient admitted with stroke. Chart reviewed. Patient is a long term resident at the [facility]. Return referral submitted to determine if there are any barriers to patient returning. Following.     1115:    03/02/24 1128   Discharge Planning   Living Arrangements Alone   Support Systems Friends/neighbors   Assistance Needed ADL's, IADL's   Type of Residence Nursing home/residential care   Do you have animals or pets at home? No   Home or Post Acute Services Post acute facilities (Rehab/SNF/etc)   Type of Post Acute Facility Services Long term care   Patient expects to be discharged to: [facility]   Does the patient need discharge transport arranged? Yes   RoundTrip coordination needed? Yes   Has discharge transport been arranged? No   Patient Choice   Patient / Family choosing to utilize agency / facility established prior to hospitalization Yes     Discharge planning assessment completed with patient. He confirmed he plans to return to the [facility] when he is discharged. Awaiting response from the [facility] in [facility].     1425: No response yet in [facility]. Call to the [facility] and was informed no one from admissions is on site today and they will return on Monday.

## 2024-03-02 NOTE — CARE PLAN
The patient's goals for the shift include      The clinical goals for the shift include Pt will tolerate AB therapy

## 2024-03-02 NOTE — PROGRESS NOTES
Occupational Therapy    Evaluation    Patient Name: Edenilson Ness  MRN: [id number]  Today's Date: 3/2/2024  Time Calculation  Start Time: 1014  Stop Time: 1029  Time Calculation (min): 15 min        Assessment:  OT Assessment: OT eval competed. Pt appears slightly below functional baseline. Pt currently a MAX x2 for bed mobility and Max A to total for ADLs dressing, bathing and toileting. pt would beneit from further OT to prevent further functional decline  Prognosis: Fair  Barriers to Discharge: None  Evaluation/Treatment Tolerance: Patient limited by fatigue  End of Session Communication: Bedside nurse  End of Session Patient Position: Bed, 3 rail up, Alarm on  OT Assessment Results: Decreased ADL status, Decreased upper extremity strength, Decreased upper extremity range of motion, Decreased endurance, Decreased functional mobility  Prognosis: Fair  Barriers to Discharge: None  Evaluation/Treatment Tolerance: Patient limited by fatigue  Strengths: Support of Caregivers  Barriers to Participation: Insight into problems  Past Medical History:   Diagnosis Date    CHF (congestive heart failure) (CMS/[facility])     Diabetes mellitus (CMS/[facility])     Stroke (CMS/[facility]) 09/20/2019    affects left side per SNF paperwork    Unspecified fracture of right lower leg, initial encounter for closed fracture     Leg fracture, right     Past Surgical History:   Procedure Laterality Date    KNEE SURGERY  10/07/2015    Knee Surgery    MR HEAD ANGIO WO IV CONTRAST  8/29/2019    MR HEAD ANGIO WO IV CONTRAST 8/29/2019 [facility] CL[facility]ICAL LEGAC[facility]    MR NECK ANGIO WO IV CONTRAST  8/29/2019    MR NECK ANGIO WO IV CONTRAST 8/29/2019 [facility] CLINICAL LEGACY       Plan:  Treatment Interventions: ADL retraining, Functional transfer training, UE strengthening/ROM, Endurance training, Patient/family training, Equipment evaluation/education, Neuromuscular reeducation, Compensatory technique education  OT Frequency: 2 times per week  OT Discharge Recommendations:  Moderate intensity level of continued care  Equipment Recommended upon Discharge:  (TBD)  OT Recommended Transfer Status: Dependent (requires a stand lift)  OT - OK to Discharge: Yes (okay to dc to next level of care once medically cleared)  Treatment Interventions: ADL retraining, Functional transfer training, UE strengthening/ROM, Endurance training, Patient/family training, Equipment evaluation/education, Neuromuscular reeducation, Compensatory technique education    Subjective   Current Problem:  1. Ac isch multi vasc territories stroke (CMS/HCC)  Transthoracic Echo (TTE) Complete    Transthoracic Echo (TTE) Complete      2. Cerebrovascular accident (CVA), unspecified mechanism (CMS/HCC)  Transthoracic Echo (TTE) Complete    Transthoracic Echo (TTE) Complete        General:  General  Reason for Referral: Dx: Increased L sided weakness, slurred speech, R/O CVA (Impaired ADLs and functional mobility)  Referred By: Franci  Past Medical History Relevant to Rehab: PMH: CVA wiuth resultant L sided weakness, A flutter, HTN, CHF, CAD, DM  Family/Caregiver Present: No  Co-Treatment: PT  Co-Treatment Reason: Co-eval with PT  to maximize mobility while focusing on discpline specific goals  Prior to Session Communication: Bedside nurse (Pt cleared for therapy by RN)  Patient Position Received: Bed, 3 rail up, Alarm on (gowned, Pure wick, IV, tele)  General Comment: Pt. seen in room 2001. Pt alert and agreeable to therapy. Pt lying on his left , agreeable to sit at EOB  Precautions:  Medical Precautions: Fall precautions  Vital Signs:     Pain:  Pain Assessment  Pain Assessment: 0-10  Pain Score: 0 - No pain    Objective   Cognition:  Overall Cognitive Status: Within Functional Limits (A& Ox 3)           Home Living:  Type of Home:  (Patient at LTC resident,  Pt  w/c bound and able to propel self using arms , uses a stand lift for transfers, non-ambulatory. Pt reporting he has been working with therapy at parallel bars on  standing. Assist with ADLs)  Prior Function:     IADL History:     ADL:  Eating Assistance: Modified independent (Device) (Anticipated with using RUE)  Grooming Assistance: Minimal (Anticipated.)  Bathing Assistance: Maximal (Anticipated)  UE Dressing Assistance: Moderate (simulated during  UE AROM  screen in supine position)  LE Dressing Assistance: Total (Max A to Total for donning socks. Pt unable to lift legs up to assist with donning socks)  Toileting Assistance with Device: Total ((+) external cath)  Activity Tolerance:  Endurance: Tolerates 10 - 20 min exercise with multiple rests  Bed Mobility/Transfers: Bed Mobility  Bed Mobility:  (Supine <>  sit with Max assist x 2; rolling R <> Max assist x 1-2; cues for use of UE, sequencing  and wt. shifting. total assist for repositioning up in bed)    Transfers  Transfer:  (NT 2/2 to pt requires a stand lift for all transfers)    Sitting Balance:  Static Sitting Balance  Static Sitting-Balance Support: Bilateral upper extremity supported, Feet supported  Static Sitting-Comment/Number of Minutes: 4 to 5 minutes unsupported sit , Min to Mod A to maintain unsupported sit . lateral leaning to left  side min to mod A to maintain midline position. pt having  diffculty maintaining unsupported sit 2/2 Left side UE hemiparesis   Modalities:     Vision:Vision - Basic Assessment  Current Vision:  (appears intact)  Sensation:  Sensation Comment: no c/o  Strength:  Strength Comments: Left side UE  hemiparesis (baseline) tested in supine pt appears at 2/5 LUE; RUE 3 to 3 (+)/5 per MMT  Perception:     Coordination:      Hand Function:  Gross Grasp: Functional  Coordination: Functional  Extremities: RUE   RUE : Within Functional Limits and LUE   LUE:  (baseline - left UE  hemiparesis, 1/4 range AROM for shouldr flexion in supine; distal AROM wfl)        Outcome Measures:[facility] Daily Activity  Putting on and taking off regular lower body clothing: Total  Bathing (including washing,  rinsing, drying): A lot  Putting on and taking off regular upper body clothing: A lot  Toileting, which includes using toilet, bedpan or urinal: Total  Taking care of personal grooming such as brushing teeth: A lot  Eating Meals: A little  Daily Activity - Total Score: 11        Education Documentation  Body Mechanics, taught by Janny Tinsley OT at 3/2/2024 11:43 AM.  Learner: Patient  Readiness: Acceptance  Method: Demonstration  Response: Needs Reinforcement    ADL Training, taught by Janny Tinsley OT at 3/2/2024 11:43 AM.  Learner: Patient  Readiness: Acceptance  Method: Demonstration  Response: Needs Reinforcement    Education Comments  No comments found.        OP EDUCATION:       Goals:  Encounter Problems       Encounter Problems (Active)       ADLs       Patient will perform UB bathing  with minimal assist  level of assistance .       Start:  03/02/24    Expected End:  03/11/24               BALANCE       Patient will tolerate unsupported sitting for 5 - 8 minutes to contact guard assist level of assistance in order to improve functional activity tolerance for ADL tasks.       Start:  03/02/24    Expected End:  03/11/24               TRANSFERS       Patient will perform bed mobility moderate assist level of assistance and bed rails and draw sheet in order to improve safety and independence with mobility       Start:  03/02/24    Expected End:  03/11/24

## 2024-03-02 NOTE — CARE PLAN
The patient's goals for the shift include      The clinical goals for the shift include Show improvement in neuro status.    Over the shift, the patient did not make progress toward the following goals. Barriers to progression include previous CVA with left sided weakness. Recommendations to address these barriers include continue Q4 hour CVA assessments.

## 2024-03-02 NOTE — PROGRESS NOTES
Speech-Language Pathology Clinical Swallow Evaluation    Patient Name: Edenilson Ness  MRN: [id number]  : 1960  Today's Date: 24  Start time: Start Time: 1035  Stop time: Stop Time: 1057  Time calculation (min) : Time Calculation (min): 22 min      ASSESSMENT  Impressions:  Grossly WFL oral phase and no suspected pharyngeal phase dysphagia based on clinical swallow evaluation.  Prognosis: Good    PLAN  Recommendations:  MBSS recommended: No; no pharyngeal dysphagia suspected.  Solid consistency: Regular (IDDSI level 7)  Liquid consistency: Thin (IDDSI 0)  Medication administration: Whole and in thin liquid  Compensatory swallow strategies: Upright positioning for all PO intake, Slow rate of intake, Small bites, and Chew thoroughly    Recommended frequency/duration:  Skilled SLP services recommended: No (for dysphagia; see separate speech/lang evaluation note for other recommendations)      SUBJECTIVE    PMHx relevant to rehab: stroke, nursing home resident, wheelchair-bound, CHF, DM, RLE fracture    Chief complaint: Pt was admitted on 3/1/24 due to worsening slurred speech and weakness.    Relevant imaging results:  MRI brain 3/1/24: 1. No diffusion restriction abnormality is present to suggest an  acute infarct.  2. Patchy and confluence areas of hyperintense FLAIR and T2 signal  are present in the periventricular and subcortical white matter of  bilateral cerebral hemispheres, nonspecific findings favored to  represent sequela of microvascular disease.    General Visit Information:  SLP Received On: 24  Patient Class: Inpatient  Living Environment: Nursing home (skilled/long-term)  Ordering Physician: Dr. Koroma  Reason for Referral: Swallow evaluation  Prior to Session Communication: Bedside nurse    RN cleared pt to participate in session and reported no concern for dysphagia this date. Noted that pt passed nursing swallow screen.    Pt reported no dysphagia to liquids or solids. Pt does have  dentures at the [facility] but does not wear them due to ill fit. He reports that he is able to eat regular textures without his dentures.      [employer] Diet: Regular/thin  Current Diet : Cardiac; regular texture/thin liquids    Pain Assessment  Pain Assessment: 0-10  Pain Score: 0 - No pain    Pt was alert, pleasant, and cooperative for session.  Orientation: Oriented to self, Oriented to location, Oriented to month, Oriented to year, Oriented to date/day, and Oriented to situation  Ability to follow functional commands: Follows complex commands after some simplification/repetition  Nutritional status: Appears well-nourished/no concerns  Respiratory Status: Room air  Baseline Vocal Quality: Normal  Volitional Cough: Strong  Volitional Swallow: Within Functional Limits  Patient positioning: Upright in bed      OBJECTIVE  Clinical swallow evaluation completed and consisted of interview, oral motor assessment, and PO trials (thin liquids x6-7oz via straw, applesauce x3 bites, diced fruit x5 bites, hollie cracker x3 bites).  ORAL PHASE: Pt is edentulous. Oral mucosa were pink, moist, and free of obvious lesions. Lingual strength and ROM were severely reduced on the R and mildly reduced on the L. Tongue protruded to the R. Labial strength/ROM were mildly reduced bilaterally. Labial seal was adequate with increased effort noted while drinking from straw. Mastication of regular solids was WFL with increased time. Pt used piecemeal deglutition effectively with large bites of solids. A/P transit and oral clearance were WFL.  PHARYNGEAL PHASE: Laryngeal elevation was visualized or palpated with all trials, however adequacy of hyolaryngeal elevation/excursion cannot be determined at bedside. No immediate or delayed s/sx aspiration/penetration were observed with any consistencies.    Was 3oz challenge administered: Yes; pt drank 3oz of thin liquid in one attempt, without breaking, with no overt s/sx aspiration/penetration  observed.    Treatment/Education:  Results and recommendations were relayed to: Patient, Bedside nurse, and Physician  Next Treatment Priority: Lingual exercises, speech intelligibility strategies

## 2024-03-03 LAB
ANION GAP SERPL CALC-SCNC: 9 MMOL/L (ref 10–20)
BUN SERPL-MCNC: 18 MG/DL (ref 6–23)
CALCIUM SERPL-MCNC: 7.7 MG/DL (ref 8.6–10.3)
CHLORIDE SERPL-SCNC: 112 MMOL/L (ref 98–107)
CO2 SERPL-SCNC: 24 MMOL/L (ref 21–32)
CREAT SERPL-MCNC: 0.91 MG/DL (ref 0.5–1.3)
EGFRCR SERPLBLD CKD-EPI 2021: >90 ML/MIN/1.73M*2
GLUCOSE SERPL-MCNC: 86 MG/DL (ref 74–99)
POTASSIUM SERPL-SCNC: 3.9 MMOL/L (ref 3.5–5.3)
SODIUM SERPL-SCNC: 141 MMOL/L (ref 136–145)

## 2024-03-03 PROCEDURE — 99232 SBSQ HOSP IP/OBS MODERATE 35: CPT | Performed by: PSYCHIATRY & NEUROLOGY

## 2024-03-03 PROCEDURE — 2060000001 HC INTERMEDIATE ICU ROOM DAILY

## 2024-03-03 PROCEDURE — 80048 BASIC METABOLIC PNL TOTAL CA: CPT | Performed by: STUDENT IN AN ORGANIZED HEALTH CARE EDUCATION/TRAINING PROGRAM

## 2024-03-03 PROCEDURE — 2500000004 HC RX 250 GENERAL PHARMACY W/ HCPCS (ALT 636 FOR OP/ED): Performed by: STUDENT IN AN ORGANIZED HEALTH CARE EDUCATION/TRAINING PROGRAM

## 2024-03-03 PROCEDURE — 36415 COLL VENOUS BLD VENIPUNCTURE: CPT | Performed by: STUDENT IN AN ORGANIZED HEALTH CARE EDUCATION/TRAINING PROGRAM

## 2024-03-03 PROCEDURE — 2500000002 HC RX 250 W HCPCS SELF ADMINISTERED DRUGS (ALT 637 FOR MEDICARE OP, ALT 636 FOR OP/ED): Performed by: STUDENT IN AN ORGANIZED HEALTH CARE EDUCATION/TRAINING PROGRAM

## 2024-03-03 PROCEDURE — 2500000001 HC RX 250 WO HCPCS SELF ADMINISTERED DRUGS (ALT 637 FOR MEDICARE OP): Performed by: STUDENT IN AN ORGANIZED HEALTH CARE EDUCATION/TRAINING PROGRAM

## 2024-03-03 PROCEDURE — 99232 SBSQ HOSP IP/OBS MODERATE 35: CPT | Performed by: STUDENT IN AN ORGANIZED HEALTH CARE EDUCATION/TRAINING PROGRAM

## 2024-03-03 RX ADMIN — APIXABAN 5 MG: 5 TABLET, FILM COATED ORAL at 20:05

## 2024-03-03 RX ADMIN — SACUBITRIL AND VALSARTAN 1 TABLET: 24; 26 TABLET, FILM COATED ORAL at 08:05

## 2024-03-03 RX ADMIN — APIXABAN 5 MG: 5 TABLET, FILM COATED ORAL at 08:05

## 2024-03-03 RX ADMIN — ATORVASTATIN CALCIUM 80 MG: 40 TABLET, FILM COATED ORAL at 20:05

## 2024-03-03 RX ADMIN — SACUBITRIL AND VALSARTAN 1 TABLET: 24; 26 TABLET, FILM COATED ORAL at 20:10

## 2024-03-03 RX ADMIN — CEFTRIAXONE SODIUM 1 G: 1 INJECTION, SOLUTION INTRAVENOUS at 12:19

## 2024-03-03 RX ADMIN — Medication 3 MG: at 19:27

## 2024-03-03 RX ADMIN — CARVEDILOL 50 MG: 25 TABLET, FILM COATED ORAL at 17:58

## 2024-03-03 RX ADMIN — CARVEDILOL 50 MG: 25 TABLET, FILM COATED ORAL at 08:05

## 2024-03-03 RX ADMIN — FUROSEMIDE 40 MG: 40 TABLET ORAL at 08:06

## 2024-03-03 RX ADMIN — SPIRONOLACTONE 25 MG: 25 TABLET ORAL at 08:05

## 2024-03-03 RX ADMIN — ASPIRIN 81 MG CHEWABLE TABLET 162 MG: 81 TABLET CHEWABLE at 08:05

## 2024-03-03 RX ADMIN — HYDRALAZINE HYDROCHLORIDE 25 MG: 25 TABLET, FILM COATED ORAL at 05:05

## 2024-03-03 ASSESSMENT — COGNITIVE AND FUNCTIONAL STATUS - GENERAL
MOVING TO AND FROM BED TO CHAIR: TOTAL
HELP NEEDED FOR BATHING: TOTAL
WALKING IN HOSPITAL ROOM: TOTAL
HELP NEEDED FOR BATHING: TOTAL
TOILETING: TOTAL
MOBILITY SCORE: 7
DAILY ACTIVITIY SCORE: 9
DRESSING REGULAR LOWER BODY CLOTHING: TOTAL
TURNING FROM BACK TO SIDE WHILE IN FLAT BAD: TOTAL
DRESSING REGULAR UPPER BODY CLOTHING: TOTAL
WALKING IN HOSPITAL ROOM: TOTAL
TURNING FROM BACK TO SIDE WHILE IN FLAT BAD: TOTAL
CLIMB 3 TO 5 STEPS WITH RAILING: TOTAL
MOVING FROM LYING ON BACK TO SITTING ON SIDE OF FLAT BED WITH BEDRAILS: A LOT
PERSONAL GROOMING: A LOT
MOBILITY SCORE: 7
STANDING UP FROM CHAIR USING ARMS: TOTAL
EATING MEALS: A LITTLE
DRESSING REGULAR UPPER BODY CLOTHING: TOTAL
PERSONAL GROOMING: A LOT
DRESSING REGULAR LOWER BODY CLOTHING: TOTAL
MOVING TO AND FROM BED TO CHAIR: TOTAL
TOILETING: TOTAL
STANDING UP FROM CHAIR USING ARMS: TOTAL
DAILY ACTIVITIY SCORE: 9
EATING MEALS: A LITTLE
CLIMB 3 TO 5 STEPS WITH RAILING: TOTAL
MOVING FROM LYING ON BACK TO SITTING ON SIDE OF FLAT BED WITH BEDRAILS: A LOT

## 2024-03-03 ASSESSMENT — PAIN - FUNCTIONAL ASSESSMENT
PAIN_FUNCTIONAL_ASSESSMENT: 0-10

## 2024-03-03 ASSESSMENT — PAIN SCALES - GENERAL
PAINLEVEL_OUTOF10: 0 - NO PAIN

## 2024-03-03 NOTE — CARE PLAN
The patient's goals for the shift include      The clinical goals for the shift include Pt will have improvement in speech

## 2024-03-03 NOTE — PROGRESS NOTES
Edenilson Ness is a 63 y.o. male on day 2 of admission presenting with Ac isch multi vasc territories stroke (CMS/HCC).      Subjective   03/03/24: Awake alert and oriented.  Able to follow commands.  Denies any major issues or complaints overnight.         Review of Systems   All other systems reviewed and are negative.         Objective     Last Recorded Vitals  BP (!) 151/103 (BP Location: Left arm, Patient Position: Lying)   Pulse 65   Temp 36.3 °C (97.3 °F) (Temporal)   Resp 16   Wt 95.9 kg (211 lb 6.7 oz)   SpO2 92%     Image Results  Transthoracic Echo (TTE) Complete    Result Date: 3/2/2024              [place] [address]      Phone [phone] Fax [phone] TRANSTHORACIC ECHOCARDIOGRAM REPORT  Patient Name:      EDENILSON NESS        Reading Physician:    [id number] Sherry Wilkinson MD Study Date:        3/2/2024              Ordering Provider:    [id number] JEAN-CLAUDE IZQUIERDO MRN/PID:           [id number]              Fellow: Accession#:        [id number]          Nurse:                Silvia Corral RN Date of Birth/Age: 1960 / 63 years  Sonographer:          Angela Lee RDCS Gender:            M                     Additional Staff: Height:            180.34 cm             Admit Date:           3/1/2024 Weight:            95.26 kg              Admission Status:     Inpatient -                                                                Routine BSA / BMI:         2.15 m2 / 29.29 kg/m2 Department Location:  [facility] Blood Pressure: 125 /79 mmHg Study Type:    TRANSTHORACIC ECHO (TTE) COMPLETE Diagnosis/ICD: Cerebrovascular disease, unspecified-I67.9 Indication:    Cerebrovascular Accident CPT Codes:     Echo Complete w Full [id number] Patient History: Diabetes:          Yes Pertinent History: HTN, CVA and CAD. Study  Detail: The following Echo studies were performed: 2D, M-Mode, Doppler and               color flow. Technically challenging study due to body habitus.               Optison used as a contrast agent for endocardial border definition               and agitated saline used as a contrast agent for intraseptal flow               evaluation. Total contrast used for this procedure was 3 mL via IV               push.  PHYSICIAN INTERPRETATION: Left Ventricle: Left ventricular systolic function is normal, with an estimated ejection fraction of 55-60%. There are no regional wall motion abnormalities. The left ventricular cavity size is normal. There is moderate concentric left ventricular hypertrophy. Spectral Doppler shows a pseudonormal pattern of left ventricular diastolic filling. Left Atrium: The left atrium is moderate to severely dilated. A bubble study using agitated saline was performed. Bubble study is negative. Right Ventricle: The right ventricle is normal in size. There is low normal right ventricular systolic function. Right Atrium: The right atrium is normal in size. Aortic Valve: The aortic valve was not well visualized. There is no evidence of aortic valve stenosis. There is no evidence of aortic valve regurgitation. Mitral Valve: The mitral valve is normal in structure. There is trace mitral valve regurgitation. Tricuspid Valve: The tricuspid valve is structurally normal. There is trace to mild tricuspid regurgitation. Pulmonic Valve: The pulmonic valve is structurally normal. There is no indication of pulmonic valve regurgitation. Pericardium: There is no pericardial effusion noted. Aorta: The aortic root is normal. Systemic Veins: The inferior vena cava appears to be of normal size.  CONCLUSIONS:  1. Left ventricular systolic function is normal with a 55-60% estimated ejection fraction.  2. Spectral Doppler shows a pseudonormal pattern of left ventricular diastolic filling.  3. There is moderate  concentric left ventricular hypertrophy.  4. There is low normal right ventricular systolic function.  5. The left atrium is moderate to severely dilated.  6. Aortic valve stenosis is not present. QUANTITATIVE DATA SUMMARY: 2D MEASUREMENTS:                           Normal Ranges: Ao Root d:     3.30 cm    (2.0-3.7cm) IVSd:          1.60 cm    (0.6-1.1cm) LVPWd:         1.40 cm    (0.6-1.1cm) LVIDd:         4.00 cm    (3.9-5.9cm) LVIDs:         2.50 cm LV Mass Index: 108.1 g/m2 LV % FS        37.5 % LA VOLUME:                               Normal Ranges: LA Vol A4C:        98.0 ml    (22+/-6mL/m2) LA Vol A2C:        109.2 ml LA Vol BP:         109.2 ml LA Vol Index A4C:  45.5ml/m2 LA Vol Index A2C:  50.7 ml/m2 LA Vol Index BP:   50.7 ml/m2 LA Area A4C:       28.0 cm2 LA Area A2C:       28.0 cm2 LA Major Axis A4C: 6.8 cm LA Major Axis A2C: 6.1 cm LA Volume Index:   47.0 ml/m2 RA VOLUME BY A/L METHOD:                       Normal Ranges: RA Area A4C: 15.0 cm2 M-MODE MEASUREMENTS:                  Normal Ranges: Ao Root: 3.30 cm (2.0-3.7cm) AoV Exc: 2.00 cm (1.5-2.5cm) LAs:     4.10 cm (2.7-4.0cm) AORTA MEASUREMENTS:                      Normal Ranges: AoV Exc:     2.00 cm (1.5-2.5cm) Ao Sinus, d: 3.30 cm (2.1-3.5cm) Asc Ao, d:   3.50 cm (2.1-3.4cm) LV SYSTOLIC FUNCTION BY 2D PLANIMETRY (MOD):                     Normal Ranges: EF-A4C View: 56.0 % (>=55%) EF-A2C View: 49.6 % EF-Biplane:  52.4 % LV DIASTOLIC FUNCTION:                           Normal Ranges: MV Peak E:    0.66 m/s    (0.7-1.2 m/s) MV Peak A:    0.52 m/s    (0.42-0.7 m/s) E/A Ratio:    1.26        (1.0-2.2) MV e'         0.06 m/s    (>8.0) MV lateral e' 0.08 m/s MV medial e'  0.05 m/s MV A Dur:     132.00 msec E/e' Ratio:   10.15       (<8.0) LV IVRT:      103 msec    (<100ms) AORTIC VALVE:                         Normal Ranges: LVOT Max Dave:  0.95 m/s (<=1.1m/s) LVOT VTI:      22.90 cm LVOT Diameter: 2.00 cm  (1.8-2.4cm)  RIGHT VENTRICLE: RV Basal 3.80  cm RV Mid   3.20 cm RV Major 5.5 cm TAPSE:   16.1 mm RV s'    0.09 m/s TRICUSPID VALVE/RVSP:                             Normal Ranges: Peak TR Velocity: 2.73 m/s RV Syst Pressure: 32.8 mmHg (< 30mmHg) TV E Vmax:        0.39 m/s  (0.3-0.7m/s) IVC Diam:         1.50 cm PULMONIC VALVE:                      Normal Ranges: PV Max Dave: 1.0 m/s  (0.6-0.9m/s) PV Max PG:  3.8 mmHg  [id number] Sherry Wilkinson MD Electronically signed on 3/2/2024 at 12:29:49 PM  ** Final **     MR brain wo IV contrast    Result Date: 3/1/2024  Interpreted By:  Lucy Reinoso, STUDY: MR BRAIN WO IV CONTRAST;  3/1/2024 7:16 pm   INDICATION: Signs/Symptoms:CVA.   COMPARISON: Same day noncontrast CT head; MRI of the brain dated 08/29/2019   ACCESSION NUMBER(S): [id number]   ORDERING CLINICIAN: JEAN-CLAUDE IZQUIERDO   TECHNIQUE: Axial T2, FLAIR, DWI, gradient echo T2 and sagittal and coronal T1 weighted images of brain were acquired.   FINDINGS: Exam is degraded by mild motion.   No diffusion restriction abnormality is present to suggest an acute infarct. Area of cystic encephalomalacia and gliosis in the posterior right temporal lobe likely represents chronic sequela of prior infarct/injury.   There is no evidence of intracranial hemorrhage. No mass effect or midline shift is identified.   Moderate brain parenchymal volume loss is present without abnormal ventricular dilatation. Basal cisterns are patent. No abnormal extra-axial fluid collection is identified.   Patchy and confluence areas of hyperintense FLAIR and T2 signal are present in the periventricular and subcortical white matter of bilateral cerebral hemispheres, nonspecific findings favored to represent sequela of microvascular disease.   Visualized large arterial flow voids, including intracranial carotid and basilar artery appear to be preserved.   No abnormal fluid signal is present in the mastoid air cells and middle ear cavities bilaterally.       1. No diffusion restriction  abnormality is present to suggest an acute infarct. 2. Patchy and confluence areas of hyperintense FLAIR and T2 signal are present in the periventricular and subcortical white matter of bilateral cerebral hemispheres, nonspecific findings favored to represent sequela of microvascular disease.   MACRO: None   Signed by: Luyc Reinoso 3/1/2024 7:36 PM Dictation workstation:   [id number]    ECG 12 lead    Result Date: 3/1/2024  Sinus rhythm Left anterior fascicular block Low voltage, precordial leads Consider anterior infarct    CT brain attack head wo IV contrast    Result Date: 3/1/2024  Interpreted By:  Levon Waters, STUDY: CT BRAIN ATTACK ANGIO HEAD AND NECK W AND WO IV CONTRAST; CT BRAIN ATTACK HEAD WO IV CONTRAST; ;  3/1/2024 11:53 am; 3/1/2024 11:48 am   INDICATION: Signs/Symptoms:Last known well 945, left-sided slurring speech, upper extremity weakness. Prior stroke and is on Eliquis; Signs/Symptoms:Last known well 945, left-sided slurring speech, upper extremity weakness.  Prior stroke and is on Eliquis.   COMPARISON: CT head from 03/01/2024. MRI brain and MRA head and neck from 08/29/2019.   ACCESSION NUMBER(S): [id number]; [id number]   ORDERING CLINICIAN: KENTON STRATTON   TECHNIQUE: CTA of the head and neck was performed after administration of 50 mL of Omnipaque 350 intravenously. Segmented MIPs are provided.   FINDINGS: CTA head:   There are small foci of atherosclerotic calcifications located within the precavernous, cavernous, and paraophthalmic segments of the bilateral internal carotid arteries without luminal narrowing. There is more concentric atherosclerotic calcification located within the supraclinoid segment of the left ICA which may cause mild luminal narrowing. Otherwise, there is no striking narrowing of the visualized internal carotid arteries.   There is no narrowing of the visualized portions of the bilateral anterior or middle cerebral arteries. There are small foci of  atherosclerotic calcifications located within the bilateral intradural vertebral arteries without luminal narrowing. There is mild luminal irregularity of the basilar artery primarily due to noncalcified plaque. There is no narrowing of the visualized portions of the bilateral posterior cerebral arteries. The lumen of the P1 segment of the left posterior cerebral artery is smaller compared to the right, likely normal variant as there is a left posterior communicating artery which is visualized.   There are no aneurysms.   CTA neck:   Aortic arch and origins of the great vessels from the aortic arch are without luminal narrowing. There are atherosclerotic calcifications within these vascular structures but there is no striking luminal narrowing. Bilateral common carotid arteries are without luminal narrowing. There are small foci of atherosclerotic calcification located within the left common carotid artery. There are small foci of atherosclerotic calcification located within the bilateral carotid bulbs without luminal narrowing. There is retropharyngeal course of the right internal carotid artery. Bilateral vertebral arteries arise from the subclavian arteries and demonstrate no luminal narrowing.   Additional findings:   There are osseous degenerative changes of the cervical spine.       At most mild narrowing of the supraclinoid segment of the left ICA due to atherosclerotic calcifications. Otherwise, no striking narrowing of the visualized arteries in the head or neck.   This study was interpreted at [facility].     MACRO: None   Signed by: Levon Waters 3/1/2024 12:10 PM Dictation workstation:   [id number]    CT brain attack angio head and neck W and WO IV contrast    Result Date: 3/1/2024  Interpreted By:  Levon Waters, STUDY: CT BRAIN ATTACK ANGIO HEAD AND NECK W AND WO IV CONTRAST; CT BRAIN ATTACK HEAD WO IV CONTRAST; ;  3/1/2024 11:53 am; 3/1/2024 11:48 am   INDICATION:  Signs/Symptoms:Last known well 945, left-sided slurring speech, upper extremity weakness. Prior stroke and is on Eliquis; Signs/Symptoms:Last known well 945, left-sided slurring speech, upper extremity weakness.  Prior stroke and is on Eliquis.   COMPARISON: CT head from 03/01/2024. MRI brain and MRA head and neck from 08/29/2019.   ACCESSION NUMBER(S): [id number]; [id number]   ORDERING CLINICIAN: KENTON STRATTON   TECHNIQUE: CTA of the head and neck was performed after administration of 50 mL of Omnipaque 350 intravenously. Segmented MIPs are provided.   FINDINGS: CTA head:   There are small foci of atherosclerotic calcifications located within the precavernous, cavernous, and paraophthalmic segments of the bilateral internal carotid arteries without luminal narrowing. There is more concentric atherosclerotic calcification located within the supraclinoid segment of the left ICA which may cause mild luminal narrowing. Otherwise, there is no striking narrowing of the visualized internal carotid arteries.   There is no narrowing of the visualized portions of the bilateral anterior or middle cerebral arteries. There are small foci of atherosclerotic calcifications located within the bilateral intradural vertebral arteries without luminal narrowing. There is mild luminal irregularity of the basilar artery primarily due to noncalcified plaque. There is no narrowing of the visualized portions of the bilateral posterior cerebral arteries. The lumen of the P1 segment of the left posterior cerebral artery is smaller compared to the right, likely normal variant as there is a left posterior communicating artery which is visualized.   There are no aneurysms.   CTA neck:   Aortic arch and origins of the great vessels from the aortic arch are without luminal narrowing. There are atherosclerotic calcifications within these vascular structures but there is no striking luminal narrowing. Bilateral common carotid arteries are  without luminal narrowing. There are small foci of atherosclerotic calcification located within the left common carotid artery. There are small foci of atherosclerotic calcification located within the bilateral carotid bulbs without luminal narrowing. There is retropharyngeal course of the right internal carotid artery. Bilateral vertebral arteries arise from the subclavian arteries and demonstrate no luminal narrowing.   Additional findings:   There are osseous degenerative changes of the cervical spine.       At most mild narrowing of the supraclinoid segment of the left ICA due to atherosclerotic calcifications. Otherwise, no striking narrowing of the visualized arteries in the head or neck.   This study was interpreted at [facility].     MACRO: None   Signed by: Levon Waters 3/1/2024 12:10 PM Dictation workstation:   [id number]       Lab Results  Results for orders placed or performed during the hospital encounter of 03/01/24 (from the past 24 hour(s))   Basic Metabolic Panel   Result Value Ref Range    Glucose 86 74 - 99 mg/dL    Sodium 141 136 - 145 mmol/L    Potassium 3.9 3.5 - 5.3 mmol/L    Chloride 112 (H) 98 - 107 mmol/L    Bicarbonate 24 21 - 32 mmol/L    Anion Gap 9 (L) 10 - 20 mmol/L    Urea Nitrogen 18 6 - 23 mg/dL    Creatinine 0.91 0.50 - 1.30 mg/dL    eGFR >90 >60 mL/min/1.73m*2    Calcium 7.7 (L) 8.6 - 10.3 mg/dL        Medications  Scheduled medications:  apixaban, 5 mg, oral, q12h  aspirin, 162 mg, oral, Daily   Or  aspirin, 150 mg, rectal, Daily  atorvastatin, 80 mg, oral, Nightly  carvedilol, 50 mg, oral, BID with meals  cefTRIAXone, 1 g, intravenous, q24h  furosemide, 40 mg, oral, Daily  melatonin, 3 mg, oral, Daily  perflutren lipid microspheres, 0.5-10 mL of dilution, intravenous, Once in imaging  sacubitriL-valsartan, 1 tablet, oral, BID  spironolactone, 25 mg, oral, Daily  sulfur hexafluoride microsphr, 2 mL, intravenous, Once in imaging      Continuous  medications:     PRN medications:  PRN medications: acetaminophen **OR** acetaminophen **OR** acetaminophen, benzocaine-menthol, bisacodyl, dextromethorphan-guaifenesin, guaiFENesin, ondansetron **OR** ondansetron, pantoprazole **OR** pantoprazole     Physical Exam  Constitutional:       Appearance: Normal appearance.   HENT:      Head: Normocephalic and atraumatic.      Nose: Nose normal.   Eyes:      Extraocular Movements: Extraocular movements intact.      Pupils: Pupils are equal, round, and reactive to light.   Cardiovascular:      Rate and Rhythm: Normal rate and regular rhythm.      Heart sounds: Normal heart sounds.   Pulmonary:      Effort: Pulmonary effort is normal.      Breath sounds: Normal breath sounds.   Abdominal:      General: Bowel sounds are normal.      Palpations: Abdomen is soft.   Musculoskeletal:      Cervical back: Neck supple.   Skin:     General: Skin is warm and dry.   Neurological:      Mental Status: He is alert. Mental status is at baseline.   Psychiatric:         Mood and Affect: Mood normal.                  Code Status  Full Code     Assessment/Plan       Problem list     Slurred speech and questionable worsening left-sided weakness concerning for acute CVA  Paroxysmal A-fib/flutter currently in sinus rhythm  UTI  Hypertension  History of coronary artery disease     Treatment plan     MRI did not show any acute stroke.  Echocardiogram showed normal ejection fraction.    I think the patient presentation is related to urinary tract infection.  He has been started on intravenous Rocephin.  He is improving.  Urine culture will be followed.    Blood pressure is soft.  Patient takes multiple blood pressure medication including Entresto, Coreg, hydralazine, Imdur, Lasix and Aldactone.  At this time I will continue Lasix and Aldactone as well as Entresto.  I will discontinue hydralazine and Imdur.  I will monitor his blood pressure overnight.  Might need to adjust these diuretics upon  discharge as patient was noticed to be slightly hypotensive upon presentation to the emergency department.    Continue Eliquis.     Discussed with neurology.         DVT ppx:        Please see orders for more complete plan    Keron Koroma MD

## 2024-03-03 NOTE — PROGRESS NOTES
"Edenilson Ness is a 63 y.o. male on day 2 of admission presenting with Ac isch multi vasc territories stroke (CMS/HCC).      Subjective   The patient is a 63-year-old male with a history of a prior stroke with left-sided hemiparesis, atrial flutter, hypertension and CAD who presents to the ER with increasing weakness on the left side and slurred speech. The patient had a CT scan of the brain done in the ER that was negative for any acute process.     The patient feels that he is doing much better today and that he is more alert and his speech is much better.  He states that he feels back to his baseline.      Objective     Last Recorded Vitals  Blood pressure 115/71, pulse 107, temperature 36.1 °C (97 °F), temperature source Temporal, resp. rate 18, height 1.803 m (5' 11\"), weight 95.9 kg (211 lb 6.7 oz), SpO2 95 %.    Physical Exam  Neurological Exam    The patient's mental status testing shows that the patient is alert and oriented ×3 with no evidence of any aphasia but has a mild dysarthria.  The patient's cranial nerve testing 2, 3, 4, 5, 6, and 7 are all within normal limits.  The patient's motor testing shows 5/5 strength in the right upper extremity.  The patient's left upper extremity strength is 5-/5.  The patient's right lower extremity strength is 4+/5 proximally and 5/5 distally.  The patient's left lower extremity strength is 4+/5 proximally and 5/5 distally.  Relevant Results        Martha Coma Scale  Best Eye Response: Spontaneous  Best Verbal Response: Oriented  Best Motor Response: Follows commands  Martha Coma Scale Score: 15                Scheduled medications  apixaban, 5 mg, oral, q12h  aspirin, 162 mg, oral, Daily   Or  aspirin, 150 mg, rectal, Daily  atorvastatin, 80 mg, oral, Nightly  carvedilol, 50 mg, oral, BID with meals  cefTRIAXone, 1 g, intravenous, q24h  furosemide, 40 mg, oral, Daily  melatonin, 3 mg, oral, Daily  perflutren lipid microspheres, 0.5-10 mL of dilution, intravenous, " Once in imaging  sacubitriL-valsartan, 1 tablet, oral, BID  spironolactone, 25 mg, oral, Daily  sulfur hexafluoride microsphr, 2 mL, intravenous, Once in imaging      Continuous medications     PRN medications  PRN medications: acetaminophen **OR** acetaminophen **OR** acetaminophen, benzocaine-menthol, bisacodyl, dextromethorphan-guaifenesin, guaiFENesin, ondansetron **OR** ondansetron, pantoprazole **OR** pantoprazole    Results for orders placed or performed during the hospital encounter of 03/01/24 (from the past 24 hour(s))   Basic Metabolic Panel   Result Value Ref Range    Glucose 86 74 - 99 mg/dL    Sodium 141 136 - 145 mmol/L    Potassium 3.9 3.5 - 5.3 mmol/L    Chloride 112 (H) 98 - 107 mmol/L    Bicarbonate 24 21 - 32 mmol/L    Anion Gap 9 (L) 10 - 20 mmol/L    Urea Nitrogen 18 6 - 23 mg/dL    Creatinine 0.91 0.50 - 1.30 mg/dL    eGFR >90 >60 mL/min/1.73m*2    Calcium 7.7 (L) 8.6 - 10.3 mg/dL     Transthoracic Echo (TTE) Complete    Result Date: 3/2/2024              [place] [address]      Phone [phone] Fax [phone] TRANSTHORACIC ECHOCARDIOGRAM REPORT  Patient Name:      MELISSA Jackson Physician:    [id number] Sherry Wilkinson MD Study Date:        3/2/2024              Ordering Provider:    [id number] JEAN-CLAUDE IZQUIERDO MRN/PID:           [id number]              Fellow: Accession#:        [id number]          Nurse:                Silvia Corral RN Date of Birth/Age: 1960 / 63 years  Sonographer:          Angela Lee RDCS Gender:            M                     Additional Staff: Height:            180.34 cm             Admit Date:           3/1/2024 Weight:            95.26 kg              Admission Status:     Inpatient -                                                                Routine BSA / BMI:         2.15  m2 / 29.29 kg/m2 Department Location:  [facility] Blood Pressure: 125 /79 mmHg Study Type:    TRANSTHORACIC ECHO (TTE) COMPLETE Diagnosis/ICD: Cerebrovascular disease, unspecified-I67.9 Indication:    Cerebrovascular Accident CPT Codes:     Echo Complete w Full Doppler-[id number] Patient History: Diabetes:          Yes Pertinent History: HTN, CVA and CAD. Study Detail: The following Echo studies were performed: 2D, M-Mode, Doppler and               color flow. Technically challenging study due to body habitus.               Optison used as a contrast agent for endocardial border definition               and agitated saline used as a contrast agent for intraseptal flow               evaluation. Total contrast used for this procedure was 3 mL via IV               push.  PHYSICIAN INTERPRETATION: Left Ventricle: Left ventricular systolic function is normal, with an estimated ejection fraction of 55-60%. There are no regional wall motion abnormalities. The left ventricular cavity size is normal. There is moderate concentric left ventricular hypertrophy. Spectral Doppler shows a pseudonormal pattern of left ventricular diastolic filling. Left Atrium: The left atrium is moderate to severely dilated. A bubble study using agitated saline was performed. Bubble study is negative. Right Ventricle: The right ventricle is normal in size. There is low normal right ventricular systolic function. Right Atrium: The right atrium is normal in size. Aortic Valve: The aortic valve was not well visualized. There is no evidence of aortic valve stenosis. There is no evidence of aortic valve regurgitation. Mitral Valve: The mitral valve is normal in structure. There is trace mitral valve regurgitation. Tricuspid Valve: The tricuspid valve is structurally normal. There is trace to mild tricuspid regurgitation. Pulmonic Valve: The pulmonic valve is structurally normal. There is no indication of pulmonic valve regurgitation. Pericardium: There is  no pericardial effusion noted. Aorta: The aortic root is normal. Systemic Veins: The inferior vena cava appears to be of normal size.  CONCLUSIONS:  1. Left ventricular systolic function is normal with a 55-60% estimated ejection fraction.  2. Spectral Doppler shows a pseudonormal pattern of left ventricular diastolic filling.  3. There is moderate concentric left ventricular hypertrophy.  4. There is low normal right ventricular systolic function.  5. The left atrium is moderate to severely dilated.  6. Aortic valve stenosis is not present. QUANTITATIVE DATA SUMMARY: 2D MEASUREMENTS:                           Normal Ranges: Ao Root d:     3.30 cm    (2.0-3.7cm) IVSd:          1.60 cm    (0.6-1.1cm) LVPWd:         1.40 cm    (0.6-1.1cm) LVIDd:         4.00 cm    (3.9-5.9cm) LVIDs:         2.50 cm LV Mass Index: 108.1 g/m2 LV % FS        37.5 % LA VOLUME:                               Normal Ranges: LA Vol A4C:        98.0 ml    (22+/-6mL/m2) LA Vol A2C:        109.2 ml LA Vol BP:         109.2 ml LA Vol Index A4C:  45.5ml/m2 LA Vol Index A2C:  50.7 ml/m2 LA Vol Index BP:   50.7 ml/m2 LA Area A4C:       28.0 cm2 LA Area A2C:       28.0 cm2 LA Major Axis A4C: 6.8 cm LA Major Axis A2C: 6.1 cm LA Volume Index:   47.0 ml/m2 RA VOLUME BY A/L METHOD:                       Normal Ranges: RA Area A4C: 15.0 cm2 M-MODE MEASUREMENTS:                  Normal Ranges: Ao Root: 3.30 cm (2.0-3.7cm) AoV Exc: 2.00 cm (1.5-2.5cm) LAs:     4.10 cm (2.7-4.0cm) AORTA MEASUREMENTS:                      Normal Ranges: AoV Exc:     2.00 cm (1.5-2.5cm) Ao Sinus, d: 3.30 cm (2.1-3.5cm) Asc Ao, d:   3.50 cm (2.1-3.4cm) LV SYSTOLIC FUNCTION BY 2D PLANIMETRY (MOD):                     Normal Ranges: EF-A4C View: 56.0 % (>=55%) EF-A2C View: 49.6 % EF-Biplane:  52.4 % LV DIASTOLIC FUNCTION:                           Normal Ranges: MV Peak E:    0.66 m/s    (0.7-1.2 m/s) MV Peak A:    0.52 m/s    (0.42-0.7 m/s) E/A Ratio:    1.26        (1.0-2.2) MV  e'         0.06 m/s    (>8.0) MV lateral e' 0.08 m/s MV medial e'  0.05 m/s MV A Dur:     132.00 msec E/e' Ratio:   10.15       (<8.0) LV IVRT:      103 msec    (<100ms) AORTIC VALVE:                         Normal Ranges: LVOT Max Dave:  0.95 m/s (<=1.1m/s) LVOT VTI:      22.90 cm LVOT Diameter: 2.00 cm  (1.8-2.4cm)  RIGHT VENTRICLE: RV Basal 3.80 cm RV Mid   3.20 cm RV Major 5.5 cm TAPSE:   16.1 mm RV s'    0.09 m/s TRICUSPID VALVE/RVSP:                             Normal Ranges: Peak TR Velocity: 2.73 m/s RV Syst Pressure: 32.8 mmHg (< 30mmHg) TV E Vmax:        0.39 m/s  (0.3-0.7m/s) IVC Diam:         1.50 cm PULMONIC VALVE:                      Normal Ranges: PV Max Dave: 1.0 m/s  (0.6-0.9m/s) PV Max PG:  3.8 mmHg  [id number] Sherry Wilkinson MD Electronically signed on 3/2/2024 at 12:29:49 PM  ** Final **     MR brain wo IV contrast    Result Date: 3/1/2024  Interpreted By:  Lucy Reinoso, STUDY: MR BRAIN WO IV CONTRAST;  3/1/2024 7:16 pm   INDICATION: Signs/Symptoms:CVA.   COMPARISON: Same day noncontrast CT head; MRI of the brain dated 08/29/2019   ACCESSION NUMBER(S): [id number]   ORDERING CLINICIAN: JEAN-CLAUDE IZQUIERDO   TECHNIQUE: Axial T2, FLAIR, DWI, gradient echo T2 and sagittal and coronal T1 weighted images of brain were acquired.   FINDINGS: Exam is degraded by mild motion.   No diffusion restriction abnormality is present to suggest an acute infarct. Area of cystic encephalomalacia and gliosis in the posterior right temporal lobe likely represents chronic sequela of prior infarct/injury.   There is no evidence of intracranial hemorrhage. No mass effect or midline shift is identified.   Moderate brain parenchymal volume loss is present without abnormal ventricular dilatation. Basal cisterns are patent. No abnormal extra-axial fluid collection is identified.   Patchy and confluence areas of hyperintense FLAIR and T2 signal are present in the periventricular and subcortical white matter of bilateral  cerebral hemispheres, nonspecific findings favored to represent sequela of microvascular disease.   Visualized large arterial flow voids, including intracranial carotid and basilar artery appear to be preserved.   No abnormal fluid signal is present in the mastoid air cells and middle ear cavities bilaterally.       1. No diffusion restriction abnormality is present to suggest an acute infarct. 2. Patchy and confluence areas of hyperintense FLAIR and T2 signal are present in the periventricular and subcortical white matter of bilateral cerebral hemispheres, nonspecific findings favored to represent sequela of microvascular disease.   MACRO: None   Signed by: Lucy Reinoso 3/1/2024 7:36 PM Dictation workstation:   [id number]      Assessment/Plan   The patient is doing very well and is back to his neurological baseline.  The patient's echocardiogram shows a normal EF and no clot was noted.  I would certainly treat any underlying infections and normalize any metabolic abnormalities.  I would certainly continue his Eliquis, aspirin and atorvastatin.  I would certainly continue stroke risk factor modification.  I discussed all these issues in detail with the patient and answered all of his questions.  I discussed all these issues in detail with the patient and answered all of his questions.  I will sign off for now.  The patient can follow-up with his primary care doctor within 2 weeks of discharge.  Please feel free to call me if you have any further questions regarding the care of this patient.      Micah Long MD

## 2024-03-04 ENCOUNTER — APPOINTMENT (OUTPATIENT)
Dept: CARDIOLOGY | Facility: HOSPITAL | Age: 64
DRG: 689 | End: 2024-03-04
Payer: COMMERCIAL

## 2024-03-04 LAB
ANION GAP SERPL CALC-SCNC: 8 MMOL/L (ref 10–20)
ANION GAP SERPL CALC-SCNC: 9 MMOL/L (ref 10–20)
BUN SERPL-MCNC: 14 MG/DL (ref 6–23)
BUN SERPL-MCNC: 17 MG/DL (ref 6–23)
CALCIUM SERPL-MCNC: 7.1 MG/DL (ref 8.6–10.3)
CALCIUM SERPL-MCNC: 9.6 MG/DL (ref 8.6–10.3)
CHLORIDE SERPL-SCNC: 104 MMOL/L (ref 98–107)
CHLORIDE SERPL-SCNC: 114 MMOL/L (ref 98–107)
CO2 SERPL-SCNC: 22 MMOL/L (ref 21–32)
CO2 SERPL-SCNC: 28 MMOL/L (ref 21–32)
CREAT SERPL-MCNC: 0.69 MG/DL (ref 0.5–1.3)
CREAT SERPL-MCNC: 0.94 MG/DL (ref 0.5–1.3)
EGFRCR SERPLBLD CKD-EPI 2021: >90 ML/MIN/1.73M*2
EGFRCR SERPLBLD CKD-EPI 2021: >90 ML/MIN/1.73M*2
GLUCOSE SERPL-MCNC: 196 MG/DL (ref 74–99)
GLUCOSE SERPL-MCNC: 96 MG/DL (ref 74–99)
MAGNESIUM SERPL-MCNC: 1.92 MG/DL (ref 1.6–2.4)
POTASSIUM SERPL-SCNC: 3 MMOL/L (ref 3.5–5.3)
POTASSIUM SERPL-SCNC: 4.2 MMOL/L (ref 3.5–5.3)
SODIUM SERPL-SCNC: 137 MMOL/L (ref 136–145)
SODIUM SERPL-SCNC: 141 MMOL/L (ref 136–145)

## 2024-03-04 PROCEDURE — 2500000002 HC RX 250 W HCPCS SELF ADMINISTERED DRUGS (ALT 637 FOR MEDICARE OP, ALT 636 FOR OP/ED): Performed by: STUDENT IN AN ORGANIZED HEALTH CARE EDUCATION/TRAINING PROGRAM

## 2024-03-04 PROCEDURE — 99239 HOSP IP/OBS DSCHRG MGMT >30: CPT | Performed by: INTERNAL MEDICINE

## 2024-03-04 PROCEDURE — 36415 COLL VENOUS BLD VENIPUNCTURE: CPT | Performed by: INTERNAL MEDICINE

## 2024-03-04 PROCEDURE — 2500000001 HC RX 250 WO HCPCS SELF ADMINISTERED DRUGS (ALT 637 FOR MEDICARE OP): Performed by: STUDENT IN AN ORGANIZED HEALTH CARE EDUCATION/TRAINING PROGRAM

## 2024-03-04 PROCEDURE — 2060000001 HC INTERMEDIATE ICU ROOM DAILY

## 2024-03-04 PROCEDURE — 80048 BASIC METABOLIC PNL TOTAL CA: CPT | Performed by: STUDENT IN AN ORGANIZED HEALTH CARE EDUCATION/TRAINING PROGRAM

## 2024-03-04 PROCEDURE — 83735 ASSAY OF MAGNESIUM: CPT | Performed by: INTERNAL MEDICINE

## 2024-03-04 PROCEDURE — 80048 BASIC METABOLIC PNL TOTAL CA: CPT | Performed by: INTERNAL MEDICINE

## 2024-03-04 PROCEDURE — 93005 ELECTROCARDIOGRAM TRACING: CPT

## 2024-03-04 PROCEDURE — 36415 COLL VENOUS BLD VENIPUNCTURE: CPT | Performed by: STUDENT IN AN ORGANIZED HEALTH CARE EDUCATION/TRAINING PROGRAM

## 2024-03-04 PROCEDURE — 97530 THERAPEUTIC ACTIVITIES: CPT | Mod: GO,CO

## 2024-03-04 PROCEDURE — 87086 URINE CULTURE/COLONY COUNT: CPT | Mod: PORLAB | Performed by: INTERNAL MEDICINE

## 2024-03-04 PROCEDURE — 2500000004 HC RX 250 GENERAL PHARMACY W/ HCPCS (ALT 636 FOR OP/ED): Performed by: STUDENT IN AN ORGANIZED HEALTH CARE EDUCATION/TRAINING PROGRAM

## 2024-03-04 PROCEDURE — 2500000002 HC RX 250 W HCPCS SELF ADMINISTERED DRUGS (ALT 637 FOR MEDICARE OP, ALT 636 FOR OP/ED): Performed by: INTERNAL MEDICINE

## 2024-03-04 PROCEDURE — 93010 ELECTROCARDIOGRAM REPORT: CPT | Performed by: INTERNAL MEDICINE

## 2024-03-04 RX ORDER — POTASSIUM CHLORIDE 20 MEQ/1
40 TABLET, EXTENDED RELEASE ORAL ONCE
Status: COMPLETED | OUTPATIENT
Start: 2024-03-04 | End: 2024-03-04

## 2024-03-04 RX ORDER — SULFAMETHOXAZOLE AND TRIMETHOPRIM 800; 160 MG/1; MG/1
1 TABLET ORAL 2 TIMES DAILY
Qty: 6 TABLET | Refills: 0 | Status: SHIPPED | OUTPATIENT
Start: 2024-03-04 | End: 2024-03-07

## 2024-03-04 RX ORDER — NAPROXEN SODIUM 220 MG/1
81 TABLET, FILM COATED ORAL DAILY
Qty: 30 TABLET | Refills: 0 | Status: SHIPPED | OUTPATIENT
Start: 2024-03-05 | End: 2024-04-04

## 2024-03-04 RX ORDER — POTASSIUM CHLORIDE 20 MEQ/1
20 TABLET, EXTENDED RELEASE ORAL ONCE
Status: DISCONTINUED | OUTPATIENT
Start: 2024-03-04 | End: 2024-03-04

## 2024-03-04 RX ADMIN — POTASSIUM CHLORIDE 40 MEQ: 1500 TABLET, EXTENDED RELEASE ORAL at 11:24

## 2024-03-04 RX ADMIN — APIXABAN 5 MG: 5 TABLET, FILM COATED ORAL at 08:18

## 2024-03-04 RX ADMIN — ATORVASTATIN CALCIUM 80 MG: 40 TABLET, FILM COATED ORAL at 20:41

## 2024-03-04 RX ADMIN — APIXABAN 5 MG: 5 TABLET, FILM COATED ORAL at 20:41

## 2024-03-04 RX ADMIN — SPIRONOLACTONE 25 MG: 25 TABLET ORAL at 08:19

## 2024-03-04 RX ADMIN — SACUBITRIL AND VALSARTAN 1 TABLET: 24; 26 TABLET, FILM COATED ORAL at 20:43

## 2024-03-04 RX ADMIN — CARVEDILOL 50 MG: 25 TABLET, FILM COATED ORAL at 08:18

## 2024-03-04 RX ADMIN — Medication 3 MG: at 20:41

## 2024-03-04 RX ADMIN — ASPIRIN 81 MG CHEWABLE TABLET 162 MG: 81 TABLET CHEWABLE at 08:18

## 2024-03-04 RX ADMIN — CEFTRIAXONE SODIUM 1 G: 1 INJECTION, SOLUTION INTRAVENOUS at 11:27

## 2024-03-04 RX ADMIN — SACUBITRIL AND VALSARTAN 1 TABLET: 24; 26 TABLET, FILM COATED ORAL at 08:18

## 2024-03-04 RX ADMIN — FUROSEMIDE 40 MG: 40 TABLET ORAL at 08:18

## 2024-03-04 RX ADMIN — BISACODYL 10 MG: 5 TABLET, COATED ORAL at 11:24

## 2024-03-04 ASSESSMENT — PAIN SCALES - GENERAL
PAINLEVEL_OUTOF10: 0 - NO PAIN

## 2024-03-04 ASSESSMENT — PAIN - FUNCTIONAL ASSESSMENT
PAIN_FUNCTIONAL_ASSESSMENT: 0-10

## 2024-03-04 ASSESSMENT — COGNITIVE AND FUNCTIONAL STATUS - GENERAL
PERSONAL GROOMING: A LOT
EATING MEALS: A LITTLE
HELP NEEDED FOR BATHING: A LOT
DRESSING REGULAR UPPER BODY CLOTHING: A LOT
TOILETING: A LOT
DAILY ACTIVITIY SCORE: 13
DRESSING REGULAR LOWER BODY CLOTHING: A LOT

## 2024-03-04 NOTE — PROGRESS NOTES
Social work consult placed for positive medical risk screen. SW reviewed pt's chart and communicated with TCC. No SW needs foreseen at this time. SW signing off; available upon request.    Rg Reveles, MSW, LSW ([id number])   Care Transitions

## 2024-03-04 NOTE — PROGRESS NOTES
Occupational Therapy    OT Treatment    Patient Name: Edenilson Ness  MRN: [id number]  Today's Date: 3/4/2024  Time Calculation  Start Time: 1320  Stop Time: 1343  Time Calculation (min): 23 min         Assessment:  End of Session Communication: PCT/CHARI/BRIDGET  End of Session Patient Position: Bed, 3 rail up, Alarm on (boosted up toward HOB with hercules lift)     Plan:  Treatment Interventions: ADL retraining, UE strengthening/ROM, Endurance training      Subjective   Previous Visit Info:  OT Last Visit  OT Received On: 03/04/24  General:  General  Prior to Session Communication: PCT/NA/BRIDGET  Patient Position Received: Bed, 3 rail up, Alarm on  General Comment: Pt.actively participated. Sat EOB up to 13 mins. MIN To MOD A to maintain sitting balance. while working on "[employer]" sitting balance task.       Pain:  Pain Assessment  Pain Assessment: 0-10  Pain Score: 0 - No pain    Objective    Cognition:  Cognition  Orientation Level: Oriented X4      Sitting Balance:  Static Sitting Balance  Static Sitting-Balance Support: Feet supported, Bilateral upper extremity supported  Static Sitting-Level of Assistance: Minimum assistance, Moderate assistance  Static Sitting-Comment/Number of Minutes: pt. sat EOB 13 mins performing lateral wt. shifting  x1 10 reps R /L sided no loss MIN A improved with reps. fwd. reaching BUE 1x10 reps . pt. declined ADLS, noted patient soiled purwick appears not working notified PCA at conclusion of treatment session she is following.     Therapy/Activity: Therapeutic Exercise  Therapeutic Exercise Performed: (P) Yes  Therapeutic Exercise Activity 1: (P) pt. completed shoulder flexion/ext, horizonatal abduction scapular traction 1x10 reps MIN A to maintain sitting balance.    Outcome Measures:[facility] Daily Activity  Putting on and taking off regular lower body clothing: A lot  Bathing (including washing, rinsing, drying): A lot  Putting on and taking off regular upper body clothing: A lot  Toileting,  which includes using toilet, bedpan or urinal: A lot  Taking care of personal grooming such as brushing teeth: A lot  Eating Meals: A little  Daily Activity - Total Score: 13        Education Documentation  Precautions, taught by SCOTT Kidd at 3/4/2024  2:38 PM.  Learner: Patient  Readiness: Acceptance  Method: Explanation, Demonstration  Response: Verbalizes Understanding, Needs Reinforcement    Home Exercise Program, taught by SCOTT Kidd at 3/4/2024  2:38 PM.  Learner: Patient  Readiness: Acceptance  Method: Explanation, Demonstration  Response: Verbalizes Understanding, Needs Reinforcement    Body Mechanics, taught by SCOTT Kidd at 3/4/2024  2:38 PM.  Learner: Patient  Readiness: Acceptance  Method: Explanation, Demonstration  Response: Verbalizes Understanding, Needs Reinforcement    Education Comments  No comments found.        OP EDUCATION:       Goals:  Encounter Problems       Encounter Problems (Active)       ADLs       Patient will perform UB bathing  with minimal assist  level of assistance . (Not Progressing)       Start:  03/02/24    Expected End:  03/11/24               BALANCE       Patient will tolerate unsupported sitting for 5 - 8 minutes to contact guard assist level of assistance in order to improve functional activity tolerance for ADL tasks. (Progressing)       Start:  03/02/24    Expected End:  03/11/24               TRANSFERS       Patient will perform bed mobility moderate assist level of assistance and bed rails and draw sheet in order to improve safety and independence with mobility (Not Progressing)       Start:  03/02/24    Expected End:  03/11/24

## 2024-03-04 NOTE — DISCHARGE SUMMARY
Discharge Diagnosis  Ac isch multi vasc territories stroke (CMS/HCC)    Issues Requiring Follow-Up  Follow-up with PCP in a week.  Follow urine cultures in outpatient settings.  Follow renal functions in a week    Discharge Meds     Your medication list        START taking these medications        Instructions Last Dose Given Next Dose Due   sulfamethoxazole-trimethoprim 800-160 mg tablet  Commonly known as: Bactrim DS      Take 1 tablet by mouth 2 times a day for 3 days.              CONTINUE taking these medications        Instructions Last Dose Given Next Dose Due   apixaban 5 mg tablet  Commonly known as: Eliquis           atorvastatin 80 mg tablet  Commonly known as: Lipitor           baclofen 5 mg tablet  Commonly known as: Lioresal           carvedilol 25 mg tablet  Commonly known as: Coreg           furosemide 40 mg tablet  Commonly known as: Lasix           GLUCAGON (HCL) EMERGENCY KIT INJ           Glucose GeL 40 % gel oral gel  Generic drug: glucose           hydrALAZINE 25 mg tablet  Commonly known as: Apresoline           isosorbide dinitrate 20 mg tablet  Commonly known as: Isordil           melatonin 10 mg tablet           metFORMIN 500 mg tablet  Commonly known as: Glucophage           sacubitriL-valsartan 24-26 mg tablet  Commonly known as: Entresto           sennosides 8.6 mg tablet  Commonly known as: Senokot           spironolactone 25 mg tablet  Commonly known as: Aldactone           thiamine 100 mg tablet  Commonly known as: Vitamin B-1           zinc oxide 20 % ointment                     Where to Get Your Medications        These medications were sent to [facility]  [address] [place] [id number]      Hours: 8AM to 6PM Mon-Fri, 8AM to 2PM Sat, 8AM to 12PM Sun Phone: [phone]   sulfamethoxazole-trimethoprim 800-160 mg tablet         Test Results Pending At Discharge  Pending Labs       Order Current Status    Urine culture In process            Hospital Course   63  years old -American male with prior history of stroke, nursing home resident, wheelchair-bound, presented to the hospital with increased weakness on the left side and report of slurred speech. In the emergency department blood work including basic metabolic panel, liver function test, complete blood count was essentially insignificant. Patient CT head with contrast showed no acute intracranial hemorrhage or mass effect. CT angiogram showed mild narrowing of the left ICA. Patient is admitted for further evaluation and to rule out new strokes. MRI did not show any acute stroke.  Echocardiogram showed normal ejection fraction.  Neurology were consulted as well.  Patient was also treated for UTI with ceftriaxone.  No cultures were available.  Patient is being discharged back to his long-term care facility on Bactrim.  Change in mental status was thought to be secondary to urinary tract infection.  Patient has improved back to his baseline neurologically.  Patient is otherwise requesting discharge back to his nursing home.  Patient is otherwise stable for discharge and outpatient follow-up.    Pertinent Physical Exam At Time of Discharge  Physical Exam  HENT:      Head: Normocephalic and atraumatic.      Nose: Nose normal.      Mouth/Throat:      Mouth: Mucous membranes are dry.   Eyes:      Extraocular Movements: Extraocular movements intact.      Conjunctiva/sclera: Conjunctivae normal.   Cardiovascular:      Rate and Rhythm: Normal rate and regular rhythm.      Pulses: Normal pulses.      Heart sounds: Normal heart sounds.   Pulmonary:      Effort: Pulmonary effort is normal.      Breath sounds: Normal breath sounds.   Abdominal:      General: Bowel sounds are normal.      Palpations: Abdomen is soft.   Musculoskeletal:         General: Normal range of motion.      Cervical back: Normal range of motion and neck supple.   Skin:     General: Skin is warm and dry.   Neurological:      General: No focal deficit  present.      Mental Status: He is alert. Mental status is at baseline.   Psychiatric:         Mood and Affect: Mood normal.         Behavior: Behavior normal.         Outpatient Follow-Up  No future appointments.      Amara uL MD

## 2024-03-04 NOTE — CARE PLAN
The patient's goals for the shift include have a bowel movement.    The clinical goals for the shift include will have no further neuro decline this shift      Problem: Pain  Goal: My pain/discomfort is manageable  Outcome: Progressing     Problem: Safety  Goal: Patient will be injury free during hospitalization  Outcome: Progressing  Goal: I will remain free of falls  Outcome: Progressing     Problem: Daily Care  Goal: Daily care needs are met  Outcome: Progressing     Problem: Psychosocial Needs  Goal: Demonstrates ability to cope with hospitalization/illness  Outcome: Progressing  Goal: Collaborate with me, my family, and caregiver to identify my specific goals  Outcome: Progressing     Problem: Discharge Barriers  Goal: My discharge needs are met  Outcome: Progressing     Problem: Skin  Goal: Decreased wound size/increased tissue granulation at next dressing change  Outcome: Progressing  Goal: Participates in plan/prevention/treatment measures  Outcome: Progressing  Goal: Prevent/manage excess moisture  Outcome: Progressing  Goal: Prevent/minimize sheer/friction injuries  Outcome: Progressing  Goal: Promote/optimize nutrition  Outcome: Progressing  Goal: Promote skin healing  Outcome: Progressing  Flowsheets (Taken 3/4/2024 1211)  Promote skin healing:   Turn/reposition every 2 hours/use positioning/transfer devices   Protective dressings over bony prominences   Assess skin/pad under line(s)/device(s)     Problem: General Stroke  Goal: Establish a mutual long term goal with patient by discharge  Outcome: Progressing  Goal: Demonstrate improvement in neurological exam throughout the shift  Outcome: Progressing  Goal: Maintain BP within ordered limits throughout shift  Outcome: Progressing  Goal: Participate in treatment (ie., meds, therapy) throughout shift  Outcome: Progressing  Goal: No symptoms of aspiration throughout shift  Outcome: Progressing  Goal: No symptoms of hemorrhage throughout shift  Outcome:  Progressing  Goal: Tolerate enteral feeding throughout shift  Outcome: Progressing  Goal: Decreased nausea/vomiting throughout shift  Outcome: Progressing  Goal: Controlled blood glucose throughout shift  Outcome: Progressing  Goal: Out of bed three times today  Outcome: Progressing     Problem: ICU Stroke  Goal: Maintain ICP within ordered limits throughout shift  Outcome: Progressing  Goal: Tolerate EVD clamping trial throughout shift  Outcome: Progressing  Goal: Tolerate ventilator weaning trial during shift  Outcome: Progressing  Goal: Maintain patent airway throughout shift  Outcome: Progressing  Goal: Achieve/maintain targeted sodium level throughout shift  Outcome: Progressing     Problem: Pain - Adult  Goal: Verbalizes/displays adequate comfort level or baseline comfort level  Outcome: Progressing     Problem: Safety - Adult  Goal: Free from fall injury  Outcome: Progressing     Problem: Discharge Planning  Goal: Discharge to home or other facility with appropriate resources  Outcome: Progressing     Problem: Chronic Conditions and Co-morbidities  Goal: Patient's chronic conditions and co-morbidity symptoms are monitored and maintained or improved  Outcome: Progressing     Problem: Diabetes  Goal: Achieve decreasing blood glucose levels by end of shift  Outcome: Progressing  Goal: Increase stability of blood glucose readings by end of shift  Outcome: Progressing  Goal: Decrease in ketones present in urine by end of shift  Outcome: Progressing  Goal: Maintain electrolyte levels within acceptable range throughout shift  Outcome: Progressing  Goal: Maintain glucose levels >70mg/dl to <250mg/dl throughout shift  Outcome: Progressing  Goal: No changes in neurological exam by end of shift  Outcome: Progressing  Goal: Learn about and adhere to nutrition recommendations by end of shift  Outcome: Progressing  Goal: Vital signs within normal range for age by end of shift  Outcome: Progressing  Goal: Increase self care  and/or family involovement by end of shift  Outcome: Progressing  Goal: Receive DSME education by end of shift  Outcome: Progressing

## 2024-03-05 ENCOUNTER — NURSING HOME VISIT (OUTPATIENT)
Dept: POST ACUTE CARE | Facility: EXTERNAL LOCATION | Age: 64
End: 2024-03-05
Payer: COMMERCIAL

## 2024-03-05 VITALS
SYSTOLIC BLOOD PRESSURE: 134 MMHG | HEIGHT: 71 IN | DIASTOLIC BLOOD PRESSURE: 83 MMHG | WEIGHT: 205.91 LBS | RESPIRATION RATE: 17 BRPM | HEART RATE: 58 BPM | BODY MASS INDEX: 28.83 KG/M2 | TEMPERATURE: 97.8 F | OXYGEN SATURATION: 94 %

## 2024-03-05 DIAGNOSIS — N39.0 URINARY TRACT INFECTION WITHOUT HEMATURIA, SITE UNSPECIFIED: Primary | ICD-10-CM

## 2024-03-05 DIAGNOSIS — I11.0 HYPERTENSIVE HEART DISEASE WITH CONGESTIVE HEART FAILURE, UNSPECIFIED HEART FAILURE TYPE (MULTI): ICD-10-CM

## 2024-03-05 DIAGNOSIS — I63.9 CEREBROVASCULAR ACCIDENT (CVA), UNSPECIFIED MECHANISM (MULTI): ICD-10-CM

## 2024-03-05 DIAGNOSIS — E11.9 TYPE 2 DIABETES MELLITUS WITHOUT COMPLICATION, WITH LONG-TERM CURRENT USE OF INSULIN (MULTI): ICD-10-CM

## 2024-03-05 DIAGNOSIS — Z79.4 TYPE 2 DIABETES MELLITUS WITHOUT COMPLICATION, WITH LONG-TERM CURRENT USE OF INSULIN (MULTI): ICD-10-CM

## 2024-03-05 LAB
ATRIAL RATE: 53 BPM
BACTERIA UR CULT: NORMAL
P AXIS: 12 DEGREES
P OFFSET: 162 MS
P ONSET: 106 MS
PR INTERVAL: 212 MS
Q ONSET: 212 MS
QRS COUNT: 9 BEATS
QRS DURATION: 98 MS
QT INTERVAL: 432 MS
QTC CALCULATION(BAZETT): 405 MS
QTC FREDERICIA: 414 MS
R AXIS: -48 DEGREES
T AXIS: 10 DEGREES
T OFFSET: 428 MS
VENTRICULAR RATE: 53 BPM

## 2024-03-05 PROCEDURE — 2500000002 HC RX 250 W HCPCS SELF ADMINISTERED DRUGS (ALT 637 FOR MEDICARE OP, ALT 636 FOR OP/ED): Performed by: STUDENT IN AN ORGANIZED HEALTH CARE EDUCATION/TRAINING PROGRAM

## 2024-03-05 PROCEDURE — 99309 SBSQ NF CARE MODERATE MDM 30: CPT | Performed by: NURSE PRACTITIONER

## 2024-03-05 PROCEDURE — 2500000001 HC RX 250 WO HCPCS SELF ADMINISTERED DRUGS (ALT 637 FOR MEDICARE OP): Performed by: STUDENT IN AN ORGANIZED HEALTH CARE EDUCATION/TRAINING PROGRAM

## 2024-03-05 RX ADMIN — FUROSEMIDE 40 MG: 40 TABLET ORAL at 08:32

## 2024-03-05 RX ADMIN — SPIRONOLACTONE 25 MG: 25 TABLET ORAL at 08:32

## 2024-03-05 RX ADMIN — APIXABAN 5 MG: 5 TABLET, FILM COATED ORAL at 08:32

## 2024-03-05 RX ADMIN — ASPIRIN 81 MG CHEWABLE TABLET 162 MG: 81 TABLET CHEWABLE at 08:32

## 2024-03-05 RX ADMIN — SACUBITRIL AND VALSARTAN 1 TABLET: 24; 26 TABLET, FILM COATED ORAL at 08:32

## 2024-03-05 RX ADMIN — CARVEDILOL 50 MG: 25 TABLET, FILM COATED ORAL at 08:32

## 2024-03-05 ASSESSMENT — COGNITIVE AND FUNCTIONAL STATUS - GENERAL
DRESSING REGULAR UPPER BODY CLOTHING: A LOT
STANDING UP FROM CHAIR USING ARMS: TOTAL
MOBILITY SCORE: 7
PERSONAL GROOMING: A LOT
CLIMB 3 TO 5 STEPS WITH RAILING: TOTAL
TURNING FROM BACK TO SIDE WHILE IN FLAT BAD: TOTAL
TOILETING: A LOT
EATING MEALS: A LITTLE
DAILY ACTIVITIY SCORE: 13
MOVING FROM LYING ON BACK TO SITTING ON SIDE OF FLAT BED WITH BEDRAILS: A LOT
WALKING IN HOSPITAL ROOM: TOTAL
HELP NEEDED FOR BATHING: A LOT
DRESSING REGULAR LOWER BODY CLOTHING: A LOT
MOVING TO AND FROM BED TO CHAIR: TOTAL

## 2024-03-05 ASSESSMENT — PAIN - FUNCTIONAL ASSESSMENT: PAIN_FUNCTIONAL_ASSESSMENT: 0-10

## 2024-03-05 ASSESSMENT — PAIN SCALES - GENERAL: PAINLEVEL_OUTOF10: 0 - NO PAIN

## 2024-03-05 NOTE — CARE PLAN
Patient being discharged back to [facility].  Report called to Rosy at facility.  Ambulance picking up patient.    The clinical goals for the shift include stable for discharge      Problem: Pain  Goal: My pain/discomfort is manageable  Outcome: Adequate for Discharge     Problem: Safety  Goal: Patient will be injury free during hospitalization  Outcome: Adequate for Discharge  Goal: I will remain free of falls  Outcome: Adequate for Discharge     Problem: Daily Care  Goal: Daily care needs are met  Outcome: Adequate for Discharge     Problem: Psychosocial Needs  Goal: Demonstrates ability to cope with hospitalization/illness  Outcome: Adequate for Discharge  Goal: Collaborate with me, my family, and caregiver to identify my specific goals  Outcome: Adequate for Discharge     Problem: Discharge Barriers  Goal: My discharge needs are met  Outcome: Adequate for Discharge     Problem: Skin  Goal: Decreased wound size/increased tissue granulation at next dressing change  3/5/2024 1123 by Bev Reddy RN  Outcome: Adequate for Discharge  3/5/2024 1123 by Bev Reddy RN  Flowsheets (Taken 3/5/2024 1123)  Decreased wound size/increased tissue granulation at next dressing change: Promote sleep for wound healing  Goal: Participates in plan/prevention/treatment measures  3/5/2024 1123 by Bev Reddy RN  Outcome: Adequate for Discharge  3/5/2024 1123 by Bev Reddy RN  Flowsheets (Taken 3/5/2024 1123)  Participates in plan/prevention/treatment measures: Elevate heels  Goal: Prevent/manage excess moisture  3/5/2024 1123 by Bev Reddy RN  Outcome: Adequate for Discharge  3/5/2024 1123 by Bev Reddy RN  Flowsheets (Taken 3/5/2024 1123)  Prevent/manage excess moisture: Cleanse incontinence/protect with barrier cream  Goal: Prevent/minimize sheer/friction injuries  3/5/2024 1123 by Bev Reddy RN  Outcome: Adequate for Discharge  3/5/2024 1123 by Bev Reddy RN  Flowsheets (Taken 3/5/2024 1123)  Prevent/minimize  sheer/friction injuries: Turn/reposition every 2 hours/use positioning/transfer devices  Goal: Promote/optimize nutrition  3/5/2024 1123 by Bev Reddy RN  Outcome: Adequate for Discharge  3/5/2024 1123 by Bev Reddy RN  Flowsheets (Taken 3/5/2024 1123)  Promote/optimize nutrition: Monitor/record intake including meals  Goal: Promote skin healing  3/5/2024 1123 by Bev Reddy RN  Outcome: Adequate for Discharge  3/5/2024 1123 by Bev Reddy RN  Flowsheets (Taken 3/5/2024 1123)  Promote skin healing: Turn/reposition every 2 hours/use positioning/transfer devices     Problem: General Stroke  Goal: Establish a mutual long term goal with patient by discharge  Outcome: Adequate for Discharge  Goal: Demonstrate improvement in neurological exam throughout the shift  Outcome: Adequate for Discharge  Goal: Maintain BP within ordered limits throughout shift  Outcome: Adequate for Discharge  Goal: Participate in treatment (ie., meds, therapy) throughout shift  Outcome: Adequate for Discharge  Goal: No symptoms of aspiration throughout shift  Outcome: Adequate for Discharge  Goal: No symptoms of hemorrhage throughout shift  Outcome: Adequate for Discharge  Goal: Tolerate enteral feeding throughout shift  Outcome: Adequate for Discharge  Goal: Decreased nausea/vomiting throughout shift  Outcome: Adequate for Discharge  Goal: Controlled blood glucose throughout shift  Outcome: Adequate for Discharge  Goal: Out of bed three times today  Outcome: Adequate for Discharge     Problem: ICU Stroke  Goal: Maintain ICP within ordered limits throughout shift  Outcome: Adequate for Discharge  Goal: Tolerate EVD clamping trial throughout shift  Outcome: Adequate for Discharge  Goal: Tolerate ventilator weaning trial during shift  Outcome: Adequate for Discharge  Goal: Maintain patent airway throughout shift  Outcome: Adequate for Discharge  Goal: Achieve/maintain targeted sodium level throughout shift  Outcome: Adequate for Discharge      Problem: Pain - Adult  Goal: Verbalizes/displays adequate comfort level or baseline comfort level  Outcome: Adequate for Discharge     Problem: Safety - Adult  Goal: Free from fall injury  Outcome: Adequate for Discharge     Problem: Discharge Planning  Goal: Discharge to home or other facility with appropriate resources  Outcome: Adequate for Discharge     Problem: Chronic Conditions and Co-morbidities  Goal: Patient's chronic conditions and co-morbidity symptoms are monitored and maintained or improved  Outcome: Adequate for Discharge     Problem: Diabetes  Goal: Achieve decreasing blood glucose levels by end of shift  Outcome: Adequate for Discharge  Goal: Increase stability of blood glucose readings by end of shift  Outcome: Adequate for Discharge  Goal: Decrease in ketones present in urine by end of shift  Outcome: Adequate for Discharge  Goal: Maintain electrolyte levels within acceptable range throughout shift  Outcome: Adequate for Discharge  Goal: Maintain glucose levels >70mg/dl to <250mg/dl throughout shift  Outcome: Adequate for Discharge  Goal: No changes in neurological exam by end of shift  Outcome: Adequate for Discharge  Goal: Learn about and adhere to nutrition recommendations by end of shift  Outcome: Adequate for Discharge  Goal: Vital signs within normal range for age by end of shift  Outcome: Adequate for Discharge  Goal: Increase self care and/or family involovement by end of shift  Outcome: Adequate for Discharge  Goal: Receive DSME education by end of shift  Outcome: Adequate for Discharge     Problem: PT Problem  Goal: transfers  Outcome: Adequate for Discharge  Goal: balance   Outcome: Adequate for Discharge  Goal: strengthening   Outcome: Adequate for Discharge     Problem: ADLs  Goal: Patient will perform UB bathing  with minimal assist  level of assistance .  Outcome: Adequate for Discharge     Problem: TRANSFERS  Goal: Patient will perform bed mobility moderate assist level of  assistance and bed rails and draw sheet in order to improve safety and independence with mobility  Outcome: Adequate for Discharge     Problem: BALANCE  Goal: Patient will tolerate unsupported sitting for 5 - 8 minutes to contact guard assist level of assistance in order to improve functional activity tolerance for ADL tasks.  Outcome: Adequate for Discharge

## 2024-03-05 NOTE — DOCUMENTATION CLARIFICATION NOTE
"    PATIENT:               MELISSA BERGER  ACCT #:                  [id number]  MRN:                       [id number]  :                       1960  ADMIT DATE:       3/1/2024 11:35 AM  DISCH DATE:        3/5/2024 11:30 AM  RESPONDING PROVIDER #:        [id number]          PROVIDER RESPONSE TEXT:    Metabolic encephalopathy 2/2 UTI    CDI QUERY TEXT:    UH_Altered Mental Status        Instruction:    Based on your assessment of the patient and the clinical information, please provide the requested documentation by clicking on the appropriate radio button and enter any additional information if prompted.    Question: Please further clarify the most likely etiology of the altered mental status as    When answering this query, please exercise your independent professional judgment. The fact that a question is being asked, does not imply that any particular answer is desired or expected.    The patient's clinical indicators include:  Clinical Information: 62yo male to ED with slurred speech, expressive aphasia, left facial droop,  worsening of left sided weakness and a UTI.  PMH includes Wheelchair bound/ CVA w l hemiplegia, HTN, CHF, A-fib, a flutter, CAD and DM.    Clinical Indicators: Ed Dr Andrew 3/1: NIH 7, GCS 10    - 3/2: \" Workup also revealed urinary tract infection which could be contributing to his symptoms.  This morning he is awake alert and able to follow commands.  He passed bedside nursing swallow evaluation.  He thinks he is at his baseline at this time.\"    - RN Neuro assessments:  3/1 - 3/4 Oriented x4, left facial droop, slurred garbled speech, expressive aphasia    - ECHO 3/3:  1. Left ventricular systolic function is normal with a 55-60% estimated ejection fraction.  2. Spectral Doppler shows a pseudonormal pattern of left ventricular diastolic filling.  3. There is moderate concentric left ventricular hypertrophy.  4. There is low normal right ventricular systolic function.  5. The left atrium is " moderate to severely dilated.  6. Aortic valve stenosis is not present.    - MRI Brain 3/2:  1. No diffusion restriction abnormality is present to suggest an acute infarct.  2. Patchy and confluence areas of hyperintense FLAIR and T2 signal are present in the periventricular and subcortical white matter of bilateral cerebral hemispheres, nonspecific findings favored to represent sequela of microvascular disease.      Treatment: Neuro consult, Rocephin, MRI.    Risk Factors: Prior CVA, UTI.  Options provided:  -- Metabolic encephalopathy 2/2 UTI  -- Metabolic encephalopathy 2/2 UTI and CVA  -- CVA  -- TIA  -- Other - I will add my own diagnosis  -- Refer to Clinical Documentation Reviewer    Query created by: Ynes Pride on 3/4/2024 1:43 PM      Electronically signed by:  AVA OREILLY MD 3/5/2024 4:37 PM

## 2024-03-05 NOTE — PROGRESS NOTES
Transport set for 10:30 AM. Bedside RN aware. Report number given. Patient to return to his LTC facility.

## 2024-03-05 NOTE — DOCUMENTATION CLARIFICATION NOTE
PATIENT:               MELISSA BERGER  ACCT #:                  [id number]  MRN:                       [id number]  :                       1960  ADMIT DATE:       3/1/2024 11:35 AM  DISCH DATE:        3/5/2024 11:30 AM  RESPONDING PROVIDER #:        [id number]          PROVIDER RESPONSE TEXT:    Chronic Diastolic Congestive Heart Failure    CDI QUERY TEXT:    UH_CHF        Instruction:    Based on your assessment of the patient and the clinical information, please provide the requested documentation by clicking on the appropriate radio button and enter any additional information if prompted.    Question: Please further clarify the type and acuity of congestive heart failure    When answering this query, please exercise your independent professional judgment. The fact that a question is being asked, does not imply that any particular answer is desired or expected.    The patient's clinical indicators include:  Clinical Information:  - 62yo male to ED with slurred speech, expressive aphasia, left facial droop,  worsening of left sided weakness and a UTI.  PMH includes Wheelchair bound/ CVA w l hemiplegia, HTN, CHF, A-fib, a flutter, CAD and DM.    Clinical Indicators:  - ECHO 3/3:  1. Left ventricular systolic function is normal with a 55-60% estimated ejection fraction.  2. Spectral Doppler shows a pseudonormal pattern of left ventricular diastolic filling.  3. There is moderate concentric left ventricular hypertrophy.  4. There is low normal right ventricular systolic function.  5. The left atrium is moderate to severely dilated.  6. Aortic valve stenosis is not present.      Treatment: Repeat ECHO, Lasix 40 mg q day.    Risk Factors: Afib, HTN.  Options provided:  -- Chronic Diastolic Congestive Heart Failure  -- Chronic combined systolic/diastolic Congestive Heart Failure  -- Other - I will add my own diagnosis  -- Refer to Clinical Documentation Reviewer    Query created by: Ynes Pride on 3/4/2024 1:51  PM      Electronically signed by:  AVA OREILLY MD 3/5/2024 4:37 PM

## 2024-03-05 NOTE — LETTER
Patient: Edenilson Ness  : 1960    Encounter Date: 2024    PROGRESS NOTE    Subjective  Chief complaint: Edenilson Ness is a 63 y.o. male who is a long term care patient being seen and evaluated for readmission to nursing facility    HPI:  HPI  Patient is readmitted to nursing facility following hospitalization.  Patient is readmitted on antibiotic for UTI.  Patient was ruled out for stroke.  Patient seen and examined at bedside, appears to be in no acute distress.  Patient denies fever or chills.  Tolerating antibiotic without adverse effects.    Objective  Vital signs: 95/58, 97.5, 56, 16, blood sugar 182, 96%    Physical Exam  Constitutional:       General: He is not in acute distress.  Eyes:      Extraocular Movements: Extraocular movements intact.   Cardiovascular:      Rate and Rhythm: Normal rate and regular rhythm.   Pulmonary:      Effort: Pulmonary effort is normal.      Breath sounds: Normal breath sounds.   Abdominal:      General: Bowel sounds are normal.      Palpations: Abdomen is soft.   Musculoskeletal:      Cervical back: Neck supple.      Comments: Left-sided weakness   Neurological:      Mental Status: He is alert.   Psychiatric:         Mood and Affect: Mood normal.         Behavior: Behavior is cooperative.         Assessment/Plan  Problem List Items Addressed This Visit       CVA (cerebral vascular accident) (CMS/[facility])     monitor for changes in speech or increased weakness  Eliquis  Bleeding precautions  Statin         Type 2 diabetes mellitus without complication, with long-term current use of insulin (CMS/[facility])     Monitor sweets and carb intake  Continue to monitor BS   monitor A1c routinely         Hypertensive heart disease with CHF (CMS/[facility])     Coreg  Isosorbide dinitrate  Sacubitril-valsartan  Hydralazine  Spironolactone  Monitor BP  BP at goal         UTI (urinary tract infection) - Primary     Continue antibiotic until complete          Medications, treatments, and labs  reviewed  Continue medications and treatments as listed in EMR    Scribe Attestation  Neelima RAMIREZ Scribe   attest that this documentation has been prepared under the direction and in the presence of LUCRETIA Griffiths    Provider Attestation - Scribe documentation  All medical record entries made by the Scribe were at my direction and personally dictated by me. I have reviewed the chart and agree that the record accurately reflects my personal performance of the history, physical exam, discussion and plan.   LUCRETIA Griffiths            Electronically Signed By: LUCRETIA Griffiths   3/15/24  4:39 PM

## 2024-03-05 NOTE — DISCHARGE SUMMARY
Discharge Diagnosis  Ac isch multi vasc territories stroke (CMS/HCC)    Issues Requiring Follow-Up  Follow-up with PCP in a week.  Follow urine cultures in outpatient settings.  Follow renal functions in a week.  Also follow-up with cardiology in outpatient settings.    Discharge Meds     Your medication list        START taking these medications        Instructions Last Dose Given Next Dose Due   aspirin 81 mg chewable tablet      Chew 1 tablet (81 mg) once daily. Do not start before March 5, 2024.       sulfamethoxazole-trimethoprim 800-160 mg tablet  Commonly known as: Bactrim DS      Take 1 tablet by mouth 2 times a day for 3 days.              CONTINUE taking these medications        Instructions Last Dose Given Next Dose Due   apixaban 5 mg tablet  Commonly known as: Eliquis           atorvastatin 80 mg tablet  Commonly known as: Lipitor           baclofen 5 mg tablet  Commonly known as: Lioresal           carvedilol 25 mg tablet  Commonly known as: Coreg           furosemide 40 mg tablet  Commonly known as: Lasix           GLUCAGON (HCL) EMERGENCY KIT INJ           Glucose GeL 40 % gel oral gel  Generic drug: glucose           hydrALAZINE 25 mg tablet  Commonly known as: Apresoline           isosorbide dinitrate 20 mg tablet  Commonly known as: Isordil           melatonin 10 mg tablet           metFORMIN 500 mg tablet  Commonly known as: Glucophage           sacubitriL-valsartan 24-26 mg tablet  Commonly known as: Entresto           sennosides 8.6 mg tablet  Commonly known as: Senokot           spironolactone 25 mg tablet  Commonly known as: Aldactone           thiamine 100 mg tablet  Commonly known as: Vitamin B-1           zinc oxide 20 % ointment                     Where to Get Your Medications        These medications were sent to [facility]  [address] [place], [place] [id number]      Hours: 8AM to 6PM Mon-Fri, 8AM to 2PM Sat, 8AM to 12PM Sun Phone: [phone]   aspirin 81 mg chewable  tablet  sulfamethoxazole-trimethoprim 800-160 mg tablet         Test Results Pending At Discharge  Pending Labs       Order Current Status    Urine culture In process            Hospital Course   63 years old -American male with prior history of stroke, nursing home resident, wheelchair-bound, presented to the hospital with increased weakness on the left side and report of slurred speech. In the emergency department blood work including basic metabolic panel, liver function test, complete blood count was essentially insignificant. Patient CT head with contrast showed no acute intracranial hemorrhage or mass effect. CT angiogram showed mild narrowing of the left ICA. Patient is admitted for further evaluation and to rule out new strokes. MRI did not show any acute stroke.  Echocardiogram showed normal ejection fraction.  Neurology were consulted as well.  Patient was also treated for UTI with ceftriaxone.  No cultures were available.  Patient is being discharged back to his long-term care facility on Bactrim.  Change in mental status was thought to be secondary to urinary tract infection.  Patient has improved back to his baseline neurologically.  Given the hospitalization the patient did have 14 beats of SVT's.  Patient remained completely asymptomatic.  Magnesium was checked and potassium has improved to 4 now.  Commend continued outpatient follow-up with cardiology.  Patient is otherwise stable for discharge and outpatient follow-up.    Pertinent Physical Exam At Time of Discharge  Physical Exam  HENT:      Head: Normocephalic and atraumatic.      Nose: Nose normal.      Mouth/Throat:      Mouth: Mucous membranes are dry.   Eyes:      Extraocular Movements: Extraocular movements intact.      Conjunctiva/sclera: Conjunctivae normal.   Cardiovascular:      Rate and Rhythm: Normal rate and regular rhythm.      Pulses: Normal pulses.      Heart sounds: Normal heart sounds.   Pulmonary:      Effort: Pulmonary  effort is normal.      Breath sounds: Normal breath sounds.   Abdominal:      General: Bowel sounds are normal.      Palpations: Abdomen is soft.   Musculoskeletal:         General: Normal range of motion.      Cervical back: Normal range of motion and neck supple.   Skin:     General: Skin is warm and dry.   Neurological:      General: No focal deficit present.      Mental Status: He is alert. Mental status is at baseline.   Psychiatric:         Mood and Affect: Mood normal.         Behavior: Behavior normal.         Outpatient Follow-Up  No future appointments.      Amara Lu MD

## 2024-03-05 NOTE — CARE PLAN
The clinical goals for the shift include Pt will be free from falls.       Problem: Pain  Goal: My pain/discomfort is manageable  Outcome: Progressing     Problem: Safety  Goal: Patient will be injury free during hospitalization  Outcome: Progressing  Goal: I will remain free of falls  Outcome: Progressing     Problem: Daily Care  Goal: Daily care needs are met  Outcome: Progressing     Problem: Psychosocial Needs  Goal: Demonstrates ability to cope with hospitalization/illness  Outcome: Progressing  Goal: Collaborate with me, my family, and caregiver to identify my specific goals  Outcome: Progressing     Problem: Discharge Barriers  Goal: My discharge needs are met  Outcome: Progressing     Problem: Skin  Goal: Decreased wound size/increased tissue granulation at next dressing change  Outcome: Progressing  Goal: Participates in plan/prevention/treatment measures  Outcome: Progressing  Goal: Prevent/manage excess moisture  Outcome: Progressing  Goal: Prevent/minimize sheer/friction injuries  Outcome: Progressing  Goal: Promote/optimize nutrition  Outcome: Progressing  Goal: Promote skin healing  Outcome: Progressing     Problem: General Stroke  Goal: Establish a mutual long term goal with patient by discharge  Outcome: Progressing  Goal: Demonstrate improvement in neurological exam throughout the shift  Outcome: Progressing  Goal: Maintain BP within ordered limits throughout shift  Outcome: Progressing  Goal: Participate in treatment (ie., meds, therapy) throughout shift  Outcome: Progressing  Goal: No symptoms of aspiration throughout shift  Outcome: Progressing  Goal: No symptoms of hemorrhage throughout shift  Outcome: Progressing  Goal: Tolerate enteral feeding throughout shift  Outcome: Progressing  Goal: Decreased nausea/vomiting throughout shift  Outcome: Progressing  Goal: Controlled blood glucose throughout shift  Outcome: Progressing  Goal: Out of bed three times today  Outcome: Progressing      Problem: ICU Stroke  Goal: Maintain ICP within ordered limits throughout shift  Outcome: Progressing  Goal: Tolerate EVD clamping trial throughout shift  Outcome: Progressing  Goal: Tolerate ventilator weaning trial during shift  Outcome: Progressing  Goal: Maintain patent airway throughout shift  Outcome: Progressing  Goal: Achieve/maintain targeted sodium level throughout shift  Outcome: Progressing     Problem: Pain - Adult  Goal: Verbalizes/displays adequate comfort level or baseline comfort level  Outcome: Progressing     Problem: Safety - Adult  Goal: Free from fall injury  Outcome: Progressing     Problem: Discharge Planning  Goal: Discharge to home or other facility with appropriate resources  Outcome: Progressing     Problem: Chronic Conditions and Co-morbidities  Goal: Patient's chronic conditions and co-morbidity symptoms are monitored and maintained or improved  Outcome: Progressing     Problem: Diabetes  Goal: Achieve decreasing blood glucose levels by end of shift  Outcome: Progressing  Goal: Increase stability of blood glucose readings by end of shift  Outcome: Progressing  Goal: Decrease in ketones present in urine by end of shift  Outcome: Progressing  Goal: Maintain electrolyte levels within acceptable range throughout shift  Outcome: Progressing  Goal: Maintain glucose levels >70mg/dl to <250mg/dl throughout shift  Outcome: Progressing  Goal: No changes in neurological exam by end of shift  Outcome: Progressing  Goal: Learn about and adhere to nutrition recommendations by end of shift  Outcome: Progressing  Goal: Vital signs within normal range for age by end of shift  Outcome: Progressing  Goal: Increase self care and/or family involovement by end of shift  Outcome: Progressing  Goal: Receive DSME education by end of shift  Outcome: Progressing

## 2024-03-06 ENCOUNTER — NURSING HOME VISIT (OUTPATIENT)
Dept: POST ACUTE CARE | Facility: EXTERNAL LOCATION | Age: 64
End: 2024-03-06
Payer: COMMERCIAL

## 2024-03-06 DIAGNOSIS — E87.6 HYPOKALEMIA: ICD-10-CM

## 2024-03-06 DIAGNOSIS — I11.0 HYPERTENSIVE HEART DISEASE WITH CONGESTIVE HEART FAILURE, UNSPECIFIED HEART FAILURE TYPE (MULTI): ICD-10-CM

## 2024-03-06 DIAGNOSIS — Z79.4 TYPE 2 DIABETES MELLITUS WITHOUT COMPLICATION, WITH LONG-TERM CURRENT USE OF INSULIN (MULTI): ICD-10-CM

## 2024-03-06 DIAGNOSIS — I63.89 AC ISCH MULTI VASC TERRITORIES STROKE (MULTI): Primary | ICD-10-CM

## 2024-03-06 DIAGNOSIS — I47.10 SVT (SUPRAVENTRICULAR TACHYCARDIA) (CMS-HCC): ICD-10-CM

## 2024-03-06 DIAGNOSIS — I63.9 CEREBROVASCULAR ACCIDENT (CVA), UNSPECIFIED MECHANISM (MULTI): ICD-10-CM

## 2024-03-06 DIAGNOSIS — E11.9 TYPE 2 DIABETES MELLITUS WITHOUT COMPLICATION, WITH LONG-TERM CURRENT USE OF INSULIN (MULTI): ICD-10-CM

## 2024-03-06 DIAGNOSIS — D63.8 ANEMIA OF CHRONIC DISEASE: ICD-10-CM

## 2024-03-06 PROBLEM — N39.0 UTI (URINARY TRACT INFECTION): Status: ACTIVE | Noted: 2024-03-06

## 2024-03-06 PROCEDURE — 99305 1ST NF CARE MODERATE MDM 35: CPT | Performed by: INTERNAL MEDICINE

## 2024-03-06 NOTE — LETTER
Patient: Edenilson Ness  : 1960    Encounter Date: 2024    HISTORY & PHYSICAL    Subjective  Chief complaint: Edenilson Ness is a 63 y.o. male who is being seen and evaluated for multiple medical problems. Patient admitted to SNF for therapy due to weakness after recent hospitalization.    HPI:  HPI  Patient presented to the hospital with increased weakness on the left side and slurred speech.  Patient did have labs in ED that were essentially unremarkable.  CT of the head with contrast showed no acute intracranial hemorrhage or mass effect.  CT angiogram showed mild narrowing of the left ICA.  Patient was admitted for further evaluation and to rule out strokes.  MRI did not reveal any acute stroke.  Patient was treated for UTI with ceftriaxone and switched to Bactrim at discharge.  Patient's labs were continuously followed.  Patient was hypokalemic at 1 point with improvement of potassium to 4.  Patient is to follow-up outpatient with cardiology due to 14 beat of SVTs.  Patient hemodynamically stable to discharge back to nursing facility.  Patient was seen and examined at bedside, appears to be in no acute distress.  Denies chest pain or shortness of breath.  Denies nausea or vomiting.    Past Medical History:   Diagnosis Date   • CHF (congestive heart failure) (CMS/HCC)    • Diabetes mellitus (CMS/HCC)    • Stroke (CMS/HCC) 2019    affects left side per SNF paperwork   • Unspecified fracture of right lower leg, initial encounter for closed fracture     Leg fracture, right       Past Surgical History:   Procedure Laterality Date   • KNEE SURGERY  10/07/2015    Knee Surgery   • MR HEAD ANGIO WO IV CONTRAST  2019    MR HEAD ANGIO WO IV CONTRAST 2019 [facility] CLINICAL LEGACY   • MR NECK ANGIO WO IV CONTRAST  2019    MR NECK ANGIO WO IV CONTRAST 2019 [facility] CLINICAL LEGACY       Family History   Problem Relation Name Age of Onset   • No Known Problems Mother     • No Known Problems  Father         Social History     Socioeconomic History   • Marital status: Single     Spouse name: Not on file   • Number of children: Not on file   • Years of education: Not on file   • Highest education level: Not on file   Occupational History   • Not on file   Tobacco Use   • Smoking status: Former     Types: Cigarettes   • Smokeless tobacco: Never   Substance and Sexual Activity   • Alcohol use: Not on file   • Drug use: Not on file   • Sexual activity: Not on file   Other Topics Concern   • Not on file   Social History Narrative   • Not on file     Social Determinants of Health     Financial Resource Strain: Low Risk  (3/1/2024)    Overall Financial Resource Strain (CARDIA)    • Difficulty of Paying Living Expenses: Not hard at all   Food Insecurity: Not on file   Transportation Needs: No Transportation Needs (3/1/2024)    PRAPARE - Transportation    • Lack of Transportation (Medical): No    • Lack of Transportation (Non-Medical): No   Physical Activity: Not on file   Stress: Not on file   Social Connections: Not on file   Intimate Partner Violence: Not on file   Housing Stability: Low Risk  (3/1/2024)    Housing Stability Vital Sign    • Unable to Pay for Housing in the Last Year: No    • Number of Places Lived in the Last Year: 1    • Unstable Housing in the Last Year: No       Vital signs: 95/58, 97.5, 56, 16, blood sugar 182, 96%    Objective  Physical Exam  Constitutional:       General: He is not in acute distress.  Eyes:      Extraocular Movements: Extraocular movements intact.   Cardiovascular:      Rate and Rhythm: Normal rate and regular rhythm.   Pulmonary:      Effort: Pulmonary effort is normal.      Breath sounds: Normal breath sounds.   Abdominal:      General: Bowel sounds are normal.      Palpations: Abdomen is soft.   Musculoskeletal:      Cervical back: Neck supple.   Neurological:      Mental Status: He is alert.   Psychiatric:         Mood and Affect: Mood normal.         Behavior:  Behavior is cooperative.         Assessment/Plan  Problem List Items Addressed This Visit       CVA (cerebral vascular accident) (CMS/[facility])     monitor for changes in speech or increased weakness  Eliquis  Bleeding precautions  Statin         Type 2 diabetes mellitus without complication, with long-term current use of insulin (CMS/[facility])     Glucoscan  Metformin  Monitor A1c routinely         Hypertensive heart disease with CHF (CMS/[facility])     Monitor blood pressure  Hydralazine  Carvedilol  Isosorbide  Furosemide  Monitor for shortness of breath         Anemia of chronic disease     Stable  Continue to monitor labs         Ac isch multi vasc territories stroke (CMS/[facility]) - Primary     CT angiogram showed mild narrowing of the left ICA.  MRI showed no acute stroke.         Hypokalemia     Reported in the hospital.  At discharge, potassium 4, continue to monitor         SVT (supraventricular tachycardia)     Noted, 14 beat  Follow-up with cardiology outpatient          Hospital records reviewed  Medications, treatments, and labs reviewed  Continue medications and treatments as listed in EMR  Discussed with nursing and therapy      Scribe Attestation  INeelima Scribe   attest that this documentation has been prepared under the direction and in the presence of Alfredo Bro MD    Provider Attestation - Scribe documentation  All medical record entries made by the Scribe were at my direction and personally dictated by me. I have reviewed the chart and agree that the record accurately reflects my personal performance of the history, physical exam, discussion and plan.   Alfredo Bro MD      Electronically Signed By: Alfredo Bro MD   3/6/24  2:25 PM

## 2024-03-06 NOTE — PROGRESS NOTES
HISTORY & PHYSICAL    Subjective   Chief complaint: Edenilson Ness is a 63 y.o. male who is being seen and evaluated for multiple medical problems. Patient admitted to SNF for therapy due to weakness after recent hospitalization.    HPI:  HPI  Patient presented to the hospital with increased weakness on the left side and slurred speech.  Patient did have labs in ED that were essentially unremarkable.  CT of the head with contrast showed no acute intracranial hemorrhage or mass effect.  CT angiogram showed mild narrowing of the left ICA.  Patient was admitted for further evaluation and to rule out strokes.  MRI did not reveal any acute stroke.  Patient was treated for UTI with ceftriaxone and switched to Bactrim at discharge.  Patient's labs were continuously followed.  Patient was hypokalemic at 1 point with improvement of potassium to 4.  Patient is to follow-up outpatient with cardiology due to 14 beat of SVTs.  Patient hemodynamically stable to discharge back to nursing facility.  Patient was seen and examined at bedside, appears to be in no acute distress.  Denies chest pain or shortness of breath.  Denies nausea or vomiting.    Past Medical History:   Diagnosis Date    CHF (congestive heart failure) (CMS/[facility])     Diabetes mellitus (CMS/[facility])     Stroke (CMS/[facility]) 09/20/2019    affects left side per SNF paperwork    Unspecified fracture of right lower leg, initial encounter for closed fracture     Leg fracture, right       Past Surgical History:   Procedure Laterality Date    KNEE SURGERY  10/07/2015    Knee Surgery    MR HEAD ANGIO WO IV CONTRAST  8/29/2019    MR HEAD ANGIO WO IV CONTRAST 8/29/2019 [facility] CLINICAL LEGACY    MR NECK ANGIO WO IV CONTRAST  8/29/2019    MR NECK ANGIO WO IV CONTRAST 8/29/2019 [facility] CLINICAL LEGACY       Family History   Problem Relation Name Age of Onset    No Known Problems Mother      No Known Problems Father         Social History     Socioeconomic History    Marital status: Single      Spouse name: Not on file    Number of children: Not on file    Years of education: Not on file    Highest education level: Not on file   Occupational History    Not on file   Tobacco Use    Smoking status: Former     Types: Cigarettes    Smokeless tobacco: Never   Substance and Sexual Activity    Alcohol use: Not on file    Drug use: Not on file    Sexual activity: Not on file   Other Topics Concern    Not on file   Social History Narrative    Not on file     Social Determinants of Health     Financial Resource Strain: Low Risk  (3/1/2024)    Overall Financial Resource Strain (CARDIA)     Difficulty of Paying Living Expenses: Not hard at all   Food Insecurity: Not on file   Transportation Needs: No Transportation Needs (3/1/2024)    PRAPARE - Transportation     Lack of Transportation (Medical): No     Lack of Transportation (Non-Medical): No   Physical Activity: Not on file   Stress: Not on file   Social Connections: Not on file   Intimate Partner Violence: Not on file   Housing Stability: Low Risk  (3/1/2024)    Housing Stability Vital Sign     Unable to Pay for Housing in the Last Year: No     Number of Places Lived in the Last Year: 1     Unstable Housing in the Last Year: No       Vital signs: 95/58, 97.5, 56, 16, blood sugar 182, 96%    Objective   Physical Exam  Constitutional:       General: He is not in acute distress.  Eyes:      Extraocular Movements: Extraocular movements intact.   Cardiovascular:      Rate and Rhythm: Normal rate and regular rhythm.   Pulmonary:      Effort: Pulmonary effort is normal.      Breath sounds: Normal breath sounds.   Abdominal:      General: Bowel sounds are normal.      Palpations: Abdomen is soft.   Musculoskeletal:      Cervical back: Neck supple.   Neurological:      Mental Status: He is alert.   Psychiatric:         Mood and Affect: Mood normal.         Behavior: Behavior is cooperative.         Assessment/Plan   Problem List Items Addressed This Visit       PRECIOUS  (cerebral vascular accident) (CMS/[facility])     monitor for changes in speech or increased weakness  Eliquis  Bleeding precautions  Statin         Type 2 diabetes mellitus without complication, with long-term current use of insulin (CMS/[facility])     Glucoscan  Metformin  Monitor A1c routinely         Hypertensive heart disease with CHF (CMS/[facility])     Monitor blood pressure  Hydralazine  Carvedilol  Isosorbide  Furosemide  Monitor for shortness of breath         Anemia of chronic disease     Stable  Continue to monitor labs         Ac isch multi vasc territories stroke (CMS/[facility]) - Primary     CT angiogram showed mild narrowing of the left ICA.  MRI showed no acute stroke.         Hypokalemia     Reported in the hospital.  At discharge, potassium 4, continue to monitor         SVT (supraventricular tachycardia)     Noted, 14 beat  Follow-up with cardiology outpatient          Hospital records reviewed  Medications, treatments, and labs reviewed  Continue medications and treatments as listed in EMR  Discussed with nursing and therapy      Scribe Attestation  I, Mitch Valle   attest that this documentation has been prepared under the direction and in the presence of Alfredo Bro MD    Provider Attestation - Scribe documentation  All medical record entries made by the Scribe were at my direction and personally dictated by me. I have reviewed the chart and agree that the record accurately reflects my personal performance of the history, physical exam, discussion and plan.   Alfredo Bro MD

## 2024-03-06 NOTE — PROGRESS NOTES
PROGRESS NOTE    Subjective   Chief complaint: Edenilson Ness is a 63 y.o. male who is a long term care patient being seen and evaluated for monthly general medical care and follow-up    HPI:  HPI  Patient presents for general medical care and f/u.  Patient seen and examined at bedside.  No issues per nursing.  Patient has no acute complaints.  Patient requires assistance with ADLs.  DM, denies polydipsia polyuria polyphagia.  HTN BP at goal.  Denies chest pain and headache.  Hx CVA, denies changes in weakness and speech.  CHF stable, denies sob, orthopnea, weight gain.  Anemia stable, denies fatigue, sob, and palpitations.  No acute distress.    Objective   Vital signs: 182/92, 98.0, 15    Physical Exam  Constitutional:       General: He is not in acute distress.  Eyes:      Extraocular Movements: Extraocular movements intact.   Cardiovascular:      Rate and Rhythm: Normal rate and regular rhythm.   Pulmonary:      Effort: Pulmonary effort is normal.      Breath sounds: Normal breath sounds.   Abdominal:      General: Bowel sounds are normal.      Palpations: Abdomen is soft.   Musculoskeletal:      Cervical back: Neck supple.   Neurological:      Mental Status: He is alert.   Psychiatric:         Mood and Affect: Mood normal.         Behavior: Behavior is cooperative.         Assessment/Plan   Problem List Items Addressed This Visit       CVA (cerebral vascular accident) (CMS/[facility])     monitor for changes in speech or increased weakness  Eliquis  Bleeding precautions  Statin         Type 2 diabetes mellitus without complication, with long-term current use of insulin (CMS/[facility])     Monitor sweets and carb intake  Continue to monitor BS   monitor A1c routinely         Hypertensive heart disease with CHF (CMS/[facility]) - Primary     Coreg  Isosorbide dinitrate  Sacubitril-valsartan  Hydralazine  Spironolactone  Monitor BP         Anemia of chronic disease     Continue to monitor labs          Medications, treatments, and  labs reviewed  Continue medications and treatments as listed in EMR    Scribe Attestation  I, Meryl Valleibdhara   attest that this documentation has been prepared under the direction and in the presence of Alfredo Bro MD    Provider Attestation - Scribe documentation  All medical record entries made by the Scribe were at my direction and personally dictated by me. I have reviewed the chart and agree that the record accurately reflects my personal performance of the history, physical exam, discussion and plan.   Alfredo Bro MD

## 2024-03-06 NOTE — ASSESSMENT & PLAN NOTE
Monitor blood pressure  Hydralazine  Carvedilol  Isosorbide  Furosemide  Monitor for shortness of breath

## 2024-03-08 ENCOUNTER — NURSING HOME VISIT (OUTPATIENT)
Dept: POST ACUTE CARE | Facility: EXTERNAL LOCATION | Age: 64
End: 2024-03-08
Payer: COMMERCIAL

## 2024-03-08 DIAGNOSIS — Z79.4 TYPE 2 DIABETES MELLITUS WITHOUT COMPLICATION, WITH LONG-TERM CURRENT USE OF INSULIN (MULTI): ICD-10-CM

## 2024-03-08 DIAGNOSIS — I11.0 HYPERTENSIVE HEART DISEASE WITH CONGESTIVE HEART FAILURE, UNSPECIFIED HEART FAILURE TYPE (MULTI): ICD-10-CM

## 2024-03-08 DIAGNOSIS — N39.0 URINARY TRACT INFECTION WITHOUT HEMATURIA, SITE UNSPECIFIED: ICD-10-CM

## 2024-03-08 DIAGNOSIS — E11.9 TYPE 2 DIABETES MELLITUS WITHOUT COMPLICATION, WITH LONG-TERM CURRENT USE OF INSULIN (MULTI): ICD-10-CM

## 2024-03-08 DIAGNOSIS — I63.9 CEREBROVASCULAR ACCIDENT (CVA), UNSPECIFIED MECHANISM (MULTI): Primary | ICD-10-CM

## 2024-03-08 PROCEDURE — 99308 SBSQ NF CARE LOW MDM 20: CPT | Performed by: INTERNAL MEDICINE

## 2024-03-08 NOTE — LETTER
Patient: Edenilson Ness  : 1960    Encounter Date: 2024    PROGRESS NOTE    Subjective  Chief complaint: Edenilson Ness is a 63 y.o. male who is a long term care patient being seen and evaluated for follow-up.    HPI:  HPI  Patient is seen in follow-up since readmission due to hospitalization for UTI and strokelike symptoms.  Patient does continue on antibiotic for UTI, tolerating without adverse effects.  Therapy to evaluate patient for potential treatment.  Patient is stable.  Patient seen and examined at bedside, appears to be in no acute distress.    Objective  Vital signs: 101/57, 97.9, 66, 18, 93%    Physical Exam  Constitutional:       General: He is not in acute distress.  Eyes:      Extraocular Movements: Extraocular movements intact.   Cardiovascular:      Rate and Rhythm: Normal rate and regular rhythm.   Pulmonary:      Effort: Pulmonary effort is normal.      Breath sounds: Normal breath sounds.   Abdominal:      General: Bowel sounds are normal.      Palpations: Abdomen is soft.   Musculoskeletal:      Cervical back: Neck supple.   Neurological:      Mental Status: He is alert.   Psychiatric:         Mood and Affect: Mood normal.         Behavior: Behavior is cooperative.         Assessment/Plan  Problem List Items Addressed This Visit       CVA (cerebral vascular accident) (CMS/[facility]) - Primary     monitor for changes in speech or increased weakness  Eliquis  Bleeding precautions  Statin         Type 2 diabetes mellitus without complication, with long-term current use of insulin (CMS/[facility])     Monitor sweets and carb intake  Continue to monitor BS   monitor A1c routinely         Hypertensive heart disease with CHF (CMS/[facility])     Coreg  Isosorbide dinitrate  Sacubitril-valsartan  Hydralazine  Spironolactone  Monitor BP  BP at goal         UTI (urinary tract infection)     Continue antibiotic until complete, 3/9/2024          Medications, treatments, and labs reviewed  Continue medications and  treatments as listed in EMR    Scribe Attestation  I, Mitch Valle   attest that this documentation has been prepared under the direction and in the presence of Alfredo Bro MD    Provider Attestation - Scribe documentation  All medical record entries made by the Scribe were at my direction and personally dictated by me. I have reviewed the chart and agree that the record accurately reflects my personal performance of the history, physical exam, discussion and plan.   Alfredo Bro MD            Electronically Signed By: Alfredo Bro MD   3/8/24  1:46 PM

## 2024-03-08 NOTE — ASSESSMENT & PLAN NOTE
Coreg  Isosorbide dinitrate  Sacubitril-valsartan  Hydralazine  Spironolactone  Monitor BP  BP at goal

## 2024-03-08 NOTE — PROGRESS NOTES
PROGRESS NOTE    Subjective   Chief complaint: Edenilson Ness is a 63 y.o. male who is a long term care patient being seen and evaluated for follow-up.    HPI:  HPI  Patient is seen in follow-up since readmission due to hospitalization for UTI and strokelike symptoms.  Patient does continue on antibiotic for UTI, tolerating without adverse effects.  Therapy to evaluate patient for potential treatment.  Patient is stable.  Patient seen and examined at bedside, appears to be in no acute distress.    Objective   Vital signs: 101/57, 97.9, 66, 18, 93%    Physical Exam  Constitutional:       General: He is not in acute distress.  Eyes:      Extraocular Movements: Extraocular movements intact.   Cardiovascular:      Rate and Rhythm: Normal rate and regular rhythm.   Pulmonary:      Effort: Pulmonary effort is normal.      Breath sounds: Normal breath sounds.   Abdominal:      General: Bowel sounds are normal.      Palpations: Abdomen is soft.   Musculoskeletal:      Cervical back: Neck supple.   Neurological:      Mental Status: He is alert.   Psychiatric:         Mood and Affect: Mood normal.         Behavior: Behavior is cooperative.         Assessment/Plan   Problem List Items Addressed This Visit       CVA (cerebral vascular accident) (CMS/[facility]) - Primary     monitor for changes in speech or increased weakness  Eliquis  Bleeding precautions  Statin         Type 2 diabetes mellitus without complication, with long-term current use of insulin (CMS/[facility])     Monitor sweets and carb intake  Continue to monitor BS   monitor A1c routinely         Hypertensive heart disease with CHF (CMS/[facility])     Coreg  Isosorbide dinitrate  Sacubitril-valsartan  Hydralazine  Spironolactone  Monitor BP  BP at goal         UTI (urinary tract infection)     Continue antibiotic until complete, 3/9/2024          Medications, treatments, and labs reviewed  Continue medications and treatments as listed in EMR    Scribe Attestation  INeelima  Mitch Patel   attest that this documentation has been prepared under the direction and in the presence of Alfredo Bro MD    Provider Attestation - Scribe documentation  All medical record entries made by the Scribe were at my direction and personally dictated by me. I have reviewed the chart and agree that the record accurately reflects my personal performance of the history, physical exam, discussion and plan.   Alfredo Bro MD

## 2024-03-11 NOTE — PROGRESS NOTES
PROGRESS NOTE    Subjective   Chief complaint: Edenilson Ness is a 63 y.o. male who is a long term care patient being seen and evaluated for readmission to nursing facility    HPI:  HPI  Patient is readmitted to nursing facility following hospitalization.  Patient is readmitted on antibiotic for UTI.  Patient was ruled out for stroke.  Patient seen and examined at bedside, appears to be in no acute distress.  Patient denies fever or chills.  Tolerating antibiotic without adverse effects.    Objective   Vital signs: 95/58, 97.5, 56, 16, blood sugar 182, 96%    Physical Exam  Constitutional:       General: He is not in acute distress.  Eyes:      Extraocular Movements: Extraocular movements intact.   Cardiovascular:      Rate and Rhythm: Normal rate and regular rhythm.   Pulmonary:      Effort: Pulmonary effort is normal.      Breath sounds: Normal breath sounds.   Abdominal:      General: Bowel sounds are normal.      Palpations: Abdomen is soft.   Musculoskeletal:      Cervical back: Neck supple.      Comments: Left-sided weakness   Neurological:      Mental Status: He is alert.   Psychiatric:         Mood and Affect: Mood normal.         Behavior: Behavior is cooperative.         Assessment/Plan   Problem List Items Addressed This Visit       CVA (cerebral vascular accident) (CMS/[facility])     monitor for changes in speech or increased weakness  Eliquis  Bleeding precautions  Statin         Type 2 diabetes mellitus without complication, with long-term current use of insulin (CMS/[facility])     Monitor sweets and carb intake  Continue to monitor BS   monitor A1c routinely         Hypertensive heart disease with CHF (CMS/[facility])     Coreg  Isosorbide dinitrate  Sacubitril-valsartan  Hydralazine  Spironolactone  Monitor BP  BP at goal         UTI (urinary tract infection) - Primary     Continue antibiotic until complete          Medications, treatments, and labs reviewed  Continue medications and treatments as listed in  EMR    Scribe Attestation  I, Mitch Valle   attest that this documentation has been prepared under the direction and in the presence of LUCRETIA Griffiths    Provider Attestation - Scribe documentation  All medical record entries made by the Scribe were at my direction and personally dictated by me. I have reviewed the chart and agree that the record accurately reflects my personal performance of the history, physical exam, discussion and plan.   LUCRETIA Griffiths

## 2024-03-12 LAB
ATRIAL RATE: 60 BPM
P AXIS: 56 DEGREES
PR INTERVAL: 211 MS
Q ONSET: 253 MS
QRS COUNT: 9 BEATS
QRS DURATION: 98 MS
QT INTERVAL: 426 MS
QTC CALCULATION(BAZETT): 422 MS
QTC FREDERICIA: 423 MS
R AXIS: -41 DEGREES
T AXIS: 38 DEGREES
T OFFSET: 466 MS
VENTRICULAR RATE: 59 BPM

## 2024-03-13 ENCOUNTER — NURSING HOME VISIT (OUTPATIENT)
Dept: POST ACUTE CARE | Facility: EXTERNAL LOCATION | Age: 64
End: 2024-03-13
Payer: COMMERCIAL

## 2024-03-13 DIAGNOSIS — I11.0 HYPERTENSIVE HEART DISEASE WITH CONGESTIVE HEART FAILURE, UNSPECIFIED HEART FAILURE TYPE (MULTI): ICD-10-CM

## 2024-03-13 DIAGNOSIS — R19.5 LOOSE STOOLS: Primary | ICD-10-CM

## 2024-03-13 DIAGNOSIS — I63.9 CEREBROVASCULAR ACCIDENT (CVA), UNSPECIFIED MECHANISM (MULTI): ICD-10-CM

## 2024-03-13 PROCEDURE — 99308 SBSQ NF CARE LOW MDM 20: CPT | Performed by: INTERNAL MEDICINE

## 2024-03-13 NOTE — PROGRESS NOTES
PROGRESS NOTE    Subjective   Chief complaint: Edenilson Ness is a 63 y.o. male who is a long term care patient being seen and evaluated for loose stools    HPI:  HPI  Patient is seen for complaints of loose stools.  Patient has no fever.  No nausea or vomiting.  Orders placed to change stool softener to as needed.  Also sending stool for C. difficile and starting Imodium.  Patient seen and examined at bedside, appears to be in no acute distress.    Objective   Vital signs: 101/57, 97.9, 66, 18, 93%    Physical Exam  Constitutional:       General: He is not in acute distress.  Eyes:      Extraocular Movements: Extraocular movements intact.   Cardiovascular:      Rate and Rhythm: Normal rate and regular rhythm.   Pulmonary:      Effort: Pulmonary effort is normal.      Breath sounds: Normal breath sounds.   Abdominal:      General: Bowel sounds are normal.      Palpations: Abdomen is soft.   Musculoskeletal:      Cervical back: Neck supple.   Neurological:      Mental Status: He is alert.   Psychiatric:         Mood and Affect: Mood normal.         Behavior: Behavior is cooperative.         Assessment/Plan   Problem List Items Addressed This Visit       CVA (cerebral vascular accident) (CMS/[facility])     monitor for changes in speech or increased weakness  Eliquis  Bleeding precautions  Statin         Hypertensive heart disease with CHF (CMS/[facility])     Coreg  Isosorbide dinitrate  Sacubitril-valsartan  Hydralazine  Spironolactone  Monitor BP  BP at goal         Loose stools - Primary     Change stool softeners to as needed  Send stool for C. difficile  Imodium          Medications, treatments, and labs reviewed  Continue medications and treatments as listed in EMR    Scribe Attestation  I, Mitch Valle   attest that this documentation has been prepared under the direction and in the presence of Alfredo Bro MD    Provider Attestation - Scribe documentation  All medical record entries made by the Scribe were at  my direction and personally dictated by me. I have reviewed the chart and agree that the record accurately reflects my personal performance of the history, physical exam, discussion and plan.   Alfredo Bro MD

## 2024-03-13 NOTE — LETTER
Patient: Edenilson Ness  : 1960    Encounter Date: 2024    PROGRESS NOTE    Subjective  Chief complaint: Edenilson Ness is a 63 y.o. male who is a long term care patient being seen and evaluated for loose stools    HPI:  HPI  Patient is seen for complaints of loose stools.  Patient has no fever.  No nausea or vomiting.  Orders placed to change stool softener to as needed.  Also sending stool for C. difficile and starting Imodium.  Patient seen and examined at bedside, appears to be in no acute distress.    Objective  Vital signs: 101/57, 97.9, 66, 18, 93%    Physical Exam  Constitutional:       General: He is not in acute distress.  Eyes:      Extraocular Movements: Extraocular movements intact.   Cardiovascular:      Rate and Rhythm: Normal rate and regular rhythm.   Pulmonary:      Effort: Pulmonary effort is normal.      Breath sounds: Normal breath sounds.   Abdominal:      General: Bowel sounds are normal.      Palpations: Abdomen is soft.   Musculoskeletal:      Cervical back: Neck supple.   Neurological:      Mental Status: He is alert.   Psychiatric:         Mood and Affect: Mood normal.         Behavior: Behavior is cooperative.         Assessment/Plan  Problem List Items Addressed This Visit       CVA (cerebral vascular accident) (CMS/[facility])     monitor for changes in speech or increased weakness  Eliquis  Bleeding precautions  Statin         Hypertensive heart disease with CHF (CMS/[facility])     Coreg  Isosorbide dinitrate  Sacubitril-valsartan  Hydralazine  Spironolactone  Monitor BP  BP at goal         Loose stools - Primary     Change stool softeners to as needed  Send stool for C. difficile  Imodium          Medications, treatments, and labs reviewed  Continue medications and treatments as listed in EMR    Scribe Attestation  I, Mitch Valle   attest that this documentation has been prepared under the direction and in the presence of Alfredo Bro MD    Provider Attestation - Mitch  documentation  All medical record entries made by the Scribe were at my direction and personally dictated by me. I have reviewed the chart and agree that the record accurately reflects my personal performance of the history, physical exam, discussion and plan.   Alfredo Bro MD            Electronically Signed By: Alfredo Bro MD   3/13/24  2:55 PM

## 2024-03-20 ENCOUNTER — NURSING HOME VISIT (OUTPATIENT)
Dept: POST ACUTE CARE | Facility: EXTERNAL LOCATION | Age: 64
End: 2024-03-20
Payer: COMMERCIAL

## 2024-03-20 DIAGNOSIS — I11.0 HYPERTENSIVE HEART DISEASE WITH CONGESTIVE HEART FAILURE, UNSPECIFIED HEART FAILURE TYPE (MULTI): ICD-10-CM

## 2024-03-20 DIAGNOSIS — I63.9 CEREBROVASCULAR ACCIDENT (CVA), UNSPECIFIED MECHANISM (MULTI): ICD-10-CM

## 2024-03-20 DIAGNOSIS — R53.1 WEAKNESS: Primary | ICD-10-CM

## 2024-03-20 PROCEDURE — 99308 SBSQ NF CARE LOW MDM 20: CPT | Performed by: INTERNAL MEDICINE

## 2024-03-20 NOTE — PROGRESS NOTES
PROGRESS NOTE    Subjective   Chief complaint: Edenilson Ness is a 63 y.o. male who is a long term care patient being seen and evaluated for weakness.    HPI:  HPI  Patient does continue working in speech therapy to address cognition.  Reported that patient is actively participating in skilled interventions.  Patient was seen and examined at bedside, appears to be in no acute distress.  Denies chest pain or shortness of breath.  Afebrile.  Nursing staff voicing no new concerns at this time.    Objective   Vital signs: 101/57, 97.9, 66, 18, 93%    Physical Exam  Constitutional:       General: He is not in acute distress.  Eyes:      Extraocular Movements: Extraocular movements intact.   Cardiovascular:      Rate and Rhythm: Normal rate and regular rhythm.   Pulmonary:      Effort: Pulmonary effort is normal.      Breath sounds: Normal breath sounds.   Abdominal:      General: Bowel sounds are normal.      Palpations: Abdomen is soft.   Musculoskeletal:      Cervical back: Neck supple.   Neurological:      Mental Status: He is alert.      Motor: Weakness present.   Psychiatric:         Mood and Affect: Mood normal.         Behavior: Behavior is cooperative.         Assessment/Plan   Problem List Items Addressed This Visit       Weakness - Primary     Continue in therapy         CVA (cerebral vascular accident) (CMS/[facility])     monitor for changes in speech or increased weakness  Eliquis  Bleeding precautions  Statin         Hypertensive heart disease with CHF (CMS/[facility])     Coreg  Isosorbide dinitrate  Sacubitril-valsartan  Hydralazine  Spironolactone  Monitor BP  BP at goal          Medications, treatments, and labs reviewed  Continue medications and treatments as listed in EMR    Scribe Attestation  I, Mitch Valle   attest that this documentation has been prepared under the direction and in the presence of Alfredo Bro MD    Provider Attestation - Scribe documentation  All medical record entries made by  the Scribe were at my direction and personally dictated by me. I have reviewed the chart and agree that the record accurately reflects my personal performance of the history, physical exam, discussion and plan.   Alfredo Bro MD

## 2024-03-20 NOTE — LETTER
Patient: Edenilson Ness  : 1960    Encounter Date: 2024    PROGRESS NOTE    Subjective  Chief complaint: Edenilson Ness is a 63 y.o. male who is a long term care patient being seen and evaluated for weakness.    HPI:  HPI  Patient does continue working in speech therapy to address cognition.  Reported that patient is actively participating in skilled interventions.  Patient was seen and examined at bedside, appears to be in no acute distress.  Denies chest pain or shortness of breath.  Afebrile.  Nursing staff voicing no new concerns at this time.    Objective  Vital signs: 101/57, 97.9, 66, 18, 93%    Physical Exam  Constitutional:       General: He is not in acute distress.  Eyes:      Extraocular Movements: Extraocular movements intact.   Cardiovascular:      Rate and Rhythm: Normal rate and regular rhythm.   Pulmonary:      Effort: Pulmonary effort is normal.      Breath sounds: Normal breath sounds.   Abdominal:      General: Bowel sounds are normal.      Palpations: Abdomen is soft.   Musculoskeletal:      Cervical back: Neck supple.   Neurological:      Mental Status: He is alert.      Motor: Weakness present.   Psychiatric:         Mood and Affect: Mood normal.         Behavior: Behavior is cooperative.         Assessment/Plan  Problem List Items Addressed This Visit       Weakness - Primary     Continue in therapy         CVA (cerebral vascular accident) (CMS/[facility])     monitor for changes in speech or increased weakness  Eliquis  Bleeding precautions  Statin         Hypertensive heart disease with CHF (CMS/[facility])     Coreg  Isosorbide dinitrate  Sacubitril-valsartan  Hydralazine  Spironolactone  Monitor BP  BP at goal          Medications, treatments, and labs reviewed  Continue medications and treatments as listed in EMR    Scribe Attestation  I, Neelima Patel, Merylibdhara   attest that this documentation has been prepared under the direction and in the presence of Alfredo Bro MD    Provider  Attestation - Scribe documentation  All medical record entries made by the Scribe were at my direction and personally dictated by me. I have reviewed the chart and agree that the record accurately reflects my personal performance of the history, physical exam, discussion and plan.   Alfredo Bro MD            Electronically Signed By: Alfredo Bro MD   3/20/24  3:26 PM

## 2024-03-27 ENCOUNTER — NURSING HOME VISIT (OUTPATIENT)
Dept: POST ACUTE CARE | Facility: EXTERNAL LOCATION | Age: 64
End: 2024-03-27
Payer: COMMERCIAL

## 2024-03-27 DIAGNOSIS — I63.9 CEREBROVASCULAR ACCIDENT (CVA), UNSPECIFIED MECHANISM (MULTI): ICD-10-CM

## 2024-03-27 DIAGNOSIS — I11.0 HYPERTENSIVE HEART DISEASE WITH CONGESTIVE HEART FAILURE, UNSPECIFIED HEART FAILURE TYPE (MULTI): ICD-10-CM

## 2024-03-27 DIAGNOSIS — Z78.9 ADMITS TO ALCOHOL CONSUMPTION: ICD-10-CM

## 2024-03-27 DIAGNOSIS — R53.1 WEAKNESS: Primary | ICD-10-CM

## 2024-03-27 PROCEDURE — 99308 SBSQ NF CARE LOW MDM 20: CPT | Performed by: INTERNAL MEDICINE

## 2024-03-27 NOTE — PROGRESS NOTES
PROGRESS NOTE    Subjective   Chief complaint: Edenilson Ness is a 63 y.o. male who is a long term care patient being seen and evaluated for weakness.    HPI:  HPI  Patient does continue working in speech therapy to address cognition.  Patient to work with physical therapy with evaluation and establishment of goals.  Patient is actively participating in skilled interventions with therapy.  Patient is allowed per patient request 1 beer per week.    Objective   Vital signs: 115/71, 98.3, 69, 18, 96%    Physical Exam  Constitutional:       General: He is not in acute distress.  Eyes:      Extraocular Movements: Extraocular movements intact.   Cardiovascular:      Rate and Rhythm: Normal rate and regular rhythm.   Pulmonary:      Effort: Pulmonary effort is normal.      Breath sounds: Normal breath sounds.   Abdominal:      General: Bowel sounds are normal.      Palpations: Abdomen is soft.   Musculoskeletal:      Cervical back: Neck supple.   Neurological:      Mental Status: He is alert.      Motor: Weakness present.   Psychiatric:         Mood and Affect: Mood normal.         Behavior: Behavior is cooperative.         Assessment/Plan   Problem List Items Addressed This Visit       Weakness - Primary     Continue therapy         CVA (cerebral vascular accident) (CMS/[facility])     monitor for changes in speech or increased weakness  Eliquis  Bleeding precautions  Statin         Hypertensive heart disease with CHF (CMS/[facility])     Coreg  Isosorbide dinitrate  Sacubitril-valsartan  Hydralazine  Spironolactone  Monitor BP  BP at goal         Admits to alcohol consumption     Allowed 1 drink per week  Monitor          Medications, treatments, and labs reviewed  Continue medications and treatments as listed in EMR    Scribe Attestation  I, Mitch Valle   attest that this documentation has been prepared under the direction and in the presence of Alfredo Bro MD    Provider Attestation - Scribe documentation  All  medical record entries made by the Scribe were at my direction and personally dictated by me. I have reviewed the chart and agree that the record accurately reflects my personal performance of the history, physical exam, discussion and plan.   Alfredo Bro MD

## 2024-03-27 NOTE — LETTER
Patient: Edenilson Ness  : 1960    Encounter Date: 2024    PROGRESS NOTE    Subjective  Chief complaint: Edenilson Ness is a 63 y.o. male who is a long term care patient being seen and evaluated for weakness.    HPI:  HPI  Patient does continue working in speech therapy to address cognition.  Patient to work with physical therapy with evaluation and establishment of goals.  Patient is actively participating in skilled interventions with therapy.  Patient is allowed per patient request 1 beer per week.    Objective  Vital signs: 115/71, 98.3, 69, 18, 96%    Physical Exam  Constitutional:       General: He is not in acute distress.  Eyes:      Extraocular Movements: Extraocular movements intact.   Cardiovascular:      Rate and Rhythm: Normal rate and regular rhythm.   Pulmonary:      Effort: Pulmonary effort is normal.      Breath sounds: Normal breath sounds.   Abdominal:      General: Bowel sounds are normal.      Palpations: Abdomen is soft.   Musculoskeletal:      Cervical back: Neck supple.   Neurological:      Mental Status: He is alert.      Motor: Weakness present.   Psychiatric:         Mood and Affect: Mood normal.         Behavior: Behavior is cooperative.         Assessment/Plan  Problem List Items Addressed This Visit       Weakness - Primary     Continue therapy         CVA (cerebral vascular accident) (CMS/[facility])     monitor for changes in speech or increased weakness  Eliquis  Bleeding precautions  Statin         Hypertensive heart disease with CHF (CMS/[facility])     Coreg  Isosorbide dinitrate  Sacubitril-valsartan  Hydralazine  Spironolactone  Monitor BP  BP at goal         Admits to alcohol consumption     Allowed 1 drink per week  Monitor          Medications, treatments, and labs reviewed  Continue medications and treatments as listed in EMR    Scribe Attestation  JAMES, Mitch Valle   attest that this documentation has been prepared under the direction and in the presence of Alfredo ASH  MD Adali    Provider Attestation - Scribe documentation  All medical record entries made by the Scribe were at my direction and personally dictated by me. I have reviewed the chart and agree that the record accurately reflects my personal performance of the history, physical exam, discussion and plan.   Alfredo Bro MD            Electronically Signed By: Alfredo Bro MD   3/27/24  6:20 PM

## 2024-04-03 ENCOUNTER — NURSING HOME VISIT (OUTPATIENT)
Dept: POST ACUTE CARE | Facility: EXTERNAL LOCATION | Age: 64
End: 2024-04-03
Payer: COMMERCIAL

## 2024-04-03 DIAGNOSIS — D63.8 ANEMIA OF CHRONIC DISEASE: ICD-10-CM

## 2024-04-03 DIAGNOSIS — E11.9 TYPE 2 DIABETES MELLITUS WITHOUT COMPLICATION, WITH LONG-TERM CURRENT USE OF INSULIN (MULTI): ICD-10-CM

## 2024-04-03 DIAGNOSIS — I63.9 CEREBROVASCULAR ACCIDENT (CVA), UNSPECIFIED MECHANISM (MULTI): ICD-10-CM

## 2024-04-03 DIAGNOSIS — I11.0 HYPERTENSIVE HEART DISEASE WITH CONGESTIVE HEART FAILURE, UNSPECIFIED HEART FAILURE TYPE (MULTI): Primary | ICD-10-CM

## 2024-04-03 DIAGNOSIS — Z79.4 TYPE 2 DIABETES MELLITUS WITHOUT COMPLICATION, WITH LONG-TERM CURRENT USE OF INSULIN (MULTI): ICD-10-CM

## 2024-04-03 PROCEDURE — 99309 SBSQ NF CARE MODERATE MDM 30: CPT | Performed by: INTERNAL MEDICINE

## 2024-04-03 NOTE — LETTER
Patient: Edenilson Ness  : 1960    Encounter Date: 2024    PROGRESS NOTE    Subjective  Chief complaint: Edenilson Ness is a 63 y.o. male who is a long term care patient being seen and evaluated for monthly general medical care and follow-up    HPI:  HPI  Patient presents for general medical care and f/u.  Patient seen and examined at bedside.  No issues per nursing.  Patient has no acute complaints.  Patient requires assistance with ADLs.  HTN BP at goal.  Denies chest pain and headache.  CHF stable, denies sob, orthopnea, weight gain.  Hx CVA, denies changes in weakness and speech.  Anemia stable, denies fatigue, sob, and palpitations.  DM, denies polydipsia polyuria polyphagia.  No acute distress.    Objective  Vital signs: 115/71, 98.3, 69, 18, 96%    Physical Exam  Constitutional:       General: He is not in acute distress.  Eyes:      Extraocular Movements: Extraocular movements intact.   Cardiovascular:      Rate and Rhythm: Normal rate and regular rhythm.   Pulmonary:      Effort: Pulmonary effort is normal.      Breath sounds: Normal breath sounds.   Abdominal:      General: Bowel sounds are normal.      Palpations: Abdomen is soft.   Musculoskeletal:      Cervical back: Neck supple.   Neurological:      Mental Status: He is alert.   Psychiatric:         Mood and Affect: Mood normal.         Behavior: Behavior is cooperative.         Assessment/Plan  Problem List Items Addressed This Visit       CVA (cerebral vascular accident) (CMS/[facility])     monitor for changes in speech or increased weakness  Eliquis  Bleeding precautions  Statin         Type 2 diabetes mellitus without complication, with long-term current use of insulin (CMS/[facility])     Monitor sweets and carb intake  Continue to monitor BS   monitor A1c routinely         Hypertensive heart disease with CHF (CMS/[facility]) - Primary     Coreg  Isosorbide dinitrate  Sacubitril-valsartan  Hydralazine  Spironolactone  Monitor BP  BP at goal         Anemia  of chronic disease     Continue to monitor labs          Medications, treatments, and labs reviewed  Continue medications and treatments as listed in EMR    Scribe Attestation  I, Mitch Valle   attest that this documentation has been prepared under the direction and in the presence of Alfredo Bro MD    Provider Attestation - Scribe documentation  All medical record entries made by the Scribe were at my direction and personally dictated by me. I have reviewed the chart and agree that the record accurately reflects my personal performance of the history, physical exam, discussion and plan.   Alfredo Bro MD            Electronically Signed By: Alfredo Bro MD   4/4/24  1:22 PM

## 2024-04-04 NOTE — PROGRESS NOTES
PROGRESS NOTE    Subjective   Chief complaint: Edenilson Ness is a 63 y.o. male who is a long term care patient being seen and evaluated for monthly general medical care and follow-up    HPI:  HPI  Patient presents for general medical care and f/u.  Patient seen and examined at bedside.  No issues per nursing.  Patient has no acute complaints.  Patient requires assistance with ADLs.  HTN BP at goal.  Denies chest pain and headache.  CHF stable, denies sob, orthopnea, weight gain.  Hx CVA, denies changes in weakness and speech.  Anemia stable, denies fatigue, sob, and palpitations.  DM, denies polydipsia polyuria polyphagia.  No acute distress.    Objective   Vital signs: 115/71, 98.3, 69, 18, 96%    Physical Exam  Constitutional:       General: He is not in acute distress.  Eyes:      Extraocular Movements: Extraocular movements intact.   Cardiovascular:      Rate and Rhythm: Normal rate and regular rhythm.   Pulmonary:      Effort: Pulmonary effort is normal.      Breath sounds: Normal breath sounds.   Abdominal:      General: Bowel sounds are normal.      Palpations: Abdomen is soft.   Musculoskeletal:      Cervical back: Neck supple.   Neurological:      Mental Status: He is alert.   Psychiatric:         Mood and Affect: Mood normal.         Behavior: Behavior is cooperative.         Assessment/Plan   Problem List Items Addressed This Visit       CVA (cerebral vascular accident) (CMS/[facility])     monitor for changes in speech or increased weakness  Eliquis  Bleeding precautions  Statin         Type 2 diabetes mellitus without complication, with long-term current use of insulin (CMS/[facility])     Monitor sweets and carb intake  Continue to monitor BS   monitor A1c routinely         Hypertensive heart disease with CHF (CMS/[facility]) - Primary     Coreg  Isosorbide dinitrate  Sacubitril-valsartan  Hydralazine  Spironolactone  Monitor BP  BP at goal         Anemia of chronic disease     Continue to monitor labs           Medications, treatments, and labs reviewed  Continue medications and treatments as listed in EMR    Scribe Attestation  I, Mitch Valle   attest that this documentation has been prepared under the direction and in the presence of Alfredo Bro MD    Provider Attestation - Scribe documentation  All medical record entries made by the Scribe were at my direction and personally dictated by me. I have reviewed the chart and agree that the record accurately reflects my personal performance of the history, physical exam, discussion and plan.   Alfredo Bro MD

## 2024-04-05 ENCOUNTER — NURSING HOME VISIT (OUTPATIENT)
Dept: POST ACUTE CARE | Facility: EXTERNAL LOCATION | Age: 64
End: 2024-04-05
Payer: COMMERCIAL

## 2024-04-05 DIAGNOSIS — I63.9 CEREBROVASCULAR ACCIDENT (CVA), UNSPECIFIED MECHANISM (MULTI): ICD-10-CM

## 2024-04-05 DIAGNOSIS — Z79.4 TYPE 2 DIABETES MELLITUS WITHOUT COMPLICATION, WITH LONG-TERM CURRENT USE OF INSULIN (MULTI): ICD-10-CM

## 2024-04-05 DIAGNOSIS — R53.1 WEAKNESS: Primary | ICD-10-CM

## 2024-04-05 DIAGNOSIS — E11.9 TYPE 2 DIABETES MELLITUS WITHOUT COMPLICATION, WITH LONG-TERM CURRENT USE OF INSULIN (MULTI): ICD-10-CM

## 2024-04-05 DIAGNOSIS — I11.0 HYPERTENSIVE HEART DISEASE WITH CONGESTIVE HEART FAILURE, UNSPECIFIED HEART FAILURE TYPE (MULTI): ICD-10-CM

## 2024-04-05 PROCEDURE — 99309 SBSQ NF CARE MODERATE MDM 30: CPT | Performed by: INTERNAL MEDICINE

## 2024-04-05 NOTE — PROGRESS NOTES
PROGRESS NOTE    Subjective   Chief complaint: Edenilson Ness is a 63 y.o. male who is a long term care patient being seen and evaluated for weakness.    HPI:  HPI  Patient is working in therapy working on bilateral lower extremity therapeutic exercises focused on hip flexion, knee extension and flexion to increase strength and endurance to improve overall mobility.  Patient is seen and examined at the bedside, appears to be in no acute distress.  Patient denies chest pain or shortness of breath.  Afebrile.  Patient reports no new concerns at this time.    Objective   Vital signs: 115/71, 98.3, 69, 18, 96%    Physical Exam  Constitutional:       General: He is not in acute distress.  Eyes:      Extraocular Movements: Extraocular movements intact.   Cardiovascular:      Rate and Rhythm: Normal rate and regular rhythm.   Pulmonary:      Effort: Pulmonary effort is normal.      Breath sounds: Normal breath sounds.   Abdominal:      General: Bowel sounds are normal.      Palpations: Abdomen is soft.   Musculoskeletal:      Cervical back: Neck supple.   Neurological:      Mental Status: He is alert.      Motor: Weakness present.   Psychiatric:         Mood and Affect: Mood normal.         Behavior: Behavior is cooperative.         Assessment/Plan   Problem List Items Addressed This Visit       Weakness - Primary     Continue working with therapy         CVA (cerebral vascular accident) (CMS/[facility])     monitor for changes in speech or increased weakness  Eliquis  Bleeding precautions  Statin         Type 2 diabetes mellitus without complication, with long-term current use of insulin (CMS/[facility])     Monitor sweets and carb intake  Continue to monitor BS   monitor A1c routinely         Hypertensive heart disease with CHF (CMS/[facility])     Coreg  Isosorbide dinitrate  Sacubitril-valsartan  Hydralazine  Spironolactone  Monitor BP  BP at goal          Medications, treatments, and labs reviewed  Continue medications and treatments as  listed in EMR    Scribe Attestation  I, Mitch Valle   attest that this documentation has been prepared under the direction and in the presence of Aflredo Bro MD    Provider Attestation - Scribe documentation  All medical record entries made by the Scribe were at my direction and personally dictated by me. I have reviewed the chart and agree that the record accurately reflects my personal performance of the history, physical exam, discussion and plan.   Alfredo Bro MD

## 2024-04-05 NOTE — LETTER
Patient: Edenilson Ness  : 1960    Encounter Date: 2024    PROGRESS NOTE    Subjective  Chief complaint: Edenilson Ness is a 63 y.o. male who is a long term care patient being seen and evaluated for weakness.    HPI:  HPI  Patient is working in therapy working on bilateral lower extremity therapeutic exercises focused on hip flexion, knee extension and flexion to increase strength and endurance to improve overall mobility.  Patient is seen and examined at the bedside, appears to be in no acute distress.  Patient denies chest pain or shortness of breath.  Afebrile.  Patient reports no new concerns at this time.    Objective  Vital signs: 115/71, 98.3, 69, 18, 96%    Physical Exam  Constitutional:       General: He is not in acute distress.  Eyes:      Extraocular Movements: Extraocular movements intact.   Cardiovascular:      Rate and Rhythm: Normal rate and regular rhythm.   Pulmonary:      Effort: Pulmonary effort is normal.      Breath sounds: Normal breath sounds.   Abdominal:      General: Bowel sounds are normal.      Palpations: Abdomen is soft.   Musculoskeletal:      Cervical back: Neck supple.   Neurological:      Mental Status: He is alert.      Motor: Weakness present.   Psychiatric:         Mood and Affect: Mood normal.         Behavior: Behavior is cooperative.         Assessment/Plan  Problem List Items Addressed This Visit       Weakness - Primary     Continue working with therapy         CVA (cerebral vascular accident) (CMS/[facility])     monitor for changes in speech or increased weakness  Eliquis  Bleeding precautions  Statin         Type 2 diabetes mellitus without complication, with long-term current use of insulin (CMS/[facility])     Monitor sweets and carb intake  Continue to monitor BS   monitor A1c routinely         Hypertensive heart disease with CHF (CMS/[facility])     Coreg  Isosorbide dinitrate  Sacubitril-valsartan  Hydralazine  Spironolactone  Monitor BP  BP at goal          Medications,  treatments, and labs reviewed  Continue medications and treatments as listed in EMR    Scribe Attestation  I, Mitch Valle   attest that this documentation has been prepared under the direction and in the presence of Alfredo Bro MD    Provider Attestation - Scribe documentation  All medical record entries made by the Scribe were at my direction and personally dictated by me. I have reviewed the chart and agree that the record accurately reflects my personal performance of the history, physical exam, discussion and plan.   Alfredo Bro MD            Electronically Signed By: Alfredo Bro MD   4/5/24  1:26 PM

## 2024-04-10 ENCOUNTER — NURSING HOME VISIT (OUTPATIENT)
Dept: POST ACUTE CARE | Facility: EXTERNAL LOCATION | Age: 64
End: 2024-04-10
Payer: COMMERCIAL

## 2024-04-10 DIAGNOSIS — J18.9 PNEUMONIA DUE TO INFECTIOUS ORGANISM, UNSPECIFIED LATERALITY, UNSPECIFIED PART OF LUNG: ICD-10-CM

## 2024-04-10 DIAGNOSIS — R53.1 WEAKNESS: Primary | ICD-10-CM

## 2024-04-10 DIAGNOSIS — E11.9 TYPE 2 DIABETES MELLITUS WITHOUT COMPLICATION, WITH LONG-TERM CURRENT USE OF INSULIN (MULTI): ICD-10-CM

## 2024-04-10 DIAGNOSIS — I11.0 HYPERTENSIVE HEART DISEASE WITH CONGESTIVE HEART FAILURE, UNSPECIFIED HEART FAILURE TYPE (MULTI): ICD-10-CM

## 2024-04-10 DIAGNOSIS — Z79.4 TYPE 2 DIABETES MELLITUS WITHOUT COMPLICATION, WITH LONG-TERM CURRENT USE OF INSULIN (MULTI): ICD-10-CM

## 2024-04-10 PROCEDURE — 99308 SBSQ NF CARE LOW MDM 20: CPT | Performed by: INTERNAL MEDICINE

## 2024-04-10 NOTE — LETTER
Patient: Edenilson Ness  : 1960    Encounter Date: 04/10/2024    PROGRESS NOTE    Subjective  Chief complaint: Edenilson Ness is a 63 y.o. male who is a long term care patient being seen and evaluated for pneumonia f/u.    HPI:  HPI  Patient is seen in follow-up for pneumonia.  Patient had reports of wheezing and was started on Mucinex aerosol treatment Flonase and orders placed to change chest x-ray.  Chest x-ray had shown pneumonia and patient was started on Levaquin.  Patient is tolerating antibiotics without adverse effects.  Patient also working in therapy due to generalized weakness.  Patient is working on bilateral Ferrex focusing on hip flexion, knee extension, knee flexion and hip abduction with knees bent.  Patient seen and examined at bedside, appears to be in no acute distress.  Afebrile at this time.    Objective  Vital signs: 106/63, 97.9, 70, 17, 97%    Physical Exam  Constitutional:       General: He is not in acute distress.  Eyes:      Extraocular Movements: Extraocular movements intact.   Cardiovascular:      Rate and Rhythm: Normal rate and regular rhythm.   Pulmonary:      Effort: Pulmonary effort is normal.      Breath sounds: Normal breath sounds.   Abdominal:      General: Bowel sounds are normal.      Palpations: Abdomen is soft.   Musculoskeletal:      Cervical back: Neck supple.   Neurological:      Mental Status: He is alert.      Motor: Weakness present.   Psychiatric:         Mood and Affect: Mood normal.         Behavior: Behavior is cooperative.         Assessment/Plan  Problem List Items Addressed This Visit       Weakness - Primary     Continuing therapy         Type 2 diabetes mellitus without complication, with long-term current use of insulin (CMS/[facility])     Monitor sweets and carb intake  Continue to monitor BS   monitor A1c routinely         Hypertensive heart disease with CHF (CMS/[facility])     Coreg  Isosorbide  dinitrate  Sacubitril-valsartan  Hydralazine  Spironolactone  Monitor BP  BP at goal         Pneumonia     Levaquin until complete  Mucinex  Aerosol treatment.  Flonase          Medications, treatments, and labs reviewed  Continue medications and treatments as listed in EMR    Scribe Attestation  Neelima RAMIREZ Scribe   attest that this documentation has been prepared under the direction and in the presence of Alfredo Bro MD    Provider Attestation - Scribe documentation  All medical record entries made by the Scribe were at my direction and personally dictated by me. I have reviewed the chart and agree that the record accurately reflects my personal performance of the history, physical exam, discussion and plan.   Alfredo Bro MD            Electronically Signed By: Alfredo Bro MD   4/11/24  2:58 PM

## 2024-04-11 ENCOUNTER — NURSING HOME VISIT (OUTPATIENT)
Dept: POST ACUTE CARE | Facility: EXTERNAL LOCATION | Age: 64
End: 2024-04-11
Payer: COMMERCIAL

## 2024-04-11 DIAGNOSIS — I11.0 HYPERTENSIVE HEART DISEASE WITH CONGESTIVE HEART FAILURE, UNSPECIFIED HEART FAILURE TYPE (MULTI): ICD-10-CM

## 2024-04-11 DIAGNOSIS — I63.9 CEREBROVASCULAR ACCIDENT (CVA), UNSPECIFIED MECHANISM (MULTI): ICD-10-CM

## 2024-04-11 DIAGNOSIS — J18.9 PNEUMONIA DUE TO INFECTIOUS ORGANISM, UNSPECIFIED LATERALITY, UNSPECIFIED PART OF LUNG: Primary | ICD-10-CM

## 2024-04-11 PROCEDURE — 99309 SBSQ NF CARE MODERATE MDM 30: CPT | Performed by: NURSE PRACTITIONER

## 2024-04-11 NOTE — PROGRESS NOTES
PROGRESS NOTE    Subjective   Chief complaint: Edenilson Ness is a 63 y.o. male who is a long term care patient being seen and evaluated for pneumonia f/u.    HPI:  HPI  Patient is seen in follow-up for pneumonia.  Patient had reports of wheezing and was started on Mucinex aerosol treatment Flonase and orders placed to change chest x-ray.  Chest x-ray had shown pneumonia and patient was started on Levaquin.  Patient is tolerating antibiotics without adverse effects.  Patient also working in therapy due to generalized weakness.  Patient is working on bilateral Ferrex focusing on hip flexion, knee extension, knee flexion and hip abduction with knees bent.  Patient seen and examined at bedside, appears to be in no acute distress.  Afebrile at this time.    Objective   Vital signs: 106/63, 97.9, 70, 17, 97%    Physical Exam  Constitutional:       General: He is not in acute distress.  Eyes:      Extraocular Movements: Extraocular movements intact.   Cardiovascular:      Rate and Rhythm: Normal rate and regular rhythm.   Pulmonary:      Effort: Pulmonary effort is normal.      Breath sounds: Normal breath sounds.   Abdominal:      General: Bowel sounds are normal.      Palpations: Abdomen is soft.   Musculoskeletal:      Cervical back: Neck supple.   Neurological:      Mental Status: He is alert.      Motor: Weakness present.   Psychiatric:         Mood and Affect: Mood normal.         Behavior: Behavior is cooperative.         Assessment/Plan   Problem List Items Addressed This Visit       Weakness - Primary     Continuing therapy         Type 2 diabetes mellitus without complication, with long-term current use of insulin (CMS/[facility])     Monitor sweets and carb intake  Continue to monitor BS   monitor A1c routinely         Hypertensive heart disease with CHF (CMS/[facility])     Coreg  Isosorbide dinitrate  Sacubitril-valsartan  Hydralazine  Spironolactone  Monitor BP  BP at goal         Pneumonia     Levaquin until  complete  Mucinex  Aerosol treatment.  Flonase          Medications, treatments, and labs reviewed  Continue medications and treatments as listed in EMR    Scribe Attestation  I, Mitch Valle   attest that this documentation has been prepared under the direction and in the presence of Alfredo Bro MD    Provider Attestation - Scribe documentation  All medical record entries made by the Scribe were at my direction and personally dictated by me. I have reviewed the chart and agree that the record accurately reflects my personal performance of the history, physical exam, discussion and plan.   Alfredo Bro MD

## 2024-04-11 NOTE — LETTER
Patient: Edenilson Ness  : 1960    Encounter Date: 2024    PROGRESS NOTE    Subjective  Chief complaint: Edenilson Ness is a 63 y.o. male who is a long term care patient being seen and evaluated for wheezing    HPI:  HPI  Patient is seen in follow-up of wheezing, orders were placed for Mucinex aerosol treatment Flonase and obtain chest x-ray.  Chest x-ray with pneumonia.  Patient was started on Levaquin.  He is still coughing but denies shortness of breath.  Denies nausea or vomiting.  Denies fever or chills.    Objective  Vital signs: 106/63, 97.9, 80, 18, 98%    Physical Exam  Constitutional:       General: He is not in acute distress.  Eyes:      Extraocular Movements: Extraocular movements intact.   Cardiovascular:      Rate and Rhythm: Normal rate and regular rhythm.   Pulmonary:      Effort: Pulmonary effort is normal.      Breath sounds: Decreased breath sounds and wheezing present.   Abdominal:      General: Bowel sounds are normal.      Palpations: Abdomen is soft.   Musculoskeletal:      Cervical back: Neck supple.      Right lower leg: Edema present.      Left lower leg: Edema present.   Neurological:      Mental Status: He is alert.   Psychiatric:         Mood and Affect: Mood normal.         Behavior: Behavior is cooperative.         Assessment/Plan  Problem List Items Addressed This Visit       CVA (cerebral vascular accident) (Multi)     monitor for changes in speech or increased weakness  Eliquis  Bleeding precautions  Statin         Hypertensive heart disease with CHF (Multi)     Coreg  Isosorbide dinitrate  Sacubitril-valsartan  Hydralazine  Spironolactone  Monitor BP  BP at goal         Pneumonia - Primary     Levaquin until complete  Mucinex  Aerosol treatment.  Flonase          Medications, treatments, and labs reviewed  Continue medications and treatments as listed in EMR    Scribe Attestation  I, Mitch Valle   attest that this documentation has been prepared under the  direction and in the presence of LUCRETIA Griffiths    Provider Attestation - Scribe documentation  All medical record entries made by the Scribe were at my direction and personally dictated by me. I have reviewed the chart and agree that the record accurately reflects my personal performance of the history, physical exam, discussion and plan.   LUCRETIA Griffiths            Electronically Signed By: LUCRETIA Griffiths   4/23/24  2:25 PM

## 2024-04-12 ENCOUNTER — NURSING HOME VISIT (OUTPATIENT)
Dept: POST ACUTE CARE | Facility: EXTERNAL LOCATION | Age: 64
End: 2024-04-12
Payer: COMMERCIAL

## 2024-04-12 DIAGNOSIS — I63.9 CEREBROVASCULAR ACCIDENT (CVA), UNSPECIFIED MECHANISM (MULTI): ICD-10-CM

## 2024-04-12 DIAGNOSIS — J18.9 PNEUMONIA DUE TO INFECTIOUS ORGANISM, UNSPECIFIED LATERALITY, UNSPECIFIED PART OF LUNG: Primary | ICD-10-CM

## 2024-04-12 DIAGNOSIS — I11.0 HYPERTENSIVE HEART DISEASE WITH CONGESTIVE HEART FAILURE, UNSPECIFIED HEART FAILURE TYPE (MULTI): ICD-10-CM

## 2024-04-12 PROCEDURE — 99308 SBSQ NF CARE LOW MDM 20: CPT | Performed by: INTERNAL MEDICINE

## 2024-04-12 NOTE — LETTER
Patient: Edenilson Ness  : 1960    Encounter Date: 2024    PROGRESS NOTE    Subjective  Chief complaint: Edenilson Ness is a 63 y.o. male who is a long term care patient being seen and evaluated for pneumonia.    HPI:  HPI  Patient seen in follow-up for pneumonia.  Patient was started on Mucinex aerosol treatment Flonase and Levaquin.  Patient tolerating antibiotic but adverse effects.  Patient is afebrile today, cough and congestion are improving.  Patient seen and examined at bedside, appears to be in no acute distress.    Objective  Vital signs: 106/63, 97.9, 88, 17    Physical Exam  Constitutional:       General: He is not in acute distress.  Eyes:      Extraocular Movements: Extraocular movements intact.   Cardiovascular:      Rate and Rhythm: Normal rate and regular rhythm.   Pulmonary:      Effort: Pulmonary effort is normal.      Breath sounds: Normal breath sounds.   Abdominal:      General: Bowel sounds are normal.      Palpations: Abdomen is soft.   Musculoskeletal:      Cervical back: Neck supple.   Neurological:      Mental Status: He is alert.      Motor: Weakness present.   Psychiatric:         Mood and Affect: Mood normal.         Behavior: Behavior is cooperative.         Assessment/Plan  Problem List Items Addressed This Visit       CVA (cerebral vascular accident) (Multi)     monitor for changes in speech or increased weakness  Eliquis  Bleeding precautions  Statin         Hypertensive heart disease with CHF (Multi)     Coreg  Isosorbide dinitrate  Sacubitril-valsartan  Hydralazine  Spironolactone  Monitor BP  BP at goal         Pneumonia - Primary     Levaquin until complete  Mucinex  Aerosol treatment.  Flonase          Medications, treatments, and labs reviewed  Continue medications and treatments as listed in EMR    Scribe Attestation  I, Meryl Valleibdhara   attest that this documentation has been prepared under the direction and in the presence of Alfredo Bro  MD    Provider Attestation - Scribe documentation  All medical record entries made by the Scribe were at my direction and personally dictated by me. I have reviewed the chart and agree that the record accurately reflects my personal performance of the history, physical exam, discussion and plan.   Alfredo Bro MD            Electronically Signed By: Alfredo Bro MD   4/13/24  5:21 PM

## 2024-04-12 NOTE — PROGRESS NOTES
PROGRESS NOTE    Subjective   Chief complaint: Edenilson Ness is a 63 y.o. male who is a long term care patient being seen and evaluated for pneumonia.    HPI:  HPI  Patient seen in follow-up for pneumonia.  Patient was started on Mucinex aerosol treatment Flonase and Levaquin.  Patient tolerating antibiotic but adverse effects.  Patient is afebrile today, cough and congestion are improving.  Patient seen and examined at bedside, appears to be in no acute distress.    Objective   Vital signs: 106/63, 97.9, 88, 17    Physical Exam  Constitutional:       General: He is not in acute distress.  Eyes:      Extraocular Movements: Extraocular movements intact.   Cardiovascular:      Rate and Rhythm: Normal rate and regular rhythm.   Pulmonary:      Effort: Pulmonary effort is normal.      Breath sounds: Normal breath sounds.   Abdominal:      General: Bowel sounds are normal.      Palpations: Abdomen is soft.   Musculoskeletal:      Cervical back: Neck supple.   Neurological:      Mental Status: He is alert.      Motor: Weakness present.   Psychiatric:         Mood and Affect: Mood normal.         Behavior: Behavior is cooperative.         Assessment/Plan   Problem List Items Addressed This Visit       CVA (cerebral vascular accident) (Multi)     monitor for changes in speech or increased weakness  Eliquis  Bleeding precautions  Statin         Hypertensive heart disease with CHF (Multi)     Coreg  Isosorbide dinitrate  Sacubitril-valsartan  Hydralazine  Spironolactone  Monitor BP  BP at goal         Pneumonia - Primary     Levaquin until complete  Mucinex  Aerosol treatment.  Flonase          Medications, treatments, and labs reviewed  Continue medications and treatments as listed in EMR    Scribe Attestation  I, Meryl Valleibdhara   attest that this documentation has been prepared under the direction and in the presence of Alfredo Bro MD    Provider Attestation - Scribe documentation  All medical record entries  made by the Scribe were at my direction and personally dictated by me. I have reviewed the chart and agree that the record accurately reflects my personal performance of the history, physical exam, discussion and plan.   Alfredo Bro MD

## 2024-04-16 ENCOUNTER — NURSING HOME VISIT (OUTPATIENT)
Dept: POST ACUTE CARE | Facility: EXTERNAL LOCATION | Age: 64
End: 2024-04-16
Payer: COMMERCIAL

## 2024-04-16 DIAGNOSIS — I11.0 HYPERTENSIVE HEART DISEASE WITH CONGESTIVE HEART FAILURE, UNSPECIFIED HEART FAILURE TYPE (MULTI): ICD-10-CM

## 2024-04-16 DIAGNOSIS — J18.9 PNEUMONIA DUE TO INFECTIOUS ORGANISM, UNSPECIFIED LATERALITY, UNSPECIFIED PART OF LUNG: Primary | ICD-10-CM

## 2024-04-16 PROCEDURE — 99309 SBSQ NF CARE MODERATE MDM 30: CPT | Performed by: NURSE PRACTITIONER

## 2024-04-16 NOTE — LETTER
Patient: Edenilson Ness  : 1960    Encounter Date: 2024    PROGRESS NOTE    Subjective  Chief complaint: Edenilson Ness is a 63 y.o. male who is a long term care patient being seen and evaluated for pneumonia    HPI:  HPI  Patient is seen in follow-up of pneumonia.  Patient still coughing.  Denies shortness of breath.  Denies fever or chills.  Nursing report no new concerns at this time.  Patient is tolerating antibiotics without adverse effects.    Objective  Vital signs: 106/63, 97.9, 80, 18, 98%    Physical Exam  Constitutional:       General: He is not in acute distress.  Eyes:      Extraocular Movements: Extraocular movements intact.   Cardiovascular:      Rate and Rhythm: Normal rate and regular rhythm.   Pulmonary:      Effort: Pulmonary effort is normal.      Breath sounds: Rhonchi present.   Abdominal:      General: Bowel sounds are normal.      Palpations: Abdomen is soft.   Musculoskeletal:      Cervical back: Neck supple.      Right lower leg: Edema present.      Left lower leg: Edema present.   Neurological:      Mental Status: He is alert.   Psychiatric:         Mood and Affect: Mood normal.         Behavior: Behavior is cooperative.         Assessment/Plan  Problem List Items Addressed This Visit       Hypertensive heart disease with CHF (Multi)     CHF stable, no sob  BP at goal  Coreg  Isosorbide dinitrate  Sacubitril-valsartan  Hydralazine  Spironolactone  Monitor BP           Pneumonia - Primary     Levaquin until complete  Mucinex  Aerosol treatment.  Flonase          Medications, treatments, and labs reviewed  Continue medications and treatments as listed in EMR    Scribe Attestation  INeelima Scribe   attest that this documentation has been prepared under the direction and in the presence of VANNA Griffiths-CNP    Provider Attestation - Scribe documentation  All medical record entries made by the Scribe were at my direction and personally dictated by me. I have  reviewed the chart and agree that the record accurately reflects my personal performance of the history, physical exam, discussion and plan.   LUCRETIA Griffiths            Electronically Signed By: LUCRETIA Griffiths   4/24/24  7:44 AM

## 2024-04-17 ENCOUNTER — NURSING HOME VISIT (OUTPATIENT)
Dept: POST ACUTE CARE | Facility: EXTERNAL LOCATION | Age: 64
End: 2024-04-17
Payer: COMMERCIAL

## 2024-04-17 DIAGNOSIS — I11.0 HYPERTENSIVE HEART DISEASE WITH CONGESTIVE HEART FAILURE, UNSPECIFIED HEART FAILURE TYPE (MULTI): ICD-10-CM

## 2024-04-17 DIAGNOSIS — I63.9 CEREBROVASCULAR ACCIDENT (CVA), UNSPECIFIED MECHANISM (MULTI): ICD-10-CM

## 2024-04-17 DIAGNOSIS — R53.1 WEAKNESS: Primary | ICD-10-CM

## 2024-04-17 DIAGNOSIS — J18.9 PNEUMONIA DUE TO INFECTIOUS ORGANISM, UNSPECIFIED LATERALITY, UNSPECIFIED PART OF LUNG: ICD-10-CM

## 2024-04-17 PROCEDURE — 99308 SBSQ NF CARE LOW MDM 20: CPT | Performed by: INTERNAL MEDICINE

## 2024-04-17 NOTE — PROGRESS NOTES
PROGRESS NOTE    Subjective   Chief complaint: Edenilson Ness is a 63 y.o. male who is a long term care patient being seen and evaluated for weakness    HPI:  HPI  Patient does continue working in therapy due to generalized weakness.  Patient is working on transfer training and bed mobility.  Patient also seen in follow-up for pneumonia, continues on antibiotic, tolerating without adverse effects.  Patient seen and examined at bedside, appears to be in no acute distress.  Denies fever or chills.  Denies chest pain or shortness of breath at this time.    Objective   Vital signs: 106/63, 97.9, 80, 16, 98%    Physical Exam  Constitutional:       General: He is not in acute distress.  Eyes:      Extraocular Movements: Extraocular movements intact.   Cardiovascular:      Rate and Rhythm: Normal rate and regular rhythm.   Pulmonary:      Effort: Pulmonary effort is normal.      Breath sounds: Normal breath sounds.   Abdominal:      General: Bowel sounds are normal.      Palpations: Abdomen is soft.   Musculoskeletal:      Cervical back: Neck supple.   Neurological:      Mental Status: He is alert.      Motor: Weakness present.   Psychiatric:         Mood and Affect: Mood normal.         Behavior: Behavior is cooperative.         Assessment/Plan   Problem List Items Addressed This Visit       Weakness - Primary     Continuing therapy         CVA (cerebral vascular accident) (Multi)     monitor for changes in speech or increased weakness  Eliquis  Bleeding precautions  Statin         Hypertensive heart disease with CHF (Multi)     Coreg  Isosorbide dinitrate  Sacubitril-valsartan  Hydralazine  Spironolactone  Monitor BP  BP at goal         Pneumonia     Levaquin until complete  Mucinex  Aerosol treatment.  Flonase          Medications, treatments, and labs reviewed  Continue medications and treatments as listed in EMR    Scribe Attestation  I, Mitch Valle   attest that this documentation has been prepared under  the direction and in the presence of Alfredo Bro MD    Provider Attestation - Scribe documentation  All medical record entries made by the Scribe were at my direction and personally dictated by me. I have reviewed the chart and agree that the record accurately reflects my personal performance of the history, physical exam, discussion and plan.   Alfredo Bro MD

## 2024-04-17 NOTE — LETTER
Patient: Edenilson Ness  : 1960    Encounter Date: 2024    PROGRESS NOTE    Subjective  Chief complaint: Edenilson Ness is a 63 y.o. male who is a long term care patient being seen and evaluated for weakness    HPI:  HPI  Patient does continue working in therapy due to generalized weakness.  Patient is working on transfer training and bed mobility.  Patient also seen in follow-up for pneumonia, continues on antibiotic, tolerating without adverse effects.  Patient seen and examined at bedside, appears to be in no acute distress.  Denies fever or chills.  Denies chest pain or shortness of breath at this time.    Objective  Vital signs: 106/63, 97.9, 80, 16, 98%    Physical Exam  Constitutional:       General: He is not in acute distress.  Eyes:      Extraocular Movements: Extraocular movements intact.   Cardiovascular:      Rate and Rhythm: Normal rate and regular rhythm.   Pulmonary:      Effort: Pulmonary effort is normal.      Breath sounds: Normal breath sounds.   Abdominal:      General: Bowel sounds are normal.      Palpations: Abdomen is soft.   Musculoskeletal:      Cervical back: Neck supple.   Neurological:      Mental Status: He is alert.      Motor: Weakness present.   Psychiatric:         Mood and Affect: Mood normal.         Behavior: Behavior is cooperative.         Assessment/Plan  Problem List Items Addressed This Visit       Weakness - Primary     Continuing therapy         CVA (cerebral vascular accident) (Multi)     monitor for changes in speech or increased weakness  Eliquis  Bleeding precautions  Statin         Hypertensive heart disease with CHF (Multi)     Coreg  Isosorbide dinitrate  Sacubitril-valsartan  Hydralazine  Spironolactone  Monitor BP  BP at goal         Pneumonia     Levaquin until complete  Mucinex  Aerosol treatment.  Flonase          Medications, treatments, and labs reviewed  Continue medications and treatments as listed in EMR    Scribe Attestation  INeelima  Mitch Patel   attest that this documentation has been prepared under the direction and in the presence of Alfredo Bro MD    Provider Attestation - Scribe documentation  All medical record entries made by the Scribe were at my direction and personally dictated by me. I have reviewed the chart and agree that the record accurately reflects my personal performance of the history, physical exam, discussion and plan.   Alfredo Bro MD            Electronically Signed By: Alfredo Bro MD   4/17/24  3:07 PM

## 2024-04-22 NOTE — PROGRESS NOTES
PROGRESS NOTE    Subjective   Chief complaint: Edenilson Ness is a 63 y.o. male who is a long term care patient being seen and evaluated for wheezing    HPI:  HPI  Patient is seen in follow-up of wheezing, orders were placed for Mucinex aerosol treatment Flonase and obtain chest x-ray.  Chest x-ray with pneumonia.  Patient was started on Levaquin.  He is still coughing but denies shortness of breath.  Denies nausea or vomiting.  Denies fever or chills.    Objective   Vital signs: 106/63, 97.9, 80, 18, 98%    Physical Exam  Constitutional:       General: He is not in acute distress.  Eyes:      Extraocular Movements: Extraocular movements intact.   Cardiovascular:      Rate and Rhythm: Normal rate and regular rhythm.   Pulmonary:      Effort: Pulmonary effort is normal.      Breath sounds: Decreased breath sounds and wheezing present.   Abdominal:      General: Bowel sounds are normal.      Palpations: Abdomen is soft.   Musculoskeletal:      Cervical back: Neck supple.      Right lower leg: Edema present.      Left lower leg: Edema present.   Neurological:      Mental Status: He is alert.   Psychiatric:         Mood and Affect: Mood normal.         Behavior: Behavior is cooperative.         Assessment/Plan   Problem List Items Addressed This Visit       CVA (cerebral vascular accident) (Multi)     monitor for changes in speech or increased weakness  Eliquis  Bleeding precautions  Statin         Hypertensive heart disease with CHF (Multi)     Coreg  Isosorbide dinitrate  Sacubitril-valsartan  Hydralazine  Spironolactone  Monitor BP  BP at goal         Pneumonia - Primary     Levaquin until complete  Mucinex  Aerosol treatment.  Flonase          Medications, treatments, and labs reviewed  Continue medications and treatments as listed in EMR    Scribe Attestation  I, Meryl Valleibdhara   attest that this documentation has been prepared under the direction and in the presence of Elba Herrera,  APRN-CNP    Provider Attestation - Scribe documentation  All medical record entries made by the Scribe were at my direction and personally dictated by me. I have reviewed the chart and agree that the record accurately reflects my personal performance of the history, physical exam, discussion and plan.   Elba Herrera, VANNA-CNP

## 2024-04-23 NOTE — PROGRESS NOTES
PROGRESS NOTE    Subjective   Chief complaint: Edenilson Ness is a 63 y.o. male who is a long term care patient being seen and evaluated for pneumonia    HPI:  HPI  Patient is seen in follow-up of pneumonia.  Patient still coughing.  Denies shortness of breath.  Denies fever or chills.  Nursing report no new concerns at this time.  Patient is tolerating antibiotics without adverse effects.    Objective   Vital signs: 106/63, 97.9, 80, 18, 98%    Physical Exam  Constitutional:       General: He is not in acute distress.  Eyes:      Extraocular Movements: Extraocular movements intact.   Cardiovascular:      Rate and Rhythm: Normal rate and regular rhythm.   Pulmonary:      Effort: Pulmonary effort is normal.      Breath sounds: Rhonchi present.   Abdominal:      General: Bowel sounds are normal.      Palpations: Abdomen is soft.   Musculoskeletal:      Cervical back: Neck supple.      Right lower leg: Edema present.      Left lower leg: Edema present.   Neurological:      Mental Status: He is alert.   Psychiatric:         Mood and Affect: Mood normal.         Behavior: Behavior is cooperative.         Assessment/Plan   Problem List Items Addressed This Visit       Hypertensive heart disease with CHF (Multi)     CHF stable, no sob  BP at goal  Coreg  Isosorbide dinitrate  Sacubitril-valsartan  Hydralazine  Spironolactone  Monitor BP           Pneumonia - Primary     Levaquin until complete  Mucinex  Aerosol treatment.  Flonase          Medications, treatments, and labs reviewed  Continue medications and treatments as listed in EMR    Scribe Attestation  Neelima RAMIREZ Scribe   attest that this documentation has been prepared under the direction and in the presence of VANNA Griffiths-CNP    Provider Attestation - Scribe documentation  All medical record entries made by the Scribe were at my direction and personally dictated by me. I have reviewed the chart and agree that the record accurately reflects my  personal performance of the history, physical exam, discussion and plan.   Elba Herrera, APRN-CNP

## 2024-04-23 NOTE — ASSESSMENT & PLAN NOTE
CHF stable, no sob  BP at goal  Coreg  Isosorbide dinitrate  Sacubitril-valsartan  Hydralazine  Spironolactone  Monitor BP

## 2024-04-24 ENCOUNTER — NURSING HOME VISIT (OUTPATIENT)
Dept: POST ACUTE CARE | Facility: EXTERNAL LOCATION | Age: 64
End: 2024-04-24
Payer: COMMERCIAL

## 2024-04-24 DIAGNOSIS — J18.9 PNEUMONIA DUE TO INFECTIOUS ORGANISM, UNSPECIFIED LATERALITY, UNSPECIFIED PART OF LUNG: ICD-10-CM

## 2024-04-24 DIAGNOSIS — I63.9 CEREBROVASCULAR ACCIDENT (CVA), UNSPECIFIED MECHANISM (MULTI): ICD-10-CM

## 2024-04-24 DIAGNOSIS — I11.0 HYPERTENSIVE HEART DISEASE WITH CONGESTIVE HEART FAILURE, UNSPECIFIED HEART FAILURE TYPE (MULTI): ICD-10-CM

## 2024-04-24 DIAGNOSIS — R53.1 WEAKNESS: Primary | ICD-10-CM

## 2024-04-24 PROCEDURE — 99308 SBSQ NF CARE LOW MDM 20: CPT | Performed by: INTERNAL MEDICINE

## 2024-04-24 NOTE — PROGRESS NOTES
PROGRESS NOTE    Subjective   Chief complaint: Edenilson Ness is a 63 y.o. male who is a long term care patient being seen and evaluated for weakness.    HPI:  HPI  Patient does continue working in therapy due to generalized weakness.  Patient is working with PT OT.  Speech therapy is working with patient to address speech language voice communication and auditory processing.  Patient was seen and examined at the bedside.  Patient has recent completed antibiotic course for pneumonia, no shortness of breath.  No recurrent symptoms.    Objective   Vital signs: 106/63, 97.9, 78, 18, 96%    Physical Exam  Constitutional:       General: He is not in acute distress.  Eyes:      Extraocular Movements: Extraocular movements intact.   Cardiovascular:      Rate and Rhythm: Normal rate and regular rhythm.   Pulmonary:      Effort: Pulmonary effort is normal.      Breath sounds: Normal breath sounds.   Abdominal:      General: Bowel sounds are normal.      Palpations: Abdomen is soft.   Musculoskeletal:      Cervical back: Neck supple.   Neurological:      Mental Status: He is alert.      Motor: Weakness present.   Psychiatric:         Mood and Affect: Mood normal.         Behavior: Behavior is cooperative.         Assessment/Plan   Problem List Items Addressed This Visit       Weakness - Primary     Continue working in therapy         CVA (cerebral vascular accident) (Multi)     monitor for changes in speech or increased weakness  Eliquis  Bleeding precautions  Statin         Hypertensive heart disease with CHF (Multi)     CHF stable, no sob  BP at goal  Coreg  Isosorbide dinitrate  Sacubitril-valsartan  Hydralazine  Spironolactone  Monitor BP           Pneumonia     Completed antibiotics          Medications, treatments, and labs reviewed  Continue medications and treatments as listed in EMR    Scribe Attestation  JAMES, Mitch Valle   attest that this documentation has been prepared under the direction and in the  presence of Alfredo Bro MD    Provider Attestation - Scribe documentation  All medical record entries made by the Scribe were at my direction and personally dictated by me. I have reviewed the chart and agree that the record accurately reflects my personal performance of the history, physical exam, discussion and plan.   Alfredo Bro MD           Opt out

## 2024-04-24 NOTE — LETTER
Patient: Edenilson Ness  : 1960    Encounter Date: 2024    PROGRESS NOTE    Subjective  Chief complaint: Edenilson Ness is a 63 y.o. male who is a long term care patient being seen and evaluated for weakness.    HPI:  HPI  Patient does continue working in therapy due to generalized weakness.  Patient is working with PT OT.  Speech therapy is working with patient to address speech language voice communication and auditory processing.  Patient was seen and examined at the bedside.  Patient has recent completed antibiotic course for pneumonia, no shortness of breath.  No recurrent symptoms.    Objective  Vital signs: 106/63, 97.9, 78, 18, 96%    Physical Exam  Constitutional:       General: He is not in acute distress.  Eyes:      Extraocular Movements: Extraocular movements intact.   Cardiovascular:      Rate and Rhythm: Normal rate and regular rhythm.   Pulmonary:      Effort: Pulmonary effort is normal.      Breath sounds: Normal breath sounds.   Abdominal:      General: Bowel sounds are normal.      Palpations: Abdomen is soft.   Musculoskeletal:      Cervical back: Neck supple.   Neurological:      Mental Status: He is alert.      Motor: Weakness present.   Psychiatric:         Mood and Affect: Mood normal.         Behavior: Behavior is cooperative.         Assessment/Plan  Problem List Items Addressed This Visit       Weakness - Primary     Continue working in therapy         CVA (cerebral vascular accident) (Multi)     monitor for changes in speech or increased weakness  Eliquis  Bleeding precautions  Statin         Hypertensive heart disease with CHF (Multi)     CHF stable, no sob  BP at goal  Coreg  Isosorbide dinitrate  Sacubitril-valsartan  Hydralazine  Spironolactone  Monitor BP           Pneumonia     Completed antibiotics          Medications, treatments, and labs reviewed  Continue medications and treatments as listed in EMR    Scribe Attestation  I, Mitch Valle   attest that this  documentation has been prepared under the direction and in the presence of Alfredo Bro MD    Provider Attestation - Scribe documentation  All medical record entries made by the Scribe were at my direction and personally dictated by me. I have reviewed the chart and agree that the record accurately reflects my personal performance of the history, physical exam, discussion and plan.   Alfredo Bro MD            Electronically Signed By: Alfredo Bro MD   4/24/24  3:31 PM

## 2024-05-01 ENCOUNTER — NURSING HOME VISIT (OUTPATIENT)
Dept: POST ACUTE CARE | Facility: EXTERNAL LOCATION | Age: 64
End: 2024-05-01
Payer: COMMERCIAL

## 2024-05-01 DIAGNOSIS — I63.9 CEREBROVASCULAR ACCIDENT (CVA), UNSPECIFIED MECHANISM (MULTI): ICD-10-CM

## 2024-05-01 DIAGNOSIS — I11.0 HYPERTENSIVE HEART DISEASE WITH CONGESTIVE HEART FAILURE, UNSPECIFIED HEART FAILURE TYPE (MULTI): ICD-10-CM

## 2024-05-01 DIAGNOSIS — D63.8 ANEMIA OF CHRONIC DISEASE: ICD-10-CM

## 2024-05-01 DIAGNOSIS — Z79.4 TYPE 2 DIABETES MELLITUS WITHOUT COMPLICATION, WITH LONG-TERM CURRENT USE OF INSULIN (MULTI): Primary | ICD-10-CM

## 2024-05-01 DIAGNOSIS — E11.9 TYPE 2 DIABETES MELLITUS WITHOUT COMPLICATION, WITH LONG-TERM CURRENT USE OF INSULIN (MULTI): Primary | ICD-10-CM

## 2024-05-01 PROCEDURE — 99309 SBSQ NF CARE MODERATE MDM 30: CPT | Performed by: INTERNAL MEDICINE

## 2024-05-01 NOTE — PROGRESS NOTES
PROGRESS NOTE    Subjective   Chief complaint: Edenilson Ness is a 63 y.o. male who is a long term care patient being seen and evaluated for monthly general medical care and follow-up    HPI:  HPI  Patient presents for general medical care and f/u.  Patient seen and examined at bedside.  No issues per nursing.  Patient has no acute complaints.  DM, denies polydipsia polyuria polyphagia.  HTN BP at goal.  Denies chest pain and headache.  CHF stable, denies sob, orthopnea, weight gain.  Hx CVA, denies changes in weakness and speech.  Anemia stable, denies fatigue, sob, and palpitations.  Mentation at baseline, no acute distress.    Objective   Vital signs: 106/63, 97.9, 61, 16, 98%    Physical Exam  Constitutional:       General: He is not in acute distress.  Eyes:      Extraocular Movements: Extraocular movements intact.   Cardiovascular:      Rate and Rhythm: Normal rate and regular rhythm.   Pulmonary:      Effort: Pulmonary effort is normal.      Breath sounds: Normal breath sounds.   Abdominal:      General: Bowel sounds are normal.      Palpations: Abdomen is soft.   Musculoskeletal:      Cervical back: Neck supple.   Neurological:      Mental Status: He is alert.      Motor: Weakness present.   Psychiatric:         Mood and Affect: Mood normal.         Behavior: Behavior is cooperative.         Assessment/Plan   Problem List Items Addressed This Visit       CVA (cerebral vascular accident) (Multi)     monitor for changes in speech or increased weakness  Eliquis  Bleeding precautions  Statin         Type 2 diabetes mellitus without complication, with long-term current use of insulin (Multi) - Primary     Monitor sweets and carb intake   monitor A1c routinely         Hypertensive heart disease with CHF (Multi)     CHF stable, no sob  BP at goal  Coreg  Isosorbide dinitrate  Sacubitril-valsartan  Hydralazine  Spironolactone  Monitor BP           Anemia of chronic disease     Continue to monitor labs  Stable on  recent labs          Medications, treatments, and labs reviewed  Continue medications and treatments as listed in EMR    Scribe Attestation  I, Mitch Valle   attest that this documentation has been prepared under the direction and in the presence of Alfredo Bro MD    Provider Attestation - Scribe documentation  All medical record entries made by the Scribe were at my direction and personally dictated by me. I have reviewed the chart and agree that the record accurately reflects my personal performance of the history, physical exam, discussion and plan.   Alfredo Bro MD

## 2024-05-01 NOTE — LETTER
Patient: Edenilson Ness  : 1960    Encounter Date: 2024    PROGRESS NOTE    Subjective  Chief complaint: Edenilson Ness is a 63 y.o. male who is a long term care patient being seen and evaluated for monthly general medical care and follow-up    HPI:  HPI  Patient presents for general medical care and f/u.  Patient seen and examined at bedside.  No issues per nursing.  Patient has no acute complaints.  DM, denies polydipsia polyuria polyphagia.  HTN BP at goal.  Denies chest pain and headache.  CHF stable, denies sob, orthopnea, weight gain.  Hx CVA, denies changes in weakness and speech.  Anemia stable, denies fatigue, sob, and palpitations.  Mentation at baseline, no acute distress.    Objective  Vital signs: 106/63, 97.9, 61, 16, 98%    Physical Exam  Constitutional:       General: He is not in acute distress.  Eyes:      Extraocular Movements: Extraocular movements intact.   Cardiovascular:      Rate and Rhythm: Normal rate and regular rhythm.   Pulmonary:      Effort: Pulmonary effort is normal.      Breath sounds: Normal breath sounds.   Abdominal:      General: Bowel sounds are normal.      Palpations: Abdomen is soft.   Musculoskeletal:      Cervical back: Neck supple.   Neurological:      Mental Status: He is alert.      Motor: Weakness present.   Psychiatric:         Mood and Affect: Mood normal.         Behavior: Behavior is cooperative.         Assessment/Plan  Problem List Items Addressed This Visit       CVA (cerebral vascular accident) (Multi)     monitor for changes in speech or increased weakness  Eliquis  Bleeding precautions  Statin         Type 2 diabetes mellitus without complication, with long-term current use of insulin (Multi) - Primary     Monitor sweets and carb intake   monitor A1c routinely         Hypertensive heart disease with CHF (Multi)     CHF stable, no sob  BP at goal  Coreg  Isosorbide dinitrate  Sacubitril-valsartan  Hydralazine  Spironolactone  Monitor BP            Anemia of chronic disease     Continue to monitor labs  Stable on recent labs          Medications, treatments, and labs reviewed  Continue medications and treatments as listed in EMR    Scribe Attestation  I, Mitch Valle   attest that this documentation has been prepared under the direction and in the presence of Alfredo Bro MD    Provider Attestation - Scribe documentation  All medical record entries made by the Scribe were at my direction and personally dictated by me. I have reviewed the chart and agree that the record accurately reflects my personal performance of the history, physical exam, discussion and plan.   Alfredo Bro MD            Electronically Signed By: Alfredo Bro MD   5/1/24  1:46 PM

## 2024-05-03 ENCOUNTER — NURSING HOME VISIT (OUTPATIENT)
Dept: POST ACUTE CARE | Facility: EXTERNAL LOCATION | Age: 64
End: 2024-05-03
Payer: COMMERCIAL

## 2024-05-03 DIAGNOSIS — I63.9 CEREBROVASCULAR ACCIDENT (CVA), UNSPECIFIED MECHANISM (MULTI): ICD-10-CM

## 2024-05-03 DIAGNOSIS — I11.0 HYPERTENSIVE HEART DISEASE WITH CONGESTIVE HEART FAILURE, UNSPECIFIED HEART FAILURE TYPE (MULTI): ICD-10-CM

## 2024-05-03 DIAGNOSIS — R53.1 WEAKNESS: Primary | ICD-10-CM

## 2024-05-03 PROCEDURE — 99308 SBSQ NF CARE LOW MDM 20: CPT | Performed by: INTERNAL MEDICINE

## 2024-05-03 NOTE — PROGRESS NOTES
PROGRESS NOTE    Subjective   Chief complaint: Edenilson Ness is a 63 y.o. male who is a long term care patient being seen and evaluated for weakness.    HPI:  HPI  Speech therapy has evaluated patient for recertification to address speech language voice, communication\auditory processing.  Patient was seen and examined at the bedside, appears to be in no acute distress.  Nursing staff voicing no new concerns.  Patient denies constitutional symptoms at this time.    Objective   Vital signs: 106/63, 97.9, 61, 16, 98%,    Physical Exam  Constitutional:       General: He is not in acute distress.  Eyes:      Extraocular Movements: Extraocular movements intact.   Cardiovascular:      Rate and Rhythm: Normal rate and regular rhythm.   Pulmonary:      Effort: Pulmonary effort is normal.      Breath sounds: Normal breath sounds.   Abdominal:      General: Bowel sounds are normal.      Palpations: Abdomen is soft.   Musculoskeletal:      Cervical back: Neck supple.   Neurological:      Mental Status: He is alert.      Motor: Weakness present.   Psychiatric:         Mood and Affect: Mood normal.         Behavior: Behavior is cooperative.         Assessment/Plan   Problem List Items Addressed This Visit       Weakness - Primary     Continue working in therapy towards goals         CVA (cerebral vascular accident) (Multi)     monitor for changes in speech or increased weakness  Eliquis  Bleeding precautions  Statin         Hypertensive heart disease with CHF (Multi)     CHF stable, no sob  BP at goal  Coreg  Isosorbide dinitrate  Sacubitril-valsartan  Hydralazine  Spironolactone  Monitor BP            Medications, treatments, and labs reviewed  Continue medications and treatments as listed in EMR    Scribe Attestation  I, Mitch Valle   attest that this documentation has been prepared under the direction and in the presence of Alfredo Bro MD    Provider Attestation - Scribe documentation  All medical record  entries made by the Scribe were at my direction and personally dictated by me. I have reviewed the chart and agree that the record accurately reflects my personal performance of the history, physical exam, discussion and plan.   Alfredo Bro MD

## 2024-05-03 NOTE — LETTER
Patient: Edenilson Ness  : 1960    Encounter Date: 2024    PROGRESS NOTE    Subjective  Chief complaint: Edenilson Ness is a 63 y.o. male who is a long term care patient being seen and evaluated for weakness.    HPI:  HPI  Speech therapy has evaluated patient for recertification to address speech language voice, communication\auditory processing.  Patient was seen and examined at the bedside, appears to be in no acute distress.  Nursing staff voicing no new concerns.  Patient denies constitutional symptoms at this time.    Objective  Vital signs: 106/63, 97.9, 61, 16, 98%,    Physical Exam  Constitutional:       General: He is not in acute distress.  Eyes:      Extraocular Movements: Extraocular movements intact.   Cardiovascular:      Rate and Rhythm: Normal rate and regular rhythm.   Pulmonary:      Effort: Pulmonary effort is normal.      Breath sounds: Normal breath sounds.   Abdominal:      General: Bowel sounds are normal.      Palpations: Abdomen is soft.   Musculoskeletal:      Cervical back: Neck supple.   Neurological:      Mental Status: He is alert.      Motor: Weakness present.   Psychiatric:         Mood and Affect: Mood normal.         Behavior: Behavior is cooperative.         Assessment/Plan  Problem List Items Addressed This Visit       Weakness - Primary     Continue working in therapy towards goals         CVA (cerebral vascular accident) (Multi)     monitor for changes in speech or increased weakness  Eliquis  Bleeding precautions  Statin         Hypertensive heart disease with CHF (Multi)     CHF stable, no sob  BP at goal  Coreg  Isosorbide dinitrate  Sacubitril-valsartan  Hydralazine  Spironolactone  Monitor BP            Medications, treatments, and labs reviewed  Continue medications and treatments as listed in EMR    Scribe Attestation  I, Mitch Valle   attest that this documentation has been prepared under the direction and in the presence of Alfredo Bro,  MD    Provider Attestation - Scribe documentation  All medical record entries made by the Scribe were at my direction and personally dictated by me. I have reviewed the chart and agree that the record accurately reflects my personal performance of the history, physical exam, discussion and plan.   Alfredo Bro MD            Electronically Signed By: Alfredo Bro MD   5/3/24  2:50 PM

## 2024-05-08 ENCOUNTER — NURSING HOME VISIT (OUTPATIENT)
Dept: POST ACUTE CARE | Facility: EXTERNAL LOCATION | Age: 64
End: 2024-05-08
Payer: COMMERCIAL

## 2024-05-08 DIAGNOSIS — R53.1 WEAKNESS: Primary | ICD-10-CM

## 2024-05-08 DIAGNOSIS — I63.9 CEREBROVASCULAR ACCIDENT (CVA), UNSPECIFIED MECHANISM (MULTI): ICD-10-CM

## 2024-05-08 DIAGNOSIS — I11.0 HYPERTENSIVE HEART DISEASE WITH CONGESTIVE HEART FAILURE, UNSPECIFIED HEART FAILURE TYPE (MULTI): ICD-10-CM

## 2024-05-08 PROCEDURE — 99308 SBSQ NF CARE LOW MDM 20: CPT | Performed by: INTERNAL MEDICINE

## 2024-05-08 NOTE — PROGRESS NOTES
PROGRESS NOTE    Subjective   Chief complaint: Edenilson Ness is a 63 y.o. male who is a long term care patient being seen and evaluated for  weakness.    HPI:  HPI  Patient is working in therapy due to generalized weakness.  Therapy is working with patient to address ADLs and mobility.  Speech therapy is working with patient to address speech language, voice, communication.  Patient was seen and examined at the bedside, appears to be in no acute distress.  Nursing staff voicing no new concerns.  Patient denies constitutional symptoms.    Objective   Vital signs: 106/63, 97.9, 61, 16, 98%    Physical Exam  Constitutional:       General: He is not in acute distress.  Eyes:      Extraocular Movements: Extraocular movements intact.   Cardiovascular:      Rate and Rhythm: Normal rate and regular rhythm.   Pulmonary:      Effort: Pulmonary effort is normal.      Breath sounds: Normal breath sounds.   Abdominal:      General: Bowel sounds are normal.      Palpations: Abdomen is soft.   Musculoskeletal:      Cervical back: Neck supple.   Neurological:      Mental Status: He is alert.      Motor: Weakness present.   Psychiatric:         Mood and Affect: Mood normal.         Behavior: Behavior is cooperative.         Assessment/Plan   Problem List Items Addressed This Visit       Weakness - Primary     Continue working towards established goals         CVA (cerebral vascular accident) (Multi)     monitor for changes in speech or increased weakness  Eliquis  Bleeding precautions  Statin         Hypertensive heart disease with CHF (Multi)     CHF stable, no sob  BP at goal  Coreg  Isosorbide dinitrate  Sacubitril-valsartan  Hydralazine  Spironolactone  Monitor BP            Medications, treatments, and labs reviewed  Continue medications and treatments as listed in EMR    Scribe Attestation  I, Mitch Valle   attest that this documentation has been prepared under the direction and in the presence of Alfredo Bro  MD    Provider Attestation - Scribe documentation  All medical record entries made by the Scribe were at my direction and personally dictated by me. I have reviewed the chart and agree that the record accurately reflects my personal performance of the history, physical exam, discussion and plan.   Alfredo Bro MD

## 2024-05-08 NOTE — LETTER
Patient: Edenilson Ness  : 1960    Encounter Date: 2024    PROGRESS NOTE    Subjective  Chief complaint: Edenilson Ness is a 63 y.o. male who is a long term care patient being seen and evaluated for  weakness.    HPI:  HPI  Patient is working in therapy due to generalized weakness.  Therapy is working with patient to address ADLs and mobility.  Speech therapy is working with patient to address speech language, voice, communication.  Patient was seen and examined at the bedside, appears to be in no acute distress.  Nursing staff voicing no new concerns.  Patient denies constitutional symptoms.    Objective  Vital signs: 106/63, 97.9, 61, 16, 98%    Physical Exam  Constitutional:       General: He is not in acute distress.  Eyes:      Extraocular Movements: Extraocular movements intact.   Cardiovascular:      Rate and Rhythm: Normal rate and regular rhythm.   Pulmonary:      Effort: Pulmonary effort is normal.      Breath sounds: Normal breath sounds.   Abdominal:      General: Bowel sounds are normal.      Palpations: Abdomen is soft.   Musculoskeletal:      Cervical back: Neck supple.   Neurological:      Mental Status: He is alert.      Motor: Weakness present.   Psychiatric:         Mood and Affect: Mood normal.         Behavior: Behavior is cooperative.         Assessment/Plan  Problem List Items Addressed This Visit       Weakness - Primary     Continue working towards established goals         CVA (cerebral vascular accident) (Multi)     monitor for changes in speech or increased weakness  Eliquis  Bleeding precautions  Statin         Hypertensive heart disease with CHF (Multi)     CHF stable, no sob  BP at goal  Coreg  Isosorbide dinitrate  Sacubitril-valsartan  Hydralazine  Spironolactone  Monitor BP            Medications, treatments, and labs reviewed  Continue medications and treatments as listed in EMR    Scribe Attestation  I, Mitch Valle   attest that this documentation has been  prepared under the direction and in the presence of Alfredo rBo MD    Provider Attestation - Scribe documentation  All medical record entries made by the Scribe were at my direction and personally dictated by me. I have reviewed the chart and agree that the record accurately reflects my personal performance of the history, physical exam, discussion and plan.   Alfredo Bro MD            Electronically Signed By: Alfredo Bro MD   5/8/24  3:30 PM

## 2024-05-15 ENCOUNTER — NURSING HOME VISIT (OUTPATIENT)
Dept: POST ACUTE CARE | Facility: EXTERNAL LOCATION | Age: 64
End: 2024-05-15
Payer: COMMERCIAL

## 2024-05-15 DIAGNOSIS — I11.0 HYPERTENSIVE HEART DISEASE WITH CONGESTIVE HEART FAILURE, UNSPECIFIED HEART FAILURE TYPE (MULTI): ICD-10-CM

## 2024-05-15 DIAGNOSIS — R53.1 WEAKNESS: Primary | ICD-10-CM

## 2024-05-15 DIAGNOSIS — I63.9 CEREBROVASCULAR ACCIDENT (CVA), UNSPECIFIED MECHANISM (MULTI): ICD-10-CM

## 2024-05-15 PROCEDURE — 99308 SBSQ NF CARE LOW MDM 20: CPT | Performed by: INTERNAL MEDICINE

## 2024-05-15 NOTE — ASSESSMENT & PLAN NOTE
CHF stable, no sob  Coreg  Isosorbide dinitrate  Sacubitril-valsartan  Hydralazine  Spironolactone  Monitor BP

## 2024-05-15 NOTE — LETTER
Patient: Edenilson Ness  : 1960    Encounter Date: 05/15/2024    PROGRESS NOTE    Subjective  Chief complaint: Edenilson Ness is a 63 y.o. male who is a long term care patient being seen and evaluated for weakness.    HPI:  HPI  Patient does continue to work in therapy due to generalized weakness.  Patient is working on gait training, able to ambulate short distances with walker and a gait belt.  Speech therapy is working with patient to address cognition, working on speech language voice communication and auditory processing.  Patient seen and examined at the bedside, appears to be in no acute distress.    Objective  Vital signs: 130/80, 97.9, 61, 16, 98%    Physical Exam  Constitutional:       General: He is not in acute distress.  Eyes:      Extraocular Movements: Extraocular movements intact.   Cardiovascular:      Rate and Rhythm: Normal rate and regular rhythm.   Pulmonary:      Effort: Pulmonary effort is normal.      Breath sounds: Normal breath sounds.   Abdominal:      General: Bowel sounds are normal.      Palpations: Abdomen is soft.   Musculoskeletal:      Cervical back: Neck supple.   Neurological:      Mental Status: He is alert.      Motor: Weakness present.   Psychiatric:         Mood and Affect: Mood normal.         Behavior: Behavior is cooperative.         Assessment/Plan  Problem List Items Addressed This Visit       Weakness - Primary     Continue to work with PT         CVA (cerebral vascular accident) (Multi)     monitor for changes in speech or increased weakness  Eliquis  Bleeding precautions  Statin         Hypertensive heart disease with CHF (Multi)     CHF stable, no sob  Coreg  Isosorbide dinitrate  Sacubitril-valsartan  Hydralazine  Spironolactone  Monitor BP            Medications, treatments, and labs reviewed  Continue medications and treatments as listed in EMR    Scribe Attestation  I, Mitch Valle   attest that this documentation has been prepared under the  direction and in the presence of Alfredo Bro MD    Provider Attestation - Scribe documentation  All medical record entries made by the Scribe were at my direction and personally dictated by me. I have reviewed the chart and agree that the record accurately reflects my personal performance of the history, physical exam, discussion and plan.   Alfredo Bro MD            Electronically Signed By: Alfredo Bro MD   5/15/24 12:30 PM

## 2024-05-15 NOTE — PROGRESS NOTES
PROGRESS NOTE    Subjective   Chief complaint: Edenilson Ness is a 63 y.o. male who is a long term care patient being seen and evaluated for weakness.    HPI:  HPI  Patient does continue to work in therapy due to generalized weakness.  Patient is working on gait training, able to ambulate short distances with walker and a gait belt.  Speech therapy is working with patient to address cognition, working on speech language voice communication and auditory processing.  Patient seen and examined at the bedside, appears to be in no acute distress.    Objective   Vital signs: 130/80, 97.9, 61, 16, 98%    Physical Exam  Constitutional:       General: He is not in acute distress.  Eyes:      Extraocular Movements: Extraocular movements intact.   Cardiovascular:      Rate and Rhythm: Normal rate and regular rhythm.   Pulmonary:      Effort: Pulmonary effort is normal.      Breath sounds: Normal breath sounds.   Abdominal:      General: Bowel sounds are normal.      Palpations: Abdomen is soft.   Musculoskeletal:      Cervical back: Neck supple.   Neurological:      Mental Status: He is alert.      Motor: Weakness present.   Psychiatric:         Mood and Affect: Mood normal.         Behavior: Behavior is cooperative.         Assessment/Plan   Problem List Items Addressed This Visit       Weakness - Primary     Continue to work with PT         CVA (cerebral vascular accident) (Multi)     monitor for changes in speech or increased weakness  Eliquis  Bleeding precautions  Statin         Hypertensive heart disease with CHF (Multi)     CHF stable, no sob  Coreg  Isosorbide dinitrate  Sacubitril-valsartan  Hydralazine  Spironolactone  Monitor BP            Medications, treatments, and labs reviewed  Continue medications and treatments as listed in EMR    Scribe Attestation  JAMES, Mitch Valle   attest that this documentation has been prepared under the direction and in the presence of Alfredo Bro MD    Provider  Attestation - Scribe documentation  All medical record entries made by the Scribe were at my direction and personally dictated by me. I have reviewed the chart and agree that the record accurately reflects my personal performance of the history, physical exam, discussion and plan.   Alfredo Bro MD

## 2024-05-22 ENCOUNTER — NURSING HOME VISIT (OUTPATIENT)
Dept: POST ACUTE CARE | Facility: EXTERNAL LOCATION | Age: 64
End: 2024-05-22
Payer: COMMERCIAL

## 2024-05-22 DIAGNOSIS — R53.1 WEAKNESS: Primary | ICD-10-CM

## 2024-05-22 DIAGNOSIS — I11.0 HYPERTENSIVE HEART DISEASE WITH CONGESTIVE HEART FAILURE, UNSPECIFIED HEART FAILURE TYPE (MULTI): ICD-10-CM

## 2024-05-22 DIAGNOSIS — I63.9 CEREBROVASCULAR ACCIDENT (CVA), UNSPECIFIED MECHANISM (MULTI): ICD-10-CM

## 2024-05-22 PROCEDURE — 99308 SBSQ NF CARE LOW MDM 20: CPT | Performed by: INTERNAL MEDICINE

## 2024-05-22 NOTE — PROGRESS NOTES
PROGRESS NOTE    Subjective   Chief complaint: Edenilson Ness is a 63 y.o. male who is a long term care patient being seen and evaluated for weakness.    HPI:  HPI  Patient does continue to work in therapy.  Patient is working on gait training with front wheel walker requiring CGA and wheelchair following for safety and cues for upright posture.  Patient is ambulating 20 feet and 10 feet.  Patient was seen and examined at bedside, appears to be in no acute distress.  Nursing staff voicing no new concerns.    Objective   Vital signs: 130/80, 97.8, 61, 16, 98%    Physical Exam  Constitutional:       General: He is not in acute distress.  Eyes:      Extraocular Movements: Extraocular movements intact.   Cardiovascular:      Rate and Rhythm: Normal rate and regular rhythm.   Pulmonary:      Effort: Pulmonary effort is normal.      Breath sounds: Normal breath sounds.   Abdominal:      General: Bowel sounds are normal.      Palpations: Abdomen is soft.   Musculoskeletal:      Cervical back: Neck supple.   Neurological:      Mental Status: He is alert.      Motor: Weakness present.   Psychiatric:         Mood and Affect: Mood normal.         Behavior: Behavior is cooperative.         Assessment/Plan   Problem List Items Addressed This Visit       Weakness - Primary     Continue to work in therapy on gait training         CVA (cerebral vascular accident) (Multi)     monitor for changes in speech or increased weakness  Eliquis  Bleeding precautions  Statin         Hypertensive heart disease with CHF (Multi)     CHF stable, no sob  Coreg  Isosorbide dinitrate  Sacubitril-valsartan  Hydralazine  Spironolactone  Monitor BP            Medications, treatments, and labs reviewed  Continue medications and treatments as listed in EMR    Scribe Attestation  JAMES, Mitch Valle   attest that this documentation has been prepared under the direction and in the presence of Alfredo Bro MD    Provider Attestation - Scribe  documentation  All medical record entries made by the Scribe were at my direction and personally dictated by me. I have reviewed the chart and agree that the record accurately reflects my personal performance of the history, physical exam, discussion and plan.   Alfredo Bro MD

## 2024-05-22 NOTE — LETTER
Patient: Edenilson Ness  : 1960    Encounter Date: 2024    PROGRESS NOTE    Subjective  Chief complaint: Edenilson Ness is a 63 y.o. male who is a long term care patient being seen and evaluated for weakness.    HPI:  HPI  Patient does continue to work in therapy.  Patient is working on gait training with front wheel walker requiring CGA and wheelchair following for safety and cues for upright posture.  Patient is ambulating 20 feet and 10 feet.  Patient was seen and examined at bedside, appears to be in no acute distress.  Nursing staff voicing no new concerns.    Objective  Vital signs: 130/80, 97.8, 61, 16, 98%    Physical Exam  Constitutional:       General: He is not in acute distress.  Eyes:      Extraocular Movements: Extraocular movements intact.   Cardiovascular:      Rate and Rhythm: Normal rate and regular rhythm.   Pulmonary:      Effort: Pulmonary effort is normal.      Breath sounds: Normal breath sounds.   Abdominal:      General: Bowel sounds are normal.      Palpations: Abdomen is soft.   Musculoskeletal:      Cervical back: Neck supple.   Neurological:      Mental Status: He is alert.      Motor: Weakness present.   Psychiatric:         Mood and Affect: Mood normal.         Behavior: Behavior is cooperative.         Assessment/Plan  Problem List Items Addressed This Visit       Weakness - Primary     Continue to work in therapy on gait training         CVA (cerebral vascular accident) (Multi)     monitor for changes in speech or increased weakness  Eliquis  Bleeding precautions  Statin         Hypertensive heart disease with CHF (Multi)     CHF stable, no sob  Coreg  Isosorbide dinitrate  Sacubitril-valsartan  Hydralazine  Spironolactone  Monitor BP            Medications, treatments, and labs reviewed  Continue medications and treatments as listed in EMR    Scribe Attestation  I, Mitch Valle   attest that this documentation has been prepared under the direction and in the  presence of Alfredo Bro MD    Provider Attestation - Scribe documentation  All medical record entries made by the Scribe were at my direction and personally dictated by me. I have reviewed the chart and agree that the record accurately reflects my personal performance of the history, physical exam, discussion and plan.   Alfredo Bro MD            Electronically Signed By: Alfredo Bro MD   5/22/24  1:21 PM

## 2024-05-29 ENCOUNTER — NURSING HOME VISIT (OUTPATIENT)
Dept: POST ACUTE CARE | Facility: EXTERNAL LOCATION | Age: 64
End: 2024-05-29
Payer: COMMERCIAL

## 2024-05-29 DIAGNOSIS — I63.9 CEREBROVASCULAR ACCIDENT (CVA), UNSPECIFIED MECHANISM (MULTI): ICD-10-CM

## 2024-05-29 DIAGNOSIS — R53.1 WEAKNESS: Primary | ICD-10-CM

## 2024-05-29 DIAGNOSIS — I11.0 HYPERTENSIVE HEART DISEASE WITH CONGESTIVE HEART FAILURE, UNSPECIFIED HEART FAILURE TYPE (MULTI): ICD-10-CM

## 2024-05-29 PROCEDURE — 99308 SBSQ NF CARE LOW MDM 20: CPT | Performed by: INTERNAL MEDICINE

## 2024-05-29 NOTE — LETTER
Patient: Edenilson Ness  : 1960    Encounter Date: 2024    PROGRESS NOTE    Subjective  Chief complaint: Edenilson Ness is a 63 y.o. male who is a long term care patient being seen and evaluated for weakness.    HPI:  HPI  Patient is working in therapy.  Patient completing NuStep on level 3 bilateral lower and upper extremities to increase strength and cardiovascular endurance to improve functional transfers and ambulation.  Patient is ambulating with front wheel walker and CGA and cues for safety and upright posture.  Patient is ambulating 20 feet with seated rest breaks.  Patient seen and examined at the bedside, appears to be in no acute distress.    Objective  Vital signs: 130/80, 97.8, 61, 16, 98%    Physical Exam  Constitutional:       General: He is not in acute distress.  Eyes:      Extraocular Movements: Extraocular movements intact.   Cardiovascular:      Rate and Rhythm: Normal rate and regular rhythm.   Pulmonary:      Effort: Pulmonary effort is normal.      Breath sounds: Normal breath sounds.   Abdominal:      General: Bowel sounds are normal.      Palpations: Abdomen is soft.   Musculoskeletal:      Cervical back: Neck supple.   Neurological:      Mental Status: He is alert.      Motor: Weakness present.   Psychiatric:         Mood and Affect: Mood normal.         Behavior: Behavior is cooperative.         Assessment/Plan  Problem List Items Addressed This Visit       Weakness - Primary     Continue working towards goals         CVA (cerebral vascular accident) (Multi)     monitor for changes in speech or increased weakness  Eliquis  Bleeding precautions  Statin         Hypertensive heart disease with CHF (Multi)     CHF stable, no sob  Coreg  Isosorbide dinitrate  Sacubitril-valsartan  Hydralazine  Spironolactone  Monitor BP            Medications, treatments, and labs reviewed  Continue medications and treatments as listed in EMR    Merylibdhara Napierestation  I, Mitch Valle    attest that this documentation has been prepared under the direction and in the presence of Alfredo Bro MD    Provider Attestation - Scribe documentation  All medical record entries made by the Scribe were at my direction and personally dictated by me. I have reviewed the chart and agree that the record accurately reflects my personal performance of the history, physical exam, discussion and plan.   Alfredo Bro MD            Electronically Signed By: Alfredo Bro MD   5/29/24  5:29 PM

## 2024-05-29 NOTE — PROGRESS NOTES
PROGRESS NOTE    Subjective   Chief complaint: Edenilson Ness is a 63 y.o. male who is a long term care patient being seen and evaluated for weakness.    HPI:  HPI  Patient is working in therapy.  Patient completing NuStep on level 3 bilateral lower and upper extremities to increase strength and cardiovascular endurance to improve functional transfers and ambulation.  Patient is ambulating with front wheel walker and CGA and cues for safety and upright posture.  Patient is ambulating 20 feet with seated rest breaks.  Patient seen and examined at the bedside, appears to be in no acute distress.    Objective   Vital signs: 130/80, 97.8, 61, 16, 98%    Physical Exam  Constitutional:       General: He is not in acute distress.  Eyes:      Extraocular Movements: Extraocular movements intact.   Cardiovascular:      Rate and Rhythm: Normal rate and regular rhythm.   Pulmonary:      Effort: Pulmonary effort is normal.      Breath sounds: Normal breath sounds.   Abdominal:      General: Bowel sounds are normal.      Palpations: Abdomen is soft.   Musculoskeletal:      Cervical back: Neck supple.   Neurological:      Mental Status: He is alert.      Motor: Weakness present.   Psychiatric:         Mood and Affect: Mood normal.         Behavior: Behavior is cooperative.         Assessment/Plan   Problem List Items Addressed This Visit       Weakness - Primary     Continue working towards goals         CVA (cerebral vascular accident) (Multi)     monitor for changes in speech or increased weakness  Eliquis  Bleeding precautions  Statin         Hypertensive heart disease with CHF (Multi)     CHF stable, no sob  Coreg  Isosorbide dinitrate  Sacubitril-valsartan  Hydralazine  Spironolactone  Monitor BP            Medications, treatments, and labs reviewed  Continue medications and treatments as listed in EMR    Scribe Attestation  I, Mitch Valel   attest that this documentation has been prepared under the direction and  in the presence of Alfredo Bro MD    Provider Attestation - Scribe documentation  All medical record entries made by the Scribe were at my direction and personally dictated by me. I have reviewed the chart and agree that the record accurately reflects my personal performance of the history, physical exam, discussion and plan.   Alfredo Bro MD

## 2024-06-05 ENCOUNTER — NURSING HOME VISIT (OUTPATIENT)
Dept: POST ACUTE CARE | Facility: EXTERNAL LOCATION | Age: 64
End: 2024-06-05
Payer: COMMERCIAL

## 2024-06-05 DIAGNOSIS — I63.9 CEREBROVASCULAR ACCIDENT (CVA), UNSPECIFIED MECHANISM (MULTI): ICD-10-CM

## 2024-06-05 DIAGNOSIS — I11.0 HYPERTENSIVE HEART DISEASE WITH CONGESTIVE HEART FAILURE, UNSPECIFIED HEART FAILURE TYPE (MULTI): ICD-10-CM

## 2024-06-05 DIAGNOSIS — D63.8 ANEMIA OF CHRONIC DISEASE: Primary | ICD-10-CM

## 2024-06-05 DIAGNOSIS — Z79.4 TYPE 2 DIABETES MELLITUS WITHOUT COMPLICATION, WITH LONG-TERM CURRENT USE OF INSULIN (MULTI): ICD-10-CM

## 2024-06-05 DIAGNOSIS — E11.9 TYPE 2 DIABETES MELLITUS WITHOUT COMPLICATION, WITH LONG-TERM CURRENT USE OF INSULIN (MULTI): ICD-10-CM

## 2024-06-05 PROCEDURE — 99309 SBSQ NF CARE MODERATE MDM 30: CPT | Performed by: INTERNAL MEDICINE

## 2024-06-05 NOTE — PROGRESS NOTES
PROGRESS NOTE    Subjective   Chief complaint: Edenilson Ness is a 63 y.o. male who is a long term care patient being seen and evaluated for monthly general medical care and follow-up    HPI:  HPI  Patient presents for general medical care and f/u.  Patient seen and examined at bedside.  No issues per nursing.  Patient has no acute complaints.  DM, denies polydipsia polyuria polyphagia.  HTN BP at goal.  Denies chest pain and headache.  CHF stable, denies sob, orthopnea, weight gain.  Hx CVA, denies changes in weakness and speech.  Anemia stable, denies fatigue, sob, and palpitations.  No acute distress.    Objective   Vital signs: 104/66, 97.6, 71, 16, 99%    Physical Exam  Constitutional:       General: He is not in acute distress.  Eyes:      Extraocular Movements: Extraocular movements intact.   Cardiovascular:      Rate and Rhythm: Normal rate and regular rhythm.   Pulmonary:      Effort: Pulmonary effort is normal.      Breath sounds: Normal breath sounds.   Abdominal:      General: Bowel sounds are normal.      Palpations: Abdomen is soft.   Musculoskeletal:      Cervical back: Neck supple.   Neurological:      Mental Status: He is alert.      Motor: Weakness present.   Psychiatric:         Mood and Affect: Mood normal.         Behavior: Behavior is cooperative.         Assessment/Plan   Problem List Items Addressed This Visit       CVA (cerebral vascular accident) (Multi)     monitor for changes in speech or increased weakness  Eliquis  Bleeding precautions  Statin         Type 2 diabetes mellitus without complication, with long-term current use of insulin (Multi)     Monitor sweets and carb intake   monitor A1c routinely         Hypertensive heart disease with CHF (Multi)     CHF stable, no sob  Coreg  Isosorbide dinitrate  Sacubitril-valsartan  Hydralazine  Spironolactone  Monitor BP           Anemia of chronic disease - Primary     Continue to monitor labs  Stable on recent labs          Medications,  treatments, and labs reviewed  Continue medications and treatments as listed in EMR    Scribe Attestation  I, Meryl Valleibdhara   attest that this documentation has been prepared under the direction and in the presence of Alfredo Bro MD    Provider Attestation - Scribe documentation  All medical record entries made by the Scribe were at my direction and personally dictated by me. I have reviewed the chart and agree that the record accurately reflects my personal performance of the history, physical exam, discussion and plan.   Alfredo Bro MD

## 2024-06-05 NOTE — LETTER
Patient: Edenilson Ness  : 1960    Encounter Date: 2024    PROGRESS NOTE    Subjective  Chief complaint: Edenilson Ness is a 63 y.o. male who is a long term care patient being seen and evaluated for monthly general medical care and follow-up    HPI:  HPI  Patient presents for general medical care and f/u.  Patient seen and examined at bedside.  No issues per nursing.  Patient has no acute complaints.  DM, denies polydipsia polyuria polyphagia.  HTN BP at goal.  Denies chest pain and headache.  CHF stable, denies sob, orthopnea, weight gain.  Hx CVA, denies changes in weakness and speech.  Anemia stable, denies fatigue, sob, and palpitations.  No acute distress.    Objective  Vital signs: 104/66, 97.6, 71, 16, 99%    Physical Exam  Constitutional:       General: He is not in acute distress.  Eyes:      Extraocular Movements: Extraocular movements intact.   Cardiovascular:      Rate and Rhythm: Normal rate and regular rhythm.   Pulmonary:      Effort: Pulmonary effort is normal.      Breath sounds: Normal breath sounds.   Abdominal:      General: Bowel sounds are normal.      Palpations: Abdomen is soft.   Musculoskeletal:      Cervical back: Neck supple.   Neurological:      Mental Status: He is alert.      Motor: Weakness present.   Psychiatric:         Mood and Affect: Mood normal.         Behavior: Behavior is cooperative.         Assessment/Plan  Problem List Items Addressed This Visit       CVA (cerebral vascular accident) (Multi)     monitor for changes in speech or increased weakness  Eliquis  Bleeding precautions  Statin         Type 2 diabetes mellitus without complication, with long-term current use of insulin (Multi)     Monitor sweets and carb intake   monitor A1c routinely         Hypertensive heart disease with CHF (Multi)     CHF stable, no sob  Coreg  Isosorbide dinitrate  Sacubitril-valsartan  Hydralazine  Spironolactone  Monitor BP           Anemia of chronic disease - Primary      Continue to monitor labs  Stable on recent labs          Medications, treatments, and labs reviewed  Continue medications and treatments as listed in EMR    Scribe Attestation  I, Mitch Valle   attest that this documentation has been prepared under the direction and in the presence of Alfredo Bro MD    Provider Attestation - Scribe documentation  All medical record entries made by the Scribe were at my direction and personally dictated by me. I have reviewed the chart and agree that the record accurately reflects my personal performance of the history, physical exam, discussion and plan.   Alfredo Bro MD            Electronically Signed By: Alfredo Bro MD   6/5/24  2:42 PM

## 2024-06-21 ENCOUNTER — NURSING HOME VISIT (OUTPATIENT)
Dept: POST ACUTE CARE | Facility: EXTERNAL LOCATION | Age: 64
End: 2024-06-21
Payer: COMMERCIAL

## 2024-06-21 DIAGNOSIS — R23.8 SKIN IRRITATION: Primary | ICD-10-CM

## 2024-06-21 DIAGNOSIS — I11.0 HYPERTENSIVE HEART DISEASE WITH CONGESTIVE HEART FAILURE, UNSPECIFIED HEART FAILURE TYPE (MULTI): ICD-10-CM

## 2024-06-21 DIAGNOSIS — I63.9 CEREBROVASCULAR ACCIDENT (CVA), UNSPECIFIED MECHANISM (MULTI): ICD-10-CM

## 2024-06-21 PROCEDURE — 99308 SBSQ NF CARE LOW MDM 20: CPT | Performed by: INTERNAL MEDICINE

## 2024-06-21 NOTE — PROGRESS NOTES
PROGRESS NOTE    Subjective   Chief complaint: Edenilson Ness is a 63 y.o. male who is a long term care patient being seen and evaluated for  irritation.    HPI:  HPI  .  Patient seen in follow-up due to nurse call yesterday reporting that patient had skin irritation to  scrotum.  Redness.  Patient is on zinc oxide.  Continue to monitor.    Objective   Vital signs: 116/76, 97.6, 67, 16, 99%    Physical Exam  Constitutional:       General: He is not in acute distress.  Eyes:      Extraocular Movements: Extraocular movements intact.   Cardiovascular:      Rate and Rhythm: Normal rate and regular rhythm.   Pulmonary:      Effort: Pulmonary effort is normal.      Breath sounds: Normal breath sounds.   Abdominal:      General: Bowel sounds are normal.      Palpations: Abdomen is soft.   Musculoskeletal:      Cervical back: Neck supple.   Neurological:      Mental Status: He is alert.      Motor: Weakness present.   Psychiatric:         Mood and Affect: Mood normal.         Behavior: Behavior is cooperative.         Assessment/Plan   Problem List Items Addressed This Visit       CVA (cerebral vascular accident) (Multi)     monitor for changes in speech or increased weakness  Eliquis  Bleeding precautions  Statin         Hypertensive heart disease with CHF (Multi)     CHF stable, no sob  Coreg  Isosorbide dinitrate  Sacubitril-valsartan  Hydralazine  Spironolactone  Monitor BP           Skin irritation - Primary      scrotum   zinc oxide          Medications, treatments, and labs reviewed  Continue medications and treatments as listed in EMR    Scribe Attestation  INeelima Scribe   attest that this documentation has been prepared under the direction and in the presence of Alfredo Bro MD    Provider Attestation - Scribe documentation  All medical record entries made by the Scribe were at my direction and personally dictated by me. I have reviewed the chart and agree that the record accurately reflects my  personal performance of the history, physical exam, discussion and plan.   Alfredo Bro MD

## 2024-06-21 NOTE — LETTER
Patient: Edenilson Ness  : 1960    Encounter Date: 2024    PROGRESS NOTE    Subjective  Chief complaint: Edenilson Ness is a 63 y.o. male who is a long term care patient being seen and evaluated for  irritation.    HPI:  HPI  .  Patient seen in follow-up due to nurse call yesterday reporting that patient had skin irritation to  scrotum.  Redness.  Patient is on zinc oxide.  Continue to monitor.    Objective  Vital signs: 116/76, 97.6, 67, 16, 99%    Physical Exam  Constitutional:       General: He is not in acute distress.  Eyes:      Extraocular Movements: Extraocular movements intact.   Cardiovascular:      Rate and Rhythm: Normal rate and regular rhythm.   Pulmonary:      Effort: Pulmonary effort is normal.      Breath sounds: Normal breath sounds.   Abdominal:      General: Bowel sounds are normal.      Palpations: Abdomen is soft.   Musculoskeletal:      Cervical back: Neck supple.   Neurological:      Mental Status: He is alert.      Motor: Weakness present.   Psychiatric:         Mood and Affect: Mood normal.         Behavior: Behavior is cooperative.         Assessment/Plan  Problem List Items Addressed This Visit       CVA (cerebral vascular accident) (Multi)     monitor for changes in speech or increased weakness  Eliquis  Bleeding precautions  Statin         Hypertensive heart disease with CHF (Multi)     CHF stable, no sob  Coreg  Isosorbide dinitrate  Sacubitril-valsartan  Hydralazine  Spironolactone  Monitor BP           Skin irritation - Primary      scrotum   zinc oxide          Medications, treatments, and labs reviewed  Continue medications and treatments as listed in EMR    Scribe Attestation  I, Mitch Valle   attest that this documentation has been prepared under the direction and in the presence of Alfredo Bro MD    Provider Attestation - Scribe documentation  All medical record entries made by the Scribe were at my direction and personally dictated by me. I have  reviewed the chart and agree that the record accurately reflects my personal performance of the history, physical exam, discussion and plan.   Alfredo Bro MD            Electronically Signed By: Alfredo Bro MD   6/21/24  3:34 PM

## 2024-06-26 ENCOUNTER — NURSING HOME VISIT (OUTPATIENT)
Dept: POST ACUTE CARE | Facility: EXTERNAL LOCATION | Age: 64
End: 2024-06-26
Payer: COMMERCIAL

## 2024-06-26 DIAGNOSIS — I11.0 HYPERTENSIVE HEART DISEASE WITH CONGESTIVE HEART FAILURE, UNSPECIFIED HEART FAILURE TYPE (MULTI): ICD-10-CM

## 2024-06-26 DIAGNOSIS — R23.8 SKIN IRRITATION: Primary | ICD-10-CM

## 2024-06-26 PROCEDURE — 99308 SBSQ NF CARE LOW MDM 20: CPT | Performed by: INTERNAL MEDICINE

## 2024-06-26 NOTE — PROGRESS NOTES
PROGRESS NOTE    Subjective   Chief complaint: Edenilson Ness is a 63 y.o. male who is a long term care patient being seen and evaluated for skin irritation.    HPI:  HPI  Patient is seen in follow-up of skin irritation.  Patient had irritation to scrotum and was started on Calmoseptine cream with improvement.    Objective   Vital signs: 116/76, 97.6, 67, 16, 99%    Physical Exam  Constitutional:       General: He is not in acute distress.  Eyes:      Extraocular Movements: Extraocular movements intact.   Cardiovascular:      Rate and Rhythm: Normal rate and regular rhythm.   Pulmonary:      Effort: Pulmonary effort is normal.      Breath sounds: Normal breath sounds.   Abdominal:      General: Bowel sounds are normal.      Palpations: Abdomen is soft.   Musculoskeletal:      Cervical back: Neck supple.   Neurological:      Mental Status: He is alert.      Motor: Weakness present.   Psychiatric:         Mood and Affect: Mood normal.         Behavior: Behavior is cooperative.         Assessment/Plan   Problem List Items Addressed This Visit       Hypertensive heart disease with CHF (Multi)     CHF stable, no sob  Coreg  Isosorbide dinitrate  Sacubitril-valsartan  Hydralazine  Spironolactone  Monitor BP           Skin irritation - Primary     Scrotum   Improving  Continue Calmoseptine and zinc          Medications, treatments, and labs reviewed  Continue medications and treatments as listed in EMR    Scribe Attestation  INeelima Scribe   attest that this documentation has been prepared under the direction and in the presence of Alfredo Bro MD    Provider Attestation - Scribe documentation  All medical record entries made by the Scribe were at my direction and personally dictated by me. I have reviewed the chart and agree that the record accurately reflects my personal performance of the history, physical exam, discussion and plan.   Alfredo Bro MD

## 2024-06-26 NOTE — LETTER
Patient: Edenilson Ness  : 1960    Encounter Date: 2024    PROGRESS NOTE    Subjective  Chief complaint: Edenilson Ness is a 63 y.o. male who is a long term care patient being seen and evaluated for skin irritation.    HPI:  HPI  Patient is seen in follow-up of skin irritation.  Patient had irritation to scrotum and was started on Calmoseptine cream with improvement.    Objective  Vital signs: 116/76, 97.6, 67, 16, 99%    Physical Exam  Constitutional:       General: He is not in acute distress.  Eyes:      Extraocular Movements: Extraocular movements intact.   Cardiovascular:      Rate and Rhythm: Normal rate and regular rhythm.   Pulmonary:      Effort: Pulmonary effort is normal.      Breath sounds: Normal breath sounds.   Abdominal:      General: Bowel sounds are normal.      Palpations: Abdomen is soft.   Musculoskeletal:      Cervical back: Neck supple.   Neurological:      Mental Status: He is alert.      Motor: Weakness present.   Psychiatric:         Mood and Affect: Mood normal.         Behavior: Behavior is cooperative.         Assessment/Plan  Problem List Items Addressed This Visit       Hypertensive heart disease with CHF (Multi)     CHF stable, no sob  Coreg  Isosorbide dinitrate  Sacubitril-valsartan  Hydralazine  Spironolactone  Monitor BP           Skin irritation - Primary     Scrotum   Improving  Continue Calmoseptine and zinc          Medications, treatments, and labs reviewed  Continue medications and treatments as listed in EMR    Scribe Attestation  Neelima RAMIREZ Scribe   attest that this documentation has been prepared under the direction and in the presence of Alfredo Bro MD    Provider Attestation - Scribe documentation  All medical record entries made by the Scribe were at my direction and personally dictated by me. I have reviewed the chart and agree that the record accurately reflects my personal performance of the history, physical exam, discussion and plan.    Alfredo Bro MD            Electronically Signed By: Alfredo Bro MD   6/26/24  2:56 PM

## 2024-07-03 ENCOUNTER — NURSING HOME VISIT (OUTPATIENT)
Dept: POST ACUTE CARE | Facility: EXTERNAL LOCATION | Age: 64
End: 2024-07-03
Payer: COMMERCIAL

## 2024-07-03 DIAGNOSIS — E11.9 TYPE 2 DIABETES MELLITUS WITHOUT COMPLICATION, WITH LONG-TERM CURRENT USE OF INSULIN (MULTI): ICD-10-CM

## 2024-07-03 DIAGNOSIS — I11.0 HYPERTENSIVE HEART DISEASE WITH CONGESTIVE HEART FAILURE, UNSPECIFIED HEART FAILURE TYPE (MULTI): ICD-10-CM

## 2024-07-03 DIAGNOSIS — Z79.4 TYPE 2 DIABETES MELLITUS WITHOUT COMPLICATION, WITH LONG-TERM CURRENT USE OF INSULIN (MULTI): ICD-10-CM

## 2024-07-03 DIAGNOSIS — I63.9 CEREBROVASCULAR ACCIDENT (CVA), UNSPECIFIED MECHANISM (MULTI): Primary | ICD-10-CM

## 2024-07-03 NOTE — PROGRESS NOTES
PROGRESS NOTE    Subjective   Chief complaint: Edenilson Ness is a 63 y.o. male who is a long term care patient being seen and evaluated for monthly general medical care and follow-up    HPI:  HPI  An interactive audio and/or video telecommunication system which permits real time communications between the patient (at the originating site) and provider (at a distant site) was utilized to provide this telehealth service after obtaining verbal consent.    Patient presents for general medical care and f/u.  Patient seen and examined at bedside.  No issues per nursing.  Patient has no acute complaints.  HTN, Denies chest pain and headache.  CHF stable, denies sob, orthopnea, weight gain.  Hx CVA, denies changes in weakness and speech.  DM, denies polydipsia polyuria polyphagia.  Mentation at baseline, no acute distress.    Objective   Vital signs: 160/76, 97.6, 67, 16, 99%    Physical Exam  Constitutional:       General: He is not in acute distress.  Eyes:      Extraocular Movements: Extraocular movements intact.   Pulmonary:      Effort: Pulmonary effort is normal.   Musculoskeletal:      Cervical back: Neck supple.   Neurological:      Mental Status: He is alert.   Psychiatric:         Mood and Affect: Mood normal.         Behavior: Behavior is cooperative.         Assessment/Plan   Problem List Items Addressed This Visit       CVA (cerebral vascular accident) (Multi) - Primary     monitor for changes in speech or increased weakness  Eliquis  Bleeding precautions  Statin         Type 2 diabetes mellitus without complication, with long-term current use of insulin (Multi)     Monitor sweets and carb intake   monitor A1c routinely         Hypertensive heart disease with CHF (Multi)     CHF stable, no sob  Coreg  Isosorbide dinitrate  Sacubitril-valsartan  Hydralazine  Spironolactone  Monitor BP            Medications, treatments, and labs reviewed  Continue medications and treatments as listed in EMR    Scribe  Attestation  I, Mitch Valle   attest that this documentation has been prepared under the direction and in the presence of Alfredo Bro MD    Provider Attestation - Scribe documentation  All medical record entries made by the Scribe were at my direction and personally dictated by me. I have reviewed the chart and agree that the record accurately reflects my personal performance of the history, physical exam, discussion and plan.   Alfredo Bro MD

## 2024-07-03 NOTE — LETTER
Patient: Edenilson Ness  : 1960    Encounter Date: 2024    PROGRESS NOTE    Subjective  Chief complaint: Edenilson Ness is a 63 y.o. male who is a long term care patient being seen and evaluated for monthly general medical care and follow-up    HPI:  HPI  An interactive audio and/or video telecommunication system which permits real time communications between the patient (at the originating site) and provider (at a distant site) was utilized to provide this telehealth service after obtaining verbal consent.    Patient presents for general medical care and f/u.  Patient seen and examined at bedside.  No issues per nursing.  Patient has no acute complaints.  HTN, Denies chest pain and headache.  CHF stable, denies sob, orthopnea, weight gain.  Hx CVA, denies changes in weakness and speech.  DM, denies polydipsia polyuria polyphagia.  Mentation at baseline, no acute distress.    Objective  Vital signs: 160/76, 97.6, 67, 16, 99%    Physical Exam  Constitutional:       General: He is not in acute distress.  Eyes:      Extraocular Movements: Extraocular movements intact.   Pulmonary:      Effort: Pulmonary effort is normal.   Musculoskeletal:      Cervical back: Neck supple.   Neurological:      Mental Status: He is alert.   Psychiatric:         Mood and Affect: Mood normal.         Behavior: Behavior is cooperative.         Assessment/Plan  Problem List Items Addressed This Visit       CVA (cerebral vascular accident) (Multi) - Primary     monitor for changes in speech or increased weakness  Eliquis  Bleeding precautions  Statin         Type 2 diabetes mellitus without complication, with long-term current use of insulin (Multi)     Monitor sweets and carb intake   monitor A1c routinely         Hypertensive heart disease with CHF (Multi)     CHF stable, no sob  Coreg  Isosorbide dinitrate  Sacubitril-valsartan  Hydralazine  Spironolactone  Monitor BP            Medications, treatments, and labs  reviewed  Continue medications and treatments as listed in EMR    Scribe Attestation  I, Mitch Valle   attest that this documentation has been prepared under the direction and in the presence of Alfredo Bro MD    Provider Attestation - Scribe documentation  All medical record entries made by the Scribe were at my direction and personally dictated by me. I have reviewed the chart and agree that the record accurately reflects my personal performance of the history, physical exam, discussion and plan.   Alfredo Bro MD            Electronically Signed By: Alfredo Bro MD   7/3/24 10:51 PM

## 2024-08-02 ENCOUNTER — NURSING HOME VISIT (OUTPATIENT)
Dept: POST ACUTE CARE | Facility: EXTERNAL LOCATION | Age: 64
End: 2024-08-02
Payer: COMMERCIAL

## 2024-08-02 DIAGNOSIS — I63.9 CEREBROVASCULAR ACCIDENT (CVA), UNSPECIFIED MECHANISM (MULTI): Primary | ICD-10-CM

## 2024-08-02 DIAGNOSIS — D63.8 ANEMIA OF CHRONIC DISEASE: ICD-10-CM

## 2024-08-02 DIAGNOSIS — I11.0 HYPERTENSIVE HEART DISEASE WITH CONGESTIVE HEART FAILURE, UNSPECIFIED HEART FAILURE TYPE (MULTI): ICD-10-CM

## 2024-08-02 DIAGNOSIS — Z79.4 TYPE 2 DIABETES MELLITUS WITHOUT COMPLICATION, WITH LONG-TERM CURRENT USE OF INSULIN (MULTI): ICD-10-CM

## 2024-08-02 DIAGNOSIS — E11.9 TYPE 2 DIABETES MELLITUS WITHOUT COMPLICATION, WITH LONG-TERM CURRENT USE OF INSULIN (MULTI): ICD-10-CM

## 2024-08-02 NOTE — LETTER
Patient: Edenilson Ness  : 1960    Encounter Date: 2024    PROGRESS NOTE    Subjective  Chief complaint: Edenilson Ness is a 63 y.o. male who is a long term care patient being seen and evaluated for monthly general medical care and follow-up    HPI:  HPI  Patient presents for general medical care and f/u.  Patient seen and examined at bedside.  No issues per nursing.  Patient has no acute complaints.  HTN BP at goal.  Denies chest pain and headache.  CHF stable, denies sob, orthopnea, weight gain.  Hx CVA, denies changes in weakness and speech.  DM, denies polydipsia polyuria polyphagia.  Anemia stable, denies fatigue, sob, and palpitations.  Mentation at baseline, no acute distress    Objective  Vital signs: 116/76, 97.6, 67, 16, 99%    Physical Exam  Constitutional:       General: He is not in acute distress.  Eyes:      Extraocular Movements: Extraocular movements intact.   Pulmonary:      Effort: Pulmonary effort is normal.   Musculoskeletal:      Cervical back: Neck supple.   Neurological:      Mental Status: He is alert.   Psychiatric:         Mood and Affect: Mood normal.         Behavior: Behavior is cooperative.         Assessment/Plan  Problem List Items Addressed This Visit       CVA (cerebral vascular accident) (Multi) - Primary     monitor for changes in speech or increased weakness  Eliquis  Bleeding precautions  Statin         Type 2 diabetes mellitus without complication, with long-term current use of insulin (Multi)     Monitor sweets and carb intake   monitor A1c routinely         Hypertensive heart disease with CHF (Multi)     CHF stable, no sob  Coreg  Isosorbide dinitrate  Sacubitril-valsartan  Hydralazine  Spironolactone  Monitor BP           Anemia of chronic disease     Stable on recent labs.  Monitor          Medications, treatments, and labs reviewed  Continue medications and treatments as listed in EMR    Scribe Attestation  I, Mitch Valle   attest that this  documentation has been prepared under the direction and in the presence of Alfredo Bro MD    Provider Attestation - Scribe documentation  All medical record entries made by the Scribe were at my direction and personally dictated by me. I have reviewed the chart and agree that the record accurately reflects my personal performance of the history, physical exam, discussion and plan.   Alfredo Bro MD            Electronically Signed By: Alfredo Bro MD   8/2/24  3:08 PM

## 2024-08-02 NOTE — PROGRESS NOTES
PROGRESS NOTE    Subjective   Chief complaint: Edenilson Ness is a 63 y.o. male who is a long term care patient being seen and evaluated for monthly general medical care and follow-up    HPI:  HPI  Patient presents for general medical care and f/u.  Patient seen and examined at bedside.  No issues per nursing.  Patient has no acute complaints.  HTN BP at goal.  Denies chest pain and headache.  CHF stable, denies sob, orthopnea, weight gain.  Hx CVA, denies changes in weakness and speech.  DM, denies polydipsia polyuria polyphagia.  Anemia stable, denies fatigue, sob, and palpitations.  Mentation at baseline, no acute distress    Objective   Vital signs: 116/76, 97.6, 67, 16, 99%    Physical Exam  Constitutional:       General: He is not in acute distress.  Eyes:      Extraocular Movements: Extraocular movements intact.   Pulmonary:      Effort: Pulmonary effort is normal.   Musculoskeletal:      Cervical back: Neck supple.   Neurological:      Mental Status: He is alert.   Psychiatric:         Mood and Affect: Mood normal.         Behavior: Behavior is cooperative.         Assessment/Plan   Problem List Items Addressed This Visit       CVA (cerebral vascular accident) (Multi) - Primary     monitor for changes in speech or increased weakness  Eliquis  Bleeding precautions  Statin         Type 2 diabetes mellitus without complication, with long-term current use of insulin (Multi)     Monitor sweets and carb intake   monitor A1c routinely         Hypertensive heart disease with CHF (Multi)     CHF stable, no sob  Coreg  Isosorbide dinitrate  Sacubitril-valsartan  Hydralazine  Spironolactone  Monitor BP           Anemia of chronic disease     Stable on recent labs.  Monitor          Medications, treatments, and labs reviewed  Continue medications and treatments as listed in EMR    Scribe Attestation  I, Mitch Valle   attest that this documentation has been prepared under the direction and in the presence of  Alfredo Bro MD    Provider Attestation - Scribe documentation  All medical record entries made by the Scribe were at my direction and personally dictated by me. I have reviewed the chart and agree that the record accurately reflects my personal performance of the history, physical exam, discussion and plan.   Alfredo Bro MD

## 2024-08-14 ENCOUNTER — NURSING HOME VISIT (OUTPATIENT)
Dept: POST ACUTE CARE | Facility: EXTERNAL LOCATION | Age: 64
End: 2024-08-14
Payer: COMMERCIAL

## 2024-08-14 DIAGNOSIS — I63.9 CEREBROVASCULAR ACCIDENT (CVA), UNSPECIFIED MECHANISM (MULTI): Primary | ICD-10-CM

## 2024-08-14 DIAGNOSIS — R53.1 WEAKNESS: ICD-10-CM

## 2024-08-14 DIAGNOSIS — I11.0 HYPERTENSIVE HEART DISEASE WITH CONGESTIVE HEART FAILURE, UNSPECIFIED HEART FAILURE TYPE (MULTI): ICD-10-CM

## 2024-08-14 PROCEDURE — 99308 SBSQ NF CARE LOW MDM 20: CPT | Performed by: INTERNAL MEDICINE

## 2024-08-14 NOTE — PROGRESS NOTES
PROGRESS NOTE    Subjective   Chief complaint: Edenilson Ness is a 63 y.o. male who is a long term care patient being seen and evaluated for weakness    HPI:  HPI  Patient is currently working in therapy due to generalized weakness.  Patient is completing functional wheelchair mobility with encouragement to use feet for propelling in order to increase ROM and facilitation for sit to stand transfers.  Patient is also working on transfers in parallel bars requiring min assist and verbal cues for hand and feet placement.  Patient was seen and examined at the bedside, appears to be in no acute distress.  Nursing reporting no new concerns.  Patient denies constitutional symptoms.    Objective   Vital signs: 127/74, 97.8, 78, 18, 98%    Physical Exam  Constitutional:       General: He is not in acute distress.  Eyes:      Extraocular Movements: Extraocular movements intact.   Pulmonary:      Effort: Pulmonary effort is normal.   Musculoskeletal:      Cervical back: Neck supple.   Neurological:      Mental Status: He is alert.      Motor: Weakness present.   Psychiatric:         Mood and Affect: Mood normal.         Behavior: Behavior is cooperative.         Assessment/Plan   Problem List Items Addressed This Visit       Weakness     Continue to work in therapy         CVA (cerebral vascular accident) (Multi) - Primary     monitor for changes in speech or increased weakness  Eliquis  Bleeding precautions  Statin         Hypertensive heart disease with CHF (Multi)     CHF stable, no sob  Coreg  Isosorbide dinitrate  Sacubitril-valsartan  Hydralazine  Spironolactone  Monitor BP            Medications, treatments, and labs reviewed  Continue medications and treatments as listed in EMR    Scribe Attestation  I, Mitch Valle   attest that this documentation has been prepared under the direction and in the presence of Alfredo Bro MD    Provider Attestation - Scribe documentation  All medical record entries made by  the Scribe were at my direction and personally dictated by me. I have reviewed the chart and agree that the record accurately reflects my personal performance of the history, physical exam, discussion and plan.   Alfredo Bro MD

## 2024-08-14 NOTE — LETTER
Patient: Edenilson Ness  : 1960    Encounter Date: 2024    PROGRESS NOTE    Subjective  Chief complaint: Edenilson Ness is a 63 y.o. male who is a long term care patient being seen and evaluated for weakness    HPI:  HPI  Patient is currently working in therapy due to generalized weakness.  Patient is completing functional wheelchair mobility with encouragement to use feet for propelling in order to increase ROM and facilitation for sit to stand transfers.  Patient is also working on transfers in parallel bars requiring min assist and verbal cues for hand and feet placement.  Patient was seen and examined at the bedside, appears to be in no acute distress.  Nursing reporting no new concerns.  Patient denies constitutional symptoms.    Objective  Vital signs: 127/74, 97.8, 78, 18, 98%    Physical Exam  Constitutional:       General: He is not in acute distress.  Eyes:      Extraocular Movements: Extraocular movements intact.   Pulmonary:      Effort: Pulmonary effort is normal.   Musculoskeletal:      Cervical back: Neck supple.   Neurological:      Mental Status: He is alert.      Motor: Weakness present.   Psychiatric:         Mood and Affect: Mood normal.         Behavior: Behavior is cooperative.         Assessment/Plan  Problem List Items Addressed This Visit       Weakness     Continue to work in therapy         CVA (cerebral vascular accident) (Multi) - Primary     monitor for changes in speech or increased weakness  Eliquis  Bleeding precautions  Statin         Hypertensive heart disease with CHF (Multi)     CHF stable, no sob  Coreg  Isosorbide dinitrate  Sacubitril-valsartan  Hydralazine  Spironolactone  Monitor BP            Medications, treatments, and labs reviewed  Continue medications and treatments as listed in EMR    Scribe Attestation  I, Mitch Valle   attest that this documentation has been prepared under the direction and in the presence of Alfredo Bro MD    Provider  Attestation - Scribe documentation  All medical record entries made by the Scribe were at my direction and personally dictated by me. I have reviewed the chart and agree that the record accurately reflects my personal performance of the history, physical exam, discussion and plan.   Alfredo Bro MD            Electronically Signed By: Alfredo Bro MD   8/14/24  3:25 PM

## 2024-08-21 ENCOUNTER — NURSING HOME VISIT (OUTPATIENT)
Dept: POST ACUTE CARE | Facility: EXTERNAL LOCATION | Age: 64
End: 2024-08-21
Payer: COMMERCIAL

## 2024-08-21 DIAGNOSIS — I11.0 HYPERTENSIVE HEART DISEASE WITH CONGESTIVE HEART FAILURE, UNSPECIFIED HEART FAILURE TYPE (MULTI): ICD-10-CM

## 2024-08-21 DIAGNOSIS — I63.9 CEREBROVASCULAR ACCIDENT (CVA), UNSPECIFIED MECHANISM (MULTI): ICD-10-CM

## 2024-08-21 DIAGNOSIS — R53.1 WEAKNESS: ICD-10-CM

## 2024-08-21 DIAGNOSIS — R05.9 COUGH, UNSPECIFIED TYPE: Primary | ICD-10-CM

## 2024-08-21 PROCEDURE — 99308 SBSQ NF CARE LOW MDM 20: CPT | Performed by: INTERNAL MEDICINE

## 2024-08-21 NOTE — LETTER
Patient: Edenilson Ness  : 1960    Encounter Date: 2024    PROGRESS NOTE    Subjective  Chief complaint: Edenilson Ness is a 63 y.o. male who is a long term care patient being seen and evaluated for cough and weakness.    HPI:  HPI  Patient is currently working with therapy due to generalized weakness, OT.  Therapy is working with patient on completing sit to stand transfers in parallel bars requiring min to mod assist and verbal cues for hand and foot placement.  Patient does take rest breaks between transfers.  Patient is actively participating in skilled interventions.  Nursing called yesterday reported that patient had a nonproductive cough.  Orders placed for patient to start Mucinex.  Patient denies fever or chills.  Denies chest pain or shortness of breath.    Objective  Vital signs: 127/74, 97.9, 78, 18, 98%    Physical Exam  Constitutional:       General: He is not in acute distress.  Eyes:      Extraocular Movements: Extraocular movements intact.   Pulmonary:      Effort: Pulmonary effort is normal.   Musculoskeletal:      Cervical back: Neck supple.   Neurological:      Mental Status: He is alert.      Motor: Weakness present.   Psychiatric:         Mood and Affect: Mood normal.         Behavior: Behavior is cooperative.         Assessment/Plan  Problem List Items Addressed This Visit       Weakness     Continue working in therapy         CVA (cerebral vascular accident) (Multi)     monitor for changes in speech or increased weakness  Eliquis  Bleeding precautions  Statin         Hypertensive heart disease with CHF (Multi)     CHF stable, no sob  Coreg  Isosorbide dinitrate  Sacubitril-valsartan  Hydralazine  Spironolactone  Monitor BP           Cough - Primary     Mucinex  Monitor for worsening symptoms          Medications, treatments, and labs reviewed  Continue medications and treatments as listed in EMR    Scribe Attestation  I, Mitch Valle   attest that this documentation has  been prepared under the direction and in the presence of Alfredo Bro MD    Provider Attestation - Scribe documentation  All medical record entries made by the Scribe were at my direction and personally dictated by me. I have reviewed the chart and agree that the record accurately reflects my personal performance of the history, physical exam, discussion and plan.   Alfredo Bro MD            Electronically Signed By: Alfredo Bro MD   8/21/24  2:45 PM

## 2024-08-23 ENCOUNTER — NURSING HOME VISIT (OUTPATIENT)
Dept: POST ACUTE CARE | Facility: EXTERNAL LOCATION | Age: 64
End: 2024-08-23
Payer: COMMERCIAL

## 2024-08-23 DIAGNOSIS — R05.9 COUGH, UNSPECIFIED TYPE: Primary | ICD-10-CM

## 2024-08-23 DIAGNOSIS — I63.9 CEREBROVASCULAR ACCIDENT (CVA), UNSPECIFIED MECHANISM (MULTI): ICD-10-CM

## 2024-08-23 DIAGNOSIS — I11.0 HYPERTENSIVE HEART DISEASE WITH CONGESTIVE HEART FAILURE, UNSPECIFIED HEART FAILURE TYPE (MULTI): ICD-10-CM

## 2024-08-23 PROCEDURE — 99308 SBSQ NF CARE LOW MDM 20: CPT | Performed by: INTERNAL MEDICINE

## 2024-08-23 NOTE — LETTER
Patient: Edenilson Ness  : 1960    Encounter Date: 2024    PROGRESS NOTE    Subjective  Chief complaint: Edenilson Ness is a 63 y.o. male who is a long term care patient being seen and evaluated for cough.    HPI:  HPI  Patient is seen in follow up.  Reports of a recent cough, nonproductive and patient started on Mucinex.  Patient reports improvement with cough.  Patient was seen and examined at the bedside, appears to be in no acute distress.  Patient is denying fever chills.    Objective  Vital signs: 127/74, 97.8, 78, 18, 98%    Physical Exam  Constitutional:       General: He is not in acute distress.  Eyes:      Extraocular Movements: Extraocular movements intact.   Pulmonary:      Effort: Pulmonary effort is normal.   Musculoskeletal:      Cervical back: Neck supple.   Neurological:      Mental Status: He is alert.   Psychiatric:         Mood and Affect: Mood normal.         Behavior: Behavior is cooperative.         Assessment/Plan  Problem List Items Addressed This Visit       CVA (cerebral vascular accident) (Multi)     monitor for changes in speech or increased weakness  Eliquis  Bleeding precautions  Statin         Hypertensive heart disease with CHF (Multi)     CHF stable, no sob  Coreg  Isosorbide dinitrate  Sacubitril-valsartan  Hydralazine  Spironolactone  Monitor BP           Cough - Primary     Improving  Mucinex  Monitor for worsening symptoms          Medications, treatments, and labs reviewed  Continue medications and treatments as listed in EMR    Scribe Attestation  I, Mitch Valle   attest that this documentation has been prepared under the direction and in the presence of Alfredo Bro MD    Provider Attestation - Scribe documentation  All medical record entries made by the Scribe were at my direction and personally dictated by me. I have reviewed the chart and agree that the record accurately reflects my personal performance of the history, physical exam, discussion  and plan.   Alfredo Bro MD            Electronically Signed By: Alfredo Bro MD   8/23/24  5:02 PM

## 2024-08-23 NOTE — PROGRESS NOTES
PROGRESS NOTE    Subjective   Chief complaint: Edenilson Ness is a 63 y.o. male who is a long term care patient being seen and evaluated for cough.    HPI:  HPI  Patient is seen in follow up.  Reports of a recent cough, nonproductive and patient started on Mucinex.  Patient reports improvement with cough.  Patient was seen and examined at the bedside, appears to be in no acute distress.  Patient is denying fever chills.    Objective   Vital signs: 127/74, 97.8, 78, 18, 98%    Physical Exam  Constitutional:       General: He is not in acute distress.  Eyes:      Extraocular Movements: Extraocular movements intact.   Pulmonary:      Effort: Pulmonary effort is normal.   Musculoskeletal:      Cervical back: Neck supple.   Neurological:      Mental Status: He is alert.   Psychiatric:         Mood and Affect: Mood normal.         Behavior: Behavior is cooperative.         Assessment/Plan   Problem List Items Addressed This Visit       CVA (cerebral vascular accident) (Multi)     monitor for changes in speech or increased weakness  Eliquis  Bleeding precautions  Statin         Hypertensive heart disease with CHF (Multi)     CHF stable, no sob  Coreg  Isosorbide dinitrate  Sacubitril-valsartan  Hydralazine  Spironolactone  Monitor BP           Cough - Primary     Improving  Mucinex  Monitor for worsening symptoms          Medications, treatments, and labs reviewed  Continue medications and treatments as listed in EMR    Scribe Attestation  I, Mitch Valle   attest that this documentation has been prepared under the direction and in the presence of Alfredo Bro MD    Provider Attestation - Scribe documentation  All medical record entries made by the Scribe were at my direction and personally dictated by me. I have reviewed the chart and agree that the record accurately reflects my personal performance of the history, physical exam, discussion and plan.   Alfredo Bro MD

## 2024-08-28 ENCOUNTER — NURSING HOME VISIT (OUTPATIENT)
Dept: POST ACUTE CARE | Facility: EXTERNAL LOCATION | Age: 64
End: 2024-08-28
Payer: COMMERCIAL

## 2024-08-28 DIAGNOSIS — R53.1 WEAKNESS: Primary | ICD-10-CM

## 2024-08-28 DIAGNOSIS — I63.9 CEREBROVASCULAR ACCIDENT (CVA), UNSPECIFIED MECHANISM (MULTI): ICD-10-CM

## 2024-08-28 DIAGNOSIS — I11.0 HYPERTENSIVE HEART DISEASE WITH CONGESTIVE HEART FAILURE, UNSPECIFIED HEART FAILURE TYPE (MULTI): ICD-10-CM

## 2024-08-28 PROCEDURE — 99308 SBSQ NF CARE LOW MDM 20: CPT | Performed by: INTERNAL MEDICINE

## 2024-08-28 NOTE — PROGRESS NOTES
PROGRESS NOTE    Subjective   Chief complaint: Edenilson Ness is a 63 y.o. male who is a long term care patient being seen and evaluated for weakness.    HPI:  HPI  Patient does continue to work with therapy.  OT is working with patient on completing functional transfers in parallel bars requiring min to mod assist to stand.  Patient was educated on posture.  Patient does lean heavily to the left on left armrest.  Patient also working on sit to stand transfers and bathroom requiring mod assist and use of bilateral grab bars.  Patient seen and examined at the bedside, appears to be in no acute distress.  Nursing staff voicing no new concerns.    Objective   Vital signs: 127/74, 97.8, 78, 18, 98%    Physical Exam  Constitutional:       General: He is not in acute distress.  Eyes:      Extraocular Movements: Extraocular movements intact.   Pulmonary:      Effort: Pulmonary effort is normal.   Musculoskeletal:      Cervical back: Neck supple.   Neurological:      Mental Status: He is alert.      Motor: Weakness present.   Psychiatric:         Mood and Affect: Mood normal.         Behavior: Behavior is cooperative.         Assessment/Plan   Problem List Items Addressed This Visit       Weakness - Primary     Continue to work with PT OT         CVA (cerebral vascular accident) (Multi)     monitor for changes in speech or increased weakness  Eliquis  Bleeding precautions  Statin  Therapy         Hypertensive heart disease with CHF (Multi)     CHF stable, no sob  Coreg  Isosorbide dinitrate  Sacubitril-valsartan  Hydralazine  Spironolactone  Monitor BP            Medications, treatments, and labs reviewed  Continue medications and treatments as listed in EMR    Scribe Attestation  I, Mitch Valle   attest that this documentation has been prepared under the direction and in the presence of Alfredo Bro MD    Provider Attestation - Scribe documentation  All medical record entries made by the Scribe were at my  direction and personally dictated by me. I have reviewed the chart and agree that the record accurately reflects my personal performance of the history, physical exam, discussion and plan.   Alfredo Bro MD

## 2024-08-28 NOTE — ASSESSMENT & PLAN NOTE
monitor for changes in speech or increased weakness  Eliquis  Bleeding precautions  Statin  Therapy

## 2024-08-28 NOTE — LETTER
Patient: Edenilson Ness  : 1960    Encounter Date: 2024    PROGRESS NOTE    Subjective  Chief complaint: Edenilson Ness is a 63 y.o. male who is a long term care patient being seen and evaluated for weakness.    HPI:  HPI  Patient does continue to work with therapy.  OT is working with patient on completing functional transfers in parallel bars requiring min to mod assist to stand.  Patient was educated on posture.  Patient does lean heavily to the left on left armrest.  Patient also working on sit to stand transfers and bathroom requiring mod assist and use of bilateral grab bars.  Patient seen and examined at the bedside, appears to be in no acute distress.  Nursing staff voicing no new concerns.    Objective  Vital signs: 127/74, 97.8, 78, 18, 98%    Physical Exam  Constitutional:       General: He is not in acute distress.  Eyes:      Extraocular Movements: Extraocular movements intact.   Pulmonary:      Effort: Pulmonary effort is normal.   Musculoskeletal:      Cervical back: Neck supple.   Neurological:      Mental Status: He is alert.      Motor: Weakness present.   Psychiatric:         Mood and Affect: Mood normal.         Behavior: Behavior is cooperative.         Assessment/Plan  Problem List Items Addressed This Visit       Weakness - Primary     Continue to work with PT OT         CVA (cerebral vascular accident) (Multi)     monitor for changes in speech or increased weakness  Eliquis  Bleeding precautions  Statin  Therapy         Hypertensive heart disease with CHF (Multi)     CHF stable, no sob  Coreg  Isosorbide dinitrate  Sacubitril-valsartan  Hydralazine  Spironolactone  Monitor BP            Medications, treatments, and labs reviewed  Continue medications and treatments as listed in EMR    Scribe Attestation  I, Mitch Valle   attest that this documentation has been prepared under the direction and in the presence of Alfredo Bro MD    Provider Attestation - Mitch  documentation  All medical record entries made by the Scribe were at my direction and personally dictated by me. I have reviewed the chart and agree that the record accurately reflects my personal performance of the history, physical exam, discussion and plan.   Alfredo Bro MD            Electronically Signed By: Alfredo Bro MD   8/28/24  5:55 PM

## 2024-09-04 ENCOUNTER — NURSING HOME VISIT (OUTPATIENT)
Dept: POST ACUTE CARE | Facility: EXTERNAL LOCATION | Age: 64
End: 2024-09-04
Payer: COMMERCIAL

## 2024-09-04 DIAGNOSIS — E11.9 TYPE 2 DIABETES MELLITUS WITHOUT COMPLICATION, WITH LONG-TERM CURRENT USE OF INSULIN (MULTI): ICD-10-CM

## 2024-09-04 DIAGNOSIS — Z79.4 TYPE 2 DIABETES MELLITUS WITHOUT COMPLICATION, WITH LONG-TERM CURRENT USE OF INSULIN (MULTI): ICD-10-CM

## 2024-09-04 DIAGNOSIS — I63.9 CEREBROVASCULAR ACCIDENT (CVA), UNSPECIFIED MECHANISM (MULTI): ICD-10-CM

## 2024-09-04 DIAGNOSIS — I11.0 HYPERTENSIVE HEART DISEASE WITH CONGESTIVE HEART FAILURE, UNSPECIFIED HEART FAILURE TYPE: ICD-10-CM

## 2024-09-04 PROCEDURE — 99309 SBSQ NF CARE MODERATE MDM 30: CPT | Performed by: INTERNAL MEDICINE

## 2024-09-04 NOTE — LETTER
Patient: Edenilson Ness  : 1960    Encounter Date: 2024    PROGRESS NOTE    Subjective  Chief complaint: Edenilson Ness is a 63 y.o. male who is a long term care patient being seen and evaluated for monthly general medical care and follow-up    HPI:  HPI  Patient presents for general medical care and f/u.  Patient seen and examined at bedside.  No issues per nursing.  Patient has no acute complaints.  HTN BP at goal.  Denies chest pain and headache.  CHF stable, denies sob, orthopnea, weight gain.  Hx CVA, denies changes in weakness and speech.  DM, denies polydipsia polyuria polyphagia.  Anemia stable, denies fatigue, sob, and palpitations.  Mentation at baseline, no acute distress  An interactive audio and/or video telecommunication system which permits real time communications between the patient (at the originating site) and provider (at a distant site) was utilized to provide this telehealth service after obtaining verbal consent.      Objective  Vital signs: 124/76, 97.6, 67, 16, 99%    Physical Exam  Constitutional:       General: He is not in acute distress.  Eyes:      Extraocular Movements: Extraocular movements intact.   Pulmonary:      Effort: Pulmonary effort is normal.   Musculoskeletal:      Cervical back: Neck supple.   Neurological:      Mental Status: He is alert.   Psychiatric:         Mood and Affect: Mood normal.         Behavior: Behavior is cooperative.         Assessment/Plan  Problem List Items Addressed This Visit       CVA (cerebral vascular accident) (Multi)     monitor for changes in speech or increased weakness  Eliquis  Bleeding precautions  Statin  Therapy         Type 2 diabetes mellitus without complication, with long-term current use of insulin (Multi)     Monitor sweets and carb intake   monitor A1c routinely         Hypertensive heart disease with CHF (Multi)     CHF stable, no sob  Coreg  Isosorbide  dinitrate  Sacubitril-valsartan  Hydralazine  Spironolactone  Monitor BP              Medications, treatments, and labs reviewed  Continue medications and treatments as listed in EMR    Scribe Attestation  I, Heather Shethibdhara   attest that this documentation has been prepared under the direction and in the presence of Alfredo Bro MD    Provider Attestation - Scribe documentation  All medical record entries made by the Scribe were at my direction and personally dictated by me. I have reviewed the chart and agree that the record accurately reflects my personal performance of the history, physical exam, discussion and plan.   Alfredo Bro MD            Electronically Signed By: Alfredo Bro MD   9/18/24  5:57 PM

## 2024-09-06 ENCOUNTER — NURSING HOME VISIT (OUTPATIENT)
Dept: POST ACUTE CARE | Facility: EXTERNAL LOCATION | Age: 64
End: 2024-09-06
Payer: COMMERCIAL

## 2024-09-06 DIAGNOSIS — I63.9 CEREBROVASCULAR ACCIDENT (CVA), UNSPECIFIED MECHANISM (MULTI): ICD-10-CM

## 2024-09-06 DIAGNOSIS — I11.0 HYPERTENSIVE HEART DISEASE WITH CONGESTIVE HEART FAILURE, UNSPECIFIED HEART FAILURE TYPE (MULTI): ICD-10-CM

## 2024-09-06 DIAGNOSIS — R53.1 WEAKNESS: Primary | ICD-10-CM

## 2024-09-06 PROCEDURE — 99309 SBSQ NF CARE MODERATE MDM 30: CPT | Performed by: INTERNAL MEDICINE

## 2024-09-06 NOTE — LETTER
Patient: Edenilson Ness  : 1960    Encounter Date: 2024    PROGRESS NOTE    Subjective  Chief complaint: Edenilson Ness is a 63 y.o. male who is a long term care patient being seen and evaluated for weakness.    HPI:  HPI  Patient is currently working with therapy due to generalized weakness.  Therapy is working with patient on completing transfers with use of a front wheeled walker and patient does require mod to max assist to complete.  Patient also working on standing balance and was able to stand from wheelchair with mod assist and use of both handrails in the bathroom and required max assist with clothing management.  Patient seen and examined at the bedside, appears to be in no acute distress.  Nursing staff voicing no new concerns.    Objective  Vital signs: 138/76, 98.0, 97, 18, 98%    Physical Exam  Constitutional:       General: He is not in acute distress.  Eyes:      Extraocular Movements: Extraocular movements intact.   Pulmonary:      Effort: Pulmonary effort is normal.   Musculoskeletal:      Cervical back: Neck supple.   Neurological:      Mental Status: He is alert.      Motor: Weakness present.   Psychiatric:         Mood and Affect: Mood normal.         Behavior: Behavior is cooperative.         Assessment/Plan  Problem List Items Addressed This Visit       Weakness - Primary     Continue to work towards established goals and therapy         CVA (cerebral vascular accident) (Multi)     monitor for changes in speech or increased weakness  Eliquis  Bleeding precautions  Statin  Therapy         Hypertensive heart disease with CHF (Multi)     CHF stable, no sob  Coreg  Isosorbide dinitrate  Sacubitril-valsartan  Hydralazine  Spironolactone  Monitor BP            Medications, treatments, and labs reviewed  Continue medications and treatments as listed in EMR    Scribe Attestation  JAMES, Mitch Valle   attest that this documentation has been prepared under the direction and in the  presence of Alfredo Bro MD    Provider Attestation - Scribe documentation  All medical record entries made by the Scribe were at my direction and personally dictated by me. I have reviewed the chart and agree that the record accurately reflects my personal performance of the history, physical exam, discussion and plan.   Alfredo Bro MD            Electronically Signed By: Alfredo Bro MD   9/8/24  5:48 PM

## 2024-09-06 NOTE — PROGRESS NOTES
PROGRESS NOTE    Subjective   Chief complaint: Edenilson Ness is a 63 y.o. male who is a long term care patient being seen and evaluated for weakness.    HPI:  HPI  Patient is currently working with therapy due to generalized weakness.  Therapy is working with patient on completing transfers with use of a front wheeled walker and patient does require mod to max assist to complete.  Patient also working on standing balance and was able to stand from wheelchair with mod assist and use of both handrails in the bathroom and required max assist with clothing management.  Patient seen and examined at the bedside, appears to be in no acute distress.  Nursing staff voicing no new concerns.    Objective   Vital signs: 138/76, 98.0, 97, 18, 98%    Physical Exam  Constitutional:       General: He is not in acute distress.  Eyes:      Extraocular Movements: Extraocular movements intact.   Pulmonary:      Effort: Pulmonary effort is normal.   Musculoskeletal:      Cervical back: Neck supple.   Neurological:      Mental Status: He is alert.      Motor: Weakness present.   Psychiatric:         Mood and Affect: Mood normal.         Behavior: Behavior is cooperative.         Assessment/Plan   Problem List Items Addressed This Visit       Weakness - Primary     Continue to work towards established goals and therapy         CVA (cerebral vascular accident) (Multi)     monitor for changes in speech or increased weakness  Eliquis  Bleeding precautions  Statin  Therapy         Hypertensive heart disease with CHF (Multi)     CHF stable, no sob  Coreg  Isosorbide dinitrate  Sacubitril-valsartan  Hydralazine  Spironolactone  Monitor BP            Medications, treatments, and labs reviewed  Continue medications and treatments as listed in EMR    Scribe Attestation  I, Mitch Valle   attest that this documentation has been prepared under the direction and in the presence of Alfredo Bro MD    Provider Attestation - Scribe  documentation  All medical record entries made by the Scribe were at my direction and personally dictated by me. I have reviewed the chart and agree that the record accurately reflects my personal performance of the history, physical exam, discussion and plan.   Alfredo Bro MD

## 2024-09-11 ENCOUNTER — NURSING HOME VISIT (OUTPATIENT)
Dept: POST ACUTE CARE | Facility: EXTERNAL LOCATION | Age: 64
End: 2024-09-11
Payer: COMMERCIAL

## 2024-09-11 DIAGNOSIS — I63.9 CEREBROVASCULAR ACCIDENT (CVA), UNSPECIFIED MECHANISM (MULTI): ICD-10-CM

## 2024-09-11 DIAGNOSIS — R53.1 WEAKNESS: Primary | ICD-10-CM

## 2024-09-11 DIAGNOSIS — I11.0 HYPERTENSIVE HEART DISEASE WITH CONGESTIVE HEART FAILURE, UNSPECIFIED HEART FAILURE TYPE (MULTI): ICD-10-CM

## 2024-09-11 PROCEDURE — 99309 SBSQ NF CARE MODERATE MDM 30: CPT | Performed by: INTERNAL MEDICINE

## 2024-09-11 NOTE — PROGRESS NOTES
PROGRESS NOTE    Subjective   Chief complaint: Edenilson Ness is a 63 y.o. male who is a long term care patient being seen and evaluated for weakness.    HPI:  HPI  An interactive audio and/or video telecommunication system which permits real time communications between the patient (at the originating site) and provider (at a distant site) was utilized to provide this telehealth service after obtaining verbal consent.  Therapy is currently working with patient on completing safe transfers and patient does require mod assist with grab bars.  Patient is able to complete sit to stand transfer with min to mod assist.  Therapy also working with patient on completing ADL tasks for upper and lower body.  Patient appears to be in no acute distress.  Nursing reporting no new concerns.  Objective   Vital signs: 138/76, 98.0, 97, 18, 98%    Physical Exam  Constitutional:       General: He is not in acute distress.  Eyes:      Extraocular Movements: Extraocular movements intact.   Pulmonary:      Effort: Pulmonary effort is normal.   Musculoskeletal:      Cervical back: Neck supple.   Neurological:      Mental Status: He is alert.   Psychiatric:         Mood and Affect: Mood normal.         Behavior: Behavior is cooperative.         Assessment/Plan   Problem List Items Addressed This Visit       Weakness - Primary     Continue per plan of care with therapy         CVA (cerebral vascular accident) (Multi)     monitor for changes in speech or increased weakness  Eliquis  Bleeding precautions  Statin  Therapy         Hypertensive heart disease with CHF (Multi)     CHF stable, no sob  Coreg  Isosorbide dinitrate  Sacubitril-valsartan  Hydralazine  Spironolactone  Monitor BP            Medications, treatments, and labs reviewed  Continue medications and treatments as listed in EMR    Scribe Attestation  JAMES, Mitch Valle   attest that this documentation has been prepared under the direction and in the presence of Alfredo ASH  MD Adali    Provider Attestation - Scribe documentation  All medical record entries made by the Scribe were at my direction and personally dictated by me. I have reviewed the chart and agree that the record accurately reflects my personal performance of the history, physical exam, discussion and plan.   Alfredo Bro MD

## 2024-09-11 NOTE — LETTER
Patient: Edenilson Ness  : 1960    Encounter Date: 2024    PROGRESS NOTE    Subjective  Chief complaint: Edenilson Ness is a 63 y.o. male who is a long term care patient being seen and evaluated for weakness.    HPI:  HPI  An interactive audio and/or video telecommunication system which permits real time communications between the patient (at the originating site) and provider (at a distant site) was utilized to provide this telehealth service after obtaining verbal consent.  Therapy is currently working with patient on completing safe transfers and patient does require mod assist with grab bars.  Patient is able to complete sit to stand transfer with min to mod assist.  Therapy also working with patient on completing ADL tasks for upper and lower body.  Patient appears to be in no acute distress.  Nursing reporting no new concerns.  Objective  Vital signs: 138/76, 98.0, 97, 18, 98%    Physical Exam  Constitutional:       General: He is not in acute distress.  Eyes:      Extraocular Movements: Extraocular movements intact.   Pulmonary:      Effort: Pulmonary effort is normal.   Musculoskeletal:      Cervical back: Neck supple.   Neurological:      Mental Status: He is alert.   Psychiatric:         Mood and Affect: Mood normal.         Behavior: Behavior is cooperative.         Assessment/Plan  Problem List Items Addressed This Visit       Weakness - Primary     Continue per plan of care with therapy         CVA (cerebral vascular accident) (Multi)     monitor for changes in speech or increased weakness  Eliquis  Bleeding precautions  Statin  Therapy         Hypertensive heart disease with CHF (Multi)     CHF stable, no sob  Coreg  Isosorbide dinitrate  Sacubitril-valsartan  Hydralazine  Spironolactone  Monitor BP            Medications, treatments, and labs reviewed  Continue medications and treatments as listed in EMR    Scribe Attestation  I, Mitch Valle   attest that this documentation has  been prepared under the direction and in the presence of Alfredo Bro MD    Provider Attestation - Scribe documentation  All medical record entries made by the Scribe were at my direction and personally dictated by me. I have reviewed the chart and agree that the record accurately reflects my personal performance of the history, physical exam, discussion and plan.   Alfredo Bro MD            Electronically Signed By: Alfredo Bro MD   9/12/24  4:15 PM

## 2024-09-11 NOTE — ASSESSMENT & PLAN NOTE
CHF stable, no sob  Coreg  Isosorbide dinitrate  Sacubitril-valsartan  Hydralazine  Spironolactone  Monitor BP     Atrial fibrillation, unspecified type

## 2024-09-15 NOTE — PROGRESS NOTES
PROGRESS NOTE    Subjective   Chief complaint: Edenilson Ness is a 63 y.o. male who is a long term care patient being seen and evaluated for monthly general medical care and follow-up    HPI:  HPI  Patient presents for general medical care and f/u.  Patient seen and examined at bedside.  No issues per nursing.  Patient has no acute complaints.  HTN BP at goal.  Denies chest pain and headache.  CHF stable, denies sob, orthopnea, weight gain.  Hx CVA, denies changes in weakness and speech.  DM, denies polydipsia polyuria polyphagia.  Anemia stable, denies fatigue, sob, and palpitations.  Mentation at baseline, no acute distress  An interactive audio and/or video telecommunication system which permits real time communications between the patient (at the originating site) and provider (at a distant site) was utilized to provide this telehealth service after obtaining verbal consent.      Objective   Vital signs: 124/76, 97.6, 67, 16, 99%    Physical Exam  Constitutional:       General: He is not in acute distress.  Eyes:      Extraocular Movements: Extraocular movements intact.   Pulmonary:      Effort: Pulmonary effort is normal.   Musculoskeletal:      Cervical back: Neck supple.   Neurological:      Mental Status: He is alert.   Psychiatric:         Mood and Affect: Mood normal.         Behavior: Behavior is cooperative.         Assessment/Plan   Problem List Items Addressed This Visit       CVA (cerebral vascular accident) (Multi)     monitor for changes in speech or increased weakness  Eliquis  Bleeding precautions  Statin  Therapy         Type 2 diabetes mellitus without complication, with long-term current use of insulin (Multi)     Monitor sweets and carb intake   monitor A1c routinely         Hypertensive heart disease with CHF (Multi)     CHF stable, no sob  Coreg  Isosorbide dinitrate  Sacubitril-valsartan  Hydralazine  Spironolactone  Monitor BP              Medications, treatments, and labs  reviewed  Continue medications and treatments as listed in EMR    Scribe Attestation  I, Heather Obrien Scribe   attest that this documentation has been prepared under the direction and in the presence of Alfredo Bro MD    Provider Attestation - Scribe documentation  All medical record entries made by the Scribe were at my direction and personally dictated by me. I have reviewed the chart and agree that the record accurately reflects my personal performance of the history, physical exam, discussion and plan.   Alfredo Bro MD

## 2024-09-18 ENCOUNTER — NURSING HOME VISIT (OUTPATIENT)
Dept: POST ACUTE CARE | Facility: EXTERNAL LOCATION | Age: 64
End: 2024-09-18
Payer: COMMERCIAL

## 2024-09-18 DIAGNOSIS — I11.0 HYPERTENSIVE HEART DISEASE WITH CONGESTIVE HEART FAILURE, UNSPECIFIED HEART FAILURE TYPE: ICD-10-CM

## 2024-09-18 DIAGNOSIS — K42.9 UMBILICAL HERNIA WITHOUT OBSTRUCTION AND WITHOUT GANGRENE: ICD-10-CM

## 2024-09-18 DIAGNOSIS — R53.1 WEAKNESS: Primary | ICD-10-CM

## 2024-09-18 DIAGNOSIS — R05.9 COUGH, UNSPECIFIED TYPE: ICD-10-CM

## 2024-09-18 DIAGNOSIS — I63.9 CEREBROVASCULAR ACCIDENT (CVA), UNSPECIFIED MECHANISM (MULTI): ICD-10-CM

## 2024-09-18 PROCEDURE — 99309 SBSQ NF CARE MODERATE MDM 30: CPT | Performed by: INTERNAL MEDICINE

## 2024-09-18 NOTE — PROGRESS NOTES
PROGRESS NOTE    Subjective   Chief complaint: Edenilson Ness is a 63 y.o. male who is a long term care patient being seen and evaluated for weakness    HPI:  HPI  Therapy does continue to work with patient due to generalized weakness.  Therapy is working with patient on completing therapeutic exercise and activities to improve strength endurance range of motion for overall independence with functional mobility and task.  Patient was seen and examined at the bedside, appears to be in no acute distress.  Patient noted with umbilical hernia and patient to have abdominal binder.  Patient also had complaints of a cough and patient to start Robitussin as needed.  Objective   Vital signs: 138/76, 98.0, 97, 18, 98%    Physical Exam  Constitutional:       General: He is not in acute distress.  Eyes:      Extraocular Movements: Extraocular movements intact.   Pulmonary:      Effort: Pulmonary effort is normal.   Musculoskeletal:      Cervical back: Neck supple.   Neurological:      Mental Status: He is alert.      Motor: Weakness present.   Psychiatric:         Mood and Affect: Mood normal.         Behavior: Behavior is cooperative.         Assessment/Plan   Problem List Items Addressed This Visit       Weakness - Primary     Continue working towards established goals with therapist         CVA (cerebral vascular accident) (Multi)     monitor for changes in speech or increased weakness  Eliquis  Bleeding precautions  Statin  Therapy         Hypertensive heart disease with CHF (Multi)     CHF stable, no sob  Coreg  Isosorbide dinitrate  Sacubitril-valsartan  Hydralazine  Spironolactone  Monitor BP           Cough     Monitor  Robitussin         Umbilical hernia     Continue to monitor  Abdominal binder          Medications, treatments, and labs reviewed  Continue medications and treatments as listed in EMR    Merylibdhara Attestation  JAMES, Mitch Valle   attest that this documentation has been prepared under the direction  and in the presence of Alfredo Bro MD    Provider Attestation - Scribe documentation  All medical record entries made by the Scribe were at my direction and personally dictated by me. I have reviewed the chart and agree that the record accurately reflects my personal performance of the history, physical exam, discussion and plan.   Alfredo Bro MD

## 2024-09-18 NOTE — LETTER
Patient: Edenilson Ness  : 1960    Encounter Date: 2024    PROGRESS NOTE    Subjective  Chief complaint: Edenilson Ness is a 63 y.o. male who is a long term care patient being seen and evaluated for weakness    HPI:  HPI  Therapy does continue to work with patient due to generalized weakness.  Therapy is working with patient on completing therapeutic exercise and activities to improve strength endurance range of motion for overall independence with functional mobility and task.  Patient was seen and examined at the bedside, appears to be in no acute distress.  Patient noted with umbilical hernia and patient to have abdominal binder.  Patient also had complaints of a cough and patient to start Robitussin as needed.  Objective  Vital signs: 138/76, 98.0, 97, 18, 98%    Physical Exam  Constitutional:       General: He is not in acute distress.  Eyes:      Extraocular Movements: Extraocular movements intact.   Pulmonary:      Effort: Pulmonary effort is normal.   Musculoskeletal:      Cervical back: Neck supple.   Neurological:      Mental Status: He is alert.      Motor: Weakness present.   Psychiatric:         Mood and Affect: Mood normal.         Behavior: Behavior is cooperative.         Assessment/Plan  Problem List Items Addressed This Visit       Weakness - Primary     Continue working towards established goals with therapist         CVA (cerebral vascular accident) (Multi)     monitor for changes in speech or increased weakness  Eliquis  Bleeding precautions  Statin  Therapy         Hypertensive heart disease with CHF (Multi)     CHF stable, no sob  Coreg  Isosorbide dinitrate  Sacubitril-valsartan  Hydralazine  Spironolactone  Monitor BP           Cough     Monitor  Robitussin         Umbilical hernia     Continue to monitor  Abdominal binder          Medications, treatments, and labs reviewed  Continue medications and treatments as listed in EMR    Mitch Fuller I, Mitch Valle    attest that this documentation has been prepared under the direction and in the presence of Alfredo Bro MD    Provider Attestation - Scribe documentation  All medical record entries made by the Scribe were at my direction and personally dictated by me. I have reviewed the chart and agree that the record accurately reflects my personal performance of the history, physical exam, discussion and plan.   Alfredo Bro MD            Electronically Signed By: Alfredo Bro MD   9/19/24  5:40 PM

## 2024-09-25 ENCOUNTER — NURSING HOME VISIT (OUTPATIENT)
Dept: POST ACUTE CARE | Facility: EXTERNAL LOCATION | Age: 64
End: 2024-09-25
Payer: COMMERCIAL

## 2024-09-25 DIAGNOSIS — I63.9 CEREBROVASCULAR ACCIDENT (CVA), UNSPECIFIED MECHANISM (MULTI): ICD-10-CM

## 2024-09-25 DIAGNOSIS — R53.1 WEAKNESS: Primary | ICD-10-CM

## 2024-09-25 DIAGNOSIS — I11.0 HYPERTENSIVE HEART DISEASE WITH CONGESTIVE HEART FAILURE, UNSPECIFIED HEART FAILURE TYPE: ICD-10-CM

## 2024-09-25 PROCEDURE — 99308 SBSQ NF CARE LOW MDM 20: CPT | Performed by: INTERNAL MEDICINE

## 2024-09-25 NOTE — LETTER
Patient: Edenilson Ness  : 1960    Encounter Date: 2024    PROGRESS NOTE    Subjective  Chief complaint: Edenilson Ness is a 63 y.o. male who is a long term care patient being seen and evaluated for weakness.    HPI:  HPI  Patient is currently working with therapy.  PT is working with patient on skilled interventions to address gait training, focusing on base of support, facilitation of adequate hip extension during single-leg stance.  Patient is noted to be compliant with training techniques and decreasing need for instruction.  Patient seen and examined at bedside, appears to be in no acute distress.  Nursing staff voicing no new concerns.  Objective  Vital signs: 138/76, 98.0, 97, 18, 98%    Physical Exam  Constitutional:       General: He is not in acute distress.  Eyes:      Extraocular Movements: Extraocular movements intact.   Pulmonary:      Effort: Pulmonary effort is normal.   Musculoskeletal:      Cervical back: Neck supple.   Neurological:      Mental Status: He is alert.      Motor: Weakness present.   Psychiatric:         Mood and Affect: Mood normal.         Behavior: Behavior is cooperative.         Assessment/Plan  Problem List Items Addressed This Visit       Weakness - Primary     Continue therapy, working with OT PT         CVA (cerebral vascular accident) (Multi)     monitor for changes in speech or increased weakness  Eliquis  Bleeding precautions  Statin  Therapy         Hypertensive heart disease with CHF (Multi)     CHF stable, no sob  Coreg  Isosorbide dinitrate  Sacubitril-valsartan  Hydralazine  Spironolactone  Monitor BP            Medications, treatments, and labs reviewed  Continue medications and treatments as listed in EMR    Scribe Attestation  I, Mitch Valle   attest that this documentation has been prepared under the direction and in the presence of Alfredo Bro MD    Provider Attestation - Scribe documentation  All medical record entries made by the  Scribe were at my direction and personally dictated by me. I have reviewed the chart and agree that the record accurately reflects my personal performance of the history, physical exam, discussion and plan.   Alfredo Bro MD            Electronically Signed By: Alfredo Bro MD   9/26/24  1:13 PM

## 2024-09-25 NOTE — PROGRESS NOTES
PROGRESS NOTE    Subjective   Chief complaint: Edenilson Ness is a 63 y.o. male who is a long term care patient being seen and evaluated for weakness.    HPI:  HPI  Patient is currently working with therapy.  PT is working with patient on skilled interventions to address gait training, focusing on base of support, facilitation of adequate hip extension during single-leg stance.  Patient is noted to be compliant with training techniques and decreasing need for instruction.  Patient seen and examined at bedside, appears to be in no acute distress.  Nursing staff voicing no new concerns.  Objective   Vital signs: 138/76, 98.0, 97, 18, 98%    Physical Exam  Constitutional:       General: He is not in acute distress.  Eyes:      Extraocular Movements: Extraocular movements intact.   Pulmonary:      Effort: Pulmonary effort is normal.   Musculoskeletal:      Cervical back: Neck supple.   Neurological:      Mental Status: He is alert.      Motor: Weakness present.   Psychiatric:         Mood and Affect: Mood normal.         Behavior: Behavior is cooperative.         Assessment/Plan   Problem List Items Addressed This Visit       Weakness - Primary     Continue therapy, working with OT PT         CVA (cerebral vascular accident) (Multi)     monitor for changes in speech or increased weakness  Eliquis  Bleeding precautions  Statin  Therapy         Hypertensive heart disease with CHF (Multi)     CHF stable, no sob  Coreg  Isosorbide dinitrate  Sacubitril-valsartan  Hydralazine  Spironolactone  Monitor BP            Medications, treatments, and labs reviewed  Continue medications and treatments as listed in EMR    Scribe Attestation  Neelima RAMIREZ Scribe   attest that this documentation has been prepared under the direction and in the presence of Alfredo Bro MD    Provider Attestation - Scribe documentation  All medical record entries made by the Scribe were at my direction and personally dictated by me. I have  reviewed the chart and agree that the record accurately reflects my personal performance of the history, physical exam, discussion and plan.   Alfredo Bro MD

## 2024-10-02 ENCOUNTER — NURSING HOME VISIT (OUTPATIENT)
Dept: POST ACUTE CARE | Facility: EXTERNAL LOCATION | Age: 64
End: 2024-10-02
Payer: COMMERCIAL

## 2024-10-02 DIAGNOSIS — E11.9 TYPE 2 DIABETES MELLITUS WITHOUT COMPLICATION, WITH LONG-TERM CURRENT USE OF INSULIN (MULTI): ICD-10-CM

## 2024-10-02 DIAGNOSIS — I11.0 HYPERTENSIVE HEART DISEASE WITH CONGESTIVE HEART FAILURE, UNSPECIFIED HEART FAILURE TYPE: ICD-10-CM

## 2024-10-02 DIAGNOSIS — I63.9 CEREBROVASCULAR ACCIDENT (CVA), UNSPECIFIED MECHANISM (MULTI): Primary | ICD-10-CM

## 2024-10-02 DIAGNOSIS — D63.8 ANEMIA OF CHRONIC DISEASE: ICD-10-CM

## 2024-10-02 DIAGNOSIS — Z79.4 TYPE 2 DIABETES MELLITUS WITHOUT COMPLICATION, WITH LONG-TERM CURRENT USE OF INSULIN (MULTI): ICD-10-CM

## 2024-10-02 PROCEDURE — 99309 SBSQ NF CARE MODERATE MDM 30: CPT | Performed by: INTERNAL MEDICINE

## 2024-10-02 NOTE — ASSESSMENT & PLAN NOTE
Monitor sweets and carb intake   monitor A1c routinely  No signs or symptoms of hyper or hypoglycemia

## 2024-10-02 NOTE — PROGRESS NOTES
PROGRESS NOTE    Subjective   Chief complaint: Edenilson Ness is a 63 y.o. male who is a long term care patient being seen and evaluated for monthly general medical care and follow-up    HPI:  HPI  Patient presents for general medical care and f/u.  Patient seen and examined at bedside.  No issues per nursing.  Patient has no acute complaints.  DM, denies polydipsia polyuria polyphagia.  HTN Denies chest pain and headache.  CHF stable, denies sob, orthopnea, weight gain.  Hx CVA, denies changes in weakness and speech.  Anemia stable, denies fatigue, sob, and palpitations.  Mentation at baseline, no acute distress    Objective   Vital signs: 138/76, 98.0, 97, 18, 98%    Physical Exam  Constitutional:       General: He is not in acute distress.  Eyes:      Extraocular Movements: Extraocular movements intact.   Pulmonary:      Effort: Pulmonary effort is normal.   Musculoskeletal:      Cervical back: Neck supple.   Neurological:      Mental Status: He is alert.   Psychiatric:         Mood and Affect: Mood normal.         Behavior: Behavior is cooperative.         Assessment/Plan   Problem List Items Addressed This Visit       CVA (cerebral vascular accident) (Multi) - Primary     monitor for changes in speech or increased weakness  Eliquis  Bleeding precautions  Statin         Type 2 diabetes mellitus without complication, with long-term current use of insulin (Multi)     Monitor sweets and carb intake   monitor A1c routinely  No signs or symptoms of hyper or hypoglycemia         Hypertensive heart disease with CHF     CHF stable, no sob  Coreg  Isosorbide dinitrate  Sacubitril-valsartan  Hydralazine  Spironolactone  Monitor BP           Anemia of chronic disease     Stable on recent labs.  Monitor          Medications, treatments, and labs reviewed  Continue medications and treatments as listed in EMR    Scribe Attestation  I, Mitch Valle   attest that this documentation has been prepared under the  direction and in the presence of Alfredo Bro MD    Provider Attestation - Scribe documentation  All medical record entries made by the Scribe were at my direction and personally dictated by me. I have reviewed the chart and agree that the record accurately reflects my personal performance of the history, physical exam, discussion and plan.   Alfredo Bro MD

## 2024-10-02 NOTE — LETTER
Patient: Edenilson Ness  : 1960    Encounter Date: 10/02/2024    PROGRESS NOTE    Subjective  Chief complaint: Edenilson Ness is a 63 y.o. male who is a long term care patient being seen and evaluated for monthly general medical care and follow-up    HPI:  HPI  Patient presents for general medical care and f/u.  Patient seen and examined at bedside.  No issues per nursing.  Patient has no acute complaints.  DM, denies polydipsia polyuria polyphagia.  HTN Denies chest pain and headache.  CHF stable, denies sob, orthopnea, weight gain.  Hx CVA, denies changes in weakness and speech.  Anemia stable, denies fatigue, sob, and palpitations.  Mentation at baseline, no acute distress    Objective  Vital signs: 138/76, 98.0, 97, 18, 98%    Physical Exam  Constitutional:       General: He is not in acute distress.  Eyes:      Extraocular Movements: Extraocular movements intact.   Pulmonary:      Effort: Pulmonary effort is normal.   Musculoskeletal:      Cervical back: Neck supple.   Neurological:      Mental Status: He is alert.   Psychiatric:         Mood and Affect: Mood normal.         Behavior: Behavior is cooperative.         Assessment/Plan  Problem List Items Addressed This Visit       CVA (cerebral vascular accident) (Multi) - Primary     monitor for changes in speech or increased weakness  Eliquis  Bleeding precautions  Statin         Type 2 diabetes mellitus without complication, with long-term current use of insulin (Multi)     Monitor sweets and carb intake   monitor A1c routinely  No signs or symptoms of hyper or hypoglycemia         Hypertensive heart disease with CHF     CHF stable, no sob  Coreg  Isosorbide dinitrate  Sacubitril-valsartan  Hydralazine  Spironolactone  Monitor BP           Anemia of chronic disease     Stable on recent labs.  Monitor          Medications, treatments, and labs reviewed  Continue medications and treatments as listed in EMR    Scribe Attestation  I, Mitch Valle    attest that this documentation has been prepared under the direction and in the presence of Alfredo Bro MD    Provider Attestation - Scribe documentation  All medical record entries made by the Scribe were at my direction and personally dictated by me. I have reviewed the chart and agree that the record accurately reflects my personal performance of the history, physical exam, discussion and plan.   Alfredo Bro MD            Electronically Signed By: Alfredo Bro MD   10/3/24 12:24 PM

## 2024-10-04 ENCOUNTER — NURSING HOME VISIT (OUTPATIENT)
Dept: POST ACUTE CARE | Facility: EXTERNAL LOCATION | Age: 64
End: 2024-10-04
Payer: COMMERCIAL

## 2024-10-04 DIAGNOSIS — I11.0 HYPERTENSIVE HEART DISEASE WITH CONGESTIVE HEART FAILURE, UNSPECIFIED HEART FAILURE TYPE: ICD-10-CM

## 2024-10-04 DIAGNOSIS — I63.9 CEREBROVASCULAR ACCIDENT (CVA), UNSPECIFIED MECHANISM (MULTI): Primary | ICD-10-CM

## 2024-10-04 DIAGNOSIS — R53.1 WEAKNESS: ICD-10-CM

## 2024-10-04 PROCEDURE — 99308 SBSQ NF CARE LOW MDM 20: CPT | Performed by: INTERNAL MEDICINE

## 2024-10-04 NOTE — LETTER
Patient: Edenilson Ness  : 1960    Encounter Date: 10/04/2024    PROGRESS NOTE    Subjective  Chief complaint: Edenilson Ness is a 63 y.o. male who is a long term care patient being seen and evaluated for weakness.    HPI:  HPI  Patient presents for f/u therapy and general medical care.  Patient seen and examined at bedside.  Therapy has been working with the patient to improve strength, endurance, ADLs, and transfers d/t generalized weakness.  Patient has no acute concerns today.    Objective  Vital signs: 138/76, 98.0, 97, 18, 98%    Physical Exam  Constitutional:       General: He is not in acute distress.  Eyes:      Extraocular Movements: Extraocular movements intact.   Pulmonary:      Effort: Pulmonary effort is normal.   Musculoskeletal:      Cervical back: Neck supple.   Neurological:      Mental Status: He is alert.   Psychiatric:         Mood and Affect: Mood normal.         Behavior: Behavior is cooperative.         Assessment/Plan  Problem List Items Addressed This Visit       Weakness     Continue working towards goals with therapy         CVA (cerebral vascular accident) (Multi) - Primary     monitor for changes in speech or increased weakness  Eliquis  Bleeding precautions  Statin         Hypertensive heart disease with CHF     CHF stable, no sob  Coreg  Isosorbide dinitrate  Sacubitril-valsartan  Hydralazine  Spironolactone  Monitor BP            Medications, treatments, and labs reviewed  Continue medications and treatments as listed in EMR    Scribe Attestation  I, Mitch Valle   attest that this documentation has been prepared under the direction and in the presence of Alfredo Bro MD    Provider Attestation - Scribe documentation  All medical record entries made by the Scribe were at my direction and personally dictated by me. I have reviewed the chart and agree that the record accurately reflects my personal performance of the history, physical exam, discussion and plan.    Alfredo Bro MD            Electronically Signed By: Alfredo Bro MD   10/5/24  1:22 PM

## 2024-10-04 NOTE — PROGRESS NOTES
PROGRESS NOTE    Subjective   Chief complaint: Edenilson Ness is a 63 y.o. male who is a long term care patient being seen and evaluated for weakness.    HPI:  HPI  Patient presents for f/u therapy and general medical care.  Patient seen and examined at bedside.  Therapy has been working with the patient to improve strength, endurance, ADLs, and transfers d/t generalized weakness.  Patient has no acute concerns today.    Objective   Vital signs: 138/76, 98.0, 97, 18, 98%    Physical Exam  Constitutional:       General: He is not in acute distress.  Eyes:      Extraocular Movements: Extraocular movements intact.   Pulmonary:      Effort: Pulmonary effort is normal.   Musculoskeletal:      Cervical back: Neck supple.   Neurological:      Mental Status: He is alert.   Psychiatric:         Mood and Affect: Mood normal.         Behavior: Behavior is cooperative.         Assessment/Plan   Problem List Items Addressed This Visit       Weakness     Continue working towards goals with therapy         CVA (cerebral vascular accident) (Multi) - Primary     monitor for changes in speech or increased weakness  Eliquis  Bleeding precautions  Statin         Hypertensive heart disease with CHF     CHF stable, no sob  Coreg  Isosorbide dinitrate  Sacubitril-valsartan  Hydralazine  Spironolactone  Monitor BP            Medications, treatments, and labs reviewed  Continue medications and treatments as listed in EMR    Scribe Attestation  I, Mitch Valle   attest that this documentation has been prepared under the direction and in the presence of Alfredo Bro MD    Provider Attestation - Scribe documentation  All medical record entries made by the Scribe were at my direction and personally dictated by me. I have reviewed the chart and agree that the record accurately reflects my personal performance of the history, physical exam, discussion and plan.   Alfredo Bro MD

## 2024-10-09 ENCOUNTER — NURSING HOME VISIT (OUTPATIENT)
Dept: POST ACUTE CARE | Facility: EXTERNAL LOCATION | Age: 64
End: 2024-10-09
Payer: COMMERCIAL

## 2024-10-09 DIAGNOSIS — R53.1 WEAKNESS: Primary | ICD-10-CM

## 2024-10-09 DIAGNOSIS — I63.9 CEREBROVASCULAR ACCIDENT (CVA), UNSPECIFIED MECHANISM (MULTI): ICD-10-CM

## 2024-10-09 DIAGNOSIS — I11.0 HYPERTENSIVE HEART DISEASE WITH CONGESTIVE HEART FAILURE, UNSPECIFIED HEART FAILURE TYPE: ICD-10-CM

## 2024-10-09 PROCEDURE — 99308 SBSQ NF CARE LOW MDM 20: CPT | Performed by: INTERNAL MEDICINE

## 2024-10-09 NOTE — LETTER
Patient: Edenilson Ness  : 1960    Encounter Date: 10/09/2024    PROGRESS NOTE    Subjective  Chief complaint: Edenilson Ness is a 64 y.o. male who is a long term care patient being seen and evaluated for Weakness.    HPI:  HPI  Patient is currently working with therapy due to generalized weakness.  Therapy is focusing on interventions on dynamic functional activities to increase strength and ROM and flexibility and progressive manner.  Patient also working on balance, endurance for independence and functional mobility.  Patient appears to be in no acute distress.  Nursing staff voicing no new concerns.    Objective  Vital signs: 138/76, 98.07, 18, 98%    Physical Exam  Constitutional:       General: He is not in acute distress.  Eyes:      Extraocular Movements: Extraocular movements intact.   Pulmonary:      Effort: Pulmonary effort is normal.   Musculoskeletal:      Cervical back: Neck supple.   Neurological:      Mental Status: He is alert.   Psychiatric:         Mood and Affect: Mood normal.         Behavior: Behavior is cooperative.         Assessment/Plan  Problem List Items Addressed This Visit       Weakness - Primary     Continue therapy, work towards goals         CVA (cerebral vascular accident) (Multi)     monitor for changes in speech or increased weakness  Eliquis  Bleeding precautions  Statin         Hypertensive heart disease with CHF     CHF stable, no sob  Coreg  Isosorbide dinitrate  Sacubitril-valsartan  Hydralazine  Spironolactone  Monitor BP            Medications, treatments, and labs reviewed  Continue medications and treatments as listed in EMR    Scribe Attestation  Neelima RAMIREZ Scribe   attest that this documentation has been prepared under the direction and in the presence of Alfredo Bro MD    Provider Attestation - Scribe documentation  All medical record entries made by the Scribe were at my direction and personally dictated by me. I have reviewed the chart and agree  that the record accurately reflects my personal performance of the history, physical exam, discussion and plan.   Alfredo Bro MD            Electronically Signed By: Alfredo Bro MD   10/10/24 11:14 AM

## 2024-10-09 NOTE — PROGRESS NOTES
PROGRESS NOTE    Subjective   Chief complaint: Edenilson Ness is a 64 y.o. male who is a long term care patient being seen and evaluated for Weakness.    HPI:  HPI  Patient is currently working with therapy due to generalized weakness.  Therapy is focusing on interventions on dynamic functional activities to increase strength and ROM and flexibility and progressive manner.  Patient also working on balance, endurance for independence and functional mobility.  Patient appears to be in no acute distress.  Nursing staff voicing no new concerns.    Objective   Vital signs: 138/76, 98.07, 18, 98%    Physical Exam  Constitutional:       General: He is not in acute distress.  Eyes:      Extraocular Movements: Extraocular movements intact.   Pulmonary:      Effort: Pulmonary effort is normal.   Musculoskeletal:      Cervical back: Neck supple.   Neurological:      Mental Status: He is alert.   Psychiatric:         Mood and Affect: Mood normal.         Behavior: Behavior is cooperative.         Assessment/Plan   Problem List Items Addressed This Visit       Weakness - Primary     Continue therapy, work towards goals         CVA (cerebral vascular accident) (Multi)     monitor for changes in speech or increased weakness  Eliquis  Bleeding precautions  Statin         Hypertensive heart disease with CHF     CHF stable, no sob  Coreg  Isosorbide dinitrate  Sacubitril-valsartan  Hydralazine  Spironolactone  Monitor BP            Medications, treatments, and labs reviewed  Continue medications and treatments as listed in EMR    Scribe Attestation  I, Mitch Valle   attest that this documentation has been prepared under the direction and in the presence of Alfredo Bro MD    Provider Attestation - Scribe documentation  All medical record entries made by the Scribe were at my direction and personally dictated by me. I have reviewed the chart and agree that the record accurately reflects my personal performance of the  history, physical exam, discussion and plan.   Alfredo Bro MD

## 2024-11-01 ENCOUNTER — NURSING HOME VISIT (OUTPATIENT)
Dept: POST ACUTE CARE | Facility: EXTERNAL LOCATION | Age: 64
End: 2024-11-01
Payer: COMMERCIAL

## 2024-11-01 DIAGNOSIS — E11.9 TYPE 2 DIABETES MELLITUS WITHOUT COMPLICATION, WITH LONG-TERM CURRENT USE OF INSULIN (MULTI): ICD-10-CM

## 2024-11-01 DIAGNOSIS — I63.9 CEREBROVASCULAR ACCIDENT (CVA), UNSPECIFIED MECHANISM (MULTI): Primary | ICD-10-CM

## 2024-11-01 DIAGNOSIS — Z79.4 TYPE 2 DIABETES MELLITUS WITHOUT COMPLICATION, WITH LONG-TERM CURRENT USE OF INSULIN (MULTI): ICD-10-CM

## 2024-11-01 DIAGNOSIS — I11.0 HYPERTENSIVE HEART DISEASE WITH CONGESTIVE HEART FAILURE, UNSPECIFIED HEART FAILURE TYPE: ICD-10-CM

## 2024-11-01 DIAGNOSIS — D63.8 ANEMIA OF CHRONIC DISEASE: ICD-10-CM

## 2024-12-04 ENCOUNTER — NURSING HOME VISIT (OUTPATIENT)
Dept: POST ACUTE CARE | Facility: EXTERNAL LOCATION | Age: 64
End: 2024-12-04
Payer: COMMERCIAL

## 2024-12-04 DIAGNOSIS — I63.9 CEREBROVASCULAR ACCIDENT (CVA), UNSPECIFIED MECHANISM (MULTI): ICD-10-CM

## 2024-12-04 DIAGNOSIS — D63.8 ANEMIA OF CHRONIC DISEASE: ICD-10-CM

## 2024-12-04 DIAGNOSIS — E11.9 TYPE 2 DIABETES MELLITUS WITHOUT COMPLICATION, WITH LONG-TERM CURRENT USE OF INSULIN (MULTI): Primary | ICD-10-CM

## 2024-12-04 DIAGNOSIS — I11.0 HYPERTENSIVE HEART DISEASE WITH CONGESTIVE HEART FAILURE, UNSPECIFIED HEART FAILURE TYPE: ICD-10-CM

## 2024-12-04 DIAGNOSIS — Z79.4 TYPE 2 DIABETES MELLITUS WITHOUT COMPLICATION, WITH LONG-TERM CURRENT USE OF INSULIN (MULTI): Primary | ICD-10-CM

## 2024-12-04 PROCEDURE — 99309 SBSQ NF CARE MODERATE MDM 30: CPT | Performed by: INTERNAL MEDICINE

## 2024-12-04 NOTE — LETTER
Patient: Edenilson Ness  : 1960    Encounter Date: 2024    PROGRESS NOTE    Subjective  Chief complaint: Edenilson Ness is a 64 y.o. male who is a long term care patient being seen and evaluated for monthly general medical care and follow-up    HPI:  HPI  Patient presents for general medical care and f/u.  Patient seen and examined at bedside.  No issues per nursing.  Patient has no acute complaints.  Hx CVA, denies changes in weakness and speech.  HTN BP at goal.  Denies chest pain and headache.  DM, denies polydipsia polyuria polyphagia.  CHF stable, denies sob, orthopnea, weight gain.  Anemia stable, denies fatigue, sob, and palpitations.  Mentation at baseline, no acute distress.    Objective  Vital signs: 148/61, 98.3, 82, 18, 97%    Physical Exam  Constitutional:       General: He is not in acute distress.  Eyes:      Extraocular Movements: Extraocular movements intact.   Pulmonary:      Effort: Pulmonary effort is normal.   Musculoskeletal:      Cervical back: Neck supple.   Neurological:      Mental Status: He is alert.      Comments: A&O X2   Psychiatric:         Mood and Affect: Mood normal.         Behavior: Behavior is cooperative.         Assessment/Plan  Problem List Items Addressed This Visit       CVA (cerebral vascular accident) (Multi)     monitor for changes in speech or increased weakness  Eliquis  Bleeding precautions  Statin         Type 2 diabetes mellitus without complication, with long-term current use of insulin (Multi) - Primary     Monitor sweets and carb intake   monitor A1c routinely  No signs or symptoms of hyper or hypoglycemia         Hypertensive heart disease with CHF     CHF stable, no sob  Coreg  Isosorbide dinitrate  Sacubitril-valsartan  Hydralazine  Spironolactone  Monitor BP           Anemia of chronic disease     Stable on recent labs.  Monitor          Medications, treatments, and labs reviewed  Continue medications and treatments as listed in EMR    Scribe  Attestation  I, Mitch Valle   attest that this documentation has been prepared under the direction and in the presence of Alfredo Bro MD    Provider Attestation - Scribe documentation  All medical record entries made by the Scribe were at my direction and personally dictated by me. I have reviewed the chart and agree that the record accurately reflects my personal performance of the history, physical exam, discussion and plan.   Alfredo Bro MD            Electronically Signed By: Alfredo Bro MD   12/5/24  1:45 PM

## 2024-12-04 NOTE — PROGRESS NOTES
PROGRESS NOTE    Subjective   Chief complaint: Edenilson Ness is a 64 y.o. male who is a long term care patient being seen and evaluated for monthly general medical care and follow-up    HPI:  HPI  Patient presents for general medical care and f/u.  Patient seen and examined at bedside.  No issues per nursing.  Patient has no acute complaints.  Hx CVA, denies changes in weakness and speech.  HTN BP at goal.  Denies chest pain and headache.  DM, denies polydipsia polyuria polyphagia.  CHF stable, denies sob, orthopnea, weight gain.  Anemia stable, denies fatigue, sob, and palpitations.  Mentation at baseline, no acute distress.    Objective   Vital signs: 148/61, 98.3, 82, 18, 97%    Physical Exam  Constitutional:       General: He is not in acute distress.  Eyes:      Extraocular Movements: Extraocular movements intact.   Pulmonary:      Effort: Pulmonary effort is normal.   Musculoskeletal:      Cervical back: Neck supple.   Neurological:      Mental Status: He is alert.      Comments: A&O X2   Psychiatric:         Mood and Affect: Mood normal.         Behavior: Behavior is cooperative.         Assessment/Plan   Problem List Items Addressed This Visit       CVA (cerebral vascular accident) (Multi)     monitor for changes in speech or increased weakness  Eliquis  Bleeding precautions  Statin         Type 2 diabetes mellitus without complication, with long-term current use of insulin (Multi) - Primary     Monitor sweets and carb intake   monitor A1c routinely  No signs or symptoms of hyper or hypoglycemia         Hypertensive heart disease with CHF     CHF stable, no sob  Coreg  Isosorbide dinitrate  Sacubitril-valsartan  Hydralazine  Spironolactone  Monitor BP           Anemia of chronic disease     Stable on recent labs.  Monitor          Medications, treatments, and labs reviewed  Continue medications and treatments as listed in EMR    Scribe Attestation  I, Mitch Valle   attest that this documentation  has been prepared under the direction and in the presence of Alfredo Bro MD    Provider Attestation - Scribe documentation  All medical record entries made by the Scribe were at my direction and personally dictated by me. I have reviewed the chart and agree that the record accurately reflects my personal performance of the history, physical exam, discussion and plan.   Alfredo Bro MD

## 2025-01-18 NOTE — ASSESSMENT & PLAN NOTE
monitor for changes in speech or increased weakness  Eliquis  Bleeding precautions  Statin   Eyeglasses 18-Jan-2025 18:31

## 2025-02-02 ENCOUNTER — APPOINTMENT (OUTPATIENT)
Dept: RADIOLOGY | Facility: HOSPITAL | Age: 65
End: 2025-02-02
Payer: COMMERCIAL

## 2025-02-02 ENCOUNTER — HOSPITAL ENCOUNTER (EMERGENCY)
Facility: HOSPITAL | Age: 65
Discharge: HOME | End: 2025-02-02
Attending: STUDENT IN AN ORGANIZED HEALTH CARE EDUCATION/TRAINING PROGRAM
Payer: COMMERCIAL

## 2025-02-02 VITALS
HEART RATE: 70 BPM | HEIGHT: 70 IN | DIASTOLIC BLOOD PRESSURE: 51 MMHG | SYSTOLIC BLOOD PRESSURE: 131 MMHG | BODY MASS INDEX: 35.51 KG/M2 | WEIGHT: 248.02 LBS | OXYGEN SATURATION: 100 % | RESPIRATION RATE: 18 BRPM | TEMPERATURE: 98 F

## 2025-02-02 DIAGNOSIS — S09.90XA HEAD INJURY, INITIAL ENCOUNTER: Primary | ICD-10-CM

## 2025-02-02 LAB
ETHANOL SERPL-MCNC: 98 MG/DL
GLUCOSE BLD MANUAL STRIP-MCNC: 169 MG/DL (ref 74–99)

## 2025-02-02 PROCEDURE — 72125 CT NECK SPINE W/O DYE: CPT | Performed by: RADIOLOGY

## 2025-02-02 PROCEDURE — 99284 EMERGENCY DEPT VISIT MOD MDM: CPT | Mod: 25 | Performed by: STUDENT IN AN ORGANIZED HEALTH CARE EDUCATION/TRAINING PROGRAM

## 2025-02-02 PROCEDURE — 36415 COLL VENOUS BLD VENIPUNCTURE: CPT | Performed by: NURSE PRACTITIONER

## 2025-02-02 PROCEDURE — 76377 3D RENDER W/INTRP POSTPROCES: CPT

## 2025-02-02 PROCEDURE — 82947 ASSAY GLUCOSE BLOOD QUANT: CPT

## 2025-02-02 PROCEDURE — 70486 CT MAXILLOFACIAL W/O DYE: CPT

## 2025-02-02 PROCEDURE — G0390 TRAUMA RESPONS W/HOSP CRITI: HCPCS

## 2025-02-02 PROCEDURE — 70450 CT HEAD/BRAIN W/O DYE: CPT

## 2025-02-02 PROCEDURE — 72125 CT NECK SPINE W/O DYE: CPT

## 2025-02-02 PROCEDURE — 82077 ASSAY SPEC XCP UR&BREATH IA: CPT | Performed by: NURSE PRACTITIONER

## 2025-02-02 PROCEDURE — 70450 CT HEAD/BRAIN W/O DYE: CPT | Performed by: RADIOLOGY

## 2025-02-02 ASSESSMENT — PAIN DESCRIPTION - LOCATION
LOCATION: HEAD
LOCATION: BACK

## 2025-02-02 ASSESSMENT — LIFESTYLE VARIABLES
TOTAL SCORE: 0
HAVE YOU EVER FELT YOU SHOULD CUT DOWN ON YOUR DRINKING: NO
HAVE YOU EVER FELT YOU SHOULD CUT DOWN ON YOUR DRINKING: NO
HAVE PEOPLE ANNOYED YOU BY CRITICIZING YOUR DRINKING: NO
HAVE PEOPLE ANNOYED YOU BY CRITICIZING YOUR DRINKING: NO
EVER FELT BAD OR GUILTY ABOUT YOUR DRINKING: NO
TOTAL SCORE: 0
EVER HAD A DRINK FIRST THING IN THE MORNING TO STEADY YOUR NERVES TO GET RID OF A HANGOVER: NO
EVER HAD A DRINK FIRST THING IN THE MORNING TO STEADY YOUR NERVES TO GET RID OF A HANGOVER: NO
EVER FELT BAD OR GUILTY ABOUT YOUR DRINKING: NO

## 2025-02-02 ASSESSMENT — PAIN - FUNCTIONAL ASSESSMENT
PAIN_FUNCTIONAL_ASSESSMENT: 0-10

## 2025-02-02 ASSESSMENT — PAIN SCALES - GENERAL
PAINLEVEL_OUTOF10: 2
PAINLEVEL_OUTOF10: 2
PAINLEVEL_OUTOF10: 0 - NO PAIN
PAINLEVEL_OUTOF10: 2
PAINLEVEL_OUTOF10: 0 - NO PAIN
PAINLEVEL_OUTOF10: 2

## 2025-02-02 ASSESSMENT — PAIN DESCRIPTION - PAIN TYPE
TYPE: ACUTE PAIN
TYPE: CHRONIC PAIN

## 2025-02-02 ASSESSMENT — PAIN DESCRIPTION - DESCRIPTORS: DESCRIPTORS: ACHING

## 2025-02-02 NOTE — ED TRIAGE NOTES
Pt to ER via ambulance from [facility] at [place]. Pt is intoxicated, and fell forward out of his wheelchair. Pt alert and laughing. Pt has abrasion to left side of forehead. Pt admits to drinking a lot of beer prior to arrival.

## 2025-02-02 NOTE — ED PROVIDER NOTES
HPI   Chief Complaint   Patient presents with    Head Injury       64-year-old male with past history of CVA wheelchair bound, diabetes, CHF, htn, anemia presents to the emergency department today for a fall on blood thinners.  According to EMS states that he had a mechanical fall this morning.  Patient admits to drinking some alcohol this morning and he attempted to stand up and ambulates striking his head on. no Pain currently.  No vision changes. Denies n/v. No neck or back pain.                           Norton Coma Scale Score: 15         NIH Stroke Scale: 0          Patient History   Past Medical History:   Diagnosis Date    CHF (congestive heart failure) (Multi)     Diabetes mellitus (Multi)     Stroke (Multi) 09/20/2019    affects left side per SNF paperwork    Unspecified fracture of right lower leg, initial encounter for closed fracture     Leg fracture, right     Past Surgical History:   Procedure Laterality Date    KNEE SURGERY  10/07/2015    Knee Surgery    MR HEAD ANGIO WO IV CONTRAST  8/29/2019    MR HEAD ANGIO WO IV CONTRAST 8/29/2019 [facility] CLINICAL LEGACY    MR NECK ANGIO WO IV CONTRAST  8/29/2019    MR NECK ANGIO WO IV CONTRAST 8/29/2019 [facility] CLINICAL [facility]GAC[facility]     Family History   Problem Relation Name Age of Onset    No Known Problems Mother      No Known Problems Father       Social History     Tobacco Use    Smoking status: Former     Types: Cigarettes    Smokeless tobacco: Never   Substance Use Topics    Alcohol use: Not on file    Drug use: Not on file       Physical Exam   ED Triage Vitals   Temp Pulse Resp BP   -- -- -- --      SpO2 Temp src Heart Rate Source Patient Position   -- -- -- --      BP Location FiO2 (%)     -- --       Physical Exam  Vitals and nursing note reviewed.   Constitutional:       General: He is not in acute distress.     Appearance: Normal appearance. He is not toxic-appearing.   HENT:      Right Ear: Tympanic membrane normal. No hemotympanum.      Left Ear: Tympanic  membrane normal. No hemotympanum.      Mouth/Throat:      Mouth: Mucous membranes are moist.      Pharynx: Oropharynx is clear.   Eyes:      Extraocular Movements: Extraocular movements intact.      Pupils: Pupils are equal, round, and reactive to light.   Cardiovascular:      Rate and Rhythm: Normal rate and regular rhythm.      Pulses: Normal pulses.      Heart sounds: Normal heart sounds.   Pulmonary:      Effort: Pulmonary effort is normal.      Breath sounds: Normal breath sounds.   Abdominal:      General: Abdomen is flat. Bowel sounds are normal.      Palpations: Abdomen is soft.      Tenderness: There is no abdominal tenderness.   Musculoskeletal:         General: Normal range of motion.      Cervical back: Normal range of motion and neck supple.   Skin:     General: Skin is warm and dry.      Capillary Refill: Capillary refill takes less than 2 seconds.      Comments: Abrasion left frontal scalp   Neurological:      General: No focal deficit present.      Mental Status: He is alert and oriented to person, place, and time.      Comments: Facial droop at pt's baseline per EMS   Psychiatric:         Mood and Affect: Mood normal.         Behavior: Behavior normal.         Judgment: Judgment normal.         Labs Reviewed   ALCOHOL - Abnormal       Result Value    Alcohol 98 (*)    POCT GLUCOSE - Abnormal    POCT Glucose 169 (*)      Pain Management Panel           No data to display              CT cervical spine wo IV contrast   Final Result   There is still a mild degree of rotation at the craniocervical   junction. This could be from patient positioning or muscle spasm. If   there is any clinical suspicion of ligamentous injury at the cranial   cervical junction, MRI would then be recommended.        Cervical spine DJD as described. No CT evidence of cervical spine   fracture in this exam.        MACRO:   None        Signed by: Jono Gould 2/2/2025 11:30 AM   Dictation workstation:   [id number]      CT 3D  reconstruction   Final Result   No acute facial bone fracture visualized.        Continued rotation of C1 with respect to C2. There is normal   articulation of C1 with respect to the condylar facets. It may be   prudent to repeat the CT scan of the upper cervical spine.        Discussed with nurse practitioner Dr. Mitchell att 10:46 a.m., 02/02/2025        MACRO:   None        Signed by: Da Rahman 2/2/2025 10:50 AM   Dictation workstation:   [id number]      CT maxillofacial bones wo IV contrast   Final Result   No acute facial bone fracture visualized.        Continued rotation of C1 with respect to C2. There is normal   articulation of C1 with respect to the condylar facets. It may be   prudent to repeat the CT scan of the upper cervical spine.        Discussed with nurse practitioner Dr. Mitchell att 10:46 a.m., 02/02/2025        MACRO:   None        Signed by: Da Rahman 2/2/2025 10:50 AM   Dictation workstation:   [id number]      CT cervical spine wo IV contrast   Final Result   Rotation of C1 with respect to C2 without concurrent fracture.   Differential diagnosis includes head positioning/rotation.   Ligamentous injuries considered less likely but not entirely excluded.        No fractures elsewhere. Advanced multilevel arthritis and disc   disease with spurring as described.        MACRO:   None        Signed by: Da Rahman 2/2/2025 10:36 AM   Dictation workstation:   [id number]      CT head W O contrast trauma protocol   Final Result   No acute findings.        Signed by: Da Rahman 2/2/2025 10:25 AM   Dictation workstation:   [id number]          ED Course & MDM   ED Course as of 02/02/25 1454   Sun Feb 02, 2025   1008 HIA called prior to patient's arrival due to fall on thinners with head injury. [RB]   1138 Spoke with dr larson regarding the patient's CT C-spine imaging at this time.  States she feels comfortable waiting till the patient is clinically sober to reevaluate his neck pain  prior to MRI order [RB]      ED Course User Index  [RB] LUCRETIA Barillas         Diagnoses as of 02/02/25 1454   Head injury, initial encounter       Medical Decision Making  On initial evaluation patient is obviously under the influence of alcohol.  He is having no pain on my exam.  Does have a small abrasion to the left frontal scalp otherwise unremarkable.  CT of the facial bones showed no acute facial bone fracture visualize.  CT of the head shows no acute findings.  C-spine does show rotation.  We did obtain 2 CTs of the C-spine due to this.  There is still mild degree of rotation on the craniocervical junction could be positioning versus muscle spasm or ligamentous injury.  Patient is clinically sober at this point.  I reevaluated him.  He has no midline tenderness on exam.  During range of motion continues to decline any neck pain.  I did reach out to her general surgeon made them aware.  Patient states that he would like to go back to his facility at this point.  Plan to discharge back to facility in stable condition.        Procedure  Procedures       LUCRETIA Barillas  02/02/25 1446       LUCRETIA Barillas  02/02/25 1450

## 2025-05-25 ENCOUNTER — APPOINTMENT (OUTPATIENT)
Dept: CARDIOLOGY | Facility: HOSPITAL | Age: 65
End: 2025-05-25
Payer: COMMERCIAL

## 2025-05-25 ENCOUNTER — APPOINTMENT (OUTPATIENT)
Dept: RADIOLOGY | Facility: HOSPITAL | Age: 65
End: 2025-05-25
Payer: COMMERCIAL

## 2025-05-25 ENCOUNTER — HOSPITAL ENCOUNTER (INPATIENT)
Facility: HOSPITAL | Age: 65
End: 2025-05-25
Attending: EMERGENCY MEDICINE | Admitting: INTERNAL MEDICINE
Payer: MEDICARE

## 2025-05-25 DIAGNOSIS — R13.12 DYSPHAGIA, OROPHARYNGEAL: ICD-10-CM

## 2025-05-25 DIAGNOSIS — E11.9 TYPE 2 DIABETES MELLITUS WITHOUT COMPLICATION, WITH LONG-TERM CURRENT USE OF INSULIN: ICD-10-CM

## 2025-05-25 DIAGNOSIS — N17.9 ACUTE RENAL FAILURE, UNSPECIFIED ACUTE RENAL FAILURE TYPE: ICD-10-CM

## 2025-05-25 DIAGNOSIS — N17.9 ACUTE RENAL FAILURE: Primary | ICD-10-CM

## 2025-05-25 DIAGNOSIS — Z79.4 TYPE 2 DIABETES MELLITUS WITHOUT COMPLICATION, WITH LONG-TERM CURRENT USE OF INSULIN: ICD-10-CM

## 2025-05-25 LAB
ALBUMIN SERPL BCP-MCNC: 3.5 G/DL (ref 3.4–5)
ALP SERPL-CCNC: 109 U/L (ref 33–136)
ALT SERPL W P-5'-P-CCNC: 72 U/L (ref 10–52)
AMPHETAMINES UR QL SCN: NORMAL
ANION GAP BLDV CALCULATED.4IONS-SCNC: 18 MMOL/L (ref 10–25)
ANION GAP SERPL CALC-SCNC: 20 MMOL/L (ref 10–20)
APPEARANCE UR: ABNORMAL
APTT PPP: 49 SECONDS (ref 26–36)
AST SERPL W P-5'-P-CCNC: 54 U/L (ref 9–39)
BACTERIA #/AREA URNS AUTO: ABNORMAL /HPF
BARBITURATES UR QL SCN: NORMAL
BASE EXCESS BLDV CALC-SCNC: -13.6 MMOL/L (ref -2–3)
BASOPHILS # BLD AUTO: 0.01 X10*3/UL (ref 0–0.1)
BASOPHILS NFR BLD AUTO: 0.2 %
BENZODIAZ UR QL SCN: NORMAL
BILIRUB SERPL-MCNC: 0.4 MG/DL (ref 0–1.2)
BILIRUB UR STRIP.AUTO-MCNC: NEGATIVE MG/DL
BNP SERPL-MCNC: 211 PG/ML (ref 0–99)
BODY TEMPERATURE: 37 DEGREES CELSIUS
BUN SERPL-MCNC: 122 MG/DL (ref 6–23)
BZE UR QL SCN: NORMAL
C DIF TOX TCDA+TCDB STL QL NAA+PROBE: NOT DETECTED
CA-I BLDV-SCNC: 1.3 MMOL/L (ref 1.1–1.33)
CALCIUM SERPL-MCNC: 9.1 MG/DL (ref 8.6–10.3)
CANNABINOIDS UR QL SCN: NORMAL
CARDIAC TROPONIN I PNL SERPL HS: 13 NG/L (ref 0–20)
CARDIAC TROPONIN I PNL SERPL HS: 15 NG/L (ref 0–20)
CHLORIDE BLDV-SCNC: 109 MMOL/L (ref 98–107)
CHLORIDE SERPL-SCNC: 109 MMOL/L (ref 98–107)
CO2 SERPL-SCNC: 14 MMOL/L (ref 21–32)
COLOR UR: ABNORMAL
CREAT SERPL-MCNC: 6.36 MG/DL (ref 0.5–1.3)
CREAT UR-MCNC: 99.3 MG/DL (ref 20–370)
EGFRCR SERPLBLD CKD-EPI 2021: 9 ML/MIN/1.73M*2
EOSINOPHIL # BLD AUTO: 0.01 X10*3/UL (ref 0–0.7)
EOSINOPHIL NFR BLD AUTO: 0.2 %
ERYTHROCYTE [DISTWIDTH] IN BLOOD BY AUTOMATED COUNT: 14.6 % (ref 11.5–14.5)
ETHANOL SERPL-MCNC: 70 MG/DL
FENTANYL+NORFENTANYL UR QL SCN: NORMAL
GLUCOSE BLD MANUAL STRIP-MCNC: 185 MG/DL (ref 74–99)
GLUCOSE BLDV-MCNC: 93 MG/DL (ref 74–99)
GLUCOSE SERPL-MCNC: 92 MG/DL (ref 74–99)
GLUCOSE UR STRIP.AUTO-MCNC: NORMAL MG/DL
HBV SURFACE AB SER-ACNC: <3.1 MIU/ML
HBV SURFACE AG SERPL QL IA: NONREACTIVE
HCO3 BLDV-SCNC: 14.6 MMOL/L (ref 22–26)
HCT VFR BLD AUTO: 26.7 % (ref 41–52)
HCT VFR BLD EST: 27 % (ref 41–52)
HGB BLD-MCNC: 8.6 G/DL (ref 13.5–17.5)
HGB BLDV-MCNC: 9 G/DL (ref 13.5–17.5)
HOLD SPECIMEN: NORMAL
HYALINE CASTS #/AREA URNS AUTO: ABNORMAL /LPF
IMM GRANULOCYTES # BLD AUTO: 0.01 X10*3/UL (ref 0–0.7)
IMM GRANULOCYTES NFR BLD AUTO: 0.2 % (ref 0–0.9)
INHALED O2 CONCENTRATION: 21 %
INR PPP: 1.8 (ref 0.9–1.1)
KETONES UR STRIP.AUTO-MCNC: NEGATIVE MG/DL
LACTATE BLDV-SCNC: 3.2 MMOL/L (ref 0.4–2)
LACTATE BLDV-SCNC: 5.3 MMOL/L (ref 0.4–2)
LACTATE SERPL-SCNC: 2.8 MMOL/L (ref 0.4–2)
LACTATE SERPL-SCNC: 2.9 MMOL/L (ref 0.4–2)
LACTATE SERPL-SCNC: 5 MMOL/L (ref 0.4–2)
LACTATE SERPL-SCNC: 5.5 MMOL/L (ref 0.4–2)
LEUKOCYTE ESTERASE UR QL STRIP.AUTO: ABNORMAL
LYMPHOCYTES # BLD AUTO: 0.74 X10*3/UL (ref 1.2–4.8)
LYMPHOCYTES NFR BLD AUTO: 15.7 %
MCH RBC QN AUTO: 30.5 PG (ref 26–34)
MCHC RBC AUTO-ENTMCNC: 32.2 G/DL (ref 32–36)
MCV RBC AUTO: 95 FL (ref 80–100)
METHADONE UR QL SCN: NORMAL
MONOCYTES # BLD AUTO: 0.32 X10*3/UL (ref 0.1–1)
MONOCYTES NFR BLD AUTO: 6.8 %
MUCOUS THREADS #/AREA URNS AUTO: ABNORMAL /LPF
NEUTROPHILS # BLD AUTO: 3.62 X10*3/UL (ref 1.2–7.7)
NEUTROPHILS NFR BLD AUTO: 76.9 %
NITRITE UR QL STRIP.AUTO: NEGATIVE
NRBC BLD-RTO: 0 /100 WBCS (ref 0–0)
OPIATES UR QL SCN: NORMAL
OXYCODONE+OXYMORPHONE UR QL SCN: NORMAL
OXYHGB MFR BLDV: 66.8 % (ref 45–75)
PCO2 BLDV: 43 MM HG (ref 41–51)
PCP UR QL SCN: NORMAL
PH BLDV: 7.14 PH (ref 7.33–7.43)
PH UR STRIP.AUTO: 5 [PH]
PLATELET # BLD AUTO: 146 X10*3/UL (ref 150–450)
PO2 BLDV: 42 MM HG (ref 35–45)
POTASSIUM BLDV-SCNC: 5.5 MMOL/L (ref 3.5–5.3)
POTASSIUM SERPL-SCNC: 5.3 MMOL/L (ref 3.5–5.3)
PROT SERPL-MCNC: 6.3 G/DL (ref 6.4–8.2)
PROT UR STRIP.AUTO-MCNC: ABNORMAL MG/DL
PROTHROMBIN TIME: 20 SECONDS (ref 9.8–12.4)
RBC # BLD AUTO: 2.82 X10*6/UL (ref 4.5–5.9)
RBC # UR STRIP.AUTO: ABNORMAL MG/DL
RBC #/AREA URNS AUTO: >20 /HPF
SAO2 % BLDV: 68 % (ref 45–75)
SODIUM BLDV-SCNC: 136 MMOL/L (ref 136–145)
SODIUM SERPL-SCNC: 138 MMOL/L (ref 136–145)
SODIUM UR-SCNC: 48 MMOL/L
SODIUM/CREAT UR-RTO: 48 MMOL/G CREAT
SP GR UR STRIP.AUTO: 1.01
UROBILINOGEN UR STRIP.AUTO-MCNC: NORMAL MG/DL
WBC # BLD AUTO: 4.7 X10*3/UL (ref 4.4–11.3)
WBC #/AREA URNS AUTO: ABNORMAL /HPF
WBC CLUMPS #/AREA URNS AUTO: ABNORMAL /HPF
YEAST BUDDING #/AREA UR COMP ASSIST: PRESENT /HPF

## 2025-05-25 PROCEDURE — 87086 URINE CULTURE/COLONY COUNT: CPT | Mod: PORLAB | Performed by: EMERGENCY MEDICINE

## 2025-05-25 PROCEDURE — 82947 ASSAY GLUCOSE BLOOD QUANT: CPT

## 2025-05-25 PROCEDURE — 70498 CT ANGIOGRAPHY NECK: CPT

## 2025-05-25 PROCEDURE — 36415 COLL VENOUS BLD VENIPUNCTURE: CPT | Performed by: EMERGENCY MEDICINE

## 2025-05-25 PROCEDURE — 96375 TX/PRO/DX INJ NEW DRUG ADDON: CPT | Mod: 59

## 2025-05-25 PROCEDURE — 71045 X-RAY EXAM CHEST 1 VIEW: CPT | Performed by: RADIOLOGY

## 2025-05-25 PROCEDURE — 2550000001 HC RX 255 CONTRASTS: Mod: JZ | Performed by: EMERGENCY MEDICINE

## 2025-05-25 PROCEDURE — 99291 CRITICAL CARE FIRST HOUR: CPT | Performed by: EMERGENCY MEDICINE

## 2025-05-25 PROCEDURE — 2500000004 HC RX 250 GENERAL PHARMACY W/ HCPCS (ALT 636 FOR OP/ED): Performed by: STUDENT IN AN ORGANIZED HEALTH CARE EDUCATION/TRAINING PROGRAM

## 2025-05-25 PROCEDURE — 82805 BLOOD GASES W/O2 SATURATION: CPT | Performed by: EMERGENCY MEDICINE

## 2025-05-25 PROCEDURE — 2500000005 HC RX 250 GENERAL PHARMACY W/O HCPCS: Performed by: EMERGENCY MEDICINE

## 2025-05-25 PROCEDURE — 87040 BLOOD CULTURE FOR BACTERIA: CPT | Mod: PORLAB | Performed by: EMERGENCY MEDICINE

## 2025-05-25 PROCEDURE — 02HV33Z INSERTION OF INFUSION DEVICE INTO SUPERIOR VENA CAVA, PERCUTANEOUS APPROACH: ICD-10-PCS | Performed by: INTERNAL MEDICINE

## 2025-05-25 PROCEDURE — 2500000004 HC RX 250 GENERAL PHARMACY W/ HCPCS (ALT 636 FOR OP/ED): Mod: JZ | Performed by: EMERGENCY MEDICINE

## 2025-05-25 PROCEDURE — 2500000001 HC RX 250 WO HCPCS SELF ADMINISTERED DRUGS (ALT 637 FOR MEDICARE OP): Performed by: NURSE PRACTITIONER

## 2025-05-25 PROCEDURE — 99418 PROLNG IP/OBS E/M EA 15 MIN: CPT | Performed by: NURSE PRACTITIONER

## 2025-05-25 PROCEDURE — 71275 CT ANGIOGRAPHY CHEST: CPT | Performed by: RADIOLOGY

## 2025-05-25 PROCEDURE — 2020000001 HC ICU ROOM DAILY

## 2025-05-25 PROCEDURE — 71275 CT ANGIOGRAPHY CHEST: CPT

## 2025-05-25 PROCEDURE — 82570 ASSAY OF URINE CREATININE: CPT | Performed by: INTERNAL MEDICINE

## 2025-05-25 PROCEDURE — 70498 CT ANGIOGRAPHY NECK: CPT | Performed by: RADIOLOGY

## 2025-05-25 PROCEDURE — 96361 HYDRATE IV INFUSION ADD-ON: CPT | Mod: 59

## 2025-05-25 PROCEDURE — 80307 DRUG TEST PRSMV CHEM ANLYZR: CPT | Performed by: EMERGENCY MEDICINE

## 2025-05-25 PROCEDURE — 82435 ASSAY OF BLOOD CHLORIDE: CPT | Performed by: EMERGENCY MEDICINE

## 2025-05-25 PROCEDURE — 85610 PROTHROMBIN TIME: CPT | Performed by: EMERGENCY MEDICINE

## 2025-05-25 PROCEDURE — 96365 THER/PROPH/DIAG IV INF INIT: CPT | Mod: 59

## 2025-05-25 PROCEDURE — 51702 INSERT TEMP BLADDER CATH: CPT

## 2025-05-25 PROCEDURE — 84132 ASSAY OF SERUM POTASSIUM: CPT | Performed by: EMERGENCY MEDICINE

## 2025-05-25 PROCEDURE — 70450 CT HEAD/BRAIN W/O DYE: CPT

## 2025-05-25 PROCEDURE — 85025 COMPLETE CBC W/AUTO DIFF WBC: CPT | Performed by: EMERGENCY MEDICINE

## 2025-05-25 PROCEDURE — 83880 ASSAY OF NATRIURETIC PEPTIDE: CPT | Performed by: EMERGENCY MEDICINE

## 2025-05-25 PROCEDURE — 93308 TTE F-UP OR LMTD: CPT | Performed by: EMERGENCY MEDICINE

## 2025-05-25 PROCEDURE — 82077 ASSAY SPEC XCP UR&BREATH IA: CPT | Performed by: EMERGENCY MEDICINE

## 2025-05-25 PROCEDURE — 84484 ASSAY OF TROPONIN QUANT: CPT | Performed by: EMERGENCY MEDICINE

## 2025-05-25 PROCEDURE — 96374 THER/PROPH/DIAG INJ IV PUSH: CPT | Mod: 59

## 2025-05-25 PROCEDURE — 93005 ELECTROCARDIOGRAM TRACING: CPT

## 2025-05-25 PROCEDURE — 81001 URINALYSIS AUTO W/SCOPE: CPT | Performed by: EMERGENCY MEDICINE

## 2025-05-25 PROCEDURE — 83605 ASSAY OF LACTIC ACID: CPT | Performed by: NURSE PRACTITIONER

## 2025-05-25 PROCEDURE — 85730 THROMBOPLASTIN TIME PARTIAL: CPT | Performed by: EMERGENCY MEDICINE

## 2025-05-25 PROCEDURE — 36556 INSERT NON-TUNNEL CV CATH: CPT | Performed by: INTERNAL MEDICINE

## 2025-05-25 PROCEDURE — 2500000004 HC RX 250 GENERAL PHARMACY W/ HCPCS (ALT 636 FOR OP/ED): Performed by: INTERNAL MEDICINE

## 2025-05-25 PROCEDURE — 83605 ASSAY OF LACTIC ACID: CPT | Performed by: EMERGENCY MEDICINE

## 2025-05-25 PROCEDURE — 5A1D70Z PERFORMANCE OF URINARY FILTRATION, INTERMITTENT, LESS THAN 6 HOURS PER DAY: ICD-10-PCS | Performed by: INTERNAL MEDICINE

## 2025-05-25 PROCEDURE — 99291 CRITICAL CARE FIRST HOUR: CPT | Performed by: INTERNAL MEDICINE

## 2025-05-25 PROCEDURE — 87493 C DIFF AMPLIFIED PROBE: CPT

## 2025-05-25 PROCEDURE — 90937 HEMODIALYSIS REPEATED EVAL: CPT

## 2025-05-25 PROCEDURE — 70450 CT HEAD/BRAIN W/O DYE: CPT | Performed by: STUDENT IN AN ORGANIZED HEALTH CARE EDUCATION/TRAINING PROGRAM

## 2025-05-25 PROCEDURE — 70496 CT ANGIOGRAPHY HEAD: CPT | Performed by: RADIOLOGY

## 2025-05-25 PROCEDURE — 86706 HEP B SURFACE ANTIBODY: CPT | Mod: PORLAB | Performed by: INTERNAL MEDICINE

## 2025-05-25 PROCEDURE — 2500000002 HC RX 250 W HCPCS SELF ADMINISTERED DRUGS (ALT 637 FOR MEDICARE OP, ALT 636 FOR OP/ED): Performed by: NURSE PRACTITIONER

## 2025-05-25 PROCEDURE — 71045 X-RAY EXAM CHEST 1 VIEW: CPT

## 2025-05-25 PROCEDURE — 99223 1ST HOSP IP/OBS HIGH 75: CPT | Performed by: NURSE PRACTITIONER

## 2025-05-25 PROCEDURE — 87340 HEPATITIS B SURFACE AG IA: CPT | Mod: PORLAB | Performed by: INTERNAL MEDICINE

## 2025-05-25 RX ORDER — HEPARIN SODIUM 1000 [USP'U]/ML
2500 INJECTION, SOLUTION INTRAVENOUS; SUBCUTANEOUS ONCE
Status: ACTIVE | OUTPATIENT
Start: 2025-05-25

## 2025-05-25 RX ORDER — FOLIC ACID 1 MG/1
1 TABLET ORAL DAILY
Status: DISPENSED | OUTPATIENT
Start: 2025-05-25

## 2025-05-25 RX ORDER — NOREPINEPHRINE BITARTRATE/D5W 8 MG/250ML
0.1 PLASTIC BAG, INJECTION (ML) INTRAVENOUS CONTINUOUS
Status: DISCONTINUED | OUTPATIENT
Start: 2025-05-25 | End: 2025-05-26

## 2025-05-25 RX ORDER — HEPARIN SODIUM 5000 [USP'U]/ML
2500 INJECTION, SOLUTION INTRAVENOUS; SUBCUTANEOUS ONCE
Status: DISCONTINUED | OUTPATIENT
Start: 2025-05-25 | End: 2025-05-25

## 2025-05-25 RX ORDER — ATORVASTATIN CALCIUM 40 MG/1
80 TABLET, FILM COATED ORAL NIGHTLY
Status: DISPENSED | OUTPATIENT
Start: 2025-05-25

## 2025-05-25 RX ORDER — LANOLIN ALCOHOL/MO/W.PET/CERES
100 CREAM (GRAM) TOPICAL DAILY
Status: DISPENSED | OUTPATIENT
Start: 2025-05-25

## 2025-05-25 RX ORDER — AMOXICILLIN 250 MG
1 CAPSULE ORAL NIGHTLY
Status: DISCONTINUED | OUTPATIENT
Start: 2025-05-25 | End: 2025-05-25

## 2025-05-25 RX ORDER — MULTIVIT-MIN/IRON FUM/FOLIC AC 7.5 MG-4
1 TABLET ORAL DAILY
Status: DISPENSED | OUTPATIENT
Start: 2025-05-25

## 2025-05-25 RX ORDER — VANCOMYCIN HYDROCHLORIDE 1 G/20ML
INJECTION, POWDER, LYOPHILIZED, FOR SOLUTION INTRAVENOUS DAILY PRN
Status: DISCONTINUED | OUTPATIENT
Start: 2025-05-25 | End: 2025-05-27

## 2025-05-25 RX ORDER — HEPARIN SODIUM 1000 [USP'U]/ML
2000 INJECTION, SOLUTION INTRAVENOUS; SUBCUTANEOUS AS NEEDED
Status: DISPENSED | OUTPATIENT
Start: 2025-05-25

## 2025-05-25 RX ORDER — INSULIN LISPRO 100 [IU]/ML
0-5 INJECTION, SOLUTION INTRAVENOUS; SUBCUTANEOUS
Status: DISCONTINUED | OUTPATIENT
Start: 2025-05-25 | End: 2025-06-01

## 2025-05-25 RX ORDER — MICAFUNGIN IN SODIUM CHLORIDE 100 MG/100ML
100 INJECTION INTRAVENOUS EVERY 24 HOURS
Status: DISCONTINUED | OUTPATIENT
Start: 2025-05-25 | End: 2025-05-27

## 2025-05-25 RX ORDER — DIAZEPAM 10 MG/1
10 TABLET ORAL EVERY 2 HOUR PRN
Status: DISPENSED | OUTPATIENT
Start: 2025-05-25

## 2025-05-25 RX ORDER — CEFEPIME HYDROCHLORIDE 2 G/50ML
2 INJECTION, SOLUTION INTRAVENOUS EVERY 12 HOURS
Status: DISCONTINUED | OUTPATIENT
Start: 2025-05-25 | End: 2025-05-27

## 2025-05-25 RX ORDER — AMOXICILLIN 250 MG
1 CAPSULE ORAL NIGHTLY PRN
Status: ACTIVE | OUTPATIENT
Start: 2025-05-25

## 2025-05-25 RX ADMIN — SODIUM BICARBONATE 100 ML/HR: 84 INJECTION INTRAVENOUS at 11:48

## 2025-05-25 RX ADMIN — SODIUM CHLORIDE, SODIUM LACTATE, POTASSIUM CHLORIDE, AND CALCIUM CHLORIDE 1000 ML: .6; .31; .03; .02 INJECTION, SOLUTION INTRAVENOUS at 10:26

## 2025-05-25 RX ADMIN — ATORVASTATIN CALCIUM 80 MG: 40 TABLET, FILM COATED ORAL at 20:28

## 2025-05-25 RX ADMIN — IOHEXOL 100 ML: 350 INJECTION, SOLUTION INTRAVENOUS at 10:02

## 2025-05-25 RX ADMIN — HEPARIN SODIUM 2000 UNITS: 1000 INJECTION, SOLUTION INTRAVENOUS; SUBCUTANEOUS at 14:29

## 2025-05-25 RX ADMIN — MICAFUNGIN IN SODIUM CHLORIDE 100 MG: 100 INJECTION INTRAVENOUS at 16:33

## 2025-05-25 RX ADMIN — INSULIN LISPRO 1 UNITS: 100 INJECTION, SOLUTION INTRAVENOUS; SUBCUTANEOUS at 16:33

## 2025-05-25 RX ADMIN — THIAMINE HCL TAB 100 MG 100 MG: 100 TAB at 18:47

## 2025-05-25 RX ADMIN — PIPERACILLIN SODIUM AND TAZOBACTAM SODIUM 4.5 G: 4; .5 INJECTION, SOLUTION INTRAVENOUS at 10:46

## 2025-05-25 RX ADMIN — VANCOMYCIN HYDROCHLORIDE 2 G: 10 INJECTION, POWDER, LYOPHILIZED, FOR SOLUTION INTRAVENOUS at 11:53

## 2025-05-25 RX ADMIN — Medication 1 TABLET: at 18:46

## 2025-05-25 RX ADMIN — NOREPINEPHRINE BITARTRATE 0.02 MCG/KG/MIN: 8 INJECTION, SOLUTION INTRAVENOUS at 11:08

## 2025-05-25 RX ADMIN — SODIUM CHLORIDE, SODIUM LACTATE, POTASSIUM CHLORIDE, AND CALCIUM CHLORIDE 1000 ML: .6; .31; .03; .02 INJECTION, SOLUTION INTRAVENOUS at 09:50

## 2025-05-25 RX ADMIN — CEFEPIME HYDROCHLORIDE 2 G: 2 INJECTION, SOLUTION INTRAVENOUS at 16:33

## 2025-05-25 RX ADMIN — APIXABAN 5 MG: 5 TABLET, FILM COATED ORAL at 18:46

## 2025-05-25 RX ADMIN — FOLIC ACID 1 MG: 1 TABLET ORAL at 18:46

## 2025-05-25 SDOH — ECONOMIC STABILITY: HOUSING INSECURITY: IN THE PAST 12 MONTHS, HOW MANY TIMES HAVE YOU MOVED WHERE YOU WERE LIVING?: 1

## 2025-05-25 SDOH — SOCIAL STABILITY: SOCIAL INSECURITY
WITHIN THE LAST YEAR, HAVE YOU BEEN KICKED, HIT, SLAPPED, OR OTHERWISE PHYSICALLY HURT BY YOUR PARTNER OR EX-PARTNER?: PATIENT UNABLE TO ANSWER

## 2025-05-25 SDOH — ECONOMIC STABILITY: FOOD INSECURITY
WITHIN THE PAST 12 MONTHS, THE FOOD YOU BOUGHT JUST DIDN'T LAST AND YOU DIDN'T HAVE MONEY TO GET MORE.: PATIENT UNABLE TO ANSWER

## 2025-05-25 SDOH — ECONOMIC STABILITY: HOUSING INSECURITY: AT ANY TIME IN THE PAST 12 MONTHS, WERE YOU HOMELESS OR LIVING IN A SHELTER (INCLUDING NOW)?: PATIENT UNABLE TO ANSWER

## 2025-05-25 SDOH — ECONOMIC STABILITY: HOUSING INSECURITY
IN THE LAST 12 MONTHS, WAS THERE A TIME WHEN YOU WERE NOT ABLE TO PAY THE MORTGAGE OR RENT ON TIME?: PATIENT UNABLE TO ANSWER

## 2025-05-25 SDOH — SOCIAL STABILITY: SOCIAL INSECURITY: HAVE YOU HAD THOUGHTS OF HARMING ANYONE ELSE?: UNABLE TO ASSESS

## 2025-05-25 SDOH — ECONOMIC STABILITY: INCOME INSECURITY
IN THE PAST 12 MONTHS HAS THE ELECTRIC, GAS, OIL, OR WATER COMPANY THREATENED TO SHUT OFF SERVICES IN YOUR HOME?: PATIENT UNABLE TO ANSWER

## 2025-05-25 SDOH — SOCIAL STABILITY: SOCIAL INSECURITY: WITHIN THE LAST YEAR, HAVE YOU BEEN AFRAID OF YOUR PARTNER OR EX-PARTNER?: PATIENT UNABLE TO ANSWER

## 2025-05-25 SDOH — ECONOMIC STABILITY: FOOD INSECURITY
WITHIN THE PAST 12 MONTHS, YOU WORRIED THAT YOUR FOOD WOULD RUN OUT BEFORE YOU GOT THE MONEY TO BUY MORE.: PATIENT UNABLE TO ANSWER

## 2025-05-25 SDOH — SOCIAL STABILITY: SOCIAL INSECURITY
WITHIN THE LAST YEAR, HAVE YOU BEEN RAPED OR FORCED TO HAVE ANY KIND OF SEXUAL ACTIVITY BY YOUR PARTNER OR EX-PARTNER?: PATIENT UNABLE TO ANSWER

## 2025-05-25 SDOH — ECONOMIC STABILITY: FOOD INSECURITY
HOW HARD IS IT FOR YOU TO PAY FOR THE VERY BASICS LIKE FOOD, HOUSING, MEDICAL CARE, AND HEATING?: PATIENT UNABLE TO ANSWER

## 2025-05-25 SDOH — SOCIAL STABILITY: SOCIAL INSECURITY
WITHIN THE LAST YEAR, HAVE YOU BEEN HUMILIATED OR EMOTIONALLY ABUSED IN OTHER WAYS BY YOUR PARTNER OR EX-PARTNER?: PATIENT UNABLE TO ANSWER

## 2025-05-25 SDOH — ECONOMIC STABILITY: TRANSPORTATION INSECURITY
IN THE PAST 12 MONTHS, HAS LACK OF TRANSPORTATION KEPT YOU FROM MEDICAL APPOINTMENTS OR FROM GETTING MEDICATIONS?: PATIENT UNABLE TO ANSWER

## 2025-05-25 SDOH — SOCIAL STABILITY: SOCIAL INSECURITY: WERE YOU ABLE TO COMPLETE ALL THE BEHAVIORAL HEALTH SCREENINGS?: YES

## 2025-05-25 SDOH — HEALTH STABILITY: PHYSICAL HEALTH: ON AVERAGE, HOW MANY MINUTES DO YOU ENGAGE IN EXERCISE AT THIS LEVEL?: PATIENT UNABLE TO ANSWER

## 2025-05-25 SDOH — HEALTH STABILITY: PHYSICAL HEALTH
ON AVERAGE, HOW MANY DAYS PER WEEK DO YOU ENGAGE IN MODERATE TO STRENUOUS EXERCISE (LIKE A BRISK WALK)?: PATIENT UNABLE TO ANSWER

## 2025-05-25 ASSESSMENT — COGNITIVE AND FUNCTIONAL STATUS - GENERAL
MOBILITY SCORE: 6
TOILETING: A LOT
WALKING IN HOSPITAL ROOM: TOTAL
PERSONAL GROOMING: TOTAL
MOBILITY SCORE: 6
PERSONAL GROOMING: TOTAL
STANDING UP FROM CHAIR USING ARMS: TOTAL
TURNING FROM BACK TO SIDE WHILE IN FLAT BAD: A LOT
HELP NEEDED FOR BATHING: A LOT
STANDING UP FROM CHAIR USING ARMS: TOTAL
CLIMB 3 TO 5 STEPS WITH RAILING: TOTAL
DAILY ACTIVITIY SCORE: 12
CLIMB 3 TO 5 STEPS WITH RAILING: A LOT
MOVING FROM LYING ON BACK TO SITTING ON SIDE OF FLAT BED WITH BEDRAILS: TOTAL
DRESSING REGULAR UPPER BODY CLOTHING: TOTAL
MOBILITY SCORE: 12
TOILETING: TOTAL
DRESSING REGULAR UPPER BODY CLOTHING: A LOT
PATIENT BASELINE BEDBOUND: NO
DRESSING REGULAR LOWER BODY CLOTHING: TOTAL
STANDING UP FROM CHAIR USING ARMS: A LOT
DAILY ACTIVITIY SCORE: 6
TOILETING: TOTAL
MOVING FROM LYING ON BACK TO SITTING ON SIDE OF FLAT BED WITH BEDRAILS: TOTAL
WALKING IN HOSPITAL ROOM: A LOT
DAILY ACTIVITIY SCORE: 6
DRESSING REGULAR LOWER BODY CLOTHING: TOTAL
TURNING FROM BACK TO SIDE WHILE IN FLAT BAD: TOTAL
DRESSING REGULAR LOWER BODY CLOTHING: A LOT
PERSONAL GROOMING: A LOT
DRESSING REGULAR UPPER BODY CLOTHING: TOTAL
CLIMB 3 TO 5 STEPS WITH RAILING: TOTAL
EATING MEALS: TOTAL
HELP NEEDED FOR BATHING: TOTAL
EATING MEALS: TOTAL
MOVING TO AND FROM BED TO CHAIR: TOTAL
MOVING TO AND FROM BED TO CHAIR: TOTAL
TURNING FROM BACK TO SIDE WHILE IN FLAT BAD: TOTAL
MOVING FROM LYING ON BACK TO SITTING ON SIDE OF FLAT BED WITH BEDRAILS: A LOT
EATING MEALS: A LOT
WALKING IN HOSPITAL ROOM: TOTAL
MOVING TO AND FROM BED TO CHAIR: A LOT
HELP NEEDED FOR BATHING: TOTAL

## 2025-05-25 ASSESSMENT — ACTIVITIES OF DAILY LIVING (ADL)
HEARING - LEFT EAR: FUNCTIONAL
TOILETING: UNABLE TO ASSESS
LACK_OF_TRANSPORTATION: PATIENT UNABLE TO ANSWER
ADEQUATE_TO_COMPLETE_ADL: UNABLE TO ASSESS
FEEDING YOURSELF: UNABLE TO ASSESS
WALKS IN HOME: UNABLE TO ASSESS
GROOMING: UNABLE TO ASSESS
JUDGMENT_ADEQUATE_SAFELY_COMPLETE_DAILY_ACTIVITIES: UNABLE TO ASSESS
LACK_OF_TRANSPORTATION: PATIENT UNABLE TO ANSWER
HEARING - RIGHT EAR: FUNCTIONAL
BATHING: UNABLE TO ASSESS
PATIENT'S MEMORY ADEQUATE TO SAFELY COMPLETE DAILY ACTIVITIES?: UNABLE TO ASSESS
DRESSING YOURSELF: UNABLE TO ASSESS

## 2025-05-25 ASSESSMENT — LIFESTYLE VARIABLES
SKIP TO QUESTIONS 9-10: 0
AUDIT-C TOTAL SCORE: -1
HOW OFTEN DO YOU HAVE A DRINK CONTAINING ALCOHOL: PATIENT UNABLE TO ANSWER
EVER HAD A DRINK FIRST THING IN THE MORNING TO STEADY YOUR NERVES TO GET RID OF A HANGOVER: NO
TOTAL SCORE: 0
AUDIT-C TOTAL SCORE: -1
HAVE YOU EVER FELT YOU SHOULD CUT DOWN ON YOUR DRINKING: NO
HOW MANY STANDARD DRINKS CONTAINING ALCOHOL DO YOU HAVE ON A TYPICAL DAY: PATIENT UNABLE TO ANSWER
HOW OFTEN DO YOU HAVE 6 OR MORE DRINKS ON ONE OCCASION: PATIENT UNABLE TO ANSWER
EVER FELT BAD OR GUILTY ABOUT YOUR DRINKING: NO
HAVE PEOPLE ANNOYED YOU BY CRITICIZING YOUR DRINKING: NO

## 2025-05-25 ASSESSMENT — PAIN SCALES - GENERAL
PAINLEVEL_OUTOF10: 0 - NO PAIN

## 2025-05-25 ASSESSMENT — PAIN - FUNCTIONAL ASSESSMENT
PAIN_FUNCTIONAL_ASSESSMENT: 0-10

## 2025-05-25 ASSESSMENT — PATIENT HEALTH QUESTIONNAIRE - PHQ9
1. LITTLE INTEREST OR PLEASURE IN DOING THINGS: NOT AT ALL
SUM OF ALL RESPONSES TO PHQ9 QUESTIONS 1 & 2: 0
2. FEELING DOWN, DEPRESSED OR HOPELESS: NOT AT ALL

## 2025-05-25 ASSESSMENT — COLUMBIA-SUICIDE SEVERITY RATING SCALE - C-SSRS
1. IN THE PAST MONTH, HAVE YOU WISHED YOU WERE DEAD OR WISHED YOU COULD GO TO SLEEP AND NOT WAKE UP?: NO
2. HAVE YOU ACTUALLY HAD ANY THOUGHTS OF KILLING YOURSELF?: NO
6. HAVE YOU EVER DONE ANYTHING, STARTED TO DO ANYTHING, OR PREPARED TO DO ANYTHING TO END YOUR LIFE?: NO

## 2025-05-25 NOTE — CONSULTS
Infectious Disease Inpatient Consult    Inpatient consult to Infectious Diseases  Consult performed by: Jonathan Parham MD  Consult ordered by: Jono Jacobson MD        Primary MD: Alfredo Bro MD    Reason For Consult  Septic shock      Assessment/Plan:    #Septic shock, unknown source  No obvious was of infection.  CT chest not concerning for pneumonia.  Blood cultures pending at this time.  UA does show yeast.  Will DC Zosyn to avoid combination of Zosyn plus vancomycin 2/2 nephrotoxicity.  Start cefepime and micafungin    #Acute kidney injury  Nephrology consulted.  Now on SLED    #Bicytopenia  Anemia and thrombocytopenia.  Low platelets could be due to sepsis.  Management per primary    #Transaminitis  Likely secondary to sepsis.  Monitor LFTs    CVA, DM  HFrEF  A-fib    Recommendations:    -DC Zosyn  -Start cefepime every 8 hour  -Start vancomycin.  Pharmacy to dose  -Start micafungin 100 mg once daily  -Renally dose antibiotic  -Monitor blood cultures, urine cultures  -Thank you for consult.  Will continue to follow    Jonathan Parham MD  Date of service: 5/25/2025  Time of service: 2:34 PM      History Of Present Illness  Edenilson Ness is a 64 y.o. male with Pmhx of CVA, DM, HFrEF, A-fib on Eliquis, wheelchair-bound presented from [facility] was transferred to the ER with concern for stroke.  Last known well 5 AM this morning.  Patient gets 1 beer at night daily at the facility.    On admission, afebrile, HR 70, RR 16, BP 62/38, 94% on room air.  Labs show WBC count 4.7, Hb 8.6, platelet 146, potassium 5.3, creatinine 6.3, , AST 54, ALT 72, lactic acid 2.9, alcohol 70, .  U tox negative    CT head did not show any acute process.  CTA chest showed nodule in the gastric fundus about 17 mm, polyp or mass not excluded.  Blood cultures, urine cultures obtained and patient started on vancomycin and Zosyn.  ID consulted for further antibiotic recommendation     Past Medical  History  He has a past medical history of Anemia, Atrial fibrillation (Multi), Diabetes mellitus (Multi), HFrEF (heart failure with reduced ejection fraction), and Stroke (Multi) (2019).    Surgical History  He has a past surgical history that includes Knee surgery; MR angio head wo IV contrast (2019); and MR angio neck wo IV contrast (2019).     Social History     Occupational History    Not on file   Tobacco Use    Smoking status: Former     Types: Cigarettes    Smokeless tobacco: Never   Substance and Sexual Activity    Alcohol use: Not on file    Drug use: Not on file    Sexual activity: Not on file     Travel History   Travel since 27    No documented travel since 27        Service:  Branch Years Served Period          Comments:        Family History  Family History[1]  Allergies  Patient has no known allergies.     Immunization History   Administered Date(s) Administered    COVID-19, mRNA, LNP-S, PF, 30 mcg/0.3 mL dose 2020, 2021, 10/14/2021    Pfizer Gray Cap SARS-CoV-2 2022     Medications  Home medications:  Prescriptions Prior to Admission[2]  Current medications:  Scheduled medications  Scheduled Medications[3]  Continuous medications  Continuous Medications[4]  PRN medications  PRN Medications[5]    Review of Systems    Limited ROS but denies any fever, chills, nausea, vomiting, diarrhea, abdominal pain.    Objective  Range of Vitals (last 24 hours)  Heart Rate:  [58-73]   Temp:  [35.7 °C (96.2 °F)]   Resp:  [8-49]   BP: (52-97)/(22-56)   Weight:  [113 kg (250 lb)]   SpO2:  [73 %-100 %]   Daily Weight  25 : 113 kg (250 lb)    Body mass index is 35.87 kg/m².     Physical Exam  BP 87/52   Pulse 59   Temp 35.7 °C (96.2 °F) (Temporal)   Resp 18   Wt 113 kg (250 lb)   SpO2 97%   BMI 35.87 kg/m²   Temp (24hrs), Av.7 °C (96.2 °F), Min:35.7 °C (96.2 °F), Max:35.7 °C (96.2 °F)      General: Somewhat confused  HEENT: \\conjunctival pallor, no  "scleral icterus  Neck:  RIJ dialysis catheter  Lungs: bilaterally clear to auscultation  Heart: Irregular rhythm  Abdomen: soft, non tender, non distended  Neuro: AAO x 1, B/l LE weakness,   Extremities: RLE surgical scar on the medial side  Skin: No rashes or ulcers     Relevant Results    Labs  Results from last 72 hours   Lab Units 05/25/25  1031   WBC AUTO x10*3/uL 4.7   HEMOGLOBIN g/dL 8.6*   HEMATOCRIT % 26.7*   PLATELETS AUTO x10*3/uL 146*   NEUTROS PCT AUTO % 76.9   LYMPHS PCT AUTO % 15.7   MONOS PCT AUTO % 6.8   EOS PCT AUTO % 0.2     Results from last 72 hours   Lab Units 05/25/25  1031   SODIUM mmol/L 138   POTASSIUM mmol/L 5.3   CHLORIDE mmol/L 109*   CO2 mmol/L 14*   BUN mg/dL 122*   CREATININE mg/dL 6.36*   GLUCOSE mg/dL 92   CALCIUM mg/dL 9.1   ANION GAP mmol/L 20   EGFR mL/min/1.73m*2 9*     Results from last 72 hours   Lab Units 05/25/25  1031   ALK PHOS U/L 109   BILIRUBIN TOTAL mg/dL 0.4   PROTEIN TOTAL g/dL 6.3*   ALT U/L 72*   AST U/L 54*   ALBUMIN g/dL 3.5     Estimated Creatinine Clearance: 15.2 mL/min (A) (by C-G formula based on SCr of 6.36 mg/dL (H)).  No results found for: \"CRP\", \"SEDRATE\"  No results found for: \"HIV1X2\", \"HIVCONF\", \"MSEBMY6UA\"  No results found for: \"HEPCABINIT\", \"HEPCAB\", \"HCVPCRQUANT\"    Microbiology  No results found for the last 14 days.      Imaging  CT angio chest for pulmonary embolism  Result Date: 5/25/2025  1. Extremely limited exam due to suboptimal contrast phase. Although no definite large or central pulmonary embolism is seen, a more distal pulmonary embolism not excluded given the limitations of the study. 2. Cardiomegaly with low lung volumes which may be due to poor inspiration. Likely posterior atelectasis and/or minimal dependent edema. 3. Question of enhancing nodule in the gastric fundus. This is approximately 17 mm in diameter. A polyp or mass not excluded. Correlate with EGD.   MACRO: Critical Finding:  See findings. Notification was initiated on " 5/25/2025 at 11:21 am by  Yash Cortez.  (**-YCF-**)   Signed by: Yash Cortez 5/25/2025 11:21 AM Dictation workstation:   [id number]    CT brain attack angio head and neck W and WO IV contrast  Result Date: 5/25/2025  No occlusion of the visualized arteries in the head or neck. Unchanged appearance of the these arteries compared to the prior CTA study.   This study was interpreted at [facility].   MACRO: None   Signed by: Levon Waters 5/25/2025 10:33 AM Dictation workstation:   [id number]    CT brain attack head wo IV contrast  Result Date: 5/25/2025  No acute intracranial hemorrhage, mass effect, or CT apparent acute infarct. Chronic microvascular ischemia and involutional changes. Tu Hunter discussed the significance and urgency of this critical finding by telephone with  KENTON STRATTON on 5/25/2025 at 10:27 am.  (**-RCF-**) Findings:  See findings.         Signed by: Tu Hunter 5/25/2025 10:27 AM Dictation workstation:   [id number]             [1]   Family History  Problem Relation Name Age of Onset    No Known Problems Mother      No Known Problems Father     [2]   Medications Prior to Admission   Medication Sig Dispense Refill Last Dose/Taking    apixaban (Eliquis) 5 mg tablet Take 1 tablet (5 mg) by mouth 2 times a day.       atorvastatin (Lipitor) 80 mg tablet Take 1 tablet (80 mg) by mouth once daily at bedtime.       baclofen (Lioresal) 5 mg tablet Take 1 tablet (5 mg) by mouth 2 times a day.       carvedilol (Coreg) 25 mg tablet Take 2 tablets (50 mg) by mouth 2 times a day with meals.       furosemide (Lasix) 40 mg tablet Take 1 tablet (40 mg) by mouth once daily.       GLUCAGON, HCL, EMERGENCY KIT INJ Inject 1 mL into the muscle if needed (low blood sugar).       glucose (Glucose GeL) 40 % gel oral gel Take by mouth. GIVE 1 APPLICATION BY MOUTH PRN       hydrALAZINE (Apresoline) 25 mg tablet Take 1 tablet (25 mg) by mouth every 8 hours.       isosorbide  dinitrate (Isordil) 20 mg tablet Take 1 tablet (20 mg) by mouth 3 times a day.       melatonin 10 mg tablet Take 1 tablet (10 mg) by mouth once daily at bedtime.       metFORMIN (Glucophage) 500 mg tablet Take 1 tablet (500 mg) by mouth 2 times a day.       sacubitriL-valsartan (Entresto) 24-26 mg tablet Take 1 tablet by mouth 2 times a day.       sennosides (Senokot) 8.6 mg tablet Take 1 tablet (8.6 mg) by mouth once daily as needed for constipation.       spironolactone (Aldactone) 25 mg tablet Take 1 tablet (25 mg) by mouth once daily.       thiamine 100 mg tablet Take 1 tablet (100 mg) by mouth once daily.       zinc oxide 20 % ointment Apply topically. APPLY TO SCROTUM  AND GERARD AREA TOPICALLY DAILY EVERY DAY AND NIGHT SHIFT      [3] apixaban, 5 mg, oral, BID  atorvastatin, 80 mg, oral, Nightly  folic acid, 1 mg, oral, Daily  heparin, 2,500 Units, hemodialysis, Once  insulin lispro, 0-5 Units, subcutaneous, TID AC  multivitamin with minerals, 1 tablet, oral, Daily  piperacillin-tazobactam, 2.25 g, intravenous, q6h  sennosides-docusate sodium, 1 tablet, oral, Nightly  thiamine, 100 mg, oral, Daily    [4] norepinephrine, 0.1 mcg/kg/min, Last Rate: 0.14 mcg/kg/min (05/25/25 1410)  sodium bicarbonate 1 mEq/mL (8.4 %) 150 mEq in dextrose 5% 1,150 mL infusion, 100 mL/hr, Last Rate: 100 mL/hr (05/25/25 1148)    [5] PRN medications: diazePAM, heparin, heparin, vancomycin

## 2025-05-25 NOTE — H&P
[facility] MEDICINE HISTORY AND PHYSICAL    History Of Present Illness     Edenilson Ness is a 64 y.o. male with PMHx of CVA in 2019 now wheelchair-bound, diabetes, anemia, HFrEF and atrial fib on apixaban [facility] LKW was 5am today. In the ED he was hypotensive to 62/38, with loss of urine and slurring of words. BP did not respond to 2L IV fluids and he was placed on Levophed. He drank alcohol this morning. At the bedside he is very difficult to understand. He denies pain and SOB. He says he drinks one beer a day.  Remainder of ROS reviewed and negative except as indicated in HPI.     ED workup was significant for BUN/creatinine 122/6.36, ALT/AST 72/54, , troponins 15/13, lactate 2.9/5.0, WBC 4.7k, Hgb 8.6, pH 7.14, bicarb 14.6. ETOH was 70 (it was 98 three months ago). UA was indicative of infection. CTA chest was extremely limited and revealed no definite large or central pulmonary embolism; possible cardiomegaly with likely posterior atelectasis and/or minimal dependent edema; questionable enhancing nodule in the gastric fundus. CT brain was nonacute. The pt was tachypneic to 49, hypotensive to 52/39 mmHg and hypoxic to 73%.RA. The pt received vancomycin, Zosyn and 2L Lactated Ringers IVF. He was placed on Levophed and bicarb gtts and is being admitted to the ICU.     The pt's case and plan of care was discussed with the ED provider. The ED notes were reviewed in detail, as were prior outpatient and patient notes as part of the admission chart review. The pt is at high risk for cardiovascular, respiratory, infectious and neurological events including septic shock, ETOH withdrawal, MI, stroke and ESRD due to acute hypotension requiring pressor support, lactic acidosis and acute renal failure. The decision was made to escalate care and admit the pt under ICU status.     Objective     Past Medical History  Medical History[1]    Surgical History  Surgical  History[2]    Social History  Social History[3]    Family History  Family History[4]    Allergies  Patient has no known allergies.    Visit Vitals  BP 76/56   Pulse 64   Temp 35.7 °C (96.2 °F) (Temporal)   Resp 16   Wt 113 kg (250 lb)   SpO2 100%   BMI 35.87 kg/m²   Smoking Status Former   BSA 2.36 m²       Vitals:    05/25/25 1038   Weight: 113 kg (250 lb)       Scheduled medications  Scheduled Medications[5]  Continuous medications  Continuous Medications[6]  PRN medications  PRN Medications[7]    The following labwork was reviewed:  Results for orders placed or performed during the hospital encounter of 05/25/25 (from the past 24 hours)   CBC and Auto Differential   Result Value Ref Range    WBC 4.7 4.4 - 11.3 x10*3/uL    nRBC 0.0 0.0 - 0.0 /100 WBCs    RBC 2.82 (L) 4.50 - 5.90 x10*6/uL    Hemoglobin 8.6 (L) 13.5 - 17.5 g/dL    Hematocrit 26.7 (L) 41.0 - 52.0 %    MCV 95 80 - 100 fL    MCH 30.5 26.0 - 34.0 pg    MCHC 32.2 32.0 - 36.0 g/dL    RDW 14.6 (H) 11.5 - 14.5 %    Platelets 146 (L) 150 - 450 x10*3/uL    Neutrophils % 76.9 40.0 - 80.0 %    Immature Granulocytes %, Automated 0.2 0.0 - 0.9 %    Lymphocytes % 15.7 13.0 - 44.0 %    Monocytes % 6.8 2.0 - 10.0 %    Eosinophils % 0.2 0.0 - 6.0 %    Basophils % 0.2 0.0 - 2.0 %    Neutrophils Absolute 3.62 1.20 - 7.70 x10*3/uL    Immature Granulocytes Absolute, Automated 0.01 0.00 - 0.70 x10*3/uL    Lymphocytes Absolute 0.74 (L) 1.20 - 4.80 x10*3/uL    Monocytes Absolute 0.32 0.10 - 1.00 x10*3/uL    Eosinophils Absolute 0.01 0.00 - 0.70 x10*3/uL    Basophils Absolute 0.01 0.00 - 0.10 x10*3/uL   Comprehensive Metabolic Panel   Result Value Ref Range    Glucose 92 74 - 99 mg/dL    Sodium 138 136 - 145 mmol/L    Potassium 5.3 3.5 - 5.3 mmol/L    Chloride 109 (H) 98 - 107 mmol/L    Bicarbonate 14 (L) 21 - 32 mmol/L    Anion Gap 20 10 - 20 mmol/L    Urea Nitrogen 122 (HH) 6 - 23 mg/dL    Creatinine 6.36 (H) 0.50 - 1.30 mg/dL    eGFR 9 (L) >60 mL/min/1.73m*2    Calcium  9.1 8.6 - 10.3 mg/dL    Albumin 3.5 3.4 - 5.0 g/dL    Alkaline Phosphatase 109 33 - 136 U/L    Total Protein 6.3 (L) 6.4 - 8.2 g/dL    AST 54 (H) 9 - 39 U/L    Bilirubin, Total 0.4 0.0 - 1.2 mg/dL    ALT 72 (H) 10 - 52 U/L   Lactate   Result Value Ref Range    Lactate 2.9 (H) 0.4 - 2.0 mmol/L   Ethanol   Result Value Ref Range    Alcohol 70 (H) <=10 mg/dL   B-type natriuretic peptide   Result Value Ref Range     (H) 0 - 99 pg/mL   BLOOD GAS VENOUS FULL PANEL   Result Value Ref Range    POCT pH, Venous 7.14 (LL) 7.33 - 7.43 pH    POCT pCO2, Venous 43 41 - 51 mm Hg    POCT pO2, Venous 42 35 - 45 mm Hg    POCT SO2, Venous 68 45 - 75 %    POCT Oxy Hemoglobin, Venous 66.8 45.0 - 75.0 %    POCT Hematocrit Calculated, Venous 27.0 (L) 41.0 - 52.0 %    POCT Sodium, Venous 136 136 - 145 mmol/L    POCT Potassium, Venous 5.5 (H) 3.5 - 5.3 mmol/L    POCT Chloride, Venous 109 (H) 98 - 107 mmol/L    POCT Ionized Calicum, Venous 1.30 1.10 - 1.33 mmol/L    POCT Glucose, Venous 93 74 - 99 mg/dL    POCT Lactate, Venous 3.2 (H) 0.4 - 2.0 mmol/L    POCT Base Excess, Venous -13.6 (L) -2.0 - 3.0 mmol/L    POCT HCO3 Calculated, Venous 14.6 (L) 22.0 - 26.0 mmol/L    POCT Hemoglobin, Venous 9.0 (L) 13.5 - 17.5 g/dL    POCT Anion Gap, Venous 18.0 10.0 - 25.0 mmol/L    Patient Temperature 37.0 degrees Celsius    FiO2 21 %   Protime-INR   Result Value Ref Range    Protime 20.0 (H) 9.8 - 12.4 seconds    INR 1.8 (H) 0.9 - 1.1   APTT   Result Value Ref Range    aPTT 49 (H) 26 - 36 seconds   Troponin I, High Sensitivity, Initial   Result Value Ref Range    Troponin I, High Sensitivity 15 0 - 20 ng/L   Drug Screen, Urine   Result Value Ref Range    Amphetamine Screen, Urine Presumptive Negative Presumptive Negative    Barbiturate Screen, Urine Presumptive Negative Presumptive Negative    Benzodiazepines Screen, Urine Presumptive Negative Presumptive Negative    Cannabinoid Screen, Urine Presumptive Negative Presumptive Negative    Cocaine  Metabolite Screen, Urine Presumptive Negative Presumptive Negative    Fentanyl Screen, Urine Presumptive Negative Presumptive Negative    Opiate Screen, Urine Presumptive Negative Presumptive Negative    Oxycodone Screen, Urine Presumptive Negative Presumptive Negative    PCP Screen, Urine Presumptive Negative Presumptive Negative    Methadone Screen, Urine Presumptive Negative Presumptive Negative   Urinalysis with Reflex Culture and Microscopic   Result Value Ref Range    Color, Urine Light-Yellow Light-Yellow, Yellow, Dark-Yellow    Appearance, Urine Turbid (N) Clear    Specific Gravity, Urine 1.013 1.005 - 1.035    pH, Urine 5.0 5.0, 5.5, 6.0, 6.5, 7.0, 7.5, 8.0    Protein, Urine 20 (TRACE) NEGATIVE, 10 (TRACE), 20 (TRACE) mg/dL    Glucose, Urine Normal Normal mg/dL    Blood, Urine 0.06 (1+) (A) NEGATIVE mg/dL    Ketones, Urine NEGATIVE NEGATIVE mg/dL    Bilirubin, Urine NEGATIVE NEGATIVE mg/dL    Urobilinogen, Urine Normal Normal mg/dL    Nitrite, Urine NEGATIVE NEGATIVE    Leukocyte Esterase, Urine 500 Bhakti/uL (A) NEGATIVE   Microscopic Only, Urine   Result Value Ref Range    WBC, Urine 21-50 (A) 1-5, NONE /HPF    WBC Clumps, Urine OCCASIONAL Reference range not established. /HPF    RBC, Urine >20 (A) NONE, 1-2, 3-5 /HPF    Bacteria, Urine 1+ (A) NONE SEEN /HPF    Budding Yeast, Urine PRESENT (A) NONE /HPF    Mucus, Urine FEW Reference range not established. /LPF    Hyaline Casts, Urine 2+ (A) NONE /LPF   Troponin, High Sensitivity, 1 Hour   Result Value Ref Range    Troponin I, High Sensitivity 13 0 - 20 ng/L   Blood Gas Lactic Acid, Venous   Result Value Ref Range    POCT Lactate, Venous 5.3 (HH) 0.4 - 2.0 mmol/L   Lactate   Result Value Ref Range    Lactate 5.0 (HH) 0.4 - 2.0 mmol/L       The following imaging was reviewed:  CT angio chest for pulmonary embolism  Result Date: 5/25/2025  1. Extremely limited exam due to suboptimal contrast phase. Although no definite large or central pulmonary embolism is  seen, a more distal pulmonary embolism not excluded given the limitations of the study. 2. Cardiomegaly with low lung volumes which may be due to poor inspiration. Likely posterior atelectasis and/or minimal dependent edema. 3. Question of enhancing nodule in the gastric fundus. This is approximately 17 mm in diameter. A polyp or mass not excluded. Correlate with EGD.   MACRO: Critical Finding:  See findings. Notification was initiated on 5/25/2025 at 11:21 am by  Yash Cortez.  (**-YCF-**)   Signed by: Yash Cortez 5/25/2025 11:21 AM Dictation workstation:   [id number]    CT brain attack angio head and neck W and WO IV contrast  Result Date: 5/25/2025  No occlusion of the visualized arteries in the head or neck. Unchanged appearance of the these arteries compared to the prior CTA study.   This study was interpreted at [facility].   MACRO: None   Signed by: Levon Waters 5/25/2025 10:33 AM Dictation workstation:   [id number]    CT brain attack head wo IV contrast  Result Date: 5/25/2025  No acute intracranial hemorrhage, mass effect, or CT apparent acute infarct. Chronic microvascular ischemia and involutional changes. Tu Hunter discussed the significance and urgency of this critical finding by telephone with  KENTON STRATTON on 5/25/2025 at 10:27 am.  (**-RCF-**) Findings:  See findings.         Signed by: Tu Hunter 5/25/2025 10:27 AM Dictation workstation:   [id number]      Constitutional: Well developed, awake, alert, calm, no acute distress, cooperative   Eyes: EOMI, clear sclerae   ENMT: mucous membranes moist, no lesions seen   Head/Neck: Neck supple, no apparent injury, head atraumatic   Respiratory/Thorax: CTAB, good chest expansion, respirations even and unlabored, pt on 6L O2   Cardiovascular: Regular rate and rhythm, no murmurs/rubs/gallops, normal S1 and S 2   Gastrointestinal: Abdomen nondistended, soft, nontender, +BS, no bruits   Musculoskeletal: ROM  "intact, no joint swelling, normal  strength   Extremities: no cyanosis, contusions or clubbing, or edema   Neurological: speech slurred, generalized weakness   Psychological: Appropriate affect and behavior, pleasant   Skin: Warm and dry, no lesions, no rashes       Assessment/Plan     Acute renal failure, no hx CKD  Acute hypotension requiring pressor support  Pt seen by nephrology and SLED orders are in  All offending home meds held  Daily RFP  Continue empiric pending urine and blood cx, UA was +, pt does not meet SIRS/sepsis criteria    Acute hypoxic respiratory failure  Pt currently on 6L O2  Imaging does not reveal evidence of PNA  Wean O2 as tolerated to maintain O2 sat >91%, pt may need home O2 eval     ETOH abuse  Pt admits to drinking one beer a day \"my whole life\"  ETOH was 70 (it was 98 three months ago)  Start CIWA protocol    Chronic anemia  Baseline Hgb is 11-12, today is 8.6, likely etiology is ARF  Monitor Hgb and transfuse for Hgb <= 7.0    DVT ppx: apixaban    Discharge disposition  [facility] when medically stable      Alivia Whatley CNP  [facility]         [1]   Past Medical History:  Diagnosis Date    Anemia     Atrial fibrillation (Multi)     Diabetes mellitus (Multi)     HFrEF (heart failure with reduced ejection fraction)     Stroke (Multi) 09/20/2019    left side deficit, wheelchair-bound   [2]   Past Surgical History:  Procedure Laterality Date    KNEE SURGERY      MR HEAD ANGIO WO IV CONTRAST  08/29/2019    MR NECK ANGIO WO IV CONTRAST  08/29/2019   [3]   Social History  Tobacco Use    Smoking status: Former     Types: Cigarettes    Smokeless tobacco: Never   [4]   Family History  Problem Relation Name Age of Onset    No Known Problems Mother      No Known Problems Father     [5] apixaban, 5 mg, oral, BID  atorvastatin, 80 mg, oral, Nightly  insulin lispro, 0-5 Units, subcutaneous, TID AC  sennosides-docusate sodium, 1 tablet, oral, " Nightly  vancomycin, 2 g, intravenous, Once     [6] norepinephrine, 0.1 mcg/kg/min, Last Rate: 0.1 mcg/kg/min (05/25/25 1232)  sodium bicarbonate 1 mEq/mL (8.4 %) 150 mEq in dextrose 5% 1,150 mL infusion, 100 mL/hr, Last Rate: 100 mL/hr (05/25/25 1148)     [7] PRN medications: heparin, heparin

## 2025-05-25 NOTE — PROCEDURES
Right internal jugular vein temporary hemodialysis (trialysis) catheter insertion note    Patient is in need of emergent hemodialysis.  As such consent was deferred.    The right internal jugular vein site was examined with ultrasound and the right internal jugular vein was deemed to be a good target.  The area was then cleansed with chlorhexidine, which was allowed to dry.  Using full sterile barrier precautions including, mask, sterile gown, sterile ultrasound probe cover, and sterile drapes, I was able to enter the right internal jugular vein on first attempt with a long hollow needle.  A wire was easily passed through the needle.  The needle was were removed.  A small nick was made in the skin with a #11 blade at the wires insertion site.  The tract was gently dilated.  Ultrasound was used to confirm placement of the wire in the proper vessel prior to tract dilation.  I then passed a temporary hemodialysis catheter over the wire and removed the wire completely.  The catheter was a 16 cm long catheter which was fixed at the hub with 2 sutures.  The ports nicho well and were flushed with saline and then locked with heparin.  No complications were evident at the time the procedure was terminated and the chest x-ray is currently pending.    Jono Jacobson MD

## 2025-05-25 NOTE — PROGRESS NOTES
Vancomycin Dosing by Pharmacy- INITIAL    Edenilson Ness is a 64 y.o. year old male who Pharmacy has been consulted for vancomycin dosing for other UTI. Based on the patient's indication and renal status this patient will be dosed based on a goal trough/random level of 15-20.     Renal function is currently declining, patient currently receiving SLED due to severe REGINA.    Visit Vitals  BP 75/50   Pulse 66   Temp 35.7 °C (96.2 °F) (Temporal)   Resp 24        Lab Results   Component Value Date    CREATININE 6.36 (H) 2025    CREATININE 0.94 2024    CREATININE 0.69 2024    CREATININE 0.91 2024        Patient weight is as follows:   Vitals:    25 1038   Weight: 113 kg (250 lb)       Cultures:  No results found for the encounter in last 14 days.        No intake/output data recorded.  I/O during current shift:  I/O this shift:  In: 900 [IV Piggyback:900]  Out: -     Temp (24hrs), Av.7 °C (96.2 °F), Min:35.7 °C (96.2 °F), Max:35.7 °C (96.2 °F)         Assessment/Plan     Patient has already been given a loading dose of 2000 mg.  Will initiate vancomycin maintenance, and will dose by level. Given SLED, will not re-dose before checking a level 5/ AM.    Follow-up level will be ordered on  at 0500 unless clinically indicated sooner.  Will continue to monitor renal function daily while on vancomycin and order serum creatinine at least every 48 hours if not already ordered.  Follow for continued vancomycin needs, clinical response, and signs/symptoms of toxicity.       Fracisco Childs, PharmD

## 2025-05-25 NOTE — ED PROVIDER NOTES
HPI   Chief Complaint   Patient presents with    Stroke       HPI    HISTORY OF PRESENT ILLNESS:  Patient arrives to the hospital by EMS as a stroke alert.  EMS report was that last known well was at 5 AM.  Patient had gotten up.  He resides in a nursing facility that allows him to drink alcoholic beverages.  Patient was found altered.  I did not obtain any additional history as the patient was confused and could not provide any history upon arrival here.  EMS had also already left when I was called to the room.    Past Medical History: Prior CVA, left-sided weakness, wheelchair-bound, diabetes, heart failure, hypertension, anemia    __________________________________________________________  PHYSICAL EXAM:    Appearance: Chronically ill-appearing -American male, laying supine and slightly towards his left side, was slurring his speech, mostly inappropriately answering questions but could tell me his name.  Was following commands and could raise up arms and legs slightly.  Does appear left side was weaker than the other side.  Rest of the exam was limited.  Skin: Intact,  dry skin, no lesions, rash, petechiae or purpura.   Eyes: PERRLA, EOMs intact,  Conjunctiva pink with no redness or exudates.    HENT: Normocephalic, atraumatic. Nares patent   Neck: Supple. Trachea at midline.   Pulmonary: Lung sounds are clear bilaterally.  There is no rales, rhonchi, or wheezing.  Cardiac: Regular rate and rhythm, no rubs, murmurs, or gallops. No JVD,   Abdomen: Abdomen is soft, nontender, and nondistended.  No palpable organomegaly.  No rebound or guarding.  No CVA tenderness. Nonsurgical abdomen  Genitourinary: Exam deferred.  Musculoskeletal: no edema, pain, cyanosis, or deformity in extremities. Pulses full and equal.   Neurological: Patient alert and oriented only to name, was following commands, was slurring speech, left side appeared to have 3-5 to 4-5 strength, lower extremity worse than upper  extremity.    Interval: Baseline  Time: 9:45am  Person Administering Scale: Amadou Aguirre DO      1a  Level of consciousness: 1=not alert but arousable by minor stimulation to obey, answer or respond  1b. LOC questions:  0=Performs both tasks correctly when was following commands  1c. LOC commands: 0=Performs both tasks correctly  2.  Best Gaze: 0=normal  3. Visual: 0=No visual loss  4. Facial Palsy: 1=Minor paralysis (flattened nasolabial fold, asymmetric on smiling)  5a. Motor left arm: 1=Drift, limb holds 90 (or 45) degrees but drifts down before full 10 seconds: does not hit bed  5b.  Motor right arm: 0=No drift, limb holds 90 (or 45) degrees for full 10 seconds  6a. motor left le=Some effort against gravity, limb cannot get to or maintain (if cured) 90 (or 45) degrees, drifts down to bed, but has some effort against gravity  6b  Motor right le=Drift, limb holds 90 (or 45) degrees but drifts down before full 10 seconds: does not hit bed  7. Limb Ataxia: 0=Absent  8.  Sensory: 0=Normal; no sensory loss  9. Best Language:  0=No aphasia, normal  10. Dysarthria: 1=Mild to moderate, patient slurs at least some words and at worst, can be understood with some difficulty  11. Extinction and Inattention: 0=No abnormality    Total:   7        VAN: VAN: Positive (not acute change)         __________________________________________________________  MEDICAL DECISION MAKING:    I was called into the room by nursing staff.  EMS had already left and unclear when the patient's last known well was.  Patient was activated as a prehospital stroke.  Per report, last known well was at 5 AM.  Patient did drink a alcoholic beverage.  On arrival, patient was markedly hypotensive at 62/38.  This was confirmed with multiple blood pressures.  Was started on LR bolus.  I did note that the patient had left-sided weakness in addition to dysarthria and a likely facial droop.  Patient did appear to be mentating at peripheral pulses,  so therefore brought the patient to CT after some resuscitation.  I did add on a CT PE study.  Patient abdominal exam was benign.  While the patient was undergoing CT scans, I did perform chart review in the CT room.  Patient does have history of stroke.  A secondary disease process could be causing recrudescence of the patient's deficits.  Patient is anticoagulated and would not be an TNK candidate, even if patient's last known well had been adjusted to be within the window.  I was concerned that the patient had metabolic encephalopathy as opposed to a stroke.  This was discussed with the stroke neurologist, Dr. Hernández.  Given current ongoing need of resuscitation, not a TNK candidate, will defer stroke evaluation at this time.    In the meantime, patient had a marked acidosis of 7.14 on the venous panel.  No white count.  Troponins were negative x 2.  Alcohol level minimally elevated at 70.  The patient did have a BUN of 122 and a creatinine of 6.36.  I page the nephrologist.  She did not immediately receive a phone call back.  In the meantime, despite a second liter of IV fluids after reviewing the patient's echocardiogram, patient remained hypotensive.  Did not feel that patient would benefit from additional IV fluids as the IVC was plethoric and he was requiring supplemental oxygen.  Patient ordered bicarb infusion and started on Levophed.  Discussed the case with Dr. Duque of ICU.  He will come by and see the patient.  I did speak to nephrology, Dr. Baker.  Plans to have the patient dialyzed.  ICU team will place the central line.  Case discussed with [facility] admission was placed to the ICU.    Chronic Medical Conditions Significantly Affecting Care: Prior CVA, left-sided weakness, wheelchair-bound, diabetes, heart failure, hypertension, anemia    External Records Reviewed: I reviewed recent and relevant outside records including: Primary care note May 21, 2025, ED provider note February 2, 2025    Amadou  Timothy  Emergency Medicine    Patient History   Medical History[1]  Surgical History[2]  Family History[3]  Social History[4]    Physical Exam   ED Triage Vitals [05/25/25 0928]   Temperature Heart Rate Respirations BP   35.7 °C (96.2 °F) 70 16 (!) 62/38      Pulse Ox Temp Source Heart Rate Source Patient Position   94 % Temporal -- --      BP Location FiO2 (%)     -- --       Physical Exam      ED Course & [facility]   ED Course as of 05/25/25 1838   Sun May 25, 2025   0950 BP(!): 62/38  Checked in multiple arms [WJ]   1007 Spoke to Dr Weathers [WJ]   1021 BP(!): 62/52  90systolic after 1 L [WJ]   1035 Patient on 2 L IVF, giving slower IVF and levophed [WJ]   1035 CT brain attack angio head and neck W and WO IV contrast  No stroke [WJ]   1048 WBC: 4.7 [WJ]   1102 POTASSIUM: 5.3 [WJ]   1102 Blood Urea Nitrogen(!!): 122 [WJ]   1102 Creatinine(!): 6.36 [WJ]   1103 Alcohol(!): 70 [WJ]   1150 POCT Lactate, Venous(!!): 5.3 [WJ]   1212 Plan HD in ICU, to place the line [WJ]   1350 Patient twelve-lead EKG interpreted by myself shows atrial fibrillation, rate 65, left axis deviation, normal QRS duration, normal QT [WJ]      ED Course User Index  [WJ] Amadou Aguirre DO         Diagnoses as of 05/25/25 1838   Acute renal failure, unspecified acute renal failure type   Acute renal failure                 No data recorded     Martha Coma Scale Score: 10 (05/25/25 0941 : Silvia Gongora, RN)       NIH Stroke Scale: 3 (05/25/25 0945 : Silvia Gongora, RN)                   Medical Decision Making      Procedure  Critical Care    Performed by: Amadou Aguirre DO  Authorized by: Amadou Aguirre DO    Critical care provider statement:     Critical care time (minutes):  75    Critical care time was exclusive of:  Separately billable procedures and treating other patients and teaching time    Critical care was necessary to treat or prevent imminent or life-threatening deterioration of the following conditions:  Sepsis and CNS failure or  compromise    Critical care was time spent personally by me on the following activities:  Blood draw for specimens, development of treatment plan with patient or surrogate, ordering and performing treatments and interventions, ordering and review of laboratory studies, ordering and review of radiographic studies, pulse oximetry, review of old charts, evaluation of patient's response to treatment, re-evaluation of patient's condition, examination of patient, discussions with consultants and obtaining history from patient or surrogate    Care discussed with: admitting provider      Performed by: Amadou Aguirre DO  Authorized by: Amadou Aguirre DO    Cardiac Indications: hypotension                  Procedure: Cardiac Ultrasound    Findings:   Views: parasternal long, parasternal short, apical four and subxiphoid  The pericardial space was visualized and was NEGATIVE for a significant pericardial effusion.  Activity: Ventricular contractions were visualized.  LV: LV systolic function was NORMAL.  RV: RV size was DILATED.    Impression:  Cardiac: The focused cardiac ultrasound exam had ABNORMAL findings as specified.      Comments: Ivc Plethoric         [1]   Past Medical History:  Diagnosis Date    CHF (congestive heart failure) (Multi)     Diabetes mellitus (Multi)     Stroke (Multi) 09/20/2019    affects left side per SNF paperwork    Unspecified fracture of right lower leg, initial encounter for closed fracture     Leg fracture, right   [2]   Past Surgical History:  Procedure Laterality Date    KNEE SURGERY  10/07/2015    Knee Surgery    MR HEAD ANGIO WO IV CONTRAST  8/29/2019    MR HEAD ANGIO WO IV CONTRAST 8/29/2019 [facility] CLINICAL LEGACY    MR NECK ANGIO WO IV CONTRAST  8/29/2019    MR NECK ANGIO WO IV CONTRAST 8/29/2019 [facility] CLINICAL LEGACY   [3]   Family History  Problem Relation Name Age of Onset    No Known Problems Mother      No Known Problems Father     [4]   Social History  Tobacco Use    Smoking status:  Former     Types: Cigarettes    Smokeless tobacco: Never   Substance Use Topics    Alcohol use: Not on file    Drug use: Not on file        Amadou Aguirre DO  05/25/25 8839

## 2025-05-25 NOTE — CONSULTS
"Critical Care Medicine Consult      Reason For Consult  Shock with multisystem organ failure    History Of Present Illness  Edenilson Ness is a 64 y.o. male that resides in a nursing home and is chronically infirm with congestive heart failure and diabetes he reportedly has had stroke in the past and is alert and oriented x 2 at baseline..  He also is reportedly permitted to drink alcohol at his nursing home, which he does daily.  He is chronically anticoagulated with a DOAC and was transferred to our facility for concern regarding stroke, however, not a TNK candidate due to DOAC use and his stroke workup was negative.  However, he was found to be in renal failure with a creatinine over 6 (normal baseline creatinine) and hypotensive requiring vasopressors.  His lactic acid level was elevated and I was asked to see him by the very kind emergency department physician Dr. Amadou Aguirre.  The patient is somewhat encephalopathic but his mental status seems to wax and wane.  Cannot localize but says everything hurts and he is \"sick.\"  No other history is available currently.  See below for past medical history social history family history etc.    Medical History[1]  Surgical History[2]  Prescriptions Prior to Admission[3]  Allergies:  Patient has no known allergies.  Social History[4]  Family History[5]    Scheduled Medications:   Scheduled Medications[6]     Continuous Medications:   Continuous Medications[7]     PRN Medications:   PRN Medications[8]    Review of Systems:  Review of Systems   Reason unable to perform ROS: Abnormal mental status.        Objective   Vitals:  Most Recent:  Vitals:    05/25/25 1415   BP: 87/52   Pulse: 59   Resp: 18   Temp:    SpO2: 97%       24hr Min/Max:  Temp  Min: 35.7 °C (96.2 °F)  Max: 35.7 °C (96.2 °F)  Pulse  Min: 58  Max: 73  BP  Min: 52/39  Max: 97/55  Resp  Min: 8  Max: 49  SpO2  Min: 73 %  Max: 100 %          Intake/Output Summary (Last 24 hours) at 5/25/2025 1441  Last data " filed at 5/25/2025 1143  Gross per 24 hour   Intake 900 ml   Output --   Net 900 ml           Physical exam:    Physical Exam  Vitals reviewed.   Constitutional:       General: He is not in acute distress.     Appearance: He is not diaphoretic.   HENT:      Head: Normocephalic and atraumatic.      Nose: Nose normal.      Mouth/Throat:      Mouth: Mucous membranes are moist.      Pharynx: No oropharyngeal exudate.   Eyes:      General: No scleral icterus.  Cardiovascular:      Rate and Rhythm: Normal rate and regular rhythm.      Heart sounds: No murmur heard.     Comments: Sinus by monitor  Pulmonary:      Breath sounds: Normal breath sounds. No rhonchi or rales.      Comments: Tachypneic  Abdominal:      General: There is no distension.      Palpations: Abdomen is soft. There is no mass.   Musculoskeletal:      Cervical back: Neck supple. No rigidity.      Comments: No clubbing, legs appear rather stiff, bilaterally   Skin:     Findings: No rash.   Neurological:      Mental Status: He is alert. He is disoriented.      Comments: Questionably at baseline          Lab/Radiology/Diagnostic Review:  Results for orders placed or performed during the hospital encounter of 05/25/25 (from the past 24 hours)   CBC and Auto Differential   Result Value Ref Range    WBC 4.7 4.4 - 11.3 x10*3/uL    nRBC 0.0 0.0 - 0.0 /100 WBCs    RBC 2.82 (L) 4.50 - 5.90 x10*6/uL    Hemoglobin 8.6 (L) 13.5 - 17.5 g/dL    Hematocrit 26.7 (L) 41.0 - 52.0 %    MCV 95 80 - 100 fL    MCH 30.5 26.0 - 34.0 pg    MCHC 32.2 32.0 - 36.0 g/dL    RDW 14.6 (H) 11.5 - 14.5 %    Platelets 146 (L) 150 - 450 x10*3/uL    Neutrophils % 76.9 40.0 - 80.0 %    Immature Granulocytes %, Automated 0.2 0.0 - 0.9 %    Lymphocytes % 15.7 13.0 - 44.0 %    Monocytes % 6.8 2.0 - 10.0 %    Eosinophils % 0.2 0.0 - 6.0 %    Basophils % 0.2 0.0 - 2.0 %    Neutrophils Absolute 3.62 1.20 - 7.70 x10*3/uL    Immature Granulocytes Absolute, Automated 0.01 0.00 - 0.70 x10*3/uL     Lymphocytes Absolute 0.74 (L) 1.20 - 4.80 x10*3/uL    Monocytes Absolute 0.32 0.10 - 1.00 x10*3/uL    Eosinophils Absolute 0.01 0.00 - 0.70 x10*3/uL    Basophils Absolute 0.01 0.00 - 0.10 x10*3/uL   Comprehensive Metabolic Panel   Result Value Ref Range    Glucose 92 74 - 99 mg/dL    Sodium 138 136 - 145 mmol/L    Potassium 5.3 3.5 - 5.3 mmol/L    Chloride 109 (H) 98 - 107 mmol/L    Bicarbonate 14 (L) 21 - 32 mmol/L    Anion Gap 20 10 - 20 mmol/L    Urea Nitrogen 122 (HH) 6 - 23 mg/dL    Creatinine 6.36 (H) 0.50 - 1.30 mg/dL    eGFR 9 (L) >60 mL/min/1.73m*2    Calcium 9.1 8.6 - 10.3 mg/dL    Albumin 3.5 3.4 - 5.0 g/dL    Alkaline Phosphatase 109 33 - 136 U/L    Total Protein 6.3 (L) 6.4 - 8.2 g/dL    AST 54 (H) 9 - 39 U/L    Bilirubin, Total 0.4 0.0 - 1.2 mg/dL    ALT 72 (H) 10 - 52 U/L   Lactate   Result Value Ref Range    Lactate 2.9 (H) 0.4 - 2.0 mmol/L   Ethanol   Result Value Ref Range    Alcohol 70 (H) <=10 mg/dL   B-type natriuretic peptide   Result Value Ref Range     (H) 0 - 99 pg/mL   BLOOD GAS VENOUS FULL PANEL   Result Value Ref Range    POCT pH, Venous 7.14 (LL) 7.33 - 7.43 pH    POCT pCO2, Venous 43 41 - 51 mm Hg    POCT pO2, Venous 42 35 - 45 mm Hg    POCT SO2, Venous 68 45 - 75 %    POCT Oxy Hemoglobin, Venous 66.8 45.0 - 75.0 %    POCT Hematocrit Calculated, Venous 27.0 (L) 41.0 - 52.0 %    POCT Sodium, Venous 136 136 - 145 mmol/L    POCT Potassium, Venous 5.5 (H) 3.5 - 5.3 mmol/L    POCT Chloride, Venous 109 (H) 98 - 107 mmol/L    POCT Ionized Calicum, Venous 1.30 1.10 - 1.33 mmol/L    POCT Glucose, Venous 93 74 - 99 mg/dL    POCT Lactate, Venous 3.2 (H) 0.4 - 2.0 mmol/L    POCT Base Excess, Venous -13.6 (L) -2.0 - 3.0 mmol/L    POCT HCO3 Calculated, Venous 14.6 (L) 22.0 - 26.0 mmol/L    POCT Hemoglobin, Venous 9.0 (L) 13.5 - 17.5 g/dL    POCT Anion Gap, Venous 18.0 10.0 - 25.0 mmol/L    Patient Temperature 37.0 degrees Celsius    FiO2 21 %   Protime-INR   Result Value Ref Range    Protime  20.0 (H) 9.8 - 12.4 seconds    INR 1.8 (H) 0.9 - 1.1   APTT   Result Value Ref Range    aPTT 49 (H) 26 - 36 seconds   Troponin I, High Sensitivity, Initial   Result Value Ref Range    Troponin I, High Sensitivity 15 0 - 20 ng/L   Drug Screen, Urine   Result Value Ref Range    Amphetamine Screen, Urine Presumptive Negative Presumptive Negative    Barbiturate Screen, Urine Presumptive Negative Presumptive Negative    Benzodiazepines Screen, Urine Presumptive Negative Presumptive Negative    Cannabinoid Screen, Urine Presumptive Negative Presumptive Negative    Cocaine Metabolite Screen, Urine Presumptive Negative Presumptive Negative    Fentanyl Screen, Urine Presumptive Negative Presumptive Negative    Opiate Screen, Urine Presumptive Negative Presumptive Negative    Oxycodone Screen, Urine Presumptive Negative Presumptive Negative    PCP Screen, Urine Presumptive Negative Presumptive Negative    Methadone Screen, Urine Presumptive Negative Presumptive Negative   Urinalysis with Reflex Culture and Microscopic   Result Value Ref Range    Color, Urine Light-Yellow Light-Yellow, Yellow, Dark-Yellow    Appearance, Urine Turbid (N) Clear    Specific Gravity, Urine 1.013 1.005 - 1.035    pH, Urine 5.0 5.0, 5.5, 6.0, 6.5, 7.0, 7.5, 8.0    Protein, Urine 20 (TRACE) NEGATIVE, 10 (TRACE), 20 (TRACE) mg/dL    Glucose, Urine Normal Normal mg/dL    Blood, Urine 0.06 (1+) (A) NEGATIVE mg/dL    Ketones, Urine NEGATIVE NEGATIVE mg/dL    Bilirubin, Urine NEGATIVE NEGATIVE mg/dL    Urobilinogen, Urine Normal Normal mg/dL    Nitrite, Urine NEGATIVE NEGATIVE    Leukocyte Esterase, Urine 500 Bhakti/uL (A) NEGATIVE   Microscopic Only, Urine   Result Value Ref Range    WBC, Urine 21-50 (A) 1-5, NONE /HPF    WBC Clumps, Urine OCCASIONAL Reference range not established. /HPF    RBC, Urine >20 (A) NONE, 1-2, 3-5 /HPF    Bacteria, Urine 1+ (A) NONE SEEN /HPF    Budding Yeast, Urine PRESENT (A) NONE /HPF    Mucus, Urine FEW Reference range not  established. /LPF    Hyaline Casts, Urine 2+ (A) NONE /LPF   Troponin, High Sensitivity, 1 Hour   Result Value Ref Range    Troponin I, High Sensitivity 13 0 - 20 ng/L   Blood Gas Lactic Acid, Venous   Result Value Ref Range    POCT Lactate, Venous 5.3 (HH) 0.4 - 2.0 mmol/L   Lactate   Result Value Ref Range    Lactate 5.0 (HH) 0.4 - 2.0 mmol/L   Lactate   Result Value Ref Range    Lactate 5.5 (HH) 0.4 - 2.0 mmol/L     Imaging  CT angio chest for pulmonary embolism  Result Date: 5/25/2025  1. Extremely limited exam due to suboptimal contrast phase. Although no definite large or central pulmonary embolism is seen, a more distal pulmonary embolism not excluded given the limitations of the study. 2. Cardiomegaly with low lung volumes which may be due to poor inspiration. Likely posterior atelectasis and/or minimal dependent edema. 3. Question of enhancing nodule in the gastric fundus. This is approximately 17 mm in diameter. A polyp or mass not excluded. Correlate with EGD.   MACRO: Critical Finding:  See findings. Notification was initiated on 5/25/2025 at 11:21 am by  Yash Cortez.  (**-YCF-**)   Signed by: Yash Cortez 5/25/2025 11:21 AM Dictation workstation:   [id number]    CT brain attack angio head and neck W and WO IV contrast  Result Date: 5/25/2025  No occlusion of the visualized arteries in the head or neck. Unchanged appearance of the these arteries compared to the prior CTA study.   This study was interpreted at [facility].   MACRO: None   Signed by: Levon Waters 5/25/2025 10:33 AM Dictation workstation:   [id number]    CT brain attack head wo IV contrast  Result Date: 5/25/2025  No acute intracranial hemorrhage, mass effect, or CT apparent acute infarct. Chronic microvascular ischemia and involutional changes. Tu Hunter discussed the significance and urgency of this critical finding by telephone with  KENTON STRATTON on 5/25/2025 at 10:27 am.  (**-RCF-**) Findings:   See findings.         Signed by: Tu Hunter 5/25/2025 10:27 AM Dictation workstation:   [id number]      Cardiology, Vascular, and Other Imaging  No other imaging results found for the past 7 days      Assessment/Plan   Assessment & Plan  Acute renal failure    Suspect septic shock with possible urinary tract source.  Patient has pyuria.  Also a diabetic and immunocompromise therefore.  Budding yeast in the urine which was not a Whyte/chronic indwelling specimen.  Pressor dependent with worsening lactic acidosis and renal failure.  Seen by nephrology.  Plan to start sled.  I have consulted infectious disease, and discussed the case personally with the infectious disease consultant who is planning on vancomycin, cefepime, and micafungin treatment pending culture results.  Bicarb drip pending sled.  Watch closely in the intensive care unit for need for intubation.  Prognosis guarded.    Alcohol use history.  Watch for withdrawal.    History of congestive heart failure.  Last echo in 2024 with 55 to 60% ejection fraction.  Low normal RV systolic function.    Anemia, unspecified.  No evidence of active bleeding.  Baseline hemoglobin 11.  Question the acuity of the renal failure.    Low platelets.  Possibly sepsis related.    Mild elevation of transaminases.  Possibly consistent with some mash or alcohol use.    I spent 55 minutes of cumulative critical care time with the patient independent of any procedure time.  Greater than 50% of that time was spent in the direct collaboration and or coordination of care of the patient.     Dragon dictation software was used to dictate this note and thus there may be minor errors in translation/transcription including garbled speech or misspellings. Please contact for clarification if needed.     Jono Jacobson MD         [1]   Past Medical History:  Diagnosis Date    Anemia     Atrial fibrillation (Multi)     Diabetes mellitus (Multi)     HFrEF (heart failure with  reduced ejection fraction)     Stroke (Multi) 09/20/2019    left side deficit, wheelchair-bound   [2]   Past Surgical History:  Procedure Laterality Date    KNEE SURGERY      MR HEAD ANGIO WO IV CONTRAST  08/29/2019    MR NECK ANGIO WO IV CONTRAST  08/29/2019   [3]   Medications Prior to Admission   Medication Sig Dispense Refill Last Dose/Taking    apixaban (Eliquis) 5 mg tablet Take 1 tablet (5 mg) by mouth 2 times a day.       atorvastatin (Lipitor) 80 mg tablet Take 1 tablet (80 mg) by mouth once daily at bedtime.       baclofen (Lioresal) 5 mg tablet Take 1 tablet (5 mg) by mouth 2 times a day.       carvedilol (Coreg) 25 mg tablet Take 2 tablets (50 mg) by mouth 2 times a day with meals.       furosemide (Lasix) 40 mg tablet Take 1 tablet (40 mg) by mouth once daily.       GLUCAGON, HCL, EMERGENCY KIT INJ Inject 1 mL into the muscle if needed (low blood sugar).       glucose (Glucose GeL) 40 % gel oral gel Take by mouth. GIVE 1 APPLICATION BY MOUTH PRN       hydrALAZINE (Apresoline) 25 mg tablet Take 1 tablet (25 mg) by mouth every 8 hours.       isosorbide dinitrate (Isordil) 20 mg tablet Take 1 tablet (20 mg) by mouth 3 times a day.       melatonin 10 mg tablet Take 1 tablet (10 mg) by mouth once daily at bedtime.       metFORMIN (Glucophage) 500 mg tablet Take 1 tablet (500 mg) by mouth 2 times a day.       sacubitriL-valsartan (Entresto) 24-26 mg tablet Take 1 tablet by mouth 2 times a day.       sennosides (Senokot) 8.6 mg tablet Take 1 tablet (8.6 mg) by mouth once daily as needed for constipation.       spironolactone (Aldactone) 25 mg tablet Take 1 tablet (25 mg) by mouth once daily.       thiamine 100 mg tablet Take 1 tablet (100 mg) by mouth once daily.       zinc oxide 20 % ointment Apply topically. APPLY TO SCROTUM  AND GERARD AREA TOPICALLY DAILY EVERY DAY AND NIGHT SHIFT      [4]   Social History  Tobacco Use    Smoking status: Former     Types: Cigarettes    Smokeless tobacco: Never   [5]   Family  History  Problem Relation Name Age of Onset    No Known Problems Mother      No Known Problems Father     [6] apixaban, 5 mg, oral, BID  atorvastatin, 80 mg, oral, Nightly  folic acid, 1 mg, oral, Daily  heparin, 2,500 Units, hemodialysis, Once  insulin lispro, 0-5 Units, subcutaneous, TID AC  multivitamin with minerals, 1 tablet, oral, Daily  piperacillin-tazobactam, 2.25 g, intravenous, q6h  sennosides-docusate sodium, 1 tablet, oral, Nightly  thiamine, 100 mg, oral, Daily  [7] norepinephrine, 0.1 mcg/kg/min, Last Rate: 0.14 mcg/kg/min (05/25/25 1410)  sodium bicarbonate 1 mEq/mL (8.4 %) 150 mEq in dextrose 5% 1,150 mL infusion, 100 mL/hr, Last Rate: 100 mL/hr (05/25/25 1148)  [8] PRN medications: diazePAM, heparin, heparin, vancomycin

## 2025-05-25 NOTE — PROGRESS NOTES
Pharmacy - Antimicrobial dosing adjustment    Per System Dosing Policy    Dose adjustment from Cephalosporins: Cefepime: 2 g every 8 hours to Cephalosporins: Cefepime: 2 g every 12 hours for SLED over the next 8 hours currently ordered. Will re-check renal function and SLED orders 5/26 in AM    Kayli GoodenD

## 2025-05-25 NOTE — CONSULTS
Critical Care Medicine Consult      Reason For Consult  shae    History Of Present Illness  Edenilson Ness is a 64 y.o. male with history of diabetes, prior stroke, documented heart failure has been residing at a nursing home facility at [facility].    Patient was brought into the emergency department initially concerning for stroke.  Workup includes CT head, CT head and neck with angio, CTA of the chest was done emergency department showing no acute occlusion.    Was found to be hypotensive requiring Levophed received 2 L of isotonic fluid bolus.  His workup was noted for acute kidney injury serum creatinine 6.36 with a BUN of 122.  Whyte catheter was placed minimal urinary output.  Patient is also acidemic pH of 7.14 pCO2 43 and rising lactic acid.    Medical History[1]  Surgical History[2]  Prescriptions Prior to Admission[3]  Patient has no known allergies.  Social History[4]  Family History[5]    Scheduled Medications:   Scheduled Medications[6]     Continuous Medications:   Continuous Medications[7]     PRN Medications:   PRN Medications[8]    Review of Systems:  12 ROS negative other than stated above    Objective   Vitals:  Most Recent:  Vitals:    05/25/25 1213   BP: 76/56   Pulse:    Resp:    Temp:    SpO2:        24hr Min/Max:  Temp  Min: 35.7 °C (96.2 °F)  Max: 35.7 °C (96.2 °F)  Pulse  Min: 58  Max: 73  BP  Min: 52/39  Max: 90/54  Resp  Min: 8  Max: 49  SpO2  Min: 73 %  Max: 100 %    LDA:   Urethral Catheter Latex 16 Fr. (Active)   Placement Date/Time: 05/25/25 1125   Hand Hygiene Completed: Yes  Catheter Type: Latex  Tube Size (Fr.): 16 Fr.  Catheter Balloon Size: 10 mL  Urine Returned: Yes   Number of days: 0         Vent settings:       Hemodynamic parameters for last 24 hours:         Intake/Output Summary (Last 24 hours) at 5/25/2025 1222  Last data filed at 5/25/2025 1143  Gross per 24 hour   Intake 900 ml   Output --   Net 900 ml           Physical exam:    Physical Exam  Vitals and  nursing note reviewed.   Constitutional:       Comments: Awake and answers some simple questions  +dysarthria  S1s2 regular  B/S diminished  Abdomen obese  Whyte in place, scant urine  No significant peripheral edema          Lab/Radiology/Diagnostic Review:  Results for orders placed or performed during the hospital encounter of 05/25/25 (from the past 24 hours)   CBC and Auto Differential   Result Value Ref Range    WBC 4.7 4.4 - 11.3 x10*3/uL    nRBC 0.0 0.0 - 0.0 /100 WBCs    RBC 2.82 (L) 4.50 - 5.90 x10*6/uL    Hemoglobin 8.6 (L) 13.5 - 17.5 g/dL    Hematocrit 26.7 (L) 41.0 - 52.0 %    MCV 95 80 - 100 fL    MCH 30.5 26.0 - 34.0 pg    MCHC 32.2 32.0 - 36.0 g/dL    RDW 14.6 (H) 11.5 - 14.5 %    Platelets 146 (L) 150 - 450 x10*3/uL    Neutrophils % 76.9 40.0 - 80.0 %    Immature Granulocytes %, Automated 0.2 0.0 - 0.9 %    Lymphocytes % 15.7 13.0 - 44.0 %    Monocytes % 6.8 2.0 - 10.0 %    Eosinophils % 0.2 0.0 - 6.0 %    Basophils % 0.2 0.0 - 2.0 %    Neutrophils Absolute 3.62 1.20 - 7.70 x10*3/uL    Immature Granulocytes Absolute, Automated 0.01 0.00 - 0.70 x10*3/uL    Lymphocytes Absolute 0.74 (L) 1.20 - 4.80 x10*3/uL    Monocytes Absolute 0.32 0.10 - 1.00 x10*3/uL    Eosinophils Absolute 0.01 0.00 - 0.70 x10*3/uL    Basophils Absolute 0.01 0.00 - 0.10 x10*3/uL   Comprehensive Metabolic Panel   Result Value Ref Range    Glucose 92 74 - 99 mg/dL    Sodium 138 136 - 145 mmol/L    Potassium 5.3 3.5 - 5.3 mmol/L    Chloride 109 (H) 98 - 107 mmol/L    Bicarbonate 14 (L) 21 - 32 mmol/L    Anion Gap 20 10 - 20 mmol/L    Urea Nitrogen 122 (HH) 6 - 23 mg/dL    Creatinine 6.36 (H) 0.50 - 1.30 mg/dL    eGFR 9 (L) >60 mL/min/1.73m*2    Calcium 9.1 8.6 - 10.3 mg/dL    Albumin 3.5 3.4 - 5.0 g/dL    Alkaline Phosphatase 109 33 - 136 U/L    Total Protein 6.3 (L) 6.4 - 8.2 g/dL    AST 54 (H) 9 - 39 U/L    Bilirubin, Total 0.4 0.0 - 1.2 mg/dL    ALT 72 (H) 10 - 52 U/L   Lactate   Result Value Ref Range    Lactate 2.9 (H) 0.4 -  2.0 mmol/L   Ethanol   Result Value Ref Range    Alcohol 70 (H) <=10 mg/dL   B-type natriuretic peptide   Result Value Ref Range     (H) 0 - 99 pg/mL   BLOOD GAS VENOUS FULL PANEL   Result Value Ref Range    POCT pH, Venous 7.14 (LL) 7.33 - 7.43 pH    POCT pCO2, Venous 43 41 - 51 mm Hg    POCT pO2, Venous 42 35 - 45 mm Hg    POCT SO2, Venous 68 45 - 75 %    POCT Oxy Hemoglobin, Venous 66.8 45.0 - 75.0 %    POCT Hematocrit Calculated, Venous 27.0 (L) 41.0 - 52.0 %    POCT Sodium, Venous 136 136 - 145 mmol/L    POCT Potassium, Venous 5.5 (H) 3.5 - 5.3 mmol/L    POCT Chloride, Venous 109 (H) 98 - 107 mmol/L    POCT Ionized Calicum, Venous 1.30 1.10 - 1.33 mmol/L    POCT Glucose, Venous 93 74 - 99 mg/dL    POCT Lactate, Venous 3.2 (H) 0.4 - 2.0 mmol/L    POCT Base Excess, Venous -13.6 (L) -2.0 - 3.0 mmol/L    POCT HCO3 Calculated, Venous 14.6 (L) 22.0 - 26.0 mmol/L    POCT Hemoglobin, Venous 9.0 (L) 13.5 - 17.5 g/dL    POCT Anion Gap, Venous 18.0 10.0 - 25.0 mmol/L    Patient Temperature 37.0 degrees Celsius    FiO2 21 %   Protime-INR   Result Value Ref Range    Protime 20.0 (H) 9.8 - 12.4 seconds    INR 1.8 (H) 0.9 - 1.1   APTT   Result Value Ref Range    aPTT 49 (H) 26 - 36 seconds   Troponin I, High Sensitivity, Initial   Result Value Ref Range    Troponin I, High Sensitivity 15 0 - 20 ng/L   Drug Screen, Urine   Result Value Ref Range    Amphetamine Screen, Urine Presumptive Negative Presumptive Negative    Barbiturate Screen, Urine Presumptive Negative Presumptive Negative    Benzodiazepines Screen, Urine Presumptive Negative Presumptive Negative    Cannabinoid Screen, Urine Presumptive Negative Presumptive Negative    Cocaine Metabolite Screen, Urine Presumptive Negative Presumptive Negative    Fentanyl Screen, Urine Presumptive Negative Presumptive Negative    Opiate Screen, Urine Presumptive Negative Presumptive Negative    Oxycodone Screen, Urine Presumptive Negative Presumptive Negative    PCP Screen,  Urine Presumptive Negative Presumptive Negative    Methadone Screen, Urine Presumptive Negative Presumptive Negative   Urinalysis with Reflex Culture and Microscopic   Result Value Ref Range    Color, Urine Light-Yellow Light-Yellow, Yellow, Dark-Yellow    Appearance, Urine Turbid (N) Clear    Specific Gravity, Urine 1.013 1.005 - 1.035    pH, Urine 5.0 5.0, 5.5, 6.0, 6.5, 7.0, 7.5, 8.0    Protein, Urine 20 (TRACE) NEGATIVE, 10 (TRACE), 20 (TRACE) mg/dL    Glucose, Urine Normal Normal mg/dL    Blood, Urine 0.06 (1+) (A) NEGATIVE mg/dL    Ketones, Urine NEGATIVE NEGATIVE mg/dL    Bilirubin, Urine NEGATIVE NEGATIVE mg/dL    Urobilinogen, Urine Normal Normal mg/dL    Nitrite, Urine NEGATIVE NEGATIVE    Leukocyte Esterase, Urine 500 Bhakti/uL (A) NEGATIVE   Microscopic Only, Urine   Result Value Ref Range    WBC, Urine 21-50 (A) 1-5, NONE /HPF    WBC Clumps, Urine OCCASIONAL Reference range not established. /HPF    RBC, Urine >20 (A) NONE, 1-2, 3-5 /HPF    Bacteria, Urine 1+ (A) NONE SEEN /HPF    Budding Yeast, Urine PRESENT (A) NONE /HPF    Mucus, Urine FEW Reference range not established. /LPF    Hyaline Casts, Urine 2+ (A) NONE /LPF   Troponin, High Sensitivity, 1 Hour   Result Value Ref Range    Troponin I, High Sensitivity 13 0 - 20 ng/L   Blood Gas Lactic Acid, Venous   Result Value Ref Range    POCT Lactate, Venous 5.3 (HH) 0.4 - 2.0 mmol/L   Lactate   Result Value Ref Range    Lactate 5.0 (HH) 0.4 - 2.0 mmol/L     Imaging  CT angio chest for pulmonary embolism  Result Date: 5/25/2025  1. Extremely limited exam due to suboptimal contrast phase. Although no definite large or central pulmonary embolism is seen, a more distal pulmonary embolism not excluded given the limitations of the study. 2. Cardiomegaly with low lung volumes which may be due to poor inspiration. Likely posterior atelectasis and/or minimal dependent edema. 3. Question of enhancing nodule in the gastric fundus. This is approximately 17 mm in  diameter. A polyp or mass not excluded. Correlate with EGD.   MACRO: Critical Finding:  See findings. Notification was initiated on 5/25/2025 at 11:21 am by  Yash Cortez.  (**-YCF-**)   Signed by: Yash Cortez 5/25/2025 11:21 AM Dictation workstation:   [id number]    CT brain attack angio head and neck W and WO IV contrast  Result Date: 5/25/2025  No occlusion of the visualized arteries in the head or neck. Unchanged appearance of the these arteries compared to the prior CTA study.   This study was interpreted at [facility].   MACRO: None   Signed by: Levon Waters 5/25/2025 10:33 AM Dictation workstation:   [id number]    CT brain attack head wo IV contrast  Result Date: 5/25/2025  No acute intracranial hemorrhage, mass effect, or CT apparent acute infarct. Chronic microvascular ischemia and involutional changes. Tu Hunter discussed the significance and urgency of this critical finding by telephone with  KENTON STRATTON on 5/25/2025 at 10:27 am.  (**-RCF-**) Findings:  See findings.         Signed by: Tu Hunter 5/25/2025 10:27 AM Dictation workstation:   [id number]      Cardiology, Vascular, and Other Imaging  No other imaging results found for the past 7 days      Assessment/Plan     64-year-old gentleman with hx of diabetes, prior stroke, documented heart failure initially brought to the hospital concerning for stroke found to be hypotensive with concomitant renal failure hence nephrology was consulted    #1 acute kidney injury  - Last serum creatinine 0.94 mg/dL on March 2024  - suspect ATN in setting of decrease EBV in setting of entresto/aldactone along with other antihypertensives  - Currently oliguric with serum creatinine 6.36 mg/dL significant azotemia acidemia  - Received 2 L of fluid in the ER placed on Levophed    Plan  - Given constellation of oliguria, persistent hypotension, acidemia will initiate renal placement therapy form of SLED  - will continue  to follow with you    Discussed with ICU and ED team  Please call [phone] with any concerns  Danny Calixot MD           [1]   Past Medical History:  Diagnosis Date    CHF (congestive heart failure) (Multi)     Diabetes mellitus (Multi)     Stroke (Multi) 09/20/2019    affects left side per SNF paperwork    Unspecified fracture of right lower leg, initial encounter for closed fracture     Leg fracture, right   [2]   Past Surgical History:  Procedure Laterality Date    KNEE SURGERY  10/07/2015    Knee Surgery    MR HEAD ANGIO WO IV CONTRAST  8/29/2019    MR HEAD ANGIO WO IV CONTRAST 8/29/2019 [facility] CLINICAL LEGACY    MR NECK ANGIO WO IV CONTRAST  8/29/2019    MR NECK ANGIO WO IV CONTRAST 8/29/2019 [facility] CLINICAL LEGACY   [3] (Not in a hospital admission)  [4]   Social History  Tobacco Use    Smoking status: Former     Types: Cigarettes    Smokeless tobacco: Never   [5]   Family History  Problem Relation Name Age of Onset    No Known Problems Mother      No Known Problems Father     [6] vancomycin, 2 g, intravenous, Once  [7] norepinephrine, 0.1 mcg/kg/min, Last Rate: 0.09 mcg/kg/min (05/25/25 1208)  sodium bicarbonate 1 mEq/mL (8.4 %) 150 mEq in dextrose 5% 1,150 mL infusion, 100 mL/hr, Last Rate: 100 mL/hr (05/25/25 1148)  [8] PRN medications: heparin, heparin

## 2025-05-26 VITALS
RESPIRATION RATE: 18 BRPM | DIASTOLIC BLOOD PRESSURE: 94 MMHG | WEIGHT: 234.13 LBS | SYSTOLIC BLOOD PRESSURE: 131 MMHG | HEIGHT: 70 IN | HEART RATE: 66 BPM | TEMPERATURE: 95.5 F | OXYGEN SATURATION: 96 % | BODY MASS INDEX: 33.52 KG/M2

## 2025-05-26 LAB
ALBUMIN SERPL BCP-MCNC: 3.3 G/DL (ref 3.4–5)
ANION GAP SERPL CALC-SCNC: 11 MMOL/L (ref 10–20)
BASE EXCESS BLDV CALC-SCNC: -2.5 MMOL/L (ref -2–3)
BODY TEMPERATURE: 37 DEGREES CELSIUS
BUN SERPL-MCNC: 37 MG/DL (ref 6–23)
CALCIUM SERPL-MCNC: 8 MG/DL (ref 8.6–10.3)
CHLORIDE SERPL-SCNC: 99 MMOL/L (ref 98–107)
CO2 SERPL-SCNC: 26 MMOL/L (ref 21–32)
CREAT SERPL-MCNC: 2.63 MG/DL (ref 0.5–1.3)
EGFRCR SERPLBLD CKD-EPI 2021: 27 ML/MIN/1.73M*2
ERYTHROCYTE [DISTWIDTH] IN BLOOD BY AUTOMATED COUNT: 14.5 % (ref 11.5–14.5)
GLUCOSE BLD MANUAL STRIP-MCNC: 80 MG/DL (ref 74–99)
GLUCOSE BLD MANUAL STRIP-MCNC: 90 MG/DL (ref 74–99)
GLUCOSE BLD MANUAL STRIP-MCNC: 94 MG/DL (ref 74–99)
GLUCOSE BLD MANUAL STRIP-MCNC: 95 MG/DL (ref 74–99)
GLUCOSE SERPL-MCNC: 65 MG/DL (ref 74–99)
HCO3 BLDV-SCNC: 24.2 MMOL/L (ref 22–26)
HCT VFR BLD AUTO: 25.5 % (ref 41–52)
HGB BLD-MCNC: 8.3 G/DL (ref 13.5–17.5)
INHALED O2 CONCENTRATION: 32 %
LACTATE SERPL-SCNC: 0.8 MMOL/L (ref 0.4–2)
MAGNESIUM SERPL-MCNC: 1.42 MG/DL (ref 1.6–2.4)
MCH RBC QN AUTO: 30.2 PG (ref 26–34)
MCHC RBC AUTO-ENTMCNC: 32.5 G/DL (ref 32–36)
MCV RBC AUTO: 93 FL (ref 80–100)
NRBC BLD-RTO: 0 /100 WBCS (ref 0–0)
OXYHGB MFR BLDV: 71.6 % (ref 45–75)
PCO2 BLDV: 48 MM HG (ref 41–51)
PH BLDV: 7.31 PH (ref 7.33–7.43)
PHOSPHATE SERPL-MCNC: 2.3 MG/DL (ref 2.5–4.9)
PLATELET # BLD AUTO: 127 X10*3/UL (ref 150–450)
PO2 BLDV: 44 MM HG (ref 35–45)
POTASSIUM SERPL-SCNC: 3.9 MMOL/L (ref 3.5–5.3)
RBC # BLD AUTO: 2.75 X10*6/UL (ref 4.5–5.9)
SAO2 % BLDV: 73 % (ref 45–75)
SODIUM SERPL-SCNC: 132 MMOL/L (ref 136–145)
VANCOMYCIN SERPL-MCNC: 12.1 UG/ML (ref 5–20)
WBC # BLD AUTO: 5.7 X10*3/UL (ref 4.4–11.3)

## 2025-05-26 PROCEDURE — 2500000005 HC RX 250 GENERAL PHARMACY W/O HCPCS

## 2025-05-26 PROCEDURE — 36415 COLL VENOUS BLD VENIPUNCTURE: CPT

## 2025-05-26 PROCEDURE — 36415 COLL VENOUS BLD VENIPUNCTURE: CPT | Performed by: INTERNAL MEDICINE

## 2025-05-26 PROCEDURE — 2500000004 HC RX 250 GENERAL PHARMACY W/ HCPCS (ALT 636 FOR OP/ED): Mod: JZ

## 2025-05-26 PROCEDURE — 2500000002 HC RX 250 W HCPCS SELF ADMINISTERED DRUGS (ALT 637 FOR MEDICARE OP, ALT 636 FOR OP/ED): Performed by: NURSE PRACTITIONER

## 2025-05-26 PROCEDURE — 83735 ASSAY OF MAGNESIUM: CPT

## 2025-05-26 PROCEDURE — 84100 ASSAY OF PHOSPHORUS: CPT | Performed by: NURSE PRACTITIONER

## 2025-05-26 PROCEDURE — 82810 BLOOD GASES O2 SAT ONLY: CPT | Performed by: INTERNAL MEDICINE

## 2025-05-26 PROCEDURE — 2500000001 HC RX 250 WO HCPCS SELF ADMINISTERED DRUGS (ALT 637 FOR MEDICARE OP): Performed by: NURSE PRACTITIONER

## 2025-05-26 PROCEDURE — 2500000004 HC RX 250 GENERAL PHARMACY W/ HCPCS (ALT 636 FOR OP/ED): Performed by: INTERNAL MEDICINE

## 2025-05-26 PROCEDURE — 80202 ASSAY OF VANCOMYCIN: CPT

## 2025-05-26 PROCEDURE — 85027 COMPLETE CBC AUTOMATED: CPT | Performed by: NURSE PRACTITIONER

## 2025-05-26 PROCEDURE — 82947 ASSAY GLUCOSE BLOOD QUANT: CPT

## 2025-05-26 PROCEDURE — 82805 BLOOD GASES W/O2 SATURATION: CPT | Performed by: INTERNAL MEDICINE

## 2025-05-26 PROCEDURE — 99232 SBSQ HOSP IP/OBS MODERATE 35: CPT | Performed by: HOSPITALIST

## 2025-05-26 PROCEDURE — 80069 RENAL FUNCTION PANEL: CPT | Performed by: NURSE PRACTITIONER

## 2025-05-26 PROCEDURE — 2500000004 HC RX 250 GENERAL PHARMACY W/ HCPCS (ALT 636 FOR OP/ED): Performed by: STUDENT IN AN ORGANIZED HEALTH CARE EDUCATION/TRAINING PROGRAM

## 2025-05-26 PROCEDURE — 99233 SBSQ HOSP IP/OBS HIGH 50: CPT | Performed by: INTERNAL MEDICINE

## 2025-05-26 PROCEDURE — 2060000001 HC INTERMEDIATE ICU ROOM DAILY

## 2025-05-26 RX ORDER — MAGNESIUM SULFATE 1 G/100ML
1 INJECTION INTRAVENOUS ONCE
Status: COMPLETED | OUTPATIENT
Start: 2025-05-26 | End: 2025-05-26

## 2025-05-26 RX ORDER — VANCOMYCIN HYDROCHLORIDE 1 G/200ML
1000 INJECTION, SOLUTION INTRAVENOUS ONCE
Status: COMPLETED | OUTPATIENT
Start: 2025-05-26 | End: 2025-05-26

## 2025-05-26 RX ORDER — DEXTROSE 50 % IN WATER (D50W) INTRAVENOUS SYRINGE
12.5
Status: DISCONTINUED | OUTPATIENT
Start: 2025-05-26 | End: 2025-06-01

## 2025-05-26 RX ORDER — DEXTROSE 50 % IN WATER (D50W) INTRAVENOUS SYRINGE
25
Status: DISCONTINUED | OUTPATIENT
Start: 2025-05-26 | End: 2025-06-01

## 2025-05-26 RX ADMIN — DIAZEPAM 10 MG: 5 TABLET ORAL at 12:14

## 2025-05-26 RX ADMIN — MICAFUNGIN IN SODIUM CHLORIDE 100 MG: 100 INJECTION INTRAVENOUS at 16:32

## 2025-05-26 RX ADMIN — Medication 1 TABLET: at 08:05

## 2025-05-26 RX ADMIN — APIXABAN 5 MG: 5 TABLET, FILM COATED ORAL at 20:25

## 2025-05-26 RX ADMIN — APIXABAN 5 MG: 5 TABLET, FILM COATED ORAL at 08:05

## 2025-05-26 RX ADMIN — THIAMINE HCL TAB 100 MG 100 MG: 100 TAB at 08:05

## 2025-05-26 RX ADMIN — SODIUM PHOSPHATE, MONOBASIC, MONOHYDRATE AND SODIUM PHOSPHATE, DIBASIC, ANHYDROUS 15 MMOL: 142; 276 INJECTION, SOLUTION INTRAVENOUS at 06:25

## 2025-05-26 RX ADMIN — CEFEPIME HYDROCHLORIDE 2 G: 2 INJECTION, SOLUTION INTRAVENOUS at 15:28

## 2025-05-26 RX ADMIN — DIAZEPAM 10 MG: 5 TABLET ORAL at 16:07

## 2025-05-26 RX ADMIN — HEPARIN SODIUM 2000 UNITS: 1000 INJECTION, SOLUTION INTRAVENOUS; SUBCUTANEOUS at 01:25

## 2025-05-26 RX ADMIN — VANCOMYCIN HYDROCHLORIDE 1000 MG: 1 INJECTION, SOLUTION INTRAVENOUS at 08:04

## 2025-05-26 RX ADMIN — MAGNESIUM SULFATE HEPTAHYDRATE 1 G: 1 INJECTION, SOLUTION INTRAVENOUS at 06:26

## 2025-05-26 RX ADMIN — ATORVASTATIN CALCIUM 80 MG: 40 TABLET, FILM COATED ORAL at 20:24

## 2025-05-26 RX ADMIN — CEFEPIME HYDROCHLORIDE 2 G: 2 INJECTION, SOLUTION INTRAVENOUS at 03:25

## 2025-05-26 RX ADMIN — FOLIC ACID 1 MG: 1 TABLET ORAL at 08:05

## 2025-05-26 ASSESSMENT — LIFESTYLE VARIABLES
PULSE: 62
BLOOD PRESSURE: 100/71
TOTAL SCORE: 0
TOTAL SCORE: 1
NAUSEA AND VOMITING: NO NAUSEA AND NO VOMITING
TREMOR: NO TREMOR
ANXIETY: NO ANXIETY, AT EASE
VISUAL DISTURBANCES: NOT PRESENT
VISUAL DISTURBANCES: NOT PRESENT
ANXIETY: MILDLY ANXIOUS
PAROXYSMAL SWEATS: NO SWEAT VISIBLE
ANXIETY: NO ANXIETY, AT EASE
VISUAL DISTURBANCES: NOT PRESENT
ORIENTATION AND CLOUDING OF SENSORIUM: CANNOT DO SERIAL ADDITIONS OR IS UNCERTAIN ABOUT DATE
AUDITORY DISTURBANCES: VERY MILD HARSHNESS OR ABILITY TO FRIGHTEN
HEADACHE, FULLNESS IN HEAD: NOT PRESENT
ANXIETY: 2
AGITATION: NORMAL ACTIVITY
TREMOR: NO TREMOR
AGITATION: NORMAL ACTIVITY
VISUAL DISTURBANCES: NOT PRESENT
PAROXYSMAL SWEATS: NO SWEAT VISIBLE
AGITATION: NORMAL ACTIVITY
VISUAL DISTURBANCES: NOT PRESENT
AGITATION: SOMEWHAT MORE THAN NORMAL ACTIVITY
ANXIETY: NO ANXIETY, AT EASE
TREMOR: NO TREMOR
ANXIETY: NO ANXIETY, AT EASE
PAROXYSMAL SWEATS: NO SWEAT VISIBLE
PAROXYSMAL SWEATS: NO SWEAT VISIBLE
TREMOR: NO TREMOR
AUDITORY DISTURBANCES: NOT PRESENT
ANXIETY: NO ANXIETY, AT EASE
ORIENTATION AND CLOUDING OF SENSORIUM: CANNOT DO SERIAL ADDITIONS OR IS UNCERTAIN ABOUT DATE
PAROXYSMAL SWEATS: NO SWEAT VISIBLE
VISUAL DISTURBANCES: NOT PRESENT
HEADACHE, FULLNESS IN HEAD: NOT PRESENT
AGITATION: 2
ANXIETY: 3
TOTAL SCORE: 2
HEADACHE, FULLNESS IN HEAD: NOT PRESENT
NAUSEA AND VOMITING: NO NAUSEA AND NO VOMITING
ANXIETY: NO ANXIETY, AT EASE
HEADACHE, FULLNESS IN HEAD: NOT PRESENT
ANXIETY: NO ANXIETY, AT EASE
PULSE: 70
AUDITORY DISTURBANCES: NOT PRESENT
AUDITORY DISTURBANCES: VERY MILD HARSHNESS OR ABILITY TO FRIGHTEN
TOTAL SCORE: 0
HEADACHE, FULLNESS IN HEAD: NOT PRESENT
ORIENTATION AND CLOUDING OF SENSORIUM: CANNOT DO SERIAL ADDITIONS OR IS UNCERTAIN ABOUT DATE
TOTAL SCORE: 5
HEADACHE, FULLNESS IN HEAD: NOT PRESENT
TREMOR: NO TREMOR
ANXIETY: NO ANXIETY, AT EASE
PAROXYSMAL SWEATS: BARELY PERCEPTIBLE SWEATING, PALMS MOIST
AGITATION: SOMEWHAT MORE THAN NORMAL ACTIVITY
AGITATION: SOMEWHAT MORE THAN NORMAL ACTIVITY
VISUAL DISTURBANCES: NOT PRESENT
AUDITORY DISTURBANCES: NOT PRESENT
VISUAL DISTURBANCES: NOT PRESENT
HEADACHE, FULLNESS IN HEAD: NOT PRESENT
AUDITORY DISTURBANCES: NOT PRESENT
HEADACHE, FULLNESS IN HEAD: NOT PRESENT
AGITATION: 2
AUDITORY DISTURBANCES: NOT PRESENT
ORIENTATION AND CLOUDING OF SENSORIUM: ORIENTED AND CAN DO SERIAL ADDITIONS
TREMOR: NO TREMOR
ORIENTATION AND CLOUDING OF SENSORIUM: CANNOT DO SERIAL ADDITIONS OR IS UNCERTAIN ABOUT DATE
TOTAL SCORE: 3
TREMOR: NO TREMOR
ORIENTATION AND CLOUDING OF SENSORIUM: CANNOT DO SERIAL ADDITIONS OR IS UNCERTAIN ABOUT DATE
AGITATION: NORMAL ACTIVITY
VISUAL DISTURBANCES: NOT PRESENT
ANXIETY: NO ANXIETY, AT EASE
AUDITORY DISTURBANCES: NOT PRESENT
NAUSEA AND VOMITING: NO NAUSEA AND NO VOMITING
TREMOR: NO TREMOR
ORIENTATION AND CLOUDING OF SENSORIUM: CANNOT DO SERIAL ADDITIONS OR IS UNCERTAIN ABOUT DATE
NAUSEA AND VOMITING: NO NAUSEA AND NO VOMITING
TREMOR: NO TREMOR
HEADACHE, FULLNESS IN HEAD: NOT PRESENT
PULSE: 76
BLOOD PRESSURE: 111/73
AGITATION: SOMEWHAT MORE THAN NORMAL ACTIVITY
VISUAL DISTURBANCES: NOT PRESENT
ORIENTATION AND CLOUDING OF SENSORIUM: ORIENTED AND CAN DO SERIAL ADDITIONS
NAUSEA AND VOMITING: NO NAUSEA AND NO VOMITING
TREMOR: NO TREMOR
NAUSEA AND VOMITING: NO NAUSEA AND NO VOMITING
NAUSEA AND VOMITING: NO NAUSEA AND NO VOMITING
TOTAL SCORE: 1
PAROXYSMAL SWEATS: BARELY PERCEPTIBLE SWEATING, PALMS MOIST
PAROXYSMAL SWEATS: NO SWEAT VISIBLE
PULSE: 71
AUDITORY DISTURBANCES: NOT PRESENT
PAROXYSMAL SWEATS: NO SWEAT VISIBLE
PAROXYSMAL SWEATS: BARELY PERCEPTIBLE SWEATING, PALMS MOIST
PAROXYSMAL SWEATS: NO SWEAT VISIBLE
TOTAL SCORE: 8
NAUSEA AND VOMITING: NO NAUSEA AND NO VOMITING
PAROXYSMAL SWEATS: NO SWEAT VISIBLE
VISUAL DISTURBANCES: NOT PRESENT
ORIENTATION AND CLOUDING OF SENSORIUM: CANNOT DO SERIAL ADDITIONS OR IS UNCERTAIN ABOUT DATE
AUDITORY DISTURBANCES: NOT PRESENT
TREMOR: NO TREMOR
NAUSEA AND VOMITING: NO NAUSEA AND NO VOMITING
VISUAL DISTURBANCES: NOT PRESENT
TREMOR: NO TREMOR
NAUSEA AND VOMITING: NO NAUSEA AND NO VOMITING
AUDITORY DISTURBANCES: NOT PRESENT
HEADACHE, FULLNESS IN HEAD: NOT PRESENT
AGITATION: SOMEWHAT MORE THAN NORMAL ACTIVITY
NAUSEA AND VOMITING: NO NAUSEA AND NO VOMITING
ORIENTATION AND CLOUDING OF SENSORIUM: CANNOT DO SERIAL ADDITIONS OR IS UNCERTAIN ABOUT DATE
TREMOR: NO TREMOR
ORIENTATION AND CLOUDING OF SENSORIUM: CANNOT DO SERIAL ADDITIONS OR IS UNCERTAIN ABOUT DATE
ORIENTATION AND CLOUDING OF SENSORIUM: CANNOT DO SERIAL ADDITIONS OR IS UNCERTAIN ABOUT DATE
NAUSEA AND VOMITING: NO NAUSEA AND NO VOMITING
BLOOD PRESSURE: 106/61
PAROXYSMAL SWEATS: NO SWEAT VISIBLE
ANXIETY: NO ANXIETY, AT EASE
VISUAL DISTURBANCES: NOT PRESENT
TOTAL SCORE: 1
HEADACHE, FULLNESS IN HEAD: NOT PRESENT
TOTAL SCORE: 2
AGITATION: SOMEWHAT MORE THAN NORMAL ACTIVITY
VISUAL DISTURBANCES: NOT PRESENT
ANXIETY: 3
NAUSEA AND VOMITING: NO NAUSEA AND NO VOMITING
TOTAL SCORE: 2
AGITATION: NORMAL ACTIVITY
HEADACHE, FULLNESS IN HEAD: NOT PRESENT
HEADACHE, FULLNESS IN HEAD: NOT PRESENT
AGITATION: NORMAL ACTIVITY
AUDITORY DISTURBANCES: NOT PRESENT
TOTAL SCORE: 8
AUDITORY DISTURBANCES: NOT PRESENT
TOTAL SCORE: 2
TOTAL SCORE: 1
AUDITORY DISTURBANCES: NOT PRESENT
TREMOR: NO TREMOR
HEADACHE, FULLNESS IN HEAD: NOT PRESENT
NAUSEA AND VOMITING: NO NAUSEA AND NO VOMITING
PAROXYSMAL SWEATS: NO SWEAT VISIBLE

## 2025-05-26 ASSESSMENT — COGNITIVE AND FUNCTIONAL STATUS - GENERAL
DRESSING REGULAR LOWER BODY CLOTHING: TOTAL
TOILETING: TOTAL
HELP NEEDED FOR BATHING: TOTAL
STANDING UP FROM CHAIR USING ARMS: TOTAL
EATING MEALS: TOTAL
TURNING FROM BACK TO SIDE WHILE IN FLAT BAD: TOTAL
MOVING TO AND FROM BED TO CHAIR: TOTAL
MOBILITY SCORE: 6
PERSONAL GROOMING: TOTAL
WALKING IN HOSPITAL ROOM: TOTAL
MOVING FROM LYING ON BACK TO SITTING ON SIDE OF FLAT BED WITH BEDRAILS: TOTAL
DRESSING REGULAR UPPER BODY CLOTHING: TOTAL
CLIMB 3 TO 5 STEPS WITH RAILING: TOTAL
DAILY ACTIVITIY SCORE: 6

## 2025-05-26 ASSESSMENT — PAIN SCALES - GENERAL
PAINLEVEL_OUTOF10: 0 - NO PAIN

## 2025-05-26 ASSESSMENT — PAIN - FUNCTIONAL ASSESSMENT
PAIN_FUNCTIONAL_ASSESSMENT: 0-10

## 2025-05-26 NOTE — PROGRESS NOTES
"Critical Care Daily Progress        Subjective   Patient is a 64 y.o. male admitted on 5/25/2025  9:30 AM .     Interval History: Weaned off of vasopressors.  Lactic acidosis has cleared..     Complaints: To the extent the patient cannot communicate, he seems to indicate to me that he is feeling better.  Whereas yesterday he was saying \"sick\" he is not saying that today..     Scheduled Medications:   Scheduled Medications[1]     Continuous Medications:   Continuous Medications[2]     PRN Medications:   PRN Medications[3]    Objective   Vitals:  Most Recent:  Vitals:    05/26/25 0800   BP: 128/83   Pulse: 65   Resp: (!) 9   Temp:    SpO2: 93%       24hr Min/Max:  Temp  Min: 35.3 °C (95.5 °F)  Max: 36 °C (96.8 °F)  Pulse  Min: 58  Max: 76  BP  Min: 52/39  Max: 156/104  Resp  Min: 0  Max: 49  SpO2  Min: 73 %  Max: 100 %        I/O:    Intake/Output Summary (Last 24 hours) at 5/26/2025 0825  Last data filed at 5/26/2025 0630  Gross per 24 hour   Intake 1971.94 ml   Output 100 ml   Net 1871.94 ml       Physical Exam:   Physical Exam  Vitals reviewed.   Constitutional:       General: He is not in acute distress.     Appearance: He is obese. He is not toxic-appearing.   HENT:      Head: Normocephalic and atraumatic.      Mouth/Throat:      Mouth: Mucous membranes are moist.   Eyes:      General: No scleral icterus.  Neck:      Comments: Right IJ trialysis line in place  Cardiovascular:      Rate and Rhythm: Normal rate and regular rhythm.      Heart sounds: No murmur heard.  Pulmonary:      Effort: No respiratory distress.      Breath sounds: Normal breath sounds.   Abdominal:      Palpations: Abdomen is soft. There is no mass.      Tenderness: There is no guarding or rebound.   Musculoskeletal:      Cervical back: Neck supple. No rigidity.      Comments: No clubbing   Neurological:      Mental Status: He is alert. Mental status is at baseline.      Comments: Does not move his left side very well, nor his right leg.  Seems " a bit dysarthric or aphasic.          Lab/Radiology/Diagnostic Review:  Results for orders placed or performed during the hospital encounter of 05/25/25 (from the past 24 hours)   CBC and Auto Differential   Result Value Ref Range    WBC 4.7 4.4 - 11.3 x10*3/uL    nRBC 0.0 0.0 - 0.0 /100 WBCs    RBC 2.82 (L) 4.50 - 5.90 x10*6/uL    Hemoglobin 8.6 (L) 13.5 - 17.5 g/dL    Hematocrit 26.7 (L) 41.0 - 52.0 %    MCV 95 80 - 100 fL    MCH 30.5 26.0 - 34.0 pg    MCHC 32.2 32.0 - 36.0 g/dL    RDW 14.6 (H) 11.5 - 14.5 %    Platelets 146 (L) 150 - 450 x10*3/uL    Neutrophils % 76.9 40.0 - 80.0 %    Immature Granulocytes %, Automated 0.2 0.0 - 0.9 %    Lymphocytes % 15.7 13.0 - 44.0 %    Monocytes % 6.8 2.0 - 10.0 %    Eosinophils % 0.2 0.0 - 6.0 %    Basophils % 0.2 0.0 - 2.0 %    Neutrophils Absolute 3.62 1.20 - 7.70 x10*3/uL    Immature Granulocytes Absolute, Automated 0.01 0.00 - 0.70 x10*3/uL    Lymphocytes Absolute 0.74 (L) 1.20 - 4.80 x10*3/uL    Monocytes Absolute 0.32 0.10 - 1.00 x10*3/uL    Eosinophils Absolute 0.01 0.00 - 0.70 x10*3/uL    Basophils Absolute 0.01 0.00 - 0.10 x10*3/uL   Comprehensive Metabolic Panel   Result Value Ref Range    Glucose 92 74 - 99 mg/dL    Sodium 138 136 - 145 mmol/L    Potassium 5.3 3.5 - 5.3 mmol/L    Chloride 109 (H) 98 - 107 mmol/L    Bicarbonate 14 (L) 21 - 32 mmol/L    Anion Gap 20 10 - 20 mmol/L    Urea Nitrogen 122 (HH) 6 - 23 mg/dL    Creatinine 6.36 (H) 0.50 - 1.30 mg/dL    eGFR 9 (L) >60 mL/min/1.73m*2    Calcium 9.1 8.6 - 10.3 mg/dL    Albumin 3.5 3.4 - 5.0 g/dL    Alkaline Phosphatase 109 33 - 136 U/L    Total Protein 6.3 (L) 6.4 - 8.2 g/dL    AST 54 (H) 9 - 39 U/L    Bilirubin, Total 0.4 0.0 - 1.2 mg/dL    ALT 72 (H) 10 - 52 U/L   Lactate   Result Value Ref Range    Lactate 2.9 (H) 0.4 - 2.0 mmol/L   Blood Culture    Specimen: Peripheral Venipuncture; Blood culture   Result Value Ref Range    Blood Culture Loaded on Instrument - Culture in progress    Ethanol   Result Value  Ref Range    Alcohol 70 (H) <=10 mg/dL   B-type natriuretic peptide   Result Value Ref Range     (H) 0 - 99 pg/mL   BLOOD GAS VENOUS FULL PANEL   Result Value Ref Range    POCT pH, Venous 7.14 (LL) 7.33 - 7.43 pH    POCT pCO2, Venous 43 41 - 51 mm Hg    POCT pO2, Venous 42 35 - 45 mm Hg    POCT SO2, Venous 68 45 - 75 %    POCT Oxy Hemoglobin, Venous 66.8 45.0 - 75.0 %    POCT Hematocrit Calculated, Venous 27.0 (L) 41.0 - 52.0 %    POCT Sodium, Venous 136 136 - 145 mmol/L    POCT Potassium, Venous 5.5 (H) 3.5 - 5.3 mmol/L    POCT Chloride, Venous 109 (H) 98 - 107 mmol/L    POCT Ionized Calicum, Venous 1.30 1.10 - 1.33 mmol/L    POCT Glucose, Venous 93 74 - 99 mg/dL    POCT Lactate, Venous 3.2 (H) 0.4 - 2.0 mmol/L    POCT Base Excess, Venous -13.6 (L) -2.0 - 3.0 mmol/L    POCT HCO3 Calculated, Venous 14.6 (L) 22.0 - 26.0 mmol/L    POCT Hemoglobin, Venous 9.0 (L) 13.5 - 17.5 g/dL    POCT Anion Gap, Venous 18.0 10.0 - 25.0 mmol/L    Patient Temperature 37.0 degrees Celsius    FiO2 21 %   Protime-INR   Result Value Ref Range    Protime 20.0 (H) 9.8 - 12.4 seconds    INR 1.8 (H) 0.9 - 1.1   APTT   Result Value Ref Range    aPTT 49 (H) 26 - 36 seconds   Troponin I, High Sensitivity, Initial   Result Value Ref Range    Troponin I, High Sensitivity 15 0 - 20 ng/L   Blood Culture    Specimen: Peripheral Venipuncture; Blood culture   Result Value Ref Range    Blood Culture Loaded on Instrument - Culture in progress    Drug Screen, Urine   Result Value Ref Range    Amphetamine Screen, Urine Presumptive Negative Presumptive Negative    Barbiturate Screen, Urine Presumptive Negative Presumptive Negative    Benzodiazepines Screen, Urine Presumptive Negative Presumptive Negative    Cannabinoid Screen, Urine Presumptive Negative Presumptive Negative    Cocaine Metabolite Screen, Urine Presumptive Negative Presumptive Negative    Fentanyl Screen, Urine Presumptive Negative Presumptive Negative    Opiate Screen, Urine  Presumptive Negative Presumptive Negative    Oxycodone Screen, Urine Presumptive Negative Presumptive Negative    PCP Screen, Urine Presumptive Negative Presumptive Negative    Methadone Screen, Urine Presumptive Negative Presumptive Negative   Urinalysis with Reflex Culture and Microscopic   Result Value Ref Range    Color, Urine Light-Yellow Light-Yellow, Yellow, Dark-Yellow    Appearance, Urine Turbid (N) Clear    Specific Gravity, Urine 1.013 1.005 - 1.035    pH, Urine 5.0 5.0, 5.5, 6.0, 6.5, 7.0, 7.5, 8.0    Protein, Urine 20 (TRACE) NEGATIVE, 10 (TRACE), 20 (TRACE) mg/dL    Glucose, Urine Normal Normal mg/dL    Blood, Urine 0.06 (1+) (A) NEGATIVE mg/dL    Ketones, Urine NEGATIVE NEGATIVE mg/dL    Bilirubin, Urine NEGATIVE NEGATIVE mg/dL    Urobilinogen, Urine Normal Normal mg/dL    Nitrite, Urine NEGATIVE NEGATIVE    Leukocyte Esterase, Urine 500 Bhakti/uL (A) NEGATIVE   Extra Urine Gray Tube   Result Value Ref Range    Extra Tube Hold for add-ons.    Microscopic Only, Urine   Result Value Ref Range    WBC, Urine 21-50 (A) 1-5, NONE /HPF    WBC Clumps, Urine OCCASIONAL Reference range not established. /HPF    RBC, Urine >20 (A) NONE, 1-2, 3-5 /HPF    Bacteria, Urine 1+ (A) NONE SEEN /HPF    Budding Yeast, Urine PRESENT (A) NONE /HPF    Mucus, Urine FEW Reference range not established. /LPF    Hyaline Casts, Urine 2+ (A) NONE /LPF   Troponin, High Sensitivity, 1 Hour   Result Value Ref Range    Troponin I, High Sensitivity 13 0 - 20 ng/L   Blood Gas Lactic Acid, Venous   Result Value Ref Range    POCT Lactate, Venous 5.3 (HH) 0.4 - 2.0 mmol/L   Lactate   Result Value Ref Range    Lactate 5.0 (HH) 0.4 - 2.0 mmol/L   Lactate   Result Value Ref Range    Lactate 5.5 (HH) 0.4 - 2.0 mmol/L   Hepatitis B surface antibody   Result Value Ref Range    Hepatitis B Surface AB <3.1 <10.0 mIU/mL   Hepatitis B surface antigen   Result Value Ref Range    Hepatitis B Surface AG Nonreactive Nonreactive   POCT GLUCOSE   Result Value  Ref Range    POCT Glucose 185 (H) 74 - 99 mg/dL   Lactate   Result Value Ref Range    Lactate 2.8 (H) 0.4 - 2.0 mmol/L   C. difficile, PCR    Specimen: Stool   Result Value Ref Range    C. difficile, PCR Not Detected Not Detected   Sodium, Urine Random   Result Value Ref Range    Sodium, Urine Random 48 mmol/L    Creatinine, Urine Random 99.3 20.0 - 370.0 mg/dL    Sodium/Creatinine Ratio 48 Not established. mmol/g Creat   Lactate   Result Value Ref Range    Lactate 0.8 0.4 - 2.0 mmol/L   CBC   Result Value Ref Range    WBC 5.7 4.4 - 11.3 x10*3/uL    nRBC 0.0 0.0 - 0.0 /100 WBCs    RBC 2.75 (L) 4.50 - 5.90 x10*6/uL    Hemoglobin 8.3 (L) 13.5 - 17.5 g/dL    Hematocrit 25.5 (L) 41.0 - 52.0 %    MCV 93 80 - 100 fL    MCH 30.2 26.0 - 34.0 pg    MCHC 32.5 32.0 - 36.0 g/dL    RDW 14.5 11.5 - 14.5 %    Platelets 127 (L) 150 - 450 x10*3/uL   Renal function panel   Result Value Ref Range    Glucose 65 (L) 74 - 99 mg/dL    Sodium 132 (L) 136 - 145 mmol/L    Potassium 3.9 3.5 - 5.3 mmol/L    Chloride 99 98 - 107 mmol/L    Bicarbonate 26 21 - 32 mmol/L    Anion Gap 11 10 - 20 mmol/L    Urea Nitrogen 37 (H) 6 - 23 mg/dL    Creatinine 2.63 (H) 0.50 - 1.30 mg/dL    eGFR 27 (L) >60 mL/min/1.73m*2    Calcium 8.0 (L) 8.6 - 10.3 mg/dL    Phosphorus 2.3 (L) 2.5 - 4.9 mg/dL    Albumin 3.3 (L) 3.4 - 5.0 g/dL   Vancomycin   Result Value Ref Range    Vancomycin 12.1 5.0 - 20.0 ug/mL   Magnesium   Result Value Ref Range    Magnesium 1.42 (L) 1.60 - 2.40 mg/dL   POCT GLUCOSE   Result Value Ref Range    POCT Glucose 80 74 - 99 mg/dL   POCT GLUCOSE   Result Value Ref Range    POCT Glucose 94 74 - 99 mg/dL     Imaging  XR chest 1 view  Result Date: 5/25/2025  1. Cardiomegaly with mild interstitial prominence diffusely. Consider mild CHF or pulmonary edema. Follow-up as clinically warranted.. 2. Right central line catheter as described.       MACRO: None   Signed by: Yash Cortez 5/25/2025 4:19 PM Dictation workstation:   [id number]    CT angio  chest for pulmonary embolism  Result Date: 5/25/2025  1. Extremely limited exam due to suboptimal contrast phase. Although no definite large or central pulmonary embolism is seen, a more distal pulmonary embolism not excluded given the limitations of the study. 2. Cardiomegaly with low lung volumes which may be due to poor inspiration. Likely posterior atelectasis and/or minimal dependent edema. 3. Question of enhancing nodule in the gastric fundus. This is approximately 17 mm in diameter. A polyp or mass not excluded. Correlate with EGD.   MACRO: Critical Finding:  See findings. Notification was initiated on 5/25/2025 at 11:21 am by  Yash Cortez.  (**-YCF-**)   Signed by: Yash Cortez 5/25/2025 11:21 AM Dictation workstation:   [id number]    CT brain attack angio head and neck W and WO IV contrast  Result Date: 5/25/2025  No occlusion of the visualized arteries in the head or neck. Unchanged appearance of the these arteries compared to the prior CTA study.   This study was interpreted at [facility].   MACRO: None   Signed by: Levon Waters 5/25/2025 10:33 AM Dictation workstation:   [id number]    CT brain attack head wo IV contrast  Result Date: 5/25/2025  No acute intracranial hemorrhage, mass effect, or CT apparent acute infarct. Chronic microvascular ischemia and involutional changes. Tu Hunter discussed the significance and urgency of this critical finding by telephone with  AMADOU AGUIRRE on 5/25/2025 at 10:27 am.  (**-RCF-**) Findings:  See findings.         Signed by: Tu Hunter 5/25/2025 10:27 AM Dictation workstation:   [id number]      Cardiology, Vascular, and Other Imaging  Point of Care Ultrasound  Result Date: 5/25/2025  Amadou Aguirre DO     5/25/2025  6:51 PM Performed by: Amadou Aguirre DO Authorized by: Amadou Aguirre DO  Cardiac Indications: hypotension Procedure: Cardiac Ultrasound Findings:  Views: parasternal long, parasternal short, apical  four and subxiphoid The pericardial space was visualized and was NEGATIVE for a significant pericardial effusion. Activity: Ventricular contractions were visualized. LV: LV systolic function was NORMAL. RV: RV size was DILATED. Impression: Cardiac: The focused cardiac ultrasound exam had ABNORMAL findings as specified. Comments: Ivc Plethoric          Assessment/Plan   Assessment & Plan  Acute renal failure    Septic shock with multisystem organ failure appears to be resolving.  Prompt resolution in less than 12 hours is highly suggestive of a urinary tract source without obstruction.  Waiting blood culture data.  Weaned off vasopressors.  Hemodynamics are excellent now.  Mental status appears better.  Renal function improved but received sled.  Still with minimal urine.  Respiratory status is excellent on room air with saturation 95%.  Platelets remain low but not dropping precipitously.  Hemoglobin has been stable.  White count has remained normal.  No evidence of alcohol withdrawal clinically.  Will defer additional RRT to nephrology.  Perhaps patient would now tolerate conventional hemodialysis, tomorrow, if needed.  Will recheck venous blood gas to confirm resolution of acidosis, but lactic acid has cleared.  Broad-spectrum anti-infective agents per ID.  I suspect critical care medicine service will be able to sign off soon if not later today.    Dragon dictation software was used to dictate this note and thus there may be minor errors in translation/transcription including garbled speech or misspellings. Please contact for clarification if needed.     Jono Jacobson MD         [1] apixaban, 5 mg, oral, BID  atorvastatin, 80 mg, oral, Nightly  cefepime, 2 g, intravenous, q12h  folic acid, 1 mg, oral, Daily  heparin, 2,500 Units, hemodialysis, Once  insulin lispro, 0-5 Units, subcutaneous, TID AC  micafungin, 100 mg, intravenous, q24h  multivitamin with minerals, 1 tablet, oral, Daily  sodium phosphate, 15  mmol, intravenous, Once  thiamine, 100 mg, oral, Daily  vancomycin, 1,000 mg, intravenous, Once  [2]    [3] PRN medications: dextrose, dextrose, diazePAM, glucagon, glucagon, heparin, heparin, sennosides-docusate sodium, vancomycin

## 2025-05-26 NOTE — PROGRESS NOTES
Edenilson Ness [id number]   Service: Internal Medicine / Hospitalist Date of service: 5/26/2025                          Full Code                Overnight Events: No new overnight events reported by patient or nursing.     Subjective    Edenilson Ness is a 64 y.o. male with PMHx of CVA in 2019 now wheelchair-bound, diabetes, anemia, HFrEF and atrial fib on apixaban who presented from [facility]. LKW was 5am today. In the ED he was hypotensive to 62/38, with loss of urine and slurring of words. BP did not respond to 2L IV fluids and he was placed on Levophed. He drank alcohol this morning. At the bedside he is very difficult to understand. He denies pain and SOB. He says he drinks one beer a day.  Remainder of ROS reviewed and negative except as indicated in HPI.      ED workup was significant for BUN/creatinine 122/6.36, ALT/AST 72/54, , troponins 15/13, lactate 2.9/5.0, WBC 4.7k, Hgb 8.6, pH 7.14, bicarb 14.6. ETOH was 70 (it was 98 three months ago). UA was indicative of infection. CTA chest was extremely limited and revealed no definite large or central pulmonary embolism; possible cardiomegaly with likely posterior atelectasis and/or minimal dependent edema; questionable enhancing nodule in the gastric fundus. CT brain was nonacute. The pt was tachypneic to 49, hypotensive to 52/39 mmHg and hypoxic to 73%.RA. The pt received vancomycin, Zosyn and 2L Lactated Ringers IVF. He was placed on Levophed and bicarb gtts and is being admitted to the ICU.      The pt's case and plan of care was discussed with the ED provider. The ED notes were reviewed in detail, as were prior outpatient and patient notes as part of the admission chart review. The pt is at high risk for cardiovascular, respiratory, infectious and neurological events including septic shock, ETOH withdrawal, MI, stroke and ESRD due to acute hypotension requiring pressor support, lactic acidosis and acute renal failure. The decision was  made to escalate care and admit the pt under ICU status.     5/26...............    No new additional report gathered from patient's nurse on [facility] rounding patient appeared  confused requesting a new room.  No acute complaints, no reported : chest pains, nausea, vomiting, dyspnea or abdominal pain.    Review of Systems:   Review of system otherwise negative if not aforementioned above in subjective.    Objective              Physical Exam     Constitutional:       Appearance: Patient appeared in no acute cardiopulmonary distress.     Comments: Patient alert and oriented to person , confused.  HEENT:      Head: Normocephalic and atraumatic.Trachea midline      Nose:No observed congestion or rhinorrhea.     Mouth/Throat: Mucous membranes Moist, Trachea appeared  midline.  Eyes:      Extraocular Movements: Extraocular movements intact.      Pupils: Pupils are equal, round, and reactive to light.      Comments: No scleral icterus or conjunctival injection appreciated.   Cardiovascular:      Rate and Rhythm: Normal rate and regular rhythm. No clicks rubs or gallops, normal S1 and S2.No peripheral stigmata of endocarditis appreciated.     Pulmonary:      Lung fields diminished without appreciation of adventitious sounds.  Abdominal:      General: Abdomen soft,  obese ,non-tender, active bowel sounds, no involuntary guarding or rebound tenderness appreciated.     Comments: None   Musculoskeletal:       Patient appeared to have full active range of motion for upper and lower extremities, no acute apparent joint deformity appreciated on examination.   No pitting edema or cyanosis appreciated.    Skin:     General: Skin is warm.      Coloration:  No jaundice     Findings: No abnormal appearing skin rashes or lesions that appeared acute noted on unclothed area of the skin..   Neurological:      General: No focal sensory or motor deficits appreciated, no meningeal signs appreciated.     Cranial Nerves: Cranial nerves II to XII  appearing grossly intact.  Comment: Patient unable to follow stream of thought without difficulties     Genitals:  Deferred  Psychiatric:         The patient appeared to be displaying normal mood and affect at the time of evaluation.    Labs:     Lab Results   Component Value Date    GLUCOSE 65 (L) 05/26/2025    CALCIUM 8.0 (L) 05/26/2025     (L) 05/26/2025    K 3.9 05/26/2025    CO2 26 05/26/2025    CL 99 05/26/2025    BUN 37 (H) 05/26/2025    CREATININE 2.63 (H) 05/26/2025      Lab Results   Component Value Date    WBC 5.7 05/26/2025    HGB 8.3 (L) 05/26/2025    HCT 25.5 (L) 05/26/2025    MCV 93 05/26/2025     (L) 05/26/2025      [unfilled]   [unfilled]   No results found for the last 90 days.              X-rays/ Images    [unfilled]           Medical Problems       Problem List       * (Principal) Acute renal failure    Conjunctivitis    Weakness    CVA (cerebral vascular accident) (Multi)    Type 2 diabetes mellitus without complication, with long-term current use of insulin    Hypertensive heart disease with CHF    Anemia of chronic disease    Admits to alcohol consumption    Localized swelling on right hand    Diarrhea    Muscle spasticity    Ac isch multi vasc territories stroke (Multi)    UTI (urinary tract infection)    Hypokalemia    SVT (supraventricular tachycardia)    Loose stools    Pneumonia    Skin irritation    Cough    Umbilical hernia               Above medical problems may be reflective of historical medical problems that may have resolved and may not related to acute clinical condition/medical problems.    Clinical impression/plan:      Septic Shock  -Continue empirical  antibiotics.  -Continue stewardship as per infectious disease.  Septic shock of unclear source.    Acute renal failure, no hx CKD  Acute hypotension requiring pressor support  Pt seen by nephrology and SLED orders are in  All offending home meds held  Daily RFP  Continue empiric pending urine and blood  "cx, UA was +, pt does not meet SIRS/sepsis criteria     Acute hypoxic respiratory failure  Pt currently on 6L O2  Imaging does not reveal evidence of PNA  Wean O2 as tolerated to maintain O2 sat >91%, pt may need home O2 eval      ETOH abuse  Pt admits to drinking one beer a day \"my whole life\"  ETOH was 70 (it was 98 three months ago)  Start [facility] protocol     Chronic anemia  Baseline Hgb is 11-12, today is 8.6, likely etiology is ARF  Monitor Hgb and transfuse for Hgb <= 7.0     DVT ppx: apixaban      Disposition/additional care plan/interventions: 5/26/2025    Patient with high complexity characteristics and appeared to be at high risk for clinical decline/deterioration  and unpredictable clinical course.     Septic shock present on  admission    Continue stewardship and recommendation as per intensivist.    Continue weaning off vasopressor i, continue monitor hemodynamic status.    Continue empirical  antibiotics    Pathogen directed therapy in accordance with culture results and sensitivity data.    Continue stewardship as per infectious disease.  Septic shock of unclear source.    Acute confusional state/metabolic encephalopathy provisional report of improving.    Will continue close monitoring.    Care plan discussed with nephrologist.    Continue cefepime but monitor encephalopathy.    Monitor nephrotoxins    Monitor vancomycin trough    Continue to monitor urinary output    Avoid nephrotoxins continue to monitor renal function    Monitor for alcohol withdrawal symptoms ............continue [facility] protocol    Antihypertensive therapy, metformin, chronic heart failure therapy currently on hold in light of hemodynamic disposition.      Continue stewardship and recommendations as per intensivist. Patient seen in collaboration with intensivist and care plan discussed.         The patient was informed of differential diagnosis , work up , plan of care and possible sequelae of clinical disposition.Patient in " agreement with plan of care. Further recommendations forthcoming in accordance with patient's clinical disposition and response to care.    Discharge planning:               Dictation performed with assistance of voice recognition device therefore transcription errors are possible.

## 2025-05-26 NOTE — CARE PLAN
Problem: Pain - Adult  Goal: Verbalizes/displays adequate comfort level or baseline comfort level  Outcome: Progressing     Problem: Safety - Adult  Goal: Free from fall injury  Outcome: Progressing     Problem: Discharge Planning  Goal: Discharge to home or other facility with appropriate resources  Outcome: Progressing     Problem: Chronic Conditions and Co-morbidities  Goal: Patient's chronic conditions and co-morbidity symptoms are monitored and maintained or improved  Outcome: Progressing     Problem: Nutrition  Goal: Nutrient intake appropriate for maintaining nutritional needs  Outcome: Progressing     Problem: Skin  Goal: Decreased wound size/increased tissue granulation at next dressing change  Outcome: Progressing  Flowsheets (Taken 5/26/2025 0850)  Decreased wound size/increased tissue granulation at next dressing change:   Protective dressings over bony prominences   Promote sleep for wound healing  Goal: Participates in plan/prevention/treatment measures  Outcome: Progressing  Flowsheets (Taken 5/26/2025 0850)  Participates in plan/prevention/treatment measures:   Elevate heels   Discuss with provider PT/OT consult   Increase activity/out of bed for meals  Goal: Prevent/manage excess moisture  Outcome: Progressing  Flowsheets (Taken 5/26/2025 0850)  Prevent/manage excess moisture:   Monitor for/manage infection if present   Cleanse incontinence/protect with barrier cream   Moisturize dry skin  Goal: Prevent/minimize sheer/friction injuries  Outcome: Progressing  Flowsheets (Taken 5/26/2025 0850)  Prevent/minimize sheer/friction injuries:   Use pull sheet   HOB 30 degrees or less   Turn/reposition every 2 hours/use positioning/transfer devices  Goal: Promote/optimize nutrition  Outcome: Progressing  Flowsheets (Taken 5/26/2025 0850)  Promote/optimize nutrition: Monitor/record intake including meals  Goal: Promote skin healing  Outcome: Progressing  Flowsheets (Taken 5/26/2025 0850)  Promote skin  healing:   Assess skin/pad under line(s)/device(s)   Protective dressings over bony prominences   Turn/reposition every 2 hours/use positioning/transfer devices   Rotate device position/do not position patient on device   Ensure correct size (line/device) and apply per  instructions     Problem: Diabetes  Goal: Achieve decreasing blood glucose levels by end of shift  Outcome: Progressing  Goal: Increase stability of blood glucose readings by end of shift  Outcome: Progressing  Goal: Decrease in ketones present in urine by end of shift  Outcome: Progressing  Goal: Maintain electrolyte levels within acceptable range throughout shift  Outcome: Progressing  Goal: Maintain glucose levels >70mg/dl to <250mg/dl throughout shift  Outcome: Progressing  Goal: No changes in neurological exam by end of shift  Outcome: Progressing  Goal: Learn about and adhere to nutrition recommendations by end of shift  Outcome: Progressing  Goal: Vital signs within normal range for age by end of shift  Outcome: Progressing  Goal: Increase self care and/or family involovement by end of shift  Outcome: Progressing  Goal: Receive DSME education by end of shift  Outcome: Progressing     Problem: Fall/Injury  Goal: Not fall by end of shift  Outcome: Progressing  Goal: Be free from injury by end of the shift  Outcome: Progressing  Goal: Verbalize understanding of personal risk factors for fall in the hospital  Outcome: Progressing  Goal: Verbalize understanding of risk factor reduction measures to prevent injury from fall in the home  Outcome: Progressing  Goal: Use assistive devices by end of the shift  Outcome: Progressing  Goal: Pace activities to prevent fatigue by end of the shift  Outcome: Progressing

## 2025-05-26 NOTE — PROGRESS NOTES
"      Nephrology Progress Note      Nephrology following for shae.   Events over night:     Feels better  Off pressors  Tolerated 8 hours of SLED yesterday afternoon  Today asking to eat    /83   Pulse 73   Temp 35.9 °C (96.6 °F)   Resp 17   Ht 1.778 m (5' 10\")   Wt 106 kg (234 lb 2.1 oz)   SpO2 94%   BMI 33.59 kg/m²     Input / Output:  24 HR:   Intake/Output Summary (Last 24 hours) at 5/26/2025 1115  Last data filed at 5/26/2025 0800  Gross per 24 hour   Intake 2526.94 ml   Output 130 ml   Net 2396.94 ml       Physical Exam   Alert and oriented, mild dysarthria  Neck: no JVD  CV: RRR  Lungs: CTA bilaterally  Abd: soft, NT, ND   Ext: trace lower extremity edema  Whyte scant urine    Scheduled medications  Scheduled Medications[1]  Continuous medications  Continuous Medications[2]  PRN medications  PRN Medications[3]   Results from last 7 days   Lab Units 05/26/25  0447 05/25/25  1031   SODIUM mmol/L 132* 138   POTASSIUM mmol/L 3.9 5.3   CHLORIDE mmol/L 99 109*   CO2 mmol/L 26 14*   BUN mg/dL 37* 122*   CREATININE mg/dL 2.63* 6.36*   CALCIUM mg/dL 8.0* 9.1   PROTEIN TOTAL g/dL  --  6.3*   BILIRUBIN TOTAL mg/dL  --  0.4   ALK PHOS U/L  --  109   ALT U/L  --  72*   AST U/L  --  54*   GLUCOSE mg/dL 65* 92      Results from last 7 days   Lab Units 05/26/25  0447   MAGNESIUM mg/dL 1.42*      Results from last 7 days   Lab Units 05/26/25  0447 05/25/25  1031   WBC AUTO x10*3/uL 5.7 4.7   HEMOGLOBIN g/dL 8.3* 8.6*   HEMATOCRIT % 25.5* 26.7*   PLATELETS AUTO x10*3/uL 127* 146*        Assessment & Plan:       64-year-old gentleman with hx of diabetes, prior stroke, documented heart failure initially brought to the hospital concerning for stroke found to be hypotensive with concomitant renal failure hence nephrology was consulted     #1 acute kidney injury  - Last serum creatinine 0.94 mg/dL on March 2024  - suspect ATN in setting of decrease EBV in setting of entresto/aldactone along with other " antihypertensives  - on presentation oliguric with serum creatinine 6.36 mg/dL significant azotemia acidemia    Plan  -clinically improved  -s/p SLED yesterday  -off pressors  -remains oliguric  -electrolyte and acid base stable  -hold HD today  -re-evaluate tomorrow for need of RRT  -avoid hypotension/nephrotoxins    Please call [phone] with any   Please message me through EPIC chat with any questions or concerns.     Danny Calixto MD  5/26/2025  11:15 AM     [facility]    [address] [place] [address], [address]   [place] [address]  Office: [phone]         [1] apixaban, 5 mg, oral, BID  atorvastatin, 80 mg, oral, Nightly  cefepime, 2 g, intravenous, q12h  folic acid, 1 mg, oral, Daily  heparin, 2,500 Units, hemodialysis, Once  insulin lispro, 0-5 Units, subcutaneous, TID AC  micafungin, 100 mg, intravenous, q24h  multivitamin with minerals, 1 tablet, oral, Daily  thiamine, 100 mg, oral, Daily    [2]    [3] PRN medications: dextrose, dextrose, diazePAM, glucagon, glucagon, heparin, heparin, sennosides-docusate sodium, vancomycin

## 2025-05-26 NOTE — PROGRESS NOTES
Vancomycin Dosing by Pharmacy- FOLLOW UP    Edenilson Ness is a 64 y.o. year old male who Pharmacy has been consulted for vancomycin dosing for other UTI. Based on the patient's indication and renal status this patient is being dosed based on a goal trough/random level of 15-20.     Renal function is currently improving.    Current vancomycin dose: 2000 mg given  @ 1153 before SLED    Most recent random level: 12.1 mcg/mL post-SLED    Visit Vitals  BP (!) 113/47   Pulse 72   Temp 36 °C (96.8 °F) (Temporal)   Resp 13        Lab Results   Component Value Date    CREATININE 2.63 (H) 2025    CREATININE 6.36 (H) 2025    CREATININE 0.94 2024    CREATININE 0.69 2024        Patient weight is as follows:   Vitals:    25 1600   Weight: 106 kg (234 lb 2.1 oz)       Cultures:  No results found for the encounter in last 14 days.       I/O last 3 completed shifts:  In: 1971.9 (18.6 mL/kg) [I.V.:1071.9 (10.1 mL/kg); IV Piggyback:900]  Out: 100 (0.9 mL/kg) [Urine:100 (0 mL/kg/hr)]  Weight: 106.2 kg   I/O during current shift:  No intake/output data recorded.    Temp (24hrs), Av.6 °C (96 °F), Min:35.3 °C (95.5 °F), Max:36 °C (96.8 °F)      Assessment/Plan    Below goal random level. Will re-dose with 1 g vancomycin once  @0800.  The next level will be obtained on  at 0500. May be obtained sooner if clinically indicated.   Will continue to monitor renal function daily while on vancomycin and order serum creatinine at least every 48 hours if not already ordered.  Follow for continued vancomycin needs, clinical response, and signs/symptoms of toxicity.       Fracisco Childs, PharmD

## 2025-05-26 NOTE — PROGRESS NOTES
"Edenilson Ness is a 64 y.o. male on day 1 of admission presenting with Acute renal failure.      Assessment/Plan:     #Shock, undifferentiated, POA- resolved  No obvious signs of infection.  CT chest not concerning for pneumonia.  Blood cultures pending at this time.  UA does show yeast.  Zosyn discontinued to avoid combination of Zosyn plus vancomycin 2/2 nephrotoxicity.  On cefepime and micafungin along with vancomycin.     #Acute kidney injury  Nephrology consulted.  Now on SLED     #Bicytopenia  Anemia and thrombocytopenia.  Low platelets could be due to sepsis.  Management per primary     #Transaminitis  Likely secondary to sepsis.  Monitor LFTs     CVA, DM  HFrEF  A-fib     Recommendations:     - Continue cefepime every 8 hour  - Continue vancomycin.  Pharmacy to dose  - Continue micafungin 100 mg once daily  - Renally dose antibiotic  - Monitor blood cultures, urine cultures  - Will continue to follow      Jonathan Parham MD  Date of service: 5/26/2025  Time of service: 3:57 PM      Subjective   Interval History: No acute events overnight.  Patient sleeping        Review of Systems  Unable to obtain    Objective   Range of Vitals (last 24 hours)  Heart Rate:  [60-78]   Temp:  [35.3 °C (95.5 °F)-36.1 °C (97 °F)]   Resp:  [0-31]   BP: ()/()   Height:  [177.8 cm (5' 10\")]   Weight:  [106 kg (234 lb 2.1 oz)]   SpO2:  [74 %-100 %]  Daily Weight  05/25/25 : 106 kg (234 lb 2.1 oz)   Body mass index is 33.59 kg/m².    General: confused  HEENT: conjunctival pallor, no scleral icterus  Neck:  RIJ dialysis catheter  Heart: Irregular rhythm  Abdomen: soft, non tender, non distended  Neuro: AAO x 1, B/l LE weakness,   Extremities: RLE surgical scar on the medial side  Skin: No rashes or ulcers          Antibiotics  cefepime - 2 gram/50 mL  micafungin - 100 mg/100 mL  vancomycin      Relevant Results  Labs  Results from last 72 hours   Lab Units 05/26/25  0447 05/25/25  1031   WBC AUTO x10*3/uL 5.7 4.7 " "  HEMOGLOBIN g/dL 8.3* 8.6*   HEMATOCRIT % 25.5* 26.7*   PLATELETS AUTO x10*3/uL 127* 146*   NEUTROS PCT AUTO %  --  76.9   LYMPHS PCT AUTO %  --  15.7   MONOS PCT AUTO %  --  6.8   EOS PCT AUTO %  --  0.2     Results from last 72 hours   Lab Units 05/26/25  0447 05/25/25  1031   SODIUM mmol/L 132* 138   POTASSIUM mmol/L 3.9 5.3   CHLORIDE mmol/L 99 109*   CO2 mmol/L 26 14*   BUN mg/dL 37* 122*   CREATININE mg/dL 2.63* 6.36*   GLUCOSE mg/dL 65* 92   CALCIUM mg/dL 8.0* 9.1   ANION GAP mmol/L 11 20   EGFR mL/min/1.73m*2 27* 9*   PHOSPHORUS mg/dL 2.3*  --      Results from last 72 hours   Lab Units 05/26/25  0447 05/25/25  1031   ALK PHOS U/L  --  109   BILIRUBIN TOTAL mg/dL  --  0.4   PROTEIN TOTAL g/dL  --  6.3*   ALT U/L  --  72*   AST U/L  --  54*   ALBUMIN g/dL 3.3* 3.5     Estimated Creatinine Clearance: 35.5 mL/min (A) (by C-G formula based on SCr of 2.63 mg/dL (H)).  No results found for: \"CRP\"    Microbiology  No results found for the last 14 days.      Imaging    XR chest 1 view  Result Date: 5/25/2025  1. Cardiomegaly with mild interstitial prominence diffusely. Consider mild CHF or pulmonary edema. Follow-up as clinically warranted.. 2. Right central line catheter as described.       MACRO: None   Signed by: Yash Cortez 5/25/2025 4:19 PM Dictation workstation:   [id number]    CT angio chest for pulmonary embolism  Result Date: 5/25/2025  1. Extremely limited exam due to suboptimal contrast phase. Although no definite large or central pulmonary embolism is seen, a more distal pulmonary embolism not excluded given the limitations of the study. 2. Cardiomegaly with low lung volumes which may be due to poor inspiration. Likely posterior atelectasis and/or minimal dependent edema. 3. Question of enhancing nodule in the gastric fundus. This is approximately 17 mm in diameter. A polyp or mass not excluded. Correlate with EGD.   MACRO: Critical Finding:  See findings. Notification was initiated on 5/25/2025 at " 11:21 am by  Yash Cortez.  (**-YCF-**)   Signed by: Yash Cortez 5/25/2025 11:21 AM Dictation workstation:   [id number]    CT brain attack angio head and neck W and WO IV contrast  Result Date: 5/25/2025  No occlusion of the visualized arteries in the head or neck. Unchanged appearance of the these arteries compared to the prior CTA study.   This study was interpreted at [facility].   MACRO: None   Signed by: Levon Waters 5/25/2025 10:33 AM Dictation workstation:   [id number]    CT brain attack head wo IV contrast  Result Date: 5/25/2025  No acute intracranial hemorrhage, mass effect, or CT apparent acute infarct. Chronic microvascular ischemia and involutional changes. Tu Hunter discussed the significance and urgency of this critical finding by telephone with  KENTON STRATTON on 5/25/2025 at 10:27 am.  (**-RCF-**) Findings:  See findings.         Signed by: Tu Hunter 5/25/2025 10:27 AM Dictation workstation:   [id number]

## 2025-05-27 LAB
ANION GAP SERPL CALC-SCNC: 7 MMOL/L (ref 10–20)
BACTERIA UR CULT: NO GROWTH
BUN SERPL-MCNC: 39 MG/DL (ref 6–23)
CALCIUM SERPL-MCNC: 7.9 MG/DL (ref 8.6–10.3)
CHLORIDE SERPL-SCNC: 105 MMOL/L (ref 98–107)
CO2 SERPL-SCNC: 21 MMOL/L (ref 21–32)
CREAT SERPL-MCNC: 3.29 MG/DL (ref 0.5–1.3)
EGFRCR SERPLBLD CKD-EPI 2021: 20 ML/MIN/1.73M*2
GLUCOSE BLD MANUAL STRIP-MCNC: 104 MG/DL (ref 74–99)
GLUCOSE BLD MANUAL STRIP-MCNC: 79 MG/DL (ref 74–99)
GLUCOSE BLD MANUAL STRIP-MCNC: 86 MG/DL (ref 74–99)
GLUCOSE BLD MANUAL STRIP-MCNC: 99 MG/DL (ref 74–99)
GLUCOSE SERPL-MCNC: 84 MG/DL (ref 74–99)
POTASSIUM SERPL-SCNC: 4.8 MMOL/L (ref 3.5–5.3)
SODIUM SERPL-SCNC: 128 MMOL/L (ref 136–145)
VANCOMYCIN SERPL-MCNC: 19.4 UG/ML (ref 5–20)

## 2025-05-27 PROCEDURE — 99233 SBSQ HOSP IP/OBS HIGH 50: CPT | Performed by: HOSPITALIST

## 2025-05-27 PROCEDURE — 80202 ASSAY OF VANCOMYCIN: CPT

## 2025-05-27 PROCEDURE — 2500000001 HC RX 250 WO HCPCS SELF ADMINISTERED DRUGS (ALT 637 FOR MEDICARE OP): Performed by: NURSE PRACTITIONER

## 2025-05-27 PROCEDURE — 36415 COLL VENOUS BLD VENIPUNCTURE: CPT | Performed by: INTERNAL MEDICINE

## 2025-05-27 PROCEDURE — 2500000004 HC RX 250 GENERAL PHARMACY W/ HCPCS (ALT 636 FOR OP/ED): Performed by: STUDENT IN AN ORGANIZED HEALTH CARE EDUCATION/TRAINING PROGRAM

## 2025-05-27 PROCEDURE — 87081 CULTURE SCREEN ONLY: CPT | Mod: PORLAB | Performed by: INTERNAL MEDICINE

## 2025-05-27 PROCEDURE — 82947 ASSAY GLUCOSE BLOOD QUANT: CPT

## 2025-05-27 PROCEDURE — 2060000001 HC INTERMEDIATE ICU ROOM DAILY

## 2025-05-27 PROCEDURE — 80048 BASIC METABOLIC PNL TOTAL CA: CPT | Performed by: INTERNAL MEDICINE

## 2025-05-27 RX ORDER — BISACODYL 10 MG/1
10 SUPPOSITORY RECTAL AS NEEDED
Status: ON HOLD | COMMUNITY

## 2025-05-27 RX ORDER — GUAIFENESIN 100 MG/5ML
200 LIQUID ORAL 3 TIMES DAILY PRN
Status: ON HOLD | COMMUNITY

## 2025-05-27 RX ORDER — ADHESIVE BANDAGE
BANDAGE TOPICAL
Status: ON HOLD | COMMUNITY

## 2025-05-27 RX ORDER — LOPERAMIDE HCL 2 MG
2 TABLET ORAL 2 TIMES DAILY PRN
Status: ON HOLD | COMMUNITY

## 2025-05-27 RX ORDER — DEXTROMETHORPHAN HYDROBROMIDE,GUAIFENESIN 10; 100 MG/5ML; MG/5ML
10 LIQUID ORAL 3 TIMES DAILY PRN
Status: ON HOLD | COMMUNITY

## 2025-05-27 RX ORDER — ACETAMINOPHEN 325 MG/1
650 TABLET ORAL EVERY 6 HOURS PRN
Status: ON HOLD | COMMUNITY

## 2025-05-27 RX ADMIN — Medication 1 TABLET: at 08:19

## 2025-05-27 RX ADMIN — ATORVASTATIN CALCIUM 80 MG: 40 TABLET, FILM COATED ORAL at 20:12

## 2025-05-27 RX ADMIN — FOLIC ACID 1 MG: 1 TABLET ORAL at 08:19

## 2025-05-27 RX ADMIN — APIXABAN 5 MG: 5 TABLET, FILM COATED ORAL at 20:12

## 2025-05-27 RX ADMIN — CEFEPIME HYDROCHLORIDE 2 G: 2 INJECTION, SOLUTION INTRAVENOUS at 02:59

## 2025-05-27 RX ADMIN — APIXABAN 5 MG: 5 TABLET, FILM COATED ORAL at 08:19

## 2025-05-27 RX ADMIN — THIAMINE HCL TAB 100 MG 100 MG: 100 TAB at 08:19

## 2025-05-27 ASSESSMENT — LIFESTYLE VARIABLES
AGITATION: NORMAL ACTIVITY
HEADACHE, FULLNESS IN HEAD: NOT PRESENT
TREMOR: NO TREMOR
ANXIETY: NO ANXIETY, AT EASE
NAUSEA AND VOMITING: NO NAUSEA AND NO VOMITING
NAUSEA AND VOMITING: NO NAUSEA AND NO VOMITING
HEADACHE, FULLNESS IN HEAD: NOT PRESENT
AUDITORY DISTURBANCES: NOT PRESENT
ORIENTATION AND CLOUDING OF SENSORIUM: CANNOT DO SERIAL ADDITIONS OR IS UNCERTAIN ABOUT DATE
VISUAL DISTURBANCES: NOT PRESENT
TREMOR: NO TREMOR
TOTAL SCORE: 1
VISUAL DISTURBANCES: NOT PRESENT
PAROXYSMAL SWEATS: NO SWEAT VISIBLE
ANXIETY: NO ANXIETY, AT EASE
AUDITORY DISTURBANCES: NOT PRESENT
ORIENTATION AND CLOUDING OF SENSORIUM: CANNOT DO SERIAL ADDITIONS OR IS UNCERTAIN ABOUT DATE
TREMOR: NO TREMOR
ANXIETY: NO ANXIETY, AT EASE
TOTAL SCORE: 1
AGITATION: NORMAL ACTIVITY
VISUAL DISTURBANCES: NOT PRESENT
NAUSEA AND VOMITING: NO NAUSEA AND NO VOMITING
AGITATION: NORMAL ACTIVITY
PAROXYSMAL SWEATS: NO SWEAT VISIBLE
PAROXYSMAL SWEATS: NO SWEAT VISIBLE
HEADACHE, FULLNESS IN HEAD: NOT PRESENT
ORIENTATION AND CLOUDING OF SENSORIUM: CANNOT DO SERIAL ADDITIONS OR IS UNCERTAIN ABOUT DATE
TOTAL SCORE: 1
AUDITORY DISTURBANCES: NOT PRESENT

## 2025-05-27 ASSESSMENT — PAIN SCALES - GENERAL
PAINLEVEL_OUTOF10: 0 - NO PAIN

## 2025-05-27 ASSESSMENT — PAIN - FUNCTIONAL ASSESSMENT
PAIN_FUNCTIONAL_ASSESSMENT: 0-10

## 2025-05-27 NOTE — PROGRESS NOTES
Edenilson Ness is a 64 y.o. male on day 2 of admission presenting with Acute renal failure.      Assessment/Plan:     #Shock, undifferentiated, POA- resolved  No obvious signs of infection.  CT chest not concerning for pneumonia.  Blood cultures pending at this time.  UA does show yeast.  Zosyn discontinued to avoid combination of Zosyn plus vancomycin 2/2 nephrotoxicity.  Cefepime, Vanco micafungin discontinued as cultures have been negative so far.  Shock likely due to acidosis and less likely due to infection     #Acute kidney injury  Nephrology consulted.  Initially got sled, renal function slowly improving     #Bicytopenia  Anemia and thrombocytopenia.  Low platelets could be due to sepsis.  Management per primary     #Transaminitis  Likely secondary to sepsis.  Monitor LFTs     CVA, DM  HFrEF  A-fib     Recommendations:     - DC cefepime, vancomycin, micafungin  - Monitor off antibiotics  - Monitor blood cultures, urine cultures  - Will continue to follow    Complex antimicrobial therapy counseling and treatment.  Rationale for stopping antibiotics discussed.  Vancomycin, cefepime, micafungin discontinued to minimize risk of MDRO and C. difficile      Jonathan Parham MD  Date of service: 5/27/2025  Time of service: 1:53 PM      Subjective   Interval History: No acute events overnight.  Patient sleeping        Review of Systems  Unable to obtain    Objective   Range of Vitals (last 24 hours)  Heart Rate:  [56-84]   Temp:  [35.4 °C (95.7 °F)-36.1 °C (97 °F)]   Resp:  [9-22]   BP: ()/(38-90)   Weight:  [106 kg (233 lb 11 oz)-106 kg (234 lb 9.1 oz)]   SpO2:  [88 %-99 %]  Daily Weight  05/27/25 : 106 kg (233 lb 11 oz)   Body mass index is 33.53 kg/m².    General: Alert and awake, NAD  HEENT: conjunctival pallor, no scleral icterus  Neck:  RIJ dialysis catheter  Heart: Irregular rhythm  Abdomen: soft, non tender, non distended  Neuro: AAO x 3, B/l LE weakness,   Extremities: RLE surgical scar on the medial  "side  Skin: No rashes or ulcers          Antibiotics  This patient does not have an active medication from one of the medication groupers.      Relevant Results  Labs  Results from last 72 hours   Lab Units 05/26/25 0447 05/25/25  1031   WBC AUTO x10*3/uL 5.7 4.7   HEMOGLOBIN g/dL 8.3* 8.6*   HEMATOCRIT % 25.5* 26.7*   PLATELETS AUTO x10*3/uL 127* 146*   NEUTROS PCT AUTO %  --  76.9   LYMPHS PCT AUTO %  --  15.7   MONOS PCT AUTO %  --  6.8   EOS PCT AUTO %  --  0.2     Results from last 72 hours   Lab Units 05/27/25  0424 05/26/25 0447 05/25/25  1031   SODIUM mmol/L 128* 132* 138   POTASSIUM mmol/L 4.8 3.9 5.3   CHLORIDE mmol/L 105 99 109*   CO2 mmol/L 21 26 14*   BUN mg/dL 39* 37* 122*   CREATININE mg/dL 3.29* 2.63* 6.36*   GLUCOSE mg/dL 84 65* 92   CALCIUM mg/dL 7.9* 8.0* 9.1   ANION GAP mmol/L 7* 11 20   EGFR mL/min/1.73m*2 20* 27* 9*   PHOSPHORUS mg/dL  --  2.3*  --      Results from last 72 hours   Lab Units 05/26/25 0447 05/25/25  1031   ALK PHOS U/L  --  109   BILIRUBIN TOTAL mg/dL  --  0.4   PROTEIN TOTAL g/dL  --  6.3*   ALT U/L  --  72*   AST U/L  --  54*   ALBUMIN g/dL 3.3* 3.5     Estimated Creatinine Clearance: 28.4 mL/min (A) (by C-G formula based on SCr of 3.29 mg/dL (H)).  No results found for: \"CRP\"    Microbiology  No results found for the last 14 days.      Imaging    XR chest 1 view  Result Date: 5/25/2025  1. Cardiomegaly with mild interstitial prominence diffusely. Consider mild CHF or pulmonary edema. Follow-up as clinically warranted.. 2. Right central line catheter as described.       MACRO: None   Signed by: Yash Cortez 5/25/2025 4:19 PM Dictation workstation:   [id number]    CT angio chest for pulmonary embolism  Result Date: 5/25/2025  1. Extremely limited exam due to suboptimal contrast phase. Although no definite large or central pulmonary embolism is seen, a more distal pulmonary embolism not excluded given the limitations of the study. 2. Cardiomegaly with low lung volumes which " may be due to poor inspiration. Likely posterior atelectasis and/or minimal dependent edema. 3. Question of enhancing nodule in the gastric fundus. This is approximately 17 mm in diameter. A polyp or mass not excluded. Correlate with EGD.   MACRO: Critical Finding:  See findings. Notification was initiated on 5/25/2025 at 11:21 am by  Yash Cortez.  (**-YCF-**)   Signed by: Yash Cortez 5/25/2025 11:21 AM Dictation workstation:   [id number]    CT brain attack angio head and neck W and WO IV contrast  Result Date: 5/25/2025  No occlusion of the visualized arteries in the head or neck. Unchanged appearance of the these arteries compared to the prior CTA study.   This study was interpreted at [facility].   MACRO: None   Signed by: Levon Waters 5/25/2025 10:33 AM Dictation workstation:   [id number]    CT brain attack head wo IV contrast  Result Date: 5/25/2025  No acute intracranial hemorrhage, mass effect, or CT apparent acute infarct. Chronic microvascular ischemia and involutional changes. Tu Hunter discussed the significance and urgency of this critical finding by telephone with  KENTON STRATTON on 5/25/2025 at 10:27 am.  (**-RCF-**) Findings:  See findings.         Signed by: Tu Hunter 5/25/2025 10:27 AM Dictation workstation:   [id number]

## 2025-05-27 NOTE — CARE PLAN
Problem: Skin  Goal: Participates in plan/prevention/treatment measures  Outcome: Progressing  Flowsheets (Taken 5/26/2025 8950 by Rick Arroyo RN)  Participates in plan/prevention/treatment measures:   Elevate heels   Discuss with provider PT/OT consult   Increase activity/out of bed for meals   The patient's goals for the shift include      The clinical goals for the shift include pt will continue towards baseline

## 2025-05-27 NOTE — PROGRESS NOTES
Edenilson Ness [id number]   Service: Internal Medicine / Hospitalist Date of service: 5/27/2025                                  Full Code                     Overnight Events: No new overnight events reported by patient or nursing.      Subjective     Edenilson Ness is a 64 y.o. male with PMHx of CVA in 2019 now wheelchair-bound, diabetes, anemia, HFrEF and atrial fib on apixaban who presented from [facility]. LKW was 5am today. In the ED he was hypotensive to 62/38, with loss of urine and slurring of words. BP did not respond to 2L IV fluids and he was placed on Levophed. He drank alcohol this morning. At the bedside he is very difficult to understand. He denies pain and SOB. He says he drinks one beer a day.  Remainder of ROS reviewed and negative except as indicated in HPI.      ED workup was significant for BUN/creatinine 122/6.36, ALT/AST 72/54, , troponins 15/13, lactate 2.9/5.0, WBC 4.7k, Hgb 8.6, pH 7.14, bicarb 14.6. ETOH was 70 (it was 98 three months ago). UA was indicative of infection. CTA chest was extremely limited and revealed no definite large or central pulmonary embolism; possible cardiomegaly with likely posterior atelectasis and/or minimal dependent edema; questionable enhancing nodule in the gastric fundus. CT brain was nonacute. The pt was tachypneic to 49, hypotensive to 52/39 mmHg and hypoxic to 73%.RA. The pt received vancomycin, Zosyn and 2L Lactated Ringers IVF. He was placed on Levophed and bicarb gtts and is being admitted to the ICU.      The pt's case and plan of care was discussed with the ED provider. The ED notes were reviewed in detail, as were prior outpatient and patient notes as part of the admission chart review. The pt is at high risk for cardiovascular, respiratory, infectious and neurological events including septic shock, ETOH withdrawal, MI, stroke and ESRD due to acute hypotension requiring pressor support, lactic acidosis and acute renal failure. The  decision was made to escalate care and admit the pt under ICU status.      5/26...............     No new additional report gathered from patient's nurse on [facility] rounding patient appeared  confused requesting a new room.  No acute complaints, no reported : chest pains, nausea, vomiting, dyspnea or abdominal pain.    5/27.............  Overnight events discussed with patient's nurse/[facility] rounds discussion..No reported: Seizure activities, respiratory distress, chest pains, epistaxis, nausea or vomiting.     Review of Systems:   Review of system otherwise negative if not aforementioned above in subjective.     Objective          Physical Exam      Constitutional:       Appearance: Patient appeared in no acute cardiopulmonary distress.     Comments: Patient alert and oriented to person , place and situation to some degree.Patient appeared more lucid today..  HEENT:      Head: Normocephalic and atraumatic.Trachea midline      Nose:No observed congestion or rhinorrhea.     Mouth/Throat: Mucous membranes Moist, Trachea appeared  midline.  Eyes:      Extraocular Movements: Extraocular movements intact.      Pupils: Pupils are equal, round, and reactive to light.      Comments: No scleral icterus or conjunctival injection appreciated.   Cardiovascular:      Rate and Rhythm: Normal rate and regular rhythm. No clicks rubs or gallops, normal S1 and S2.No peripheral stigmata of endocarditis appreciated.     Pulmonary:      Lung fields diminished without appreciation of adventitious sounds.  Abdominal:      General: Abdomen soft,  obese ,non-tender, active bowel sounds, no involuntary guarding or rebound tenderness appreciated.     Comments: None   Musculoskeletal:       Patient appeared to have full active range of motion for upper and lower extremities, no acute apparent joint deformity appreciated on examination.   No pitting edema or cyanosis appreciated.     Skin:     General: Skin is warm.      Coloration:  No jaundice      Findings: No abnormal appearing skin rashes or lesions that appeared acute noted on unclothed area of the skin..   Neurological:      General: No focal sensory or motor deficits appreciated, no meningeal signs appreciated.     Cranial Nerves: Cranial nerves II to XII appearing grossly intact.  Comment: Improved overall sensorium.     Genitals:  Deferred  Psychiatric:         The patient appeared to be displaying normal mood and affect at the time of evaluation.     Labs:           Lab Results   Component Value Date     GLUCOSE 65 (L) 05/26/2025     CALCIUM 8.0 (L) 05/26/2025      (L) 05/26/2025     K 3.9 05/26/2025     CO2 26 05/26/2025     CL 99 05/26/2025     BUN 37 (H) 05/26/2025     CREATININE 2.63 (H) 05/26/2025       Lab Results   Component Value Date    GLUCOSE 84 05/27/2025    CALCIUM 7.9 (L) 05/27/2025     (L) 05/27/2025    K 4.8 05/27/2025    CO2 21 05/27/2025     05/27/2025    BUN 39 (H) 05/27/2025    CREATININE 3.29 (H) 05/27/2025         Lab Results   Component Value Date     WBC 5.7 05/26/2025     HGB 8.3 (L) 05/26/2025     HCT 25.5 (L) 05/26/2025     MCV 93 05/26/2025      (L) 05/26/2025      Lab Results   Component Value Date    WBC 5.7 05/26/2025    HGB 8.3 (L) 05/26/2025    HCT 25.5 (L) 05/26/2025    MCV 93 05/26/2025     (L) 05/26/2025      [unfilled]   [unfilled]   No results found for the last 90 days.                  X-rays/ Images     [unfilled]   Radiology Results (last 21 days)    Procedure Component Value Units Date/Time   XR chest 1 view [[id number]] Collected: 05/25/25 1620   Order Status: Completed Updated: 05/25/25 1620   Narrative:     Interpreted By:  Yash Cortez,  STUDY:  XR CHEST 1 VIEW;  5/25/2025 2:54 pm      INDICATION:  Signs/Symptoms:check RIJ CVL.          COMPARISON:  08/28/2019      ACCESSION NUMBER(S):  [id number]      ORDERING CLINICIAN:  GUNNAR KING      FINDINGS:          AP portable view of the chest is obtained.   Limited exam due to  portable nature. Magnified cardiac silhouette. Mild interstitial  prominence diffusely. No effusion or pneumothorax.. Right central  line catheter terminates at the level of the SVC       Impression:     1. Cardiomegaly with mild interstitial prominence diffusely. Consider  mild CHF or pulmonary edema. Follow-up as clinically warranted..  2. Right central line catheter as described.              MACRO:  None      Signed by: Yash Cortez 5/25/2025 4:19 PM  Dictation workstation:   [id number]   CT angio chest for pulmonary embolism [[id number]] Collected: 05/25/25 1122   Order Status: Completed Updated: 05/25/25 1122   Narrative:     Interpreted By:  Yash Cortez,  STUDY:  CT ANGIO CHEST FOR PULMONARY EMBOLISM;  5/25/2025 10:23 am      INDICATION:  Signs/Symptoms:AMS, hypotensive.          COMPARISON:  None      ACCESSION NUMBER(S):  [id number]      ORDERING CLINICIAN:  KENTON STRATTON      TECHNIQUE:  Helical data acquisition of the chest was obtained after intravenous  administration of 100 ML Omnipaque 350, as per PE protocol. Images  were reformatted in coronal and sagittal planes. Axial and coronal  maximum intensity projection (MIP) images were created and reviewed.      FINDINGS:  POTENTIAL LIMITATIONS OF THE STUDY: The exam is extremely limited  borderline nondiagnostic due to suboptimal contrast phase. Also  limited by body habitus.      HEART AND VESSELS:  There are no discrete filling defects within main pulmonary artery  and its main right and left branches. However a more distal pulmonary  embolism cannot be excluded given suboptimal contrast phase..          Main pulmonary artery and its branches are normal in caliber.      The thoracic aorta normal in course and caliber.  No coronary artery calcifications are seen. Please note, the study is  not optimized for evaluation of coronary arteries.      Cardiomegaly.      There is no pericardial effusion seen.      MEDIASTINUM AND ERASMO,  LOWER NECK AND AXILLA:  The visualized thyroid gland is within normal limits.  No evidence of thoracic lymphadenopathy by CT criteria.  Esophagus appears within normal limits as seen.      LUNGS AND AIRWAYS:  The trachea and central airways are patent. No endobronchial lesion  is seen.      The bilateral lungs are hypoinflated. This may be due to poor  inspiration. Likely bibasilar atelectasis and/or scarring.      UPPER ABDOMEN:  The visualized subdiaphragmatic structures demonstrate hypodensity  within the midpole of the right kidney which may represent a simple  cyst but not fully characterized in this exam. Question of enhancing  nodule in the gastric fundus. This is approximately 17 mm in  diameter. A polyp or mass not excluded.      CHEST WALL AND OSSEOUS STRUCTURES:  Chest wall is within normal limits.  No acute osseous pathology.There are no suspicious osseous lesions.       Impression:     1. Extremely limited exam due to suboptimal contrast phase. Although  no definite large or central pulmonary embolism is seen, a more  distal pulmonary embolism not excluded given the limitations of the  study.  2. Cardiomegaly with low lung volumes which may be due to poor  inspiration. Likely posterior atelectasis and/or minimal dependent  edema.  3. Question of enhancing nodule in the gastric fundus. This is  approximately 17 mm in diameter. A polyp or mass not excluded.  Correlate with EGD.      MACRO:  Critical Finding:  See findings. Notification was initiated on  5/25/2025 at 11:21 am by  Yash Cortez.  (**-YCF-**)      Signed by: Yash Cortez 5/25/2025 11:21 AM  Dictation workstation:   [id number]   CT brain attack angio head and neck W and WO IV contrast [[id number]] Collected: 05/25/25 1034   Order Status: Completed Updated: 05/25/25 1034   Narrative:     Interpreted By:  Levon Waters,  STUDY:  CT BRAIN ATTACK ANGIO HEAD AND NECK W AND WO IV CONTRAST; ;  5/25/2025 10:22 am      INDICATION:  Signs/Symptoms:AMS,  LKW 5am? aphasia, ? L weakness.          COMPARISON:  CTA head and neck from 03/01/2024.      ACCESSION NUMBER(S):  [id number]      ORDERING CLINICIAN:  KENTON STRATTON      TECHNIQUE:  CTA of the head and neck was performed after administration of 100 mL  of Omnipaque 350 intravenously. Segmented MIPs are provided.      FINDINGS:  CTA head:      There are small foci of atherosclerotic calcifications located within  the precavernous, cavernous, and paraophthalmic segments of the  bilateral internal carotid arteries without luminal narrowing. There  is stable more concentric atherosclerotic calcification located  within the supraclinoid segment of the left ICA which may cause mild  luminal narrowing. Otherwise, there is no striking narrowing of the  visualized internal carotid arteries.      There is no narrowing of the visualized portions of the bilateral  anterior or middle cerebral arteries. There are small foci of  atherosclerotic calcifications located within the bilateral  intradural vertebral arteries. There is at most mild narrowing  involving the distal right intradural vertebral artery, unchanged  compared to the the prior CTA and MRA head from 2019.      There is mild luminal irregularity of the basilar artery primarily  due to noncalcified plaque. There is no narrowing of the visualized  portions of the bilateral posterior cerebral arteries. The lumen of  the P1 segment of the left posterior cerebral artery is smaller  compared to the right, likely normal variant as there is a left  posterior communicating artery which is visualized, unchanged.      There are no aneurysms.      CTA neck:      Aortic arch and origins of the great vessels from the aortic arch are  without luminal narrowing. There are atherosclerotic calcifications  within these vascular structures but there is no striking luminal  narrowing. Bilateral common carotid arteries are without luminal  narrowing. There are small foci of  atherosclerotic calcification  located within the left common carotid artery. There are small foci  of atherosclerotic calcification located within the bilateral carotid  bulbs without luminal narrowing. There is retropharyngeal course of  the right internal carotid artery. Bilateral vertebral arteries arise  from the subclavian arteries and demonstrate no luminal narrowing.      Additional findings:      There are osseous degenerative changes of the cervical spine.       Impression:     No occlusion of the visualized arteries in the head or neck.  Unchanged appearance of the these arteries compared to the prior CTA  study.      This study was interpreted at [facility].      MACRO:  None      Signed by: Levon Waters 5/25/2025 10:33 AM  Dictation workstation:   [id number]   CT brain attack head wo IV contrast [[id number]] Collected: 05/25/25 1028   Order Status: Completed Updated: 05/25/25 1028   Narrative:     Interpreted By:  Tu Hunter,  STUDY:  CT BRAIN ATTACK HEAD WO IV CONTRAST;  5/25/2025 10:13 am      INDICATION:  Signs/Symptoms:AMS, LKW 5am? aphasia.      COMPARISON:  None.      ACCESSION NUMBER(S):  [id number]      ORDERING CLINICIAN:  KENTON STRATTON      TECHNIQUE:  Noncontrast axial CT scan of head was performed.      FINDINGS:  Parenchyma: There is no intracranial hemorrhage. The grey-white  differentiation is intact. There is no mass effect or midline shift.  Patchy supratentorial hypodensity, nonspecific, but likely secondary  to mild chronic microvascular ischemia.      CSF Spaces: Mild generalized volume loss, with concordant ventricular  enlargement.      Extra-Axial Fluid: There is no extraaxial fluid collection.      Calvarium: The calvarium is unremarkable.      Paranasal sinuses: Visualized paranasal sinuses are clear.      Mastoids: Clear.      Orbits: Normal.      Soft tissues: Unremarkable.       Impression:     No acute intracranial hemorrhage,  mass effect, or CT apparent acute  infarct. Chronic microvascular ischemia and involutional changes.  Tu Hunter discussed the significance and urgency of this  critical finding by telephone with  KENTON AGUIRRE on 5/25/2025 at  10:27 am.  (**-RCF-**) Findings:  See findings.                  Signed by: Tu Hunter 5/25/2025 10:27 AM  Dictation workstation:   [id number]   Point of Care Ultrasound [[id number]] Resulted: 05/25/25 1009   Order Status: Completed Updated: 05/25/25 1851   Narrative:     Kenton Aguirre DO     5/25/2025  6:51 PM    Performed by: Kenton Aguirre DO  Authorized by: Kenton Aguirre DO    Cardiac Indications: hypotension                  Procedure: Cardiac Ultrasound    Findings:   Views: parasternal long, parasternal short, apical four and subxiphoid  The pericardial space was visualized and was NEGATIVE for a significant  pericardial effusion.  Activity: Ventricular contractions were visualized.  LV: LV systolic function was NORMAL.  RV: RV size was DILATED.    Impression:  Cardiac: The focused cardiac ultrasound exam had ABNORMAL findings as  specified.      Comments: Ivc Plethoric                 Medical Problems         Problem List         * (Principal) Acute renal failure     Conjunctivitis     Weakness     CVA (cerebral vascular accident) (Multi)     Type 2 diabetes mellitus without complication, with long-term current use of insulin     Hypertensive heart disease with CHF     Anemia of chronic disease     Admits to alcohol consumption     Localized swelling on right hand     Diarrhea     Muscle spasticity     Ac isch multi vasc territories stroke (Multi)     UTI (urinary tract infection)     Hypokalemia     SVT (supraventricular tachycardia)     Loose stools     Pneumonia     Skin irritation     Cough     Umbilical hernia                  Above medical problems may be reflective of historical medical problems that may have resolved and may not related to acute clinical  "condition/medical problems.     Clinical impression/plan:        Septic Shock  -Continue empirical  antibiotics.  -Continue stewardship as per infectious disease.  Septic shock of unclear source.     Acute renal failure, no hx CKD  Acute hypotension requiring pressor support  Pt seen by nephrology and SLED orders are in  All offending home meds held  Daily RFP  Continue empiric pending urine and blood cx, UA was +, pt does not meet SIRS/sepsis criteria     Acute hypoxic respiratory failure  Pt currently on 6L O2  Imaging does not reveal evidence of PNA  Wean O2 as tolerated to maintain O2 sat >91%, pt may need home O2 eval      ETOH abuse  Pt admits to drinking one beer a day \"my whole life\"  ETOH was 70 (it was 98 three months ago)  Start CIWA protocol     Chronic anemia  Baseline Hgb is 11-12, today is 8.6, likely etiology is ARF  Monitor Hgb and transfuse for Hgb <= 7.0     DVT ppx: apixaban        Disposition/additional care plan/interventions: 5/26/2025     Patient with high complexity characteristics and appeared to be at high risk for clinical decline/deterioration  and unpredictable clinical course.      Septic shock present on  admission     Continue stewardship and recommendation as per intensivist.     Continue weaning off vasopressor i, continue monitor hemodynamic status.     Continue empirical  antibiotics     Pathogen directed therapy in accordance with culture results and sensitivity data.     Continue stewardship as per infectious disease.  Septic shock of unclear source.     Acute confusional state/metabolic encephalopathy provisional report of improving.     Will continue close monitoring.     Care plan discussed with nephrologist.     Continue cefepime but monitor encephalopathy.     Monitor nephrotoxins     Monitor vancomycin trough     Continue to monitor urinary output     Avoid nephrotoxins continue to monitor renal function     Monitor for alcohol withdrawal symptoms ............continue " CIWA protocol     Antihypertensive therapy, metformin, chronic heart failure therapy currently on hold in light of hemodynamic disposition.     Disposition/additional care plan/interventions: 5/27/2025    Care time in excess of  55 mins    Lab data and microbiological data reviewed by me    Medication stewardship discussed with pharmacy    Worsening of renal indices today    Avoid nephrotoxins    Continue renal dosing of employed medications.    Microbiology data reviewed by me  urine culture in progress, C. difficile PCR not detected, blood cultures no growth x 1 day.    Worsening hyponatremia    Patient hemodynamic disposition continue to remain stable without need for vasopressor therapy at this time.    Continue empirical antibiotics    Continue judicious dosing of vancomycin........... monitor trough closely      Await recommendation from infectious disease multidrug-resistant organism risk.    Monitor fever curve    Monitor leukocytosis      Will discuss further antimicrobial stewardship with infectious disease.    Continue to monitor hemodynamic status closely.  Will revisit patient's chronic home medications.  Given baclofen potential for causing hypotension will hold, also recommend holding diuretics and antihypertensive therapy at this time.    Heart failure compensated, continue to hold chronic longitudinal therapy until hemodynamic status further improve patient presented with septic shock.    Continue to monitor hemodynamic status closely    Resumption of chronic home medications in accordance with clinical disposition.       Continue stewardship and recommendations as per intensivist. Patient seen in collaboration with intensivist and care plan discussed.            The patient was informed of differential diagnosis , work up , plan of care and possible sequelae of clinical disposition.Patient in agreement with plan of care. Further recommendations forthcoming in accordance with patient's clinical  disposition and response to care.     Discharge planning:Discharge timing to be determined.                    Dictation performed with assistance of voice recognition device therefore transcription errors are possible.

## 2025-05-27 NOTE — PROGRESS NOTES
"      Nephrology Progress Note      Nephrology following for shae.   Events over night: none    Feels better  BP better. UOP improving   Cr 3.29. Na 128  Up in chair. Denies SOB     /85   Pulse 66   Temp 35.4 °C (95.7 °F)   Resp 21   Ht 1.778 m (5' 10\")   Wt 106 kg (233 lb 11 oz)   SpO2 97%   BMI 33.53 kg/m²     Input / Output:  24 HR:   Intake/Output Summary (Last 24 hours) at 5/27/2025 1229  Last data filed at 5/27/2025 1215  Gross per 24 hour   Intake 790 ml   Output 1735 ml   Net -945 ml       Physical Exam   Alert and oriented, mild dysarthria  Neck: no JVD  CV: RRR  Lungs: CTA bilaterally  Abd: soft, NT, ND   Ext: trace lower extremity edema  Whyte yellow urine    Scheduled medications  Scheduled Medications[1]  Continuous medications  Continuous Medications[2]  PRN medications  PRN Medications[3]   Results from last 7 days   Lab Units 05/27/25  0424 05/26/25  0447 05/25/25  1031   SODIUM mmol/L 128*   < > 138   POTASSIUM mmol/L 4.8   < > 5.3   CHLORIDE mmol/L 105   < > 109*   CO2 mmol/L 21   < > 14*   BUN mg/dL 39*   < > 122*   CREATININE mg/dL 3.29*   < > 6.36*   CALCIUM mg/dL 7.9*   < > 9.1   PROTEIN TOTAL g/dL  --   --  6.3*   BILIRUBIN TOTAL mg/dL  --   --  0.4   ALK PHOS U/L  --   --  109   ALT U/L  --   --  72*   AST U/L  --   --  54*   GLUCOSE mg/dL 84   < > 92    < > = values in this interval not displayed.      Results from last 7 days   Lab Units 05/26/25  0447   MAGNESIUM mg/dL 1.42*      Results from last 7 days   Lab Units 05/26/25  0447 05/25/25  1031   WBC AUTO x10*3/uL 5.7 4.7   HEMOGLOBIN g/dL 8.3* 8.6*   HEMATOCRIT % 25.5* 26.7*   PLATELETS AUTO x10*3/uL 127* 146*        Assessment & Plan:       64-year-old gentleman with hx of diabetes, prior stroke, documented heart failure initially brought to the hospital concerning for stroke found to be hypotensive with concomitant renal failure hence nephrology was consulted     #1 acute kidney injury  - Last serum creatinine 0.94 mg/dL on " March 2024  - suspect ATN in setting of decrease EBV in setting of entresto/aldactone along with other antihypertensives  - on presentation oliguric with serum creatinine 6.36 mg/dL significant azotemia acidemia  -S/P SLED 5/25- Cr up slightly from yesterday. Good UOP and hypotension improved    Plan  -clinically improved  -s/p SLED 5/25  -off pressors  -UOP picking up  -electrolyte and acid base stable  -hold HD today and assess in AM   -avoid hypotension/nephrotoxins    Please call [phone] with any   Please message me through [employer] chat with any questions or concerns.     Curry Cherry PA-C  5/27/2025  12:29 PM     [facility]    [address], [address]   [place] [address]  Office: [phone]         [1] apixaban, 5 mg, oral, BID  atorvastatin, 80 mg, oral, Nightly  folic acid, 1 mg, oral, Daily  heparin, 2,500 Units, hemodialysis, Once  insulin lispro, 0-5 Units, subcutaneous, TID AC  multivitamin with minerals, 1 tablet, oral, Daily  thiamine, 100 mg, oral, Daily     [2]    [3] PRN medications: dextrose, dextrose, diazePAM, glucagon, glucagon, heparin, heparin, sennosides-docusate sodium

## 2025-05-27 NOTE — DISCHARGE INSTRUCTIONS
Question of enhancing nodule in the gastric fundus. This is   approximately 17 mm in diameter. A polyp or mass not excluded.   Correlate with EGD. ................. recommend expedient outpatient GI follow-up for evaluation   18

## 2025-05-27 NOTE — CARE PLAN
The patient's goals for the shift include      The clinical goals for the shift include Patient will maintain MAP above 60 this shift      Problem: Pain - Adult  Goal: Verbalizes/displays adequate comfort level or baseline comfort level  Outcome: Progressing     Problem: Safety - Adult  Goal: Free from fall injury  Outcome: Progressing     Problem: Discharge Planning  Goal: Discharge to home or other facility with appropriate resources  Outcome: Progressing     Problem: Chronic Conditions and Co-morbidities  Goal: Patient's chronic conditions and co-morbidity symptoms are monitored and maintained or improved  Outcome: Progressing     Problem: Nutrition  Goal: Nutrient intake appropriate for maintaining nutritional needs  Outcome: Progressing     Problem: Skin  Goal: Decreased wound size/increased tissue granulation at next dressing change  Outcome: Progressing  Flowsheets (Taken 5/26/2025 0850 by Rick Arroyo, RN)  Decreased wound size/increased tissue granulation at next dressing change:   Protective dressings over bony prominences   Promote sleep for wound healing  Goal: Participates in plan/prevention/treatment measures  Outcome: Progressing  Flowsheets (Taken 5/26/2025 0850 by Rick Arroyo, RN)  Participates in plan/prevention/treatment measures:   Elevate heels   Discuss with provider PT/OT consult   Increase activity/out of bed for meals  Goal: Prevent/manage excess moisture  Outcome: Progressing  Flowsheets (Taken 5/26/2025 0850 by Rick Arroyo, RN)  Prevent/manage excess moisture:   Monitor for/manage infection if present   Cleanse incontinence/protect with barrier cream   Moisturize dry skin  Goal: Prevent/minimize sheer/friction injuries  Outcome: Progressing  Flowsheets (Taken 5/26/2025 0850 by Rick Arroyo RN)  Prevent/minimize sheer/friction injuries:   Use pull sheet   HOB 30 degrees or less   Turn/reposition every 2 hours/use positioning/transfer devices  Goal: Promote/optimize  nutrition  Outcome: Progressing  Flowsheets (Taken 5/26/2025 0850 by Rick Arroyo, RN)  Promote/optimize nutrition: Monitor/record intake including meals  Goal: Promote skin healing  Outcome: Progressing  Flowsheets (Taken 5/26/2025 0850 by Rick Arroyo, RN)  Promote skin healing:   Assess skin/pad under line(s)/device(s)   Protective dressings over bony prominences   Turn/reposition every 2 hours/use positioning/transfer devices   Rotate device position/do not position patient on device   Ensure correct size (line/device) and apply per  instructions     Problem: Diabetes  Goal: Achieve decreasing blood glucose levels by end of shift  Outcome: Progressing  Goal: Increase stability of blood glucose readings by end of shift  Outcome: Progressing  Goal: Decrease in ketones present in urine by end of shift  Outcome: Progressing  Goal: Maintain electrolyte levels within acceptable range throughout shift  Outcome: Progressing  Goal: Maintain glucose levels >70mg/dl to <250mg/dl throughout shift  Outcome: Progressing  Goal: No changes in neurological exam by end of shift  Outcome: Progressing  Goal: Learn about and adhere to nutrition recommendations by end of shift  Outcome: Progressing  Goal: Vital signs within normal range for age by end of shift  Outcome: Progressing  Goal: Increase self care and/or family involovement by end of shift  Outcome: Progressing  Goal: Receive DSME education by end of shift  Outcome: Progressing     Problem: Fall/Injury  Goal: Not fall by end of shift  Outcome: Progressing  Goal: Be free from injury by end of the shift  Outcome: Progressing  Goal: Verbalize understanding of personal risk factors for fall in the hospital  Outcome: Progressing  Goal: Verbalize understanding of risk factor reduction measures to prevent injury from fall in the home  Outcome: Progressing  Goal: Use assistive devices by end of the shift  Outcome: Progressing  Goal: Pace activities to prevent  fatigue by end of the shift  Outcome: Progressing

## 2025-05-27 NOTE — PROGRESS NOTES
Edenilson Ness is a 64 y.o. male admitted for Acute renal failure. Pharmacy reviewed the patient's [id number] medications and allergies for accuracy.    The list below reflects the PTA list prior to pharmacy medication history. A summary a changes to the PTA medication list has been listed below. Please review each medication in order reconciliation for additional clarification and justification.    Source of information:  NF      Medications added:  ACETAMINOPHEN 325MG X2 Q 6 HOURS PRN  BISACODYL SUPP 10MG PRN  FLEET ENEMA INSERT 1 UNIT RECTALLY PRN  MESERET TUSSIN 10ML  EVERY 8 PRN  GUAIFENESIN --10 ML EVERY 8 PRN  LOPERAMIDE 2MG  1 Q12 PRN  MILK OF MAG GIVE 30 ML EVERY 72 HOURS PRN    Medications modified:    Medications to be removed:    Medications of concern:      Prior to Admission Medications   Prescriptions Last Dose Informant Patient Reported? Taking?   GLUCAGON, HCL, EMERGENCY KIT INJ Unknown  Yes No   Sig: Inject 1 mL into the muscle if needed (low blood sugar).   apixaban (Eliquis) 5 mg tablet 5/24/2025  Yes Yes   Sig: Take 1 tablet (5 mg) by mouth 2 times a day.   atorvastatin (Lipitor) 80 mg tablet 5/24/2025  Yes Yes   Sig: Take 1 tablet (80 mg) by mouth once daily at bedtime.   baclofen (Lioresal) 5 mg tablet 5/24/2025  Yes Yes   Sig: Take 1 tablet (5 mg) by mouth 2 times a day.   carvedilol (Coreg) 25 mg tablet 5/24/2025  Yes Yes   Sig: Take 2 tablets (50 mg) by mouth 2 times daily (morning and late afternoon).   furosemide (Lasix) 40 mg tablet 5/24/2025  Yes Yes   Sig: Take 1 tablet (40 mg) by mouth once daily.   glucose (Glucose GeL) 40 % gel oral gel Unknown  Yes No   Sig: Take by mouth. GIVE 1 APPLICATION BY MOUTH PRN   hydrALAZINE (Apresoline) 25 mg tablet 5/24/2025  Yes Yes   Sig: Take 1 tablet (25 mg) by mouth every 8 hours.   isosorbide dinitrate (Isordil) 20 mg tablet 5/24/2025  Yes Yes   Sig: Take 1 tablet (20 mg) by mouth 3 times a day.   melatonin 10 mg tablet 5/24/2025  Yes  Yes   Sig: Take 1 tablet (10 mg) by mouth once daily at bedtime.   metFORMIN (Glucophage) 500 mg tablet 5/24/2025  Yes Yes   Sig: Take 1 tablet (500 mg) by mouth 2 times a day.   sacubitriL-valsartan (Entresto) 24-26 mg tablet 5/24/2025  Yes Yes   Sig: Take 1 tablet by mouth 2 times a day.   sennosides (Senokot) 8.6 mg tablet Unknown  Yes No   Sig: Take 1 tablet (8.6 mg) by mouth once daily as needed for constipation.   spironolactone (Aldactone) 25 mg tablet 5/24/2025  Yes Yes   Sig: Take 1 tablet (25 mg) by mouth once daily.   thiamine 100 mg tablet 5/24/2025  Yes Yes   Sig: Take 1 tablet (100 mg) by mouth once daily.   zinc oxide 20 % ointment Unknown  Yes No   Sig: Apply topically. APPLY TO SCROTUM  AND GERARD AREA TOPICALLY DAILY EVERY DAY AND NIGHT SHIFT      Facility-Administered Medications: None       Yamini Beauchamp

## 2025-05-27 NOTE — PROGRESS NOTES
05/27/25 1500   Discharge Planning   Living Arrangements Other (Comment)   Support Systems Other (Comment)   Type of Residence Nursing home/residential care   Who is requesting discharge planning? Provider   Home or Post Acute Services Post acute facilities (Rehab/SNF/etc)   Type of Post Acute Facility Services Long term care   Expected Discharge Disposition Inter   Does the patient need discharge transport arranged? Yes   RoundTrip coordination needed? Yes   Has discharge transport been arranged? No     Per chart review pt admitted from [facility] [place], currently disoriented. SW requested [facility]NC send return referral to facility in [facility] confirmed pt is a LTC resident on Medicaid bed hold, can return with no barriers. SW will continue following to address discharge planning needs and will attempt to meet with pt pending improved mentation.    Rg Reveles, MSW, LSW ([id number])   Care Transitions

## 2025-05-28 LAB
ANION GAP SERPL CALC-SCNC: 12 MMOL/L (ref 10–20)
ATRIAL RATE: 0 BPM
BUN SERPL-MCNC: 44 MG/DL (ref 6–23)
CALCIUM SERPL-MCNC: 8.2 MG/DL (ref 8.6–10.3)
CHLORIDE SERPL-SCNC: 106 MMOL/L (ref 98–107)
CO2 SERPL-SCNC: 21 MMOL/L (ref 21–32)
CREAT SERPL-MCNC: 3.64 MG/DL (ref 0.5–1.3)
EGFRCR SERPLBLD CKD-EPI 2021: 18 ML/MIN/1.73M*2
ERYTHROCYTE [DISTWIDTH] IN BLOOD BY AUTOMATED COUNT: 14.3 % (ref 11.5–14.5)
GLUCOSE BLD MANUAL STRIP-MCNC: 122 MG/DL (ref 74–99)
GLUCOSE BLD MANUAL STRIP-MCNC: 73 MG/DL (ref 74–99)
GLUCOSE BLD MANUAL STRIP-MCNC: 76 MG/DL (ref 74–99)
GLUCOSE SERPL-MCNC: 75 MG/DL (ref 74–99)
HCT VFR BLD AUTO: 27.1 % (ref 41–52)
HGB BLD-MCNC: 8.8 G/DL (ref 13.5–17.5)
MCH RBC QN AUTO: 29.5 PG (ref 26–34)
MCHC RBC AUTO-ENTMCNC: 32.5 G/DL (ref 32–36)
MCV RBC AUTO: 91 FL (ref 80–100)
NRBC BLD-RTO: 0.4 /100 WBCS (ref 0–0)
PLATELET # BLD AUTO: 113 X10*3/UL (ref 150–450)
POTASSIUM SERPL-SCNC: 4.1 MMOL/L (ref 3.5–5.3)
PR INTERVAL: 53 MS
Q ONSET: 253 MS
QRS COUNT: 14 BEATS
QRS DURATION: 99 MS
QT INTERVAL: 427 MS
QTC CALCULATION(BAZETT): 444 MS
QTC FREDERICIA: 438 MS
R AXIS: -43 DEGREES
RBC # BLD AUTO: 2.98 X10*6/UL (ref 4.5–5.9)
SODIUM SERPL-SCNC: 135 MMOL/L (ref 136–145)
T AXIS: 103 DEGREES
T OFFSET: 467 MS
VENTRICULAR RATE: 65 BPM
WBC # BLD AUTO: 4.8 X10*3/UL (ref 4.4–11.3)

## 2025-05-28 PROCEDURE — 82374 ASSAY BLOOD CARBON DIOXIDE: CPT | Performed by: HOSPITALIST

## 2025-05-28 PROCEDURE — 85027 COMPLETE CBC AUTOMATED: CPT | Performed by: HOSPITALIST

## 2025-05-28 PROCEDURE — 80048 BASIC METABOLIC PNL TOTAL CA: CPT | Performed by: HOSPITALIST

## 2025-05-28 PROCEDURE — 36415 COLL VENOUS BLD VENIPUNCTURE: CPT | Performed by: HOSPITALIST

## 2025-05-28 PROCEDURE — 99233 SBSQ HOSP IP/OBS HIGH 50: CPT | Performed by: HOSPITALIST

## 2025-05-28 PROCEDURE — 82947 ASSAY GLUCOSE BLOOD QUANT: CPT

## 2025-05-28 PROCEDURE — 2500000001 HC RX 250 WO HCPCS SELF ADMINISTERED DRUGS (ALT 637 FOR MEDICARE OP): Performed by: HOSPITALIST

## 2025-05-28 PROCEDURE — 2060000001 HC INTERMEDIATE ICU ROOM DAILY

## 2025-05-28 PROCEDURE — 2500000001 HC RX 250 WO HCPCS SELF ADMINISTERED DRUGS (ALT 637 FOR MEDICARE OP): Performed by: NURSE PRACTITIONER

## 2025-05-28 RX ORDER — BACLOFEN 10 MG/1
5 TABLET ORAL 2 TIMES DAILY
Status: DISCONTINUED | OUTPATIENT
Start: 2025-05-28 | End: 2025-05-28

## 2025-05-28 RX ORDER — BACLOFEN 5 MG/1
2.5 TABLET ORAL 2 TIMES DAILY
Status: DISPENSED | OUTPATIENT
Start: 2025-05-28

## 2025-05-28 RX ORDER — ACETAMINOPHEN 500 MG
10 TABLET ORAL NIGHTLY PRN
Status: DISPENSED | OUTPATIENT
Start: 2025-05-28

## 2025-05-28 RX ADMIN — Medication 1 TABLET: at 08:36

## 2025-05-28 RX ADMIN — APIXABAN 5 MG: 5 TABLET, FILM COATED ORAL at 20:16

## 2025-05-28 RX ADMIN — ATORVASTATIN CALCIUM 80 MG: 40 TABLET, FILM COATED ORAL at 20:16

## 2025-05-28 RX ADMIN — BACLOFEN 2.5 MG: 5 TABLET ORAL at 20:24

## 2025-05-28 RX ADMIN — FOLIC ACID 1 MG: 1 TABLET ORAL at 08:36

## 2025-05-28 RX ADMIN — APIXABAN 5 MG: 5 TABLET, FILM COATED ORAL at 08:36

## 2025-05-28 RX ADMIN — THIAMINE HCL TAB 100 MG 100 MG: 100 TAB at 08:36

## 2025-05-28 RX ADMIN — Medication 10 MG: at 20:16

## 2025-05-28 ASSESSMENT — LIFESTYLE VARIABLES
NAUSEA AND VOMITING: NO NAUSEA AND NO VOMITING
HEADACHE, FULLNESS IN HEAD: NOT PRESENT
TOTAL SCORE: 3
ORIENTATION AND CLOUDING OF SENSORIUM: DISORIENTED FOR DATE BY MORE THAN 2 CALENDAR DAYS
VISUAL DISTURBANCES: NOT PRESENT
PAROXYSMAL SWEATS: NO SWEAT VISIBLE
AGITATION: NORMAL ACTIVITY
TREMOR: NO TREMOR
AUDITORY DISTURBANCES: NOT PRESENT
ANXIETY: NO ANXIETY, AT EASE

## 2025-05-28 ASSESSMENT — COGNITIVE AND FUNCTIONAL STATUS - GENERAL
DRESSING REGULAR UPPER BODY CLOTHING: A LOT
DRESSING REGULAR LOWER BODY CLOTHING: A LOT
MOVING FROM LYING ON BACK TO SITTING ON SIDE OF FLAT BED WITH BEDRAILS: A LITTLE
PERSONAL GROOMING: A LITTLE
MOVING TO AND FROM BED TO CHAIR: A LOT
HELP NEEDED FOR BATHING: A LOT
WALKING IN HOSPITAL ROOM: TOTAL
TURNING FROM BACK TO SIDE WHILE IN FLAT BAD: A LOT
MOBILITY SCORE: 10
STANDING UP FROM CHAIR USING ARMS: TOTAL
TOILETING: A LOT
CLIMB 3 TO 5 STEPS WITH RAILING: TOTAL
DAILY ACTIVITIY SCORE: 15

## 2025-05-28 ASSESSMENT — PAIN - FUNCTIONAL ASSESSMENT
PAIN_FUNCTIONAL_ASSESSMENT: 0-10

## 2025-05-28 ASSESSMENT — PAIN SCALES - GENERAL
PAINLEVEL_OUTOF10: 0 - NO PAIN

## 2025-05-28 NOTE — CARE PLAN
The clinical goals for the shift include patient will maintain MAP > 65 throughout shift      Problem: Pain - Adult  Goal: Verbalizes/displays adequate comfort level or baseline comfort level  Outcome: Progressing     Problem: Safety - Adult  Goal: Free from fall injury  Outcome: Progressing     Problem: Discharge Planning  Goal: Discharge to home or other facility with appropriate resources  Outcome: Progressing     Problem: Chronic Conditions and Co-morbidities  Goal: Patient's chronic conditions and co-morbidity symptoms are monitored and maintained or improved  Outcome: Progressing     Problem: Nutrition  Goal: Nutrient intake appropriate for maintaining nutritional needs  Outcome: Progressing     Problem: Diabetes  Goal: Achieve decreasing blood glucose levels by end of shift  Outcome: Progressing  Goal: Increase stability of blood glucose readings by end of shift  Outcome: Progressing  Goal: Decrease in ketones present in urine by end of shift  Outcome: Progressing  Goal: Maintain electrolyte levels within acceptable range throughout shift  Outcome: Progressing  Goal: Maintain glucose levels >70mg/dl to <250mg/dl throughout shift  Outcome: Progressing  Goal: No changes in neurological exam by end of shift  Outcome: Progressing  Goal: Learn about and adhere to nutrition recommendations by end of shift  Outcome: Progressing  Goal: Vital signs within normal range for age by end of shift  Outcome: Progressing  Goal: Increase self care and/or family involovement by end of shift  Outcome: Progressing  Goal: Receive DSME education by end of shift  Outcome: Progressing     Problem: Fall/Injury  Goal: Not fall by end of shift  Outcome: Progressing  Goal: Be free from injury by end of the shift  Outcome: Progressing  Goal: Verbalize understanding of personal risk factors for fall in the hospital  Outcome: Progressing  Goal: Verbalize understanding of risk factor reduction measures to prevent injury from fall in the  home  Outcome: Progressing  Goal: Use assistive devices by end of the shift  Outcome: Progressing  Goal: Pace activities to prevent fatigue by end of the shift  Outcome: Progressing     Problem: Skin  Goal: Participates in plan/prevention/treatment measures  Outcome: Progressing  Flowsheets (Taken 5/28/2025 0750)  Participates in plan/prevention/treatment measures:   Discuss with provider PT/OT consult   Elevate heels  Goal: Prevent/manage excess moisture  Outcome: Progressing  Flowsheets (Taken 5/28/2025 0750)  Prevent/manage excess moisture:   Cleanse incontinence/protect with barrier cream   Moisturize dry skin   Monitor for/manage infection if present  Goal: Prevent/minimize sheer/friction injuries  Outcome: Progressing  Flowsheets (Taken 5/28/2025 0750)  Prevent/minimize sheer/friction injuries:   Turn/reposition every 2 hours/use positioning/transfer devices   Use pull sheet  Goal: Promote/optimize nutrition  Outcome: Progressing  Flowsheets (Taken 5/28/2025 0750)  Promote/optimize nutrition:   Assist with feeding   Consume > 50% meals/supplements   Monitor/record intake including meals   Reassess MST if dietician not consulted

## 2025-05-28 NOTE — PROGRESS NOTES
"      Nephrology Progress Note      Nephrology following for shae.   Events over night:     Feels better  Remains off pressors  UOP 1 L last night  Taking some po       BP (!) 133/97   Pulse 65   Temp 36 °C (96.8 °F)   Resp 13   Ht 1.778 m (5' 10\")   Wt 104 kg (228 lb 9.9 oz)   SpO2 96%   BMI 32.80 kg/m²     Input / Output:  24 HR:   Intake/Output Summary (Last 24 hours) at 5/28/2025 1011  Last data filed at 5/28/2025 0800  Gross per 24 hour   Intake 240 ml   Output 1695 ml   Net -1455 ml       Physical Exam   Alert and oriented, mild dysarthria  Neck: no JVD  CV: RRR  Lungs: CTA bilaterally  Abd: soft, NT, ND   Ext: trace lower extremity edema  Whyte     Scheduled medications  Scheduled Medications[1]  Continuous medications  Continuous Medications[2]  PRN medications  PRN Medications[3]   Results from last 7 days   Lab Units 05/28/25 0423 05/26/25 0447 05/25/25  1031   SODIUM mmol/L 135*   < > 138   POTASSIUM mmol/L 4.1   < > 5.3   CHLORIDE mmol/L 106   < > 109*   CO2 mmol/L 21   < > 14*   BUN mg/dL 44*   < > 122*   CREATININE mg/dL 3.64*   < > 6.36*   CALCIUM mg/dL 8.2*   < > 9.1   PROTEIN TOTAL g/dL  --   --  6.3*   BILIRUBIN TOTAL mg/dL  --   --  0.4   ALK PHOS U/L  --   --  109   ALT U/L  --   --  72*   AST U/L  --   --  54*   GLUCOSE mg/dL 75   < > 92    < > = values in this interval not displayed.      Results from last 7 days   Lab Units 05/26/25  0447   MAGNESIUM mg/dL 1.42*      Results from last 7 days   Lab Units 05/28/25  0423 05/26/25  0447 05/25/25  1031   WBC AUTO x10*3/uL 4.8 5.7 4.7   HEMOGLOBIN g/dL 8.8* 8.3* 8.6*   HEMATOCRIT % 27.1* 25.5* 26.7*   PLATELETS AUTO x10*3/uL 113* 127* 146*        Assessment & Plan:       64-year-old gentleman with hx of diabetes, prior stroke, documented heart failure initially brought to the hospital concerning for stroke found to be hypotensive with concomitant renal failure hence nephrology was consulted     #1 acute kidney injury  - Last serum creatinine " 0.94 mg/dL on March 2024  - suspect ATN in setting of decrease EBV in setting of entresto/aldactone along with other antihypertensives  - on presentation oliguric with serum creatinine 6.36 mg/dL significant azotemia acidemia   -R  internal jugular temp catheter  -5/26- 8 hour SLED x 1, Cr is rising off SLED but UOP is picking up         Plan  -clinically improved  -remains oliguric  -electrolyte and acid base stable  -hold HD again  today  -re-evaluate tomorrow for need of RRT  -avoid hypotension/nephrotoxins    Please message me through [employer] chat with any questions or concerns.     Olive Dempsey DO  5/28/2025  10:11 AM     [facility]    [address], [address]   [place] [address]  Office: [phone]         [1] apixaban, 5 mg, oral, BID  atorvastatin, 80 mg, oral, Nightly  folic acid, 1 mg, oral, Daily  heparin, 2,500 Units, hemodialysis, Once  insulin lispro, 0-5 Units, subcutaneous, TID AC  multivitamin with minerals, 1 tablet, oral, Daily  thiamine, 100 mg, oral, Daily     [2]    [3] PRN medications: alteplase, dextrose, dextrose, diazePAM, glucagon, glucagon, heparin, heparin, sennosides-docusate sodium

## 2025-05-28 NOTE — PROGRESS NOTES
Edenilson Ness is a 64 y.o. male on day 3 of admission presenting with Acute renal failure.      Assessment/Plan:     #Shock, undifferentiated, POA- resolved  No obvious signs of infection.  CT chest not concerning for pneumonia.  Blood cultures pending at this time.  UA does show yeast.  Zosyn discontinued to avoid combination of Zosyn plus vancomycin 2/2 nephrotoxicity.  Cefepime, Vanco micafungin discontinued as cultures have been negative so far.  Shock likely due to acidosis and less likely due to infection     #Acute kidney injury  Nephrology consulted.  Initially got sled.  Remains oliguric     #Bicytopenia  Anemia and thrombocytopenia.  Low platelets could be due to sepsis.  Management per primary     #Transaminitis  Likely secondary to sepsis.  Monitor LFTs     CVA, DM  HFrEF  A-fib     Recommendations:     - Monitor off antibiotics  - Monitor blood cultures, urine cultures  - Will continue to follow    Jonathan Parham MD  Date of service: 5/28/2025  Time of service: 2:34 PM      Subjective   Interval History: No acute events overnight.  Patient sleeping        Review of Systems  Unable to obtain    Objective   Range of Vitals (last 24 hours)  Heart Rate:  [59-77]   Temp:  [35.9 °C (96.6 °F)-36.2 °C (97.2 °F)]   Resp:  [9-24]   BP: ()/()   Weight:  [104 kg (228 lb 9.9 oz)]   SpO2:  [93 %-99 %]  Daily Weight  05/28/25 : 104 kg (228 lb 9.9 oz)   Body mass index is 32.8 kg/m².    General: Alert and awake, NAD  HEENT: conjunctival pallor, no scleral icterus  Neck:  RIJ dialysis catheter  Heart: Irregular rhythm  Abdomen: soft, non tender, non distended  Neuro: AAO x 3, B/l LE weakness,   Extremities: RLE surgical scar on the medial side  Skin: No rashes or ulcers       Antibiotics  This patient does not have an active medication from one of the medication groupers.      Relevant Results  Labs  Results from last 72 hours   Lab Units 05/28/25  0423 05/26/25  0447   WBC AUTO x10*3/uL 4.8 5.7   HEMOGLOBIN  "g/dL 8.8* 8.3*   HEMATOCRIT % 27.1* 25.5*   PLATELETS AUTO x10*3/uL 113* 127*     Results from last 72 hours   Lab Units 05/28/25  0423 05/27/25  0424 05/26/25  0447   SODIUM mmol/L 135* 128* 132*   POTASSIUM mmol/L 4.1 4.8 3.9   CHLORIDE mmol/L 106 105 99   CO2 mmol/L 21 21 26   BUN mg/dL 44* 39* 37*   CREATININE mg/dL 3.64* 3.29* 2.63*   GLUCOSE mg/dL 75 84 65*   CALCIUM mg/dL 8.2* 7.9* 8.0*   ANION GAP mmol/L 12 7* 11   EGFR mL/min/1.73m*2 18* 20* 27*   PHOSPHORUS mg/dL  --   --  2.3*     Results from last 72 hours   Lab Units 05/26/25  0447   ALBUMIN g/dL 3.3*     Estimated Creatinine Clearance: 25.4 mL/min (A) (by C-G formula based on SCr of 3.64 mg/dL (H)).  No results found for: \"CRP\"    Microbiology  No results found for the last 14 days.      Imaging    XR chest 1 view  Result Date: 5/25/2025  1. Cardiomegaly with mild interstitial prominence diffusely. Consider mild CHF or pulmonary edema. Follow-up as clinically warranted.. 2. Right central line catheter as described.       MACRO: None   Signed by: Yash Cortez 5/25/2025 4:19 PM Dictation workstation:   [id number]    CT angio chest for pulmonary embolism  Result Date: 5/25/2025  1. Extremely limited exam due to suboptimal contrast phase. Although no definite large or central pulmonary embolism is seen, a more distal pulmonary embolism not excluded given the limitations of the study. 2. Cardiomegaly with low lung volumes which may be due to poor inspiration. Likely posterior atelectasis and/or minimal dependent edema. 3. Question of enhancing nodule in the gastric fundus. This is approximately 17 mm in diameter. A polyp or mass not excluded. Correlate with EGD.   MACRO: Critical Finding:  See findings. Notification was initiated on 5/25/2025 at 11:21 am by  Yash Cortez.  (**-YCF-**)   Signed by: Yash Cortez 5/25/2025 11:21 AM Dictation workstation:   [id number]    CT brain attack angio head and neck W and WO IV contrast  Result Date: 5/25/2025  No " occlusion of the visualized arteries in the head or neck. Unchanged appearance of the these arteries compared to the prior CTA study.   This study was interpreted at [facility].   MACRO: None   Signed by: Levon Waters 5/25/2025 10:33 AM Dictation workstation:   [id number]    CT brain attack head wo IV contrast  Result Date: 5/25/2025  No acute intracranial hemorrhage, mass effect, or CT apparent acute infarct. Chronic microvascular ischemia and involutional changes. Tu Hunter discussed the significance and urgency of this critical finding by telephone with  KENTON STRATTON on 5/25/2025 at 10:27 am.  (**-RCF-**) Findings:  See findings.         Signed by: Tu Hunter 5/25/2025 10:27 AM Dictation workstation:   [id number]

## 2025-05-28 NOTE — PROGRESS NOTES
Edenilson Ness [id number]   Service: Internal Medicine / Hospitalist Date of service: 5/28/2025                                  Full Code                     Overnight Events: No new overnight events reported by patient or nursing.      Subjective     Edenilson Ness is a 64 y.o. male with PMHx of CVA in 2019 now wheelchair-bound, diabetes, anemia, HFrEF and atrial fib on apixaban who presented from [facility]. LKW was 5am today. In the ED he was hypotensive to 62/38, with loss of urine and slurring of words. BP did not respond to 2L IV fluids and he was placed on Levophed. He drank alcohol this morning. At the bedside he is very difficult to understand. He denies pain and SOB. He says he drinks one beer a day.  Remainder of ROS reviewed and negative except as indicated in HPI.      ED workup was significant for BUN/creatinine 122/6.36, ALT/AST 72/54, , troponins 15/13, lactate 2.9/5.0, WBC 4.7k, Hgb 8.6, pH 7.14, bicarb 14.6. ETOH was 70 (it was 98 three months ago). UA was indicative of infection. CTA chest was extremely limited and revealed no definite large or central pulmonary embolism; possible cardiomegaly with likely posterior atelectasis and/or minimal dependent edema; questionable enhancing nodule in the gastric fundus. CT brain was nonacute. The pt was tachypneic to 49, hypotensive to 52/39 mmHg and hypoxic to 73%.RA. The pt received vancomycin, Zosyn and 2L Lactated Ringers IVF. He was placed on Levophed and bicarb gtts and is being admitted to the ICU.      The pt's case and plan of care was discussed with the ED provider. The ED notes were reviewed in detail, as were prior outpatient and patient notes as part of the admission chart review. The pt is at high risk for cardiovascular, respiratory, infectious and neurological events including septic shock, ETOH withdrawal, MI, stroke and ESRD due to acute hypotension requiring pressor support, lactic acidosis and acute renal failure. The  decision was made to escalate care and admit the pt under ICU status.      5/26...............     No new additional report gathered from patient's nurse on [facility] rounding patient appeared  confused requesting a new room.  No acute complaints, no reported : chest pains, nausea, vomiting, dyspnea or abdominal pain.     5/27.............  Overnight events discussed with patient's nurse/[facility] rounds discussion..No reported: Seizure activities, respiratory distress, chest pains, epistaxis, nausea or vomiting.    5/28................   No overnight events reported by patient's nurse.  Appeared quite engaging today.  Wondering when he can be discharged home.  No acute complaints voiced by patient at the time of evaluation this morning.  Specifically no reported: chest pains, headaches, nausea, vomiting, abdominal pain, fevers or chills.  Review of Systems:   Review of system otherwise negative if not aforementioned above in subjective.     Objective           Physical Exam      Constitutional:       Appearance: Patient appeared in no acute cardiopulmonary distress.     Comments: Patient alert and oriented to person , place and situation to some degree.Patient appeared non-toxic.  HEENT:      Head: Normocephalic and atraumatic.Trachea midline      Nose:No observed congestion or rhinorrhea.     Mouth/Throat: Mucous membranes Moist, Trachea appeared  midline.  Eyes:      Extraocular Movements: Extraocular movements intact.      Pupils: Pupils are equal, round, and reactive to light.      Comments: No scleral icterus or conjunctival injection appreciated.   Cardiovascular:      Rate and Rhythm: Normal rate and regular rhythm. No clicks rubs or gallops, normal S1 and S2.No peripheral stigmata of endocarditis appreciated.     Pulmonary:      Lung fields remained diminished without appreciation of adventitious sounds.  Abdominal:      General: Abdomen soft,  obese ,non-tender, active bowel sounds, no involuntary guarding or  rebound tenderness appreciated.     Comments: None   Musculoskeletal:       Patient appeared to have full active range of motion for upper and lower extremities, no acute apparent joint deformity appreciated on examination.   No pitting edema or cyanosis appreciated.     Skin:     General: Skin is warm.      Coloration:  No jaundice     Findings: No abnormal appearing skin rashes or lesions that appeared acute noted on unclothed area of the skin..   Neurological:      General: No new focal sensory or motor deficits appreciated.     Cranial Nerves: Cranial nerves II to XII appearing grossly intact.       Genitals:  Deferred  Psychiatric:         The patient appeared to be displaying normal mood and affect at the time of evaluation.     Labs:     Lab Results   Component Value Date    GLUCOSE 75 05/28/2025    CALCIUM 8.2 (L) 05/28/2025     (L) 05/28/2025    K 4.1 05/28/2025    CO2 21 05/28/2025     05/28/2025    BUN 44 (H) 05/28/2025    CREATININE 3.64 (H) 05/28/2025      Lab Results   Component Value Date    WBC 4.8 05/28/2025    HGB 8.8 (L) 05/28/2025    HCT 27.1 (L) 05/28/2025    MCV 91 05/28/2025     (L) 05/28/2025         [unfilled]   [unfilled]   No results found for the last 90 days.                  X-rays/ Images     [unfilled]   Radiology Results (last 21 days)     Procedure Component Value Units Date/Time   XR chest 1 view [[id number]] Collected: 05/25/25 1620   Order Status: Completed Updated: 05/25/25 1620   Narrative:     Interpreted By:  Yash Cortez,  STUDY:  XR CHEST 1 VIEW;  5/25/2025 2:54 pm      INDICATION:  Signs/Symptoms:check RIJ CVL.          COMPARISON:  08/28/2019      ACCESSION NUMBER(S):  [id number]      ORDERING CLINICIAN:  GUNNAR KING      FINDINGS:          AP portable view of the chest is obtained.  Limited exam due to  portable nature. Magnified cardiac silhouette. Mild interstitial  prominence diffusely. No effusion or pneumothorax.. Right  central  line catheter terminates at the level of the SVC       Impression:     1. Cardiomegaly with mild interstitial prominence diffusely. Consider  mild CHF or pulmonary edema. Follow-up as clinically warranted..  2. Right central line catheter as described.              MACRO:  None      Signed by: Yash Cortez 5/25/2025 4:19 PM  Dictation workstation:   [id number]   CT angio chest for pulmonary embolism [[id number]] Collected: 05/25/25 1122   Order Status: Completed Updated: 05/25/25 1122   Narrative:     Interpreted By:  Yash Cortez,  STUDY:  CT ANGIO CHEST FOR PULMONARY EMBOLISM;  5/25/2025 10:23 am      INDICATION:  Signs/Symptoms:AMS, hypotensive.          COMPARISON:  None      ACCESSION NUMBER(S):  [id number]      ORDERING CLINICIAN:  KENTON STRATTON      TECHNIQUE:  Helical data acquisition of the chest was obtained after intravenous  administration of 100 ML Omnipaque 350, as per PE protocol. Images  were reformatted in coronal and sagittal planes. Axial and coronal  maximum intensity projection (MIP) images were created and reviewed.      FINDINGS:  POTENTIAL LIMITATIONS OF THE STUDY: The exam is extremely limited  borderline nondiagnostic due to suboptimal contrast phase. Also  limited by body habitus.      HEART AND VESSELS:  There are no discrete filling defects within main pulmonary artery  and its main right and left branches. However a more distal pulmonary  embolism cannot be excluded given suboptimal contrast phase..          Main pulmonary artery and its branches are normal in caliber.      The thoracic aorta normal in course and caliber.  No coronary artery calcifications are seen. Please note, the study is  not optimized for evaluation of coronary arteries.      Cardiomegaly.      There is no pericardial effusion seen.      MEDIASTINUM AND ERASMO, LOWER NECK AND AXILLA:  The visualized thyroid gland is within normal limits.  No evidence of thoracic lymphadenopathy by CT criteria.  Esophagus  appears within normal limits as seen.      LUNGS AND AIRWAYS:  The trachea and central airways are patent. No endobronchial lesion  is seen.      The bilateral lungs are hypoinflated. This may be due to poor  inspiration. Likely bibasilar atelectasis and/or scarring.      UPPER ABDOMEN:  The visualized subdiaphragmatic structures demonstrate hypodensity  within the midpole of the right kidney which may represent a simple  cyst but not fully characterized in this exam. Question of enhancing  nodule in the gastric fundus. This is approximately 17 mm in  diameter. A polyp or mass not excluded.      CHEST WALL AND OSSEOUS STRUCTURES:  Chest wall is within normal limits.  No acute osseous pathology.There are no suspicious osseous lesions.       Impression:     1. Extremely limited exam due to suboptimal contrast phase. Although  no definite large or central pulmonary embolism is seen, a more  distal pulmonary embolism not excluded given the limitations of the  study.  2. Cardiomegaly with low lung volumes which may be due to poor  inspiration. Likely posterior atelectasis and/or minimal dependent  edema.  3. Question of enhancing nodule in the gastric fundus. This is  approximately 17 mm in diameter. A polyp or mass not excluded.  Correlate with EGD.      MACRO:  Critical Finding:  See findings. Notification was initiated on  5/25/2025 at 11:21 am by  Yash Cortez.  (**-YCF-**)      Signed by: Yash Cortez 5/25/2025 11:21 AM  Dictation workstation:   [id number]   CT brain attack angio head and neck W and WO IV contrast [[id number]] Collected: 05/25/25 1034   Order Status: Completed Updated: 05/25/25 1034   Narrative:     Interpreted By:  Levon Waters,  STUDY:  CT BRAIN ATTACK ANGIO HEAD AND NECK W AND WO IV CONTRAST; ;  5/25/2025 10:22 am      INDICATION:  Signs/Symptoms:AMS, LKW 5am? aphasia, ? L weakness.          COMPARISON:  CTA head and neck from 03/01/2024.      ACCESSION NUMBER(S):  [id number]      ORDERING  CLINICIAN:  KENTON STRATTON      TECHNIQUE:  CTA of the head and neck was performed after administration of 100 mL  of Omnipaque 350 intravenously. Segmented MIPs are provided.      FINDINGS:  CTA head:      There are small foci of atherosclerotic calcifications located within  the precavernous, cavernous, and paraophthalmic segments of the  bilateral internal carotid arteries without luminal narrowing. There  is stable more concentric atherosclerotic calcification located  within the supraclinoid segment of the left ICA which may cause mild  luminal narrowing. Otherwise, there is no striking narrowing of the  visualized internal carotid arteries.      There is no narrowing of the visualized portions of the bilateral  anterior or middle cerebral arteries. There are small foci of  atherosclerotic calcifications located within the bilateral  intradural vertebral arteries. There is at most mild narrowing  involving the distal right intradural vertebral artery, unchanged  compared to the the prior CTA and MRA head from 2019.      There is mild luminal irregularity of the basilar artery primarily  due to noncalcified plaque. There is no narrowing of the visualized  portions of the bilateral posterior cerebral arteries. The lumen of  the P1 segment of the left posterior cerebral artery is smaller  compared to the right, likely normal variant as there is a left  posterior communicating artery which is visualized, unchanged.      There are no aneurysms.      CTA neck:      Aortic arch and origins of the great vessels from the aortic arch are  without luminal narrowing. There are atherosclerotic calcifications  within these vascular structures but there is no striking luminal  narrowing. Bilateral common carotid arteries are without luminal  narrowing. There are small foci of atherosclerotic calcification  located within the left common carotid artery. There are small foci  of atherosclerotic calcification located within the  bilateral carotid  bulbs without luminal narrowing. There is retropharyngeal course of  the right internal carotid artery. Bilateral vertebral arteries arise  from the subclavian arteries and demonstrate no luminal narrowing.      Additional findings:      There are osseous degenerative changes of the cervical spine.       Impression:     No occlusion of the visualized arteries in the head or neck.  Unchanged appearance of the these arteries compared to the prior CTA  study.      This study was interpreted at [facility].      MACRO:  None      Signed by: Levon Waters 5/25/2025 10:33 AM  Dictation workstation:   [id number]   CT brain attack head wo IV contrast [[id number]] Collected: 05/25/25 1028   Order Status: Completed Updated: 05/25/25 1028   Narrative:     Interpreted By:  Tu Hunter,  STUDY:  CT BRAIN ATTACK HEAD WO IV CONTRAST;  5/25/2025 10:13 am      INDICATION:  Signs/Symptoms:AMS, LKW 5am? aphasia.      COMPARISON:  None.      ACCESSION NUMBER(S):  [id number]      ORDERING CLINICIAN:  KENTON STRATOTN      TECHNIQUE:  Noncontrast axial CT scan of head was performed.      FINDINGS:  Parenchyma: There is no intracranial hemorrhage. The grey-white  differentiation is intact. There is no mass effect or midline shift.  Patchy supratentorial hypodensity, nonspecific, but likely secondary  to mild chronic microvascular ischemia.      CSF Spaces: Mild generalized volume loss, with concordant ventricular  enlargement.      Extra-Axial Fluid: There is no extraaxial fluid collection.      Calvarium: The calvarium is unremarkable.      Paranasal sinuses: Visualized paranasal sinuses are clear.      Mastoids: Clear.      Orbits: Normal.      Soft tissues: Unremarkable.       Impression:     No acute intracranial hemorrhage, mass effect, or CT apparent acute  infarct. Chronic microvascular ischemia and involutional changes.  Tu Hunter discussed the significance and  urgency of this  critical finding by telephone with  KENTON AGUIRRE on 5/25/2025 at  10:27 am.  (**-RCF-**) Findings:  See findings.                  Signed by: Tu Hunter 5/25/2025 10:27 AM  Dictation workstation:   [id number]   Point of Care Ultrasound [[id number]] Resulted: 05/25/25 1009   Order Status: Completed Updated: 05/25/25 1851   Narrative:     Kenton Aguirre DO     5/25/2025  6:51 PM    Performed by: Kenton Aguirre DO  Authorized by: Kenton Aguirre DO    Cardiac Indications: hypotension                  Procedure: Cardiac Ultrasound    Findings:   Views: parasternal long, parasternal short, apical four and subxiphoid  The pericardial space was visualized and was NEGATIVE for a significant  pericardial effusion.  Activity: Ventricular contractions were visualized.  LV: LV systolic function was NORMAL.  RV: RV size was DILATED.    Impression:  Cardiac: The focused cardiac ultrasound exam had ABNORMAL findings as  specified.      Comments: Ivc Plethoric                  Medical Problems         Problem List         * (Principal) Acute renal failure     Conjunctivitis     Weakness     CVA (cerebral vascular accident) (Multi)     Type 2 diabetes mellitus without complication, with long-term current use of insulin     Hypertensive heart disease with CHF     Anemia of chronic disease     Admits to alcohol consumption     Localized swelling on right hand     Diarrhea     Muscle spasticity     Ac isch multi vasc territories stroke (Multi)     UTI (urinary tract infection)     Hypokalemia     SVT (supraventricular tachycardia)     Loose stools     Pneumonia     Skin irritation     Cough     Umbilical hernia                  Above medical problems may be reflective of historical medical problems that may have resolved and may not related to acute clinical condition/medical problems.     Clinical impression/plan:        Undifferentiated shock  -Monitor offempirical  antibiotics.  -Continue stewardship as per  "infectious disease.         Acute renal failure, no hx CKD  Acute hypotension requiring pressor support  Pt seen by nephrology and SLED orders are in  All offending home meds held  Daily RFP  Continue empiric pending urine and blood cx, UA was +, pt does not meet SIRS/sepsis criteria     Acute hypoxic respiratory failure  Pt currently on 6L O2  Imaging does not reveal evidence of PNA  Wean O2 as tolerated to maintain O2 sat >91%, pt may need home O2 eval      ETOH abuse  Pt admits to drinking one beer a day \"my whole life\"  ETOH was 70 (it was 98 three months ago)  Start [facility] protocol     Chronic anemia  Baseline Hgb is 11-12, today is 8.6, likely etiology is ARF  Monitor Hgb and transfuse for Hgb <= 7.0     DVT ppx: apixaban        Disposition/additional care plan/interventions: 5/26/2025     Patient with high complexity characteristics and appeared to be at high risk for clinical decline/deterioration  and unpredictable clinical course.      Septic shock present on  admission     Continue stewardship and recommendation as per intensivist.     Continue weaning off vasopressor i, continue monitor hemodynamic status.     Continue empirical  antibiotics     Pathogen directed therapy in accordance with culture results and sensitivity data.     Continue stewardship as per infectious disease.  Septic shock of unclear source.     Acute confusional state/metabolic encephalopathy provisional report of improving.     Will continue close monitoring.     Care plan discussed with nephrologist.     Continue cefepime but monitor encephalopathy.     Monitor nephrotoxins     Monitor vancomycin trough     Continue to monitor urinary output     Avoid nephrotoxins continue to monitor renal function     Monitor for alcohol withdrawal symptoms ............continue [facility] protocol     Antihypertensive therapy, metformin, chronic heart failure therapy currently on hold in light of hemodynamic disposition.     Disposition/additional care " plan/interventions: 5/27/2025     Care time in excess of  55 mins     Lab data and microbiological data reviewed by me     Medication stewardship discussed with pharmacy     Worsening of renal indices today     Avoid nephrotoxins     Continue renal dosing of employed medications.     Microbiology data reviewed by me  urine culture in progress, C. difficile PCR not detected, blood cultures no growth x 1 day.     Worsening hyponatremia     Patient hemodynamic disposition continue to remain stable without need for vasopressor therapy at this time.     Continue empirical antibiotics     Continue judicious dosing of vancomycin........... monitor trough closely        Await recommendation from infectious disease multidrug-resistant organism risk.     Monitor fever curve     Monitor leukocytosis        Will discuss further antimicrobial stewardship with infectious disease.     Continue to monitor hemodynamic status closely.  Will revisit patient's chronic home medications.  Given baclofen potential for causing hypotension will hold, also recommend holding diuretics and antihypertensive therapy at this time.     Heart failure compensated, continue to hold chronic longitudinal therapy until hemodynamic status further improve patient presented with septic shock.     Continue to monitor hemodynamic status closely     Resumption of chronic home medications in accordance with clinical disposition.        Disposition/additional care plan/interventions: 5/28/2025    Will defer to the expertise of my nephrology colleague concerning timing of renal replacement therapy.    Continue to monitor volume status closely.    Judicious reintroduction of chronic home medications, avoid hypotension    Continue monitor hemodynamic status closely      Undifferentiated shock without signs of anaphylaxis.  Continue stewardship and recommendation as per infectious disease.    Continue to monitor off antibiotics .    Patient with high complexity  characteristics and appeared to be at risk for clinical decline/deterioration  and unpredictable clinical course.     The patient was informed of differential diagnosis , work up , plan of care and possible sequelae of clinical disposition.Patient in agreement with plan of care. Further recommendations forthcoming in accordance with patient's clinical disposition and response to care.     Discharge planning:Discharge timing to be determined.                    Dictation performed with assistance of voice recognition device therefore transcription errors are possible.

## 2025-05-29 ENCOUNTER — APPOINTMENT (OUTPATIENT)
Dept: RADIOLOGY | Facility: HOSPITAL | Age: 65
End: 2025-05-29
Payer: COMMERCIAL

## 2025-05-29 LAB
ANION GAP SERPL CALC-SCNC: 12 MMOL/L (ref 10–20)
BUN SERPL-MCNC: 46 MG/DL (ref 6–23)
CALCIUM SERPL-MCNC: 8.7 MG/DL (ref 8.6–10.3)
CHLORIDE SERPL-SCNC: 106 MMOL/L (ref 98–107)
CO2 SERPL-SCNC: 23 MMOL/L (ref 21–32)
CREAT SERPL-MCNC: 3.77 MG/DL (ref 0.5–1.3)
EGFRCR SERPLBLD CKD-EPI 2021: 17 ML/MIN/1.73M*2
ERYTHROCYTE [DISTWIDTH] IN BLOOD BY AUTOMATED COUNT: 14.3 % (ref 11.5–14.5)
GLUCOSE BLD MANUAL STRIP-MCNC: 105 MG/DL (ref 74–99)
GLUCOSE BLD MANUAL STRIP-MCNC: 68 MG/DL (ref 74–99)
GLUCOSE BLD MANUAL STRIP-MCNC: 92 MG/DL (ref 74–99)
GLUCOSE BLD MANUAL STRIP-MCNC: 92 MG/DL (ref 74–99)
GLUCOSE SERPL-MCNC: 72 MG/DL (ref 74–99)
HCT VFR BLD AUTO: 27.2 % (ref 41–52)
HGB BLD-MCNC: 8.7 G/DL (ref 13.5–17.5)
MCH RBC QN AUTO: 29.5 PG (ref 26–34)
MCHC RBC AUTO-ENTMCNC: 32 G/DL (ref 32–36)
MCV RBC AUTO: 92 FL (ref 80–100)
NRBC BLD-RTO: 0 /100 WBCS (ref 0–0)
PLATELET # BLD AUTO: 109 X10*3/UL (ref 150–450)
POTASSIUM SERPL-SCNC: 4.2 MMOL/L (ref 3.5–5.3)
RBC # BLD AUTO: 2.95 X10*6/UL (ref 4.5–5.9)
SODIUM SERPL-SCNC: 137 MMOL/L (ref 136–145)
STAPHYLOCOCCUS SPEC CULT: ABNORMAL
WBC # BLD AUTO: 5.2 X10*3/UL (ref 4.4–11.3)

## 2025-05-29 PROCEDURE — 2500000001 HC RX 250 WO HCPCS SELF ADMINISTERED DRUGS (ALT 637 FOR MEDICARE OP): Performed by: NURSE PRACTITIONER

## 2025-05-29 PROCEDURE — 82947 ASSAY GLUCOSE BLOOD QUANT: CPT

## 2025-05-29 PROCEDURE — 92610 EVALUATE SWALLOWING FUNCTION: CPT | Mod: GN

## 2025-05-29 PROCEDURE — 99233 SBSQ HOSP IP/OBS HIGH 50: CPT | Performed by: HOSPITALIST

## 2025-05-29 PROCEDURE — 2060000001 HC INTERMEDIATE ICU ROOM DAILY

## 2025-05-29 PROCEDURE — 2500000001 HC RX 250 WO HCPCS SELF ADMINISTERED DRUGS (ALT 637 FOR MEDICARE OP): Performed by: HOSPITALIST

## 2025-05-29 PROCEDURE — 82374 ASSAY BLOOD CARBON DIOXIDE: CPT | Performed by: HOSPITALIST

## 2025-05-29 PROCEDURE — 80048 BASIC METABOLIC PNL TOTAL CA: CPT | Performed by: HOSPITALIST

## 2025-05-29 PROCEDURE — 36415 COLL VENOUS BLD VENIPUNCTURE: CPT | Performed by: HOSPITALIST

## 2025-05-29 PROCEDURE — 85027 COMPLETE CBC AUTOMATED: CPT | Performed by: HOSPITALIST

## 2025-05-29 RX ORDER — HYDRALAZINE HYDROCHLORIDE 10 MG/1
10 TABLET, FILM COATED ORAL 2 TIMES DAILY
Status: DISCONTINUED | OUTPATIENT
Start: 2025-05-29 | End: 2025-05-31

## 2025-05-29 RX ADMIN — APIXABAN 5 MG: 5 TABLET, FILM COATED ORAL at 08:12

## 2025-05-29 RX ADMIN — THIAMINE HCL TAB 100 MG 100 MG: 100 TAB at 08:12

## 2025-05-29 RX ADMIN — HYDRALAZINE HYDROCHLORIDE 10 MG: 10 TABLET ORAL at 21:13

## 2025-05-29 RX ADMIN — BACLOFEN 2.5 MG: 5 TABLET ORAL at 21:10

## 2025-05-29 RX ADMIN — FOLIC ACID 1 MG: 1 TABLET ORAL at 08:12

## 2025-05-29 RX ADMIN — APIXABAN 5 MG: 5 TABLET, FILM COATED ORAL at 21:11

## 2025-05-29 RX ADMIN — Medication 10 MG: at 21:11

## 2025-05-29 RX ADMIN — BACLOFEN 2.5 MG: 5 TABLET ORAL at 08:12

## 2025-05-29 RX ADMIN — ATORVASTATIN CALCIUM 80 MG: 40 TABLET, FILM COATED ORAL at 21:11

## 2025-05-29 RX ADMIN — Medication 1 TABLET: at 08:12

## 2025-05-29 ASSESSMENT — LIFESTYLE VARIABLES
TOTAL SCORE: 1
PAROXYSMAL SWEATS: NO SWEAT VISIBLE
HEADACHE, FULLNESS IN HEAD: NOT PRESENT
TREMOR: NO TREMOR
NAUSEA AND VOMITING: NO NAUSEA AND NO VOMITING
AGITATION: NORMAL ACTIVITY
AUDITORY DISTURBANCES: NOT PRESENT
ANXIETY: NO ANXIETY, AT EASE
ORIENTATION AND CLOUDING OF SENSORIUM: CANNOT DO SERIAL ADDITIONS OR IS UNCERTAIN ABOUT DATE
VISUAL DISTURBANCES: NOT PRESENT

## 2025-05-29 ASSESSMENT — PAIN SCALES - GENERAL
PAINLEVEL_OUTOF10: 0 - NO PAIN

## 2025-05-29 ASSESSMENT — COGNITIVE AND FUNCTIONAL STATUS - GENERAL
PERSONAL GROOMING: A LITTLE
CLIMB 3 TO 5 STEPS WITH RAILING: TOTAL
MOVING TO AND FROM BED TO CHAIR: TOTAL
STANDING UP FROM CHAIR USING ARMS: TOTAL
DRESSING REGULAR UPPER BODY CLOTHING: A LOT
MOBILITY SCORE: 9
DRESSING REGULAR LOWER BODY CLOTHING: A LOT
TOILETING: A LOT
MOVING FROM LYING ON BACK TO SITTING ON SIDE OF FLAT BED WITH BEDRAILS: A LITTLE
HELP NEEDED FOR BATHING: A LOT
WALKING IN HOSPITAL ROOM: TOTAL
DAILY ACTIVITIY SCORE: 15
TURNING FROM BACK TO SIDE WHILE IN FLAT BAD: A LOT

## 2025-05-29 ASSESSMENT — PAIN - FUNCTIONAL ASSESSMENT
PAIN_FUNCTIONAL_ASSESSMENT: 0-10
PAIN_FUNCTIONAL_ASSESSMENT: 0-10

## 2025-05-29 NOTE — PROGRESS NOTES
"      Nephrology Progress Note      Nephrology following for shae.   Events over night:     Has some difficulties with swallowing.  Appetite is down  No shortness of breath  Good urine output    BP (!) 136/96   Pulse 60   Temp 36.1 °C (97 °F) (Temporal)   Resp (!) 28   Ht 1.778 m (5' 10\")   Wt 104 kg (228 lb 9.9 oz)   SpO2 98%   BMI 32.80 kg/m²     Input / Output:  24 HR:   Intake/Output Summary (Last 24 hours) at 5/29/2025 1434  Last data filed at 5/29/2025 0818  Gross per 24 hour   Intake 598 ml   Output 1200 ml   Net -602 ml       Physical Exam   Alert and oriented, mild dysarthria  Neck: no JVD  CV: RRR  Lungs: CTA bilaterally  Abd: soft, NT, ND   Ext: trace lower extremity edema  Whyte     Scheduled medications  Scheduled Medications[1]  Continuous medications  Continuous Medications[2]  PRN medications  PRN Medications[3]   Results from last 7 days   Lab Units 05/29/25 0419 05/26/25 0447 05/25/25  1031   SODIUM mmol/L 137   < > 138   POTASSIUM mmol/L 4.2   < > 5.3   CHLORIDE mmol/L 106   < > 109*   CO2 mmol/L 23   < > 14*   BUN mg/dL 46*   < > 122*   CREATININE mg/dL 3.77*   < > 6.36*   CALCIUM mg/dL 8.7   < > 9.1   PROTEIN TOTAL g/dL  --   --  6.3*   BILIRUBIN TOTAL mg/dL  --   --  0.4   ALK PHOS U/L  --   --  109   ALT U/L  --   --  72*   AST U/L  --   --  54*   GLUCOSE mg/dL 72*   < > 92    < > = values in this interval not displayed.      Results from last 7 days   Lab Units 05/26/25 0447   MAGNESIUM mg/dL 1.42*      Results from last 7 days   Lab Units 05/29/25 0419 05/28/25  0423 05/26/25  0447   WBC AUTO x10*3/uL 5.2 4.8 5.7   HEMOGLOBIN g/dL 8.7* 8.8* 8.3*   HEMATOCRIT % 27.2* 27.1* 25.5*   PLATELETS AUTO x10*3/uL 109* 113* 127*        Assessment & Plan:       64-year-old gentleman with hx of diabetes, prior stroke, documented heart failure initially brought to the hospital concerning for stroke found to be hypotensive with concomitant renal failure hence nephrology was consulted     #1 acute " kidney injury  - Last serum creatinine 0.94 mg/dL on March 2024  - suspect ATN in setting of decrease EBV in setting of entresto/aldactone along with other antihypertensives  - on presentation oliguric with serum creatinine 6.36 mg/dL significant azotemia acidemia   -R  internal jugular temp catheter  -5/26- 8 hour SLED x 1, Cr is rising slowly off SLED but UOP is picking up         Plan  -clinically improved  -remains non-oliguric  -electrolyte and acid base stable  -hold HD again  today  -re-evaluate tomorrow for need of RRT  -avoid hypotension/nephrotoxins    Please message me through [facility] chat with any questions or concerns.     Olive Dempsey DO  5/29/2025  2:34 PM     [facility]    [address], [address]   [place] [address]  Office: [phone]         [1] apixaban, 5 mg, oral, BID  atorvastatin, 80 mg, oral, Nightly  baclofen, 2.5 mg, oral, BID  folic acid, 1 mg, oral, Daily  heparin, 2,500 Units, hemodialysis, Once  insulin lispro, 0-5 Units, subcutaneous, TID AC  multivitamin with minerals, 1 tablet, oral, Daily  thiamine, 100 mg, oral, Daily     [2]    [3] PRN medications: alteplase, dextrose, dextrose, diazePAM, glucagon, glucagon, heparin, heparin, melatonin, sennosides-docusate sodium

## 2025-05-29 NOTE — PROGRESS NOTES
Edenilson Ness [id number]   Service: Internal Medicine / Hospitalist Date of service: 5/29/2025                                  Full Code                     Overnight Events: No new overnight events reported by patient or nursing.      Subjective     Edenilson Ness is a 64 y.o. male with PMHx of CVA in 2019 now wheelchair-bound, diabetes, anemia, HFrEF and atrial fib on apixaban who presented from [facility]. LKW was 5am today. In the ED he was hypotensive to 62/38, with loss of urine and slurring of words. BP did not respond to 2L IV fluids and he was placed on Levophed. He drank alcohol this morning. At the bedside he is very difficult to understand. He denies pain and SOB. He says he drinks one beer a day.  Remainder of ROS reviewed and negative except as indicated in HPI.      ED workup was significant for BUN/creatinine 122/6.36, ALT/AST 72/54, , troponins 15/13, lactate 2.9/5.0, WBC 4.7k, Hgb 8.6, pH 7.14, bicarb 14.6. ETOH was 70 (it was 98 three months ago). UA was indicative of infection. CTA chest was extremely limited and revealed no definite large or central pulmonary embolism; possible cardiomegaly with likely posterior atelectasis and/or minimal dependent edema; questionable enhancing nodule in the gastric fundus. CT brain was nonacute. The pt was tachypneic to 49, hypotensive to 52/39 mmHg and hypoxic to 73%.RA. The pt received vancomycin, Zosyn and 2L Lactated Ringers IVF. He was placed on Levophed and bicarb gtts and is being admitted to the ICU.      The pt's case and plan of care was discussed with the ED provider. The ED notes were reviewed in detail, as were prior outpatient and patient notes as part of the admission chart review. The pt is at high risk for cardiovascular, respiratory, infectious and neurological events including septic shock, ETOH withdrawal, MI, stroke and ESRD due to acute hypotension requiring pressor support, lactic acidosis and acute renal failure. The  decision was made to escalate care and admit the pt under ICU status.      5/26...............     No new additional report gathered from patient's nurse on [facility] rounding patient appeared  confused requesting a new room.  No acute complaints, no reported : chest pains, nausea, vomiting, dyspnea or abdominal pain.     5/27.............  Overnight events discussed with patient's nurse/triad rounds discussion..No reported: Seizure activities, respiratory distress, chest pains, epistaxis, nausea or vomiting.     5/28................   No overnight events reported by patient's nurse.  Appeared quite engaging today.  Wondering when he can be discharged home.  No acute complaints voiced by patient at the time of evaluation this morning.  Specifically no reported: chest pains, headaches, nausea, vomiting, abdominal pain, fevers or chills.    5/29.....................    [facility] rounding discussion with patient's nurse no new overnight complaints reported apart from reported swallowing difficulties voiced by patient yesterday.  Patient reported apparent yesterday that he had some difficulties with swallowing.  Speech evaluation ordered.  No reported: odynophagia, headaches, chest pains, abdominal pain, nausea or vomiting.    Review of Systems:   Review of system otherwise negative if not aforementioned above in subjective.     Objective           Physical Exam      Constitutional:       Appearance: Patient appeared in no acute cardiopulmonary distress.     Comments: Patient alert and oriented to person , place and situation to some degree.Patient appeared non-toxic.  HEENT:      Head: Normocephalic and atraumatic.Trachea midline      Nose:No observed congestion or rhinorrhea.     Mouth/Throat: Mucous membranes Moist, Trachea appeared  midline.  Eyes:      Extraocular Movements: Extraocular movements intact.      Pupils: Pupils are equal, round, and reactive to light.      Comments: No scleral icterus or conjunctival injection  appreciated.   Cardiovascular:      Rate and Rhythm: Normal rate and regular rhythm. No clicks rubs or gallops, normal S1 and S2.No peripheral stigmata of endocarditis appreciated.     Pulmonary:      Lung fields remained diminished  in  the bases without appreciation of adventitious sounds.  Abdominal:      General: Abdomen soft,  obese ,non-tender, active bowel sounds, no involuntary guarding or rebound tenderness appreciated.     Comments: None   Musculoskeletal:       Patient appeared to have full active range of motion for upper and lower extremities, no acute apparent joint deformity appreciated on examination.   No pitting edema or cyanosis appreciated.     Skin:     General: Skin is warm.      Coloration:  No jaundice     Findings: No abnormal appearing skin rashes or lesions that appeared acute noted on unclothed area of the skin..   Neurological:      General: No new focal sensory or motor deficits appreciated.     Cranial Nerves: Cranial nerves II to XII appearing grossly intact.        Genitals:  Deferred  Psychiatric:         The patient appeared to be displaying normal mood and affect at the time of evaluation.     Labs:           Lab Results   Component Value Date     GLUCOSE 75 05/28/2025     CALCIUM 8.2 (L) 05/28/2025      (L) 05/28/2025     K 4.1 05/28/2025     CO2 21 05/28/2025      05/28/2025     BUN 44 (H) 05/28/2025     CREATININE 3.64 (H) 05/28/2025       Lab Results   Component Value Date    GLUCOSE 72 (L) 05/29/2025    CALCIUM 8.7 05/29/2025     05/29/2025    K 4.2 05/29/2025    CO2 23 05/29/2025     05/29/2025    BUN 46 (H) 05/29/2025    CREATININE 3.77 (H) 05/29/2025         Lab Results   Component Value Date     WBC 4.8 05/28/2025     HGB 8.8 (L) 05/28/2025     HCT 27.1 (L) 05/28/2025     MCV 91 05/28/2025      (L) 05/28/2025         Lab Results   Component Value Date    WBC 5.2 05/29/2025    HGB 8.7 (L) 05/29/2025    HCT 27.2 (L) 05/29/2025    MCV 92  05/29/2025     (L) 05/29/2025      [unfilled]   [unfilled]   No results found for the last 90 days.                  X-rays/ Images     [unfilled]   Radiology Results (last 21 days)     Procedure Component Value Units Date/Time   XR chest 1 view [[id number]] Collected: 05/25/25 1620   Order Status: Completed Updated: 05/25/25 1620   Narrative:     Interpreted By:  Yash Cortez,  STUDY:  XR CHEST 1 VIEW;  5/25/2025 2:54 pm      INDICATION:  Signs/Symptoms:check RIJ CVL.          COMPARISON:  08/28/2019      ACCESSION NUMBER(S):  [id number]      ORDERING CLINICIAN:  GUNNAR KING      FINDINGS:          AP portable view of the chest is obtained.  Limited exam due to  portable nature. Magnified cardiac silhouette. Mild interstitial  prominence diffusely. No effusion or pneumothorax.. Right central  line catheter terminates at the level of the SVC       Impression:     1. Cardiomegaly with mild interstitial prominence diffusely. Consider  mild CHF or pulmonary edema. Follow-up as clinically warranted..  2. Right central line catheter as described.              MACRO:  None      Signed by: Yash Cortez 5/25/2025 4:19 PM  Dictation workstation:   [id number]   CT angio chest for pulmonary embolism [[id number]] Collected: 05/25/25 1122   Order Status: Completed Updated: 05/25/25 1122   Narrative:     Interpreted By:  Yash Cortez,  STUDY:  CT ANGIO CHEST FOR PULMONARY EMBOLISM;  5/25/2025 10:23 am      INDICATION:  Signs/Symptoms:AMS, hypotensive.          COMPARISON:  None      ACCESSION NUMBER(S):  [id number]      ORDERING CLINICIAN:  KENTON STRATTON      TECHNIQUE:  Helical data acquisition of the chest was obtained after intravenous  administration of 100 ML Omnipaque 350, as per PE protocol. Images  were reformatted in coronal and sagittal planes. Axial and coronal  maximum intensity projection (MIP) images were created and reviewed.      FINDINGS:  POTENTIAL LIMITATIONS OF THE STUDY: The exam is  extremely limited  borderline nondiagnostic due to suboptimal contrast phase. Also  limited by body habitus.      HEART AND VESSELS:  There are no discrete filling defects within main pulmonary artery  and its main right and left branches. However a more distal pulmonary  embolism cannot be excluded given suboptimal contrast phase..          Main pulmonary artery and its branches are normal in caliber.      The thoracic aorta normal in course and caliber.  No coronary artery calcifications are seen. Please note, the study is  not optimized for evaluation of coronary arteries.      Cardiomegaly.      There is no pericardial effusion seen.      MEDIASTINUM AND ERASMO, LOWER NECK AND AXILLA:  The visualized thyroid gland is within normal limits.  No evidence of thoracic lymphadenopathy by CT criteria.  Esophagus appears within normal limits as seen.      LUNGS AND AIRWAYS:  The trachea and central airways are patent. No endobronchial lesion  is seen.      The bilateral lungs are hypoinflated. This may be due to poor  inspiration. Likely bibasilar atelectasis and/or scarring.      UPPER ABDOMEN:  The visualized subdiaphragmatic structures demonstrate hypodensity  within the midpole of the right kidney which may represent a simple  cyst but not fully characterized in this exam. Question of enhancing  nodule in the gastric fundus. This is approximately 17 mm in  diameter. A polyp or mass not excluded.      CHEST WALL AND OSSEOUS STRUCTURES:  Chest wall is within normal limits.  No acute osseous pathology.There are no suspicious osseous lesions.       Impression:     1. Extremely limited exam due to suboptimal contrast phase. Although  no definite large or central pulmonary embolism is seen, a more  distal pulmonary embolism not excluded given the limitations of the  study.  2. Cardiomegaly with low lung volumes which may be due to poor  inspiration. Likely posterior atelectasis and/or minimal dependent  edema.  3. Question  of enhancing nodule in the gastric fundus. This is  approximately 17 mm in diameter. A polyp or mass not excluded.  Correlate with EGD.      MACRO:  Critical Finding:  See findings. Notification was initiated on  5/25/2025 at 11:21 am by  Yash Cortez.  (**-YCF-**)      Signed by: Yash Cortez 5/25/2025 11:21 AM  Dictation workstation:   [id number]   CT brain attack angio head and neck W and WO IV contrast [[id number]] Collected: 05/25/25 1034   Order Status: Completed Updated: 05/25/25 1034   Narrative:     Interpreted By:  Levon Waters,  STUDY:  CT BRAIN ATTACK ANGIO HEAD AND NECK W AND WO IV CONTRAST; ;  5/25/2025 10:22 am      INDICATION:  Signs/Symptoms:AMS, LKW 5am? aphasia, ? L weakness.          COMPARISON:  CTA head and neck from 03/01/2024.      ACCESSION NUMBER(S):  [id number]      ORDERING CLINICIAN:  KENTON STRATTON      TECHNIQUE:  CTA of the head and neck was performed after administration of 100 mL  of Omnipaque 350 intravenously. Segmented MIPs are provided.      FINDINGS:  CTA head:      There are small foci of atherosclerotic calcifications located within  the precavernous, cavernous, and paraophthalmic segments of the  bilateral internal carotid arteries without luminal narrowing. There  is stable more concentric atherosclerotic calcification located  within the supraclinoid segment of the left ICA which may cause mild  luminal narrowing. Otherwise, there is no striking narrowing of the  visualized internal carotid arteries.      There is no narrowing of the visualized portions of the bilateral  anterior or middle cerebral arteries. There are small foci of  atherosclerotic calcifications located within the bilateral  intradural vertebral arteries. There is at most mild narrowing  involving the distal right intradural vertebral artery, unchanged  compared to the the prior CTA and MRA head from 2019.      There is mild luminal irregularity of the basilar artery primarily  due to noncalcified plaque.  There is no narrowing of the visualized  portions of the bilateral posterior cerebral arteries. The lumen of  the P1 segment of the left posterior cerebral artery is smaller  compared to the right, likely normal variant as there is a left  posterior communicating artery which is visualized, unchanged.      There are no aneurysms.      CTA neck:      Aortic arch and origins of the great vessels from the aortic arch are  without luminal narrowing. There are atherosclerotic calcifications  within these vascular structures but there is no striking luminal  narrowing. Bilateral common carotid arteries are without luminal  narrowing. There are small foci of atherosclerotic calcification  located within the left common carotid artery. There are small foci  of atherosclerotic calcification located within the bilateral carotid  bulbs without luminal narrowing. There is retropharyngeal course of  the right internal carotid artery. Bilateral vertebral arteries arise  from the subclavian arteries and demonstrate no luminal narrowing.      Additional findings:      There are osseous degenerative changes of the cervical spine.       Impression:     No occlusion of the visualized arteries in the head or neck.  Unchanged appearance of the these arteries compared to the prior CTA  study.      This study was interpreted at [facility].      MACRO:  None      Signed by: Levon Waters 5/25/2025 10:33 AM  Dictation workstation:   [id number]   CT brain attack head wo IV contrast [[id number]] Collected: 05/25/25 1028   Order Status: Completed Updated: 05/25/25 1028   Narrative:     Interpreted By:  Tu Hunter,  STUDY:  CT BRAIN ATTACK HEAD WO IV CONTRAST;  5/25/2025 10:13 am      INDICATION:  Signs/Symptoms:AMS, LKW 5am? aphasia.      COMPARISON:  None.      ACCESSION NUMBER(S):  [id number]      ORDERING CLINICIAN:  KENTON STRATTON      TECHNIQUE:  Noncontrast axial CT scan of head was performed.       FINDINGS:  Parenchyma: There is no intracranial hemorrhage. The grey-white  differentiation is intact. There is no mass effect or midline shift.  Patchy supratentorial hypodensity, nonspecific, but likely secondary  to mild chronic microvascular ischemia.      CSF Spaces: Mild generalized volume loss, with concordant ventricular  enlargement.      Extra-Axial Fluid: There is no extraaxial fluid collection.      Calvarium: The calvarium is unremarkable.      Paranasal sinuses: Visualized paranasal sinuses are clear.      Mastoids: Clear.      Orbits: Normal.      Soft tissues: Unremarkable.       Impression:     No acute intracranial hemorrhage, mass effect, or CT apparent acute  infarct. Chronic microvascular ischemia and involutional changes.  Tu Hunter discussed the significance and urgency of this  critical finding by telephone with  KENTON AGUIRRE on 5/25/2025 at  10:27 am.  (**-RCF-**) Findings:  See findings.                  Signed by: Tu Hunter 5/25/2025 10:27 AM  Dictation workstation:   [id number]   Point of Care Ultrasound [[id number]] Resulted: 05/25/25 1009   Order Status: Completed Updated: 05/25/25 1851   Narrative:     Kenton Aguirre DO     5/25/2025  6:51 PM    Performed by: Kenton Aguirre DO  Authorized by: Kenton Aguirre DO    Cardiac Indications: hypotension                  Procedure: Cardiac Ultrasound    Findings:   Views: parasternal long, parasternal short, apical four and subxiphoid  The pericardial space was visualized and was NEGATIVE for a significant  pericardial effusion.  Activity: Ventricular contractions were visualized.  LV: LV systolic function was NORMAL.  RV: RV size was DILATED.    Impression:  Cardiac: The focused cardiac ultrasound exam had ABNORMAL findings as  specified.      Comments: Ivc Plethoric                  Medical Problems         Problem List         * (Principal) Acute renal failure     Conjunctivitis     Weakness     CVA (cerebral vascular  "accident) (Multi)     Type 2 diabetes mellitus without complication, with long-term current use of insulin     Hypertensive heart disease with CHF     Anemia of chronic disease     Admits to alcohol consumption     Localized swelling on right hand     Diarrhea     Muscle spasticity     Ac isch multi vasc territories stroke (Multi)     UTI (urinary tract infection)     Hypokalemia     SVT (supraventricular tachycardia)     Loose stools     Pneumonia     Skin irritation     Cough     Umbilical hernia                  Above medical problems may be reflective of historical medical problems that may have resolved and may not related to acute clinical condition/medical problems.     Clinical impression/plan:        Undifferentiated shock  -Monitor offempirical  antibiotics.  -Continue stewardship as per infectious disease.          Acute renal failure, no hx CKD  Acute hypotension requiring pressor support  Pt seen by nephrology and SLED orders are in  All offending home meds held  Daily RFP  Continue empiric pending urine and blood cx, UA was +, pt does not meet SIRS/sepsis criteria     Acute hypoxic respiratory failure  Pt currently on 6L O2  Imaging does not reveal evidence of PNA  Wean O2 as tolerated to maintain O2 sat >91%, pt may need home O2 eval      ETOH abuse  Pt admits to drinking one beer a day \"my whole life\"  ETOH was 70 (it was 98 three months ago)  Start CIWA protocol     Chronic anemia  Baseline Hgb is 11-12, today is 8.6, likely etiology is ARF  Monitor Hgb and transfuse for Hgb <= 7.0     DVT ppx: apixaban        Disposition/additional care plan/interventions: 5/26/2025     Patient with high complexity characteristics and appeared to be at high risk for clinical decline/deterioration  and unpredictable clinical course.      Septic shock present on  admission     Continue stewardship and recommendation as per intensivist.     Continue weaning off vasopressor i, continue monitor hemodynamic status.   "   Continue empirical  antibiotics     Pathogen directed therapy in accordance with culture results and sensitivity data.     Continue stewardship as per infectious disease.  Septic shock of unclear source.     Acute confusional state/metabolic encephalopathy provisional report of improving.     Will continue close monitoring.     Care plan discussed with nephrologist.     Continue cefepime but monitor encephalopathy.     Monitor nephrotoxins     Monitor vancomycin trough     Continue to monitor urinary output     Avoid nephrotoxins continue to monitor renal function     Monitor for alcohol withdrawal symptoms ............continue CIWA protocol     Antihypertensive therapy, metformin, chronic heart failure therapy currently on hold in light of hemodynamic disposition.     Disposition/additional care plan/interventions: 5/27/2025     Care time in excess of  55 mins     Lab data and microbiological data reviewed by me     Medication stewardship discussed with pharmacy     Worsening of renal indices today     Avoid nephrotoxins     Continue renal dosing of employed medications.     Microbiology data reviewed by me  urine culture in progress, C. difficile PCR not detected, blood cultures no growth x 1 day.     Worsening hyponatremia     Patient hemodynamic disposition continue to remain stable without need for vasopressor therapy at this time.     Continue empirical antibiotics     Continue judicious dosing of vancomycin........... monitor trough closely        Await recommendation from infectious disease multidrug-resistant organism risk.     Monitor fever curve     Monitor leukocytosis        Will discuss further antimicrobial stewardship with infectious disease.     Continue to monitor hemodynamic status closely.  Will revisit patient's chronic home medications.  Given baclofen potential for causing hypotension will hold, also recommend holding diuretics and antihypertensive therapy at this time.     Heart failure  compensated, continue to hold chronic longitudinal therapy until hemodynamic status further improve patient presented with septic shock.     Continue to monitor hemodynamic status closely     Resumption of chronic home medications in accordance with clinical disposition.        Disposition/additional care plan/interventions: 5/28/2025     Will defer to the expertise of my nephrology colleague concerning timing of renal replacement therapy.     Continue to monitor volume status closely.     Judicious reintroduction of chronic home medications, avoid hypotension     Continue monitor hemodynamic status closely        Undifferentiated shock without signs of anaphylaxis.  Continue stewardship and recommendation as per infectious disease.     Continue to monitor off antibiotics .    Disposition/additional care plan/interventions: 5/29/2025       Swallow evaluation in progress speech therapy at bedside await findings and recommendations.    Microbiological data reviewed by me:: Blood cultures no growth at 3 days, urine culture no growth, C. difficile not detected    Patient monitor off antibiotics    Await decision concerning renal replacement.......... will discuss with nephrology    Continue to hold heart failure chronic longitudinal therapy.    Mindful of patient's heart rate will continue to hold Coreg    Blood pressure improving, avoid rebound hypertension will implement hydralazine at a lower dosage      Patient with high complexity characteristics and appeared to be at risk for clinical decline/deterioration  and unpredictable clinical course.      The patient was informed of differential diagnosis , work up , plan of care and possible sequelae of clinical disposition.Patient in agreement with plan of care. Further recommendations forthcoming in accordance with patient's clinical disposition and response to care.     Discharge planning:Discharge timing to be determined.                    Dictation performed with  assistance of voice recognition device therefore transcription errors are possible.

## 2025-05-29 NOTE — PROGRESS NOTES
Speech-Language Pathology Clinical Swallow Evaluation    Patient Name: Edenilson Ness  MRN: [id number]  : 1960  Patient Room: -A  Today's Date: 25  Start time: Start Time: 1002  Stop time: Stop Time: 1025  Time calculation (min) : Time Calculation (min): 23 min    ASSESSMENT  Impressions:  Pt exhibiting s/sx of pharyngeal and esophageal dysphagia as evidenced by reported difficulty swallowing, feeling full after limited intake, reported choking and items feeling stuck. Pt demonstrated oral dysphagia with limited ability to chew soft and bite sized textures without dentures, and prolong mastication of same.     Pt would benefit from skilled ST to minimize aspiration risk and ensure ongoing safety with the least restrictive diet.     Additional consult/referral: N/A     Prognosis: Good      PLAN  Recommendations:  MBSS recommended: Yes; MBSS is recommended in order to objectively assess for aspiration risk, safest/least restrictive diet, and any effective compensatory strategies.  Solid consistency: Easy to chew until MBS  Liquid consistency: Thin until MBS  Medication administration: Whole in thin liquid, Crushed in puree, Whole in puree, As best tolerated, Per nursing discretion  Compensatory swallow strategies:   - Upright positioning for all PO intake  - Remain upright for >30 min after meals  - Slow rate of intake  - Small bites  - Small sips  - Small/SINGLE sips  - Alternate bites and sips    Recommended frequency/duration:  Skilled SLP services recommended: Yes  Frequency: 2x/week  Duration: 2 weeks  Discharge recommendation: Unable to determine at this time; please see follow-up notes for DC recommendation.  Strengths: Motivation  Barriers to participation in tx: Cognition      Goals (start date 2025-2025):    - Pt will participate in MBSS to assess for safest/least restrictive diet and any effective compensatory strategies.   Status: Goal initiated   Progress this date: NA     -  Pt/family will demonstrate understanding of education related to dysphagia independently.   Status: Goal initiated   Progress this date: NA       REASON FOR ADMISSION:  CHIEF COMPLAINT: hypotension, acute rental failure    PMHx relevant to rehab: CVA in 2019 now wheelchair-bound, diabetes, anemia, HFrEF and atrial fib on apixaban     Relevant imaging results: Xray chest 5/25/2025  IMPRESSION:  1. Cardiomegaly with mild interstitial prominence diffusely. Consider  mild CHF or pulmonary edema. Follow-up as clinically warranted..  2. Right central line catheter as described.      SUBJECTIVE  SLP Received On: 05/29/25  Patient Class: Inpatient  Living Environment: Home  Ordering Physician: Francis RICCI  Reason for Referral: suspected dysphagia  Prior to Session Communication: Bedside nurse    RN cleared pt to participate in session and reported no concerns for screening or pills. Pt only ate a few bites of his breakfast.    Pt reported difficulty getting food down and feeling full very easily.    Nutritional status: Appears well-nourished/no concerns          [employer] Diet: Regular/thin  Current Diet : Regular/thin    Pain Assessment  Pain Assessment: 0-10  0-10 (Numeric) Pain Score: 0 - No pain       Orientation: Oriented to self and Oriented to hospital but not name of facility  Ability to follow functional commands: WFL     Baseline Vocal Quality: Normal  Volitional Cough: Strong  Volitional Swallow: Within Functional Limits  Patient positioning: Upright in bed      OBJECTIVE  Clinical swallow evaluation completed and consisted of interview, oral motor assessment, and PO trials ( 6 bites of puree, >4 oz of thin via straw and 2 bites of drained peaches).    ORAL MOTOR: Dentition: Dentures.  Oral Hygiene: Oral mucosa were pink, moist, and free of obvious lesions. Lingual strength and ROM were WFL. Labial strength/ROM were WFL. Labial seal was adequate.     ORAL PHASE: Mastication of soft and bite sized solids, prolonged,  pt demonstrating difficulty chewing peaches without dentures present. Pt able to swallow 1 bite of peaches but unable to swallow the second.  A/P transit was delayed.      PHARYNGEAL PHASE: Laryngeal elevation was visualized or palpated with all trials, however adequacy of hyolaryngeal elevation/excursion cannot be determined at bedside. No immediate or delayed s/sx aspiration/penetration were observed with any consistencies.    ESOPHAGEAL PHASE: Burping after intake, reporting feeling too full for continued trials.       Treatment/Education:  Results and recommendations were relayed to: Patient, Bedside nurse, and Physician  Education provided: Yes   Learner: Patient   Barriers to learning: Cognitive limitations barrier   Method of teaching: Verbal   Topic: role of ST, results of assessment, recommendation for MBSS, and recommendation for dysphagia follow-up   Outcome of teaching: Pt demonstrated partial understanding and Needs reinforcement  Treatment provided: No    Next Treatment Priority: MBSS

## 2025-05-29 NOTE — PROGRESS NOTES
Music Therapy Note    Edenilson Ness was referred by Edenilson Weber, RN    Therapy Session  Referral Type: New referral this admission  Visit Type: New visit  Session Start Time: 1608  Session End Time: 1610  Intervention Delivery: In-person  Conflict of Service: Declined treatment     Pre-assessment  Unable to Assess Reason: Outcomes not assessed, Outcomes not applicable         Treatment/Interventions  Music Therapy Interventions: Assessment    Post-assessment  Total Session Time (min): 2 minutes    Narrative  Assessment Detail: Pt. lying in bed, somewhat asleep when MT arrived  Plan: MT introduced music therapy services to pt. for during his stay to assist with coping, relaxation, and pain management  Evaluation: Pt. thanked MT for stopping by but said he was good and didn't need anything. Did say he enjoys rap  Follow-up: Will f/u with pt. as needed/able    Education Documentation  Coping Strategies, taught by Tressa Upton at 5/29/2025  8:47 AM.  Learner: Patient  Readiness: Acceptance  Method: Explanation  Response: Demonstrated Understanding    Relaxation, taught by Tressa Upton at 5/29/2025  8:47 AM.  Learner: Patient  Readiness: Acceptance  Method: Explanation  Response: Demonstrated Understanding    Pain Management, taught by Tressa Upton at 5/29/2025  8:47 AM.  Learner: Patient  Readiness: Acceptance  Method: Explanation  Response: Demonstrated Understanding

## 2025-05-29 NOTE — CARE PLAN
Problem: Pain - Adult  Goal: Verbalizes/displays adequate comfort level or baseline comfort level  Outcome: Progressing     Problem: Safety - Adult  Goal: Free from fall injury  Outcome: Progressing     Problem: Discharge Planning  Goal: Discharge to home or other facility with appropriate resources  Outcome: Progressing     Problem: Chronic Conditions and Co-morbidities  Goal: Patient's chronic conditions and co-morbidity symptoms are monitored and maintained or improved  Outcome: Progressing     Problem: Nutrition  Goal: Nutrient intake appropriate for maintaining nutritional needs  Outcome: Progressing     Problem: Skin  Goal: Participates in plan/prevention/treatment measures  Recent Flowsheet Documentation  Taken 5/29/2025 1112 by Rick Arroyo RN  Participates in plan/prevention/treatment measures:   Discuss with provider PT/OT consult   Elevate heels  Goal: Prevent/manage excess moisture  Recent Flowsheet Documentation  Taken 5/29/2025 1112 by Rick Arroyo RN  Prevent/manage excess moisture:   Monitor for/manage infection if present   Moisturize dry skin  Goal: Prevent/minimize sheer/friction injuries  Recent Flowsheet Documentation  Taken 5/29/2025 1112 by Rick Arroyo RN  Prevent/minimize sheer/friction injuries:   Use pull sheet   HOB 30 degrees or less   Turn/reposition every 2 hours/use positioning/transfer devices   Increase activity/out of bed for meals  Goal: Promote/optimize nutrition  Recent Flowsheet Documentation  Taken 5/29/2025 1112 by Rick Arroyo RN  Promote/optimize nutrition: Monitor/record intake including meals     Problem: Skin  Goal: Participates in plan/prevention/treatment measures  Outcome: Progressing  Flowsheets (Taken 5/29/2025 1112)  Participates in plan/prevention/treatment measures:   Discuss with provider PT/OT consult   Elevate heels  Goal: Prevent/manage excess moisture  Outcome: Progressing  Flowsheets (Taken 5/29/2025 1112)  Prevent/manage excess moisture:    Monitor for/manage infection if present   Moisturize dry skin  Goal: Prevent/minimize sheer/friction injuries  Outcome: Progressing  Flowsheets (Taken 5/29/2025 1112)  Prevent/minimize sheer/friction injuries:   Use pull sheet   HOB 30 degrees or less   Turn/reposition every 2 hours/use positioning/transfer devices   Increase activity/out of bed for meals  Goal: Promote/optimize nutrition  Outcome: Progressing  Flowsheets (Taken 5/29/2025 1112)  Promote/optimize nutrition: Monitor/record intake including meals     Problem: Diabetes  Goal: Achieve decreasing blood glucose levels by end of shift  Outcome: Progressing  Goal: Increase stability of blood glucose readings by end of shift  Outcome: Progressing  Goal: Decrease in ketones present in urine by end of shift  Outcome: Progressing  Goal: Maintain electrolyte levels within acceptable range throughout shift  Outcome: Progressing  Goal: Maintain glucose levels >70mg/dl to <250mg/dl throughout shift  Outcome: Progressing  Goal: No changes in neurological exam by end of shift  Outcome: Progressing  Goal: Learn about and adhere to nutrition recommendations by end of shift  Outcome: Progressing  Goal: Vital signs within normal range for age by end of shift  Outcome: Progressing  Goal: Increase self care and/or family involovement by end of shift  Outcome: Progressing  Goal: Receive DSME education by end of shift  Outcome: Progressing     Problem: Fall/Injury  Goal: Not fall by end of shift  Outcome: Progressing  Goal: Be free from injury by end of the shift  Outcome: Progressing  Goal: Verbalize understanding of personal risk factors for fall in the hospital  Outcome: Progressing  Goal: Verbalize understanding of risk factor reduction measures to prevent injury from fall in the home  Outcome: Progressing  Goal: Use assistive devices by end of the shift  Outcome: Progressing  Goal: Pace activities to prevent fatigue by end of the shift  Outcome: Progressing

## 2025-05-30 ENCOUNTER — APPOINTMENT (OUTPATIENT)
Dept: RADIOLOGY | Facility: HOSPITAL | Age: 65
End: 2025-05-30
Payer: COMMERCIAL

## 2025-05-30 LAB
ANION GAP SERPL CALC-SCNC: 8 MMOL/L (ref 10–20)
BACTERIA BLD CULT: NORMAL
BACTERIA BLD CULT: NORMAL
BUN SERPL-MCNC: 41 MG/DL (ref 6–23)
CALCIUM SERPL-MCNC: 8.4 MG/DL (ref 8.6–10.3)
CHLORIDE SERPL-SCNC: 110 MMOL/L (ref 98–107)
CO2 SERPL-SCNC: 23 MMOL/L (ref 21–32)
CREAT SERPL-MCNC: 3.5 MG/DL (ref 0.5–1.3)
EGFRCR SERPLBLD CKD-EPI 2021: 19 ML/MIN/1.73M*2
GLUCOSE BLD MANUAL STRIP-MCNC: 102 MG/DL (ref 74–99)
GLUCOSE BLD MANUAL STRIP-MCNC: 73 MG/DL (ref 74–99)
GLUCOSE BLD MANUAL STRIP-MCNC: 88 MG/DL (ref 74–99)
GLUCOSE BLD MANUAL STRIP-MCNC: 97 MG/DL (ref 74–99)
GLUCOSE SERPL-MCNC: 87 MG/DL (ref 74–99)
POTASSIUM SERPL-SCNC: 4 MMOL/L (ref 3.5–5.3)
SODIUM SERPL-SCNC: 137 MMOL/L (ref 136–145)

## 2025-05-30 PROCEDURE — 36415 COLL VENOUS BLD VENIPUNCTURE: CPT | Performed by: HOSPITALIST

## 2025-05-30 PROCEDURE — 2500000001 HC RX 250 WO HCPCS SELF ADMINISTERED DRUGS (ALT 637 FOR MEDICARE OP): Performed by: NURSE PRACTITIONER

## 2025-05-30 PROCEDURE — 99233 SBSQ HOSP IP/OBS HIGH 50: CPT | Performed by: HOSPITALIST

## 2025-05-30 PROCEDURE — 2500000001 HC RX 250 WO HCPCS SELF ADMINISTERED DRUGS (ALT 637 FOR MEDICARE OP): Performed by: HOSPITALIST

## 2025-05-30 PROCEDURE — 92611 MOTION FLUOROSCOPY/SWALLOW: CPT | Mod: GN | Performed by: SPEECH-LANGUAGE PATHOLOGIST

## 2025-05-30 PROCEDURE — 2060000001 HC INTERMEDIATE ICU ROOM DAILY

## 2025-05-30 PROCEDURE — 2500000005 HC RX 250 GENERAL PHARMACY W/O HCPCS: Performed by: HOSPITALIST

## 2025-05-30 PROCEDURE — 74230 X-RAY XM SWLNG FUNCJ C+: CPT

## 2025-05-30 PROCEDURE — 80048 BASIC METABOLIC PNL TOTAL CA: CPT | Performed by: HOSPITALIST

## 2025-05-30 PROCEDURE — 82947 ASSAY GLUCOSE BLOOD QUANT: CPT

## 2025-05-30 RX ORDER — ACETAMINOPHEN 325 MG/1
650 TABLET ORAL EVERY 6 HOURS PRN
Status: ACTIVE | OUTPATIENT
Start: 2025-05-30

## 2025-05-30 RX ADMIN — APIXABAN 5 MG: 5 TABLET, FILM COATED ORAL at 20:37

## 2025-05-30 RX ADMIN — FOLIC ACID 1 MG: 1 TABLET ORAL at 08:28

## 2025-05-30 RX ADMIN — HYDRALAZINE HYDROCHLORIDE 10 MG: 10 TABLET ORAL at 20:38

## 2025-05-30 RX ADMIN — BACLOFEN 2.5 MG: 5 TABLET ORAL at 08:27

## 2025-05-30 RX ADMIN — APIXABAN 5 MG: 5 TABLET, FILM COATED ORAL at 08:28

## 2025-05-30 RX ADMIN — Medication 1 TABLET: at 08:28

## 2025-05-30 RX ADMIN — BARIUM SULFATE 130 ML: 0.81 POWDER, FOR SUSPENSION ORAL at 11:06

## 2025-05-30 RX ADMIN — BARIUM SULFATE 700 MG: 700 TABLET ORAL at 11:08

## 2025-05-30 RX ADMIN — THIAMINE HCL TAB 100 MG 100 MG: 100 TAB at 08:28

## 2025-05-30 RX ADMIN — ATORVASTATIN CALCIUM 80 MG: 40 TABLET, FILM COATED ORAL at 20:37

## 2025-05-30 RX ADMIN — HYDRALAZINE HYDROCHLORIDE 10 MG: 10 TABLET ORAL at 08:28

## 2025-05-30 RX ADMIN — BARIUM SULFATE 15 ML: 400 PASTE ORAL at 11:07

## 2025-05-30 RX ADMIN — BACLOFEN 2.5 MG: 5 TABLET ORAL at 20:37

## 2025-05-30 RX ADMIN — BARIUM SULFATE 40 ML: 400 SUSPENSION ORAL at 11:07

## 2025-05-30 RX ADMIN — BARIUM SULFATE 10 ML: 400 SUSPENSION ORAL at 11:06

## 2025-05-30 RX ADMIN — Medication 10 MG: at 20:38

## 2025-05-30 ASSESSMENT — LIFESTYLE VARIABLES
AGITATION: NORMAL ACTIVITY
ORIENTATION AND CLOUDING OF SENSORIUM: ORIENTED AND CAN DO SERIAL ADDITIONS
ORIENTATION AND CLOUDING OF SENSORIUM: ORIENTED AND CAN DO SERIAL ADDITIONS
PAROXYSMAL SWEATS: NO SWEAT VISIBLE
NAUSEA AND VOMITING: NO NAUSEA AND NO VOMITING
TREMOR: NO TREMOR
AGITATION: NORMAL ACTIVITY
ANXIETY: NO ANXIETY, AT EASE
TREMOR: NO TREMOR
HEADACHE, FULLNESS IN HEAD: NOT PRESENT
AUDITORY DISTURBANCES: NOT PRESENT
AUDITORY DISTURBANCES: NOT PRESENT
NAUSEA AND VOMITING: NO NAUSEA AND NO VOMITING
PAROXYSMAL SWEATS: NO SWEAT VISIBLE
TOTAL SCORE: 0
VISUAL DISTURBANCES: NOT PRESENT
ANXIETY: NO ANXIETY, AT EASE
VISUAL DISTURBANCES: NOT PRESENT
TOTAL SCORE: 0
HEADACHE, FULLNESS IN HEAD: NOT PRESENT

## 2025-05-30 ASSESSMENT — COGNITIVE AND FUNCTIONAL STATUS - GENERAL
HELP NEEDED FOR BATHING: TOTAL
PERSONAL GROOMING: A LITTLE
MOVING TO AND FROM BED TO CHAIR: TOTAL
DRESSING REGULAR UPPER BODY CLOTHING: TOTAL
DRESSING REGULAR LOWER BODY CLOTHING: TOTAL
TURNING FROM BACK TO SIDE WHILE IN FLAT BAD: A LOT
CLIMB 3 TO 5 STEPS WITH RAILING: TOTAL
STANDING UP FROM CHAIR USING ARMS: TOTAL
WALKING IN HOSPITAL ROOM: TOTAL
MOVING FROM LYING ON BACK TO SITTING ON SIDE OF FLAT BED WITH BEDRAILS: A LOT
TOILETING: TOTAL
EATING MEALS: A LITTLE
MOBILITY SCORE: 8
DAILY ACTIVITIY SCORE: 10

## 2025-05-30 ASSESSMENT — PAIN SCALES - GENERAL
PAINLEVEL_OUTOF10: 0 - NO PAIN

## 2025-05-30 ASSESSMENT — PAIN - FUNCTIONAL ASSESSMENT
PAIN_FUNCTIONAL_ASSESSMENT: 0-10

## 2025-05-30 NOTE — CARE PLAN
Problem: Discharge Planning  Goal: Discharge to home or other facility with appropriate resources  Outcome: Progressing     Problem: Nutrition  Goal: Nutrient intake appropriate for maintaining nutritional needs  Outcome: Progressing     Problem: Pain - Adult  Goal: Verbalizes/displays adequate comfort level or baseline comfort level  Outcome: Met     Problem: Safety - Adult  Goal: Free from fall injury  Outcome: Met     Problem: Chronic Conditions and Co-morbidities  Goal: Patient's chronic conditions and co-morbidity symptoms are monitored and maintained or improved  Outcome: Met     Problem: Fall/Injury  Goal: Not fall by end of shift  Outcome: Met  Goal: Be free from injury by end of the shift  Outcome: Met  Goal: Verbalize understanding of personal risk factors for fall in the hospital  Outcome: Met  Goal: Verbalize understanding of risk factor reduction measures to prevent injury from fall in the home  Outcome: Met  Goal: Use assistive devices by end of the shift  Outcome: Met  Goal: Pace activities to prevent fatigue by end of the shift  Outcome: Met   The patient's goals for the shift include  safety and comfort.     The clinical goals for the shift include Pt will remain hemodynamically stable throughout shift.

## 2025-05-30 NOTE — PROGRESS NOTES
"      Nephrology Progress Note      Nephrology following for shae.   Events over night:     No shortness of breath  Good urine output    /72   Pulse 55   Temp 36 °C (96.8 °F)   Resp 13   Ht 1.702 m (5' 7\")   Wt 103 kg (227 lb 1.2 oz)   SpO2 99%   BMI 35.56 kg/m²     Input / Output:  24 HR:   Intake/Output Summary (Last 24 hours) at 5/30/2025 1313  Last data filed at 5/30/2025 0500  Gross per 24 hour   Intake 590 ml   Output 925 ml   Net -335 ml       Physical Exam   Alert and oriented, mild dysarthria  Neck: no JVD  CV: RRR  Lungs: CTA bilaterally  Abd: soft, NT, ND   Ext: trace lower extremity edema  Whyte     Scheduled medications  Scheduled Medications[1]  Continuous medications  Continuous Medications[2]  PRN medications  PRN Medications[3]   Results from last 7 days   Lab Units 05/30/25 0411 05/26/25 0447 05/25/25  1031   SODIUM mmol/L 137   < > 138   POTASSIUM mmol/L 4.0   < > 5.3   CHLORIDE mmol/L 110*   < > 109*   CO2 mmol/L 23   < > 14*   BUN mg/dL 41*   < > 122*   CREATININE mg/dL 3.50*   < > 6.36*   CALCIUM mg/dL 8.4*   < > 9.1   PROTEIN TOTAL g/dL  --   --  6.3*   BILIRUBIN TOTAL mg/dL  --   --  0.4   ALK PHOS U/L  --   --  109   ALT U/L  --   --  72*   AST U/L  --   --  54*   GLUCOSE mg/dL 87   < > 92    < > = values in this interval not displayed.      Results from last 7 days   Lab Units 05/26/25 0447   MAGNESIUM mg/dL 1.42*      Results from last 7 days   Lab Units 05/29/25 0419 05/28/25  0423 05/26/25  0447   WBC AUTO x10*3/uL 5.2 4.8 5.7   HEMOGLOBIN g/dL 8.7* 8.8* 8.3*   HEMATOCRIT % 27.2* 27.1* 25.5*   PLATELETS AUTO x10*3/uL 109* 113* 127*      Results from last 7 days   Lab Units 05/30/25  0411 05/29/25 0419 05/28/25  0423   SODIUM mmol/L 137 137 135*   POTASSIUM mmol/L 4.0 4.2 4.1   CHLORIDE mmol/L 110* 106 106   CO2 mmol/L 23 23 21   BUN mg/dL 41* 46* 44*   CREATININE mg/dL 3.50* 3.77* 3.64*   GLUCOSE mg/dL 87 72* 75   CALCIUM mg/dL 8.4* 8.7 8.2*         Assessment & Plan: "       64-year-old gentleman with hx of diabetes, prior stroke, documented heart failure initially brought to the hospital concerning for stroke found to be hypotensive with concomitant renal failure hence nephrology was consulted     #1 acute kidney injury  - Last serum creatinine 0.94 mg/dL on March 2024  - suspect ATN in setting of decrease EBV in setting of entresto/aldactone along with other antihypertensives  - on presentation oliguric with serum creatinine 6.36 mg/dL significant azotemia acidemia   -R  internal jugular temp catheter  -5/26- 8 hour SLED x 1 only   -Cr trending down         Plan  -clinically improved  -remains non-oliguric  -electrolyte and acid base stable  -Now showing signs of renal recovery with creatinine downtrending, should be able to pull temporary dialysis catheter over the weekend  -avoid hypotension/nephrotoxins    Please message me through [employer] chat with any questions or concerns.     Olive Dempsey DO  5/30/2025  1:13 PM     [facility]    [address], [address]   [place] [address]  Office: [phone]         [1] apixaban, 5 mg, oral, BID  atorvastatin, 80 mg, oral, Nightly  baclofen, 2.5 mg, oral, BID  folic acid, 1 mg, oral, Daily  heparin, 2,500 Units, hemodialysis, Once  hydrALAZINE, 10 mg, oral, BID  insulin lispro, 0-5 Units, subcutaneous, TID AC  multivitamin with minerals, 1 tablet, oral, Daily  thiamine, 100 mg, oral, Daily     [2]    [3] PRN medications: acetaminophen, alteplase, dextrose, dextrose, diazePAM, glucagon, glucagon, heparin, heparin, melatonin, sennosides-docusate sodium

## 2025-05-30 NOTE — PROGRESS NOTES
"Edenilson Ness is a 64 y.o. male on day 5 of admission presenting with Acute renal failure.      Assessment/Plan:     #Shock, undifferentiated, POA- resolved  No obvious signs of infection.  CT chest not concerning for pneumonia.  Blood cultures pending at this time.  UA does show yeast.  Zosyn discontinued to avoid combination of Zosyn plus vancomycin 2/2 nephrotoxicity.  Cefepime, Vanco micafungin discontinued as cultures have been negative so far.  Shock likely due to acidosis and less likely due to infection     #Acute kidney injury  Nephrology consulted.  Initially got sled.  Remains non oliguric     #Bicytopenia  Anemia and thrombocytopenia.  Low platelets could be due to sepsis.  Management per primary     #Transaminitis  Likely secondary to sepsis.  Monitor LFTs     CVA, DM  HFrEF  A-fib     Recommendations:     - Monitor off antibiotics  - Monitor for signs of infection  - Will continue to follow      Jonathan Parham MD  Date of service: 5/30/2025  Time of service: 12:22 PM      Subjective   Interval History: No acute overnight.  Patient denies any complaint.        Review of Systems  Denies: fever, chills, nausea, vomiting, diarrhea    Objective   Range of Vitals (last 24 hours)  Heart Rate:  [47-73]   Temp:  [35 °C (95 °F)-36.8 °C (98.2 °F)]   Resp:  [7-43]   BP: (107-140)/(76-96)   Height:  [170.2 cm (5' 7\")]   Weight:  [99.5 kg (219 lb 5.7 oz)-103 kg (227 lb 1.2 oz)]   SpO2:  [83 %-100 %]  Daily Weight  05/30/25 : 103 kg (227 lb 1.2 oz)   Body mass index is 35.56 kg/m².    General: Alert and awake, NAD  Neck:  RIJ dialysis catheter  Abdomen: soft, non tender, non distended  Neuro: AAO x 3, B/l LE weakness,   Extremities: RLE surgical scar on the medial side  Skin: No rashes or ulcers         Antibiotics  This patient does not have an active medication from one of the medication groupers.      Relevant Results  Labs  Results from last 72 hours   Lab Units 05/29/25  0419 05/28/25  0423   WBC AUTO x10*3/uL " "5.2 4.8   HEMOGLOBIN g/dL 8.7* 8.8*   HEMATOCRIT % 27.2* 27.1*   PLATELETS AUTO x10*3/uL 109* 113*     Results from last 72 hours   Lab Units 05/30/25  0411 05/29/25  0419 05/28/25  0423   SODIUM mmol/L 137 137 135*   POTASSIUM mmol/L 4.0 4.2 4.1   CHLORIDE mmol/L 110* 106 106   CO2 mmol/L 23 23 21   BUN mg/dL 41* 46* 44*   CREATININE mg/dL 3.50* 3.77* 3.64*   GLUCOSE mg/dL 87 72* 75   CALCIUM mg/dL 8.4* 8.7 8.2*   ANION GAP mmol/L 8* 12 12   EGFR mL/min/1.73m*2 19* 17* 18*         Estimated Creatinine Clearance: 25 mL/min (A) (by C-G formula based on SCr of 3.5 mg/dL (H)).  No results found for: \"CRP\"    Microbiology  Susceptibility data from last 14 days.  Collected Specimen Info Organism   05/27/25 Swab from Anterior Nares Methicillin Resistant Staphylococcus aureus (MRSA)       Imaging    XR chest 1 view  Result Date: 5/25/2025  1. Cardiomegaly with mild interstitial prominence diffusely. Consider mild CHF or pulmonary edema. Follow-up as clinically warranted.. 2. Right central line catheter as described.       MACRO: None   Signed by: Yash Cortez 5/25/2025 4:19 PM Dictation workstation:   [id number]    CT angio chest for pulmonary embolism  Result Date: 5/25/2025  1. Extremely limited exam due to suboptimal contrast phase. Although no definite large or central pulmonary embolism is seen, a more distal pulmonary embolism not excluded given the limitations of the study. 2. Cardiomegaly with low lung volumes which may be due to poor inspiration. Likely posterior atelectasis and/or minimal dependent edema. 3. Question of enhancing nodule in the gastric fundus. This is approximately 17 mm in diameter. A polyp or mass not excluded. Correlate with EGD.   MACRO: Critical Finding:  See findings. Notification was initiated on 5/25/2025 at 11:21 am by  Yash Cortez.  (**-YCF-**)   Signed by: Yash Cortez 5/25/2025 11:21 AM Dictation workstation:   [id number]    CT brain attack angio head and neck W and WO IV " contrast  Result Date: 5/25/2025  No occlusion of the visualized arteries in the head or neck. Unchanged appearance of the these arteries compared to the prior CTA study.   This study was interpreted at [facility].   MACRO: None   Signed by: Levon Waters 5/25/2025 10:33 AM Dictation workstation:   [id number]    CT brain attack head wo IV contrast  Result Date: 5/25/2025  No acute intracranial hemorrhage, mass effect, or CT apparent acute infarct. Chronic microvascular ischemia and involutional changes. Tu Hunter discussed the significance and urgency of this critical finding by telephone with  KENTON STRATTON on 5/25/2025 at 10:27 am.  (**-RCF-**) Findings:  See findings.         Signed by: Tu Hunter 5/25/2025 10:27 AM Dictation workstation:   [id number]

## 2025-05-30 NOTE — DOCUMENTATION CLARIFICATION NOTE
"    PATIENT:               MELISSA BERGER  ACCT #:                  [id number]  MRN:                       [id number]  :                       1960  ADMIT DATE:       2025 9:30 AM  DISCH DATE:  RESPONDING PROVIDER #:        [id number]          PROVIDER RESPONSE TEXT:    CVA ruled out after workup    CDI QUERY TEXT:    Clarification    Instruction:    Based on your assessment of the patient and the clinical information, please provide the requested documentation by clicking on the appropriate radio button and enter any additional information if prompted.    Question: Please further clarify the diagnosis of CVA as    When answering this query, please exercise your independent professional judgment. The fact that a question is being asked, does not imply that any particular answer is desired or expected.    The patient's clinical indicators include:  Clinical Information: Patient presenting with hypotension with noted shock    Clinical Indicators: Per ED, \"Patient presents with  -Stroke.\"    Per ID consult, \"CVA.\"     brain CTA, \"No occlusion of the visualized arteries in the head or neck.  Unchanged appearance of the these arteries compared to the prior CTA  study.\"     brain CT, \"No acute intracranial hemorrhage, mass effect, or CT apparent acute  infarct. Chronic microvascular ischemia and involutional changes.\"    Treatment: head CT, head CTA, heparin injection    Risk Factors: 64yom presenting with hypotension with noted shock  Options provided:  -- CVA ruled out after workup  -- CVA ruled in for this admission as evidenced by, Please provide additional information below  -- Other - I will add my own diagnosis  -- Refer to Clinical Documentation Reviewer    Query created by: Yris Mueller on 2025 5:01 PM      Electronically signed by:  YONIS VELAZCO DO 2025 8:21 PM          "

## 2025-05-30 NOTE — PROGRESS NOTES
"Edenilson Ness is a 64 y.o. male on day 4 of admission presenting with Acute renal failure.      Assessment/Plan:     #Shock, undifferentiated, POA- resolved  No obvious signs of infection.  CT chest not concerning for pneumonia.  Blood cultures pending at this time.  UA does show yeast.  Zosyn discontinued to avoid combination of Zosyn plus vancomycin 2/2 nephrotoxicity.  Cefepime, Vanco micafungin discontinued as cultures have been negative so far.  Shock likely due to acidosis and less likely due to infection     #Acute kidney injury  Nephrology consulted.  Initially got sled.  Remains non oliguric     #Bicytopenia  Anemia and thrombocytopenia.  Low platelets could be due to sepsis.  Management per primary     #Transaminitis  Likely secondary to sepsis.  Monitor LFTs     CVA, DM  HFrEF  A-fib     Recommendations:     - Monitor off antibiotics  - Will continue to follow    Jonathan Parham MD  Date of service: 5/29/2025  Time of service: 10:01 PM      Subjective   Interval History: No acute events overnight.  Patient sleeping        Review of Systems  Denies: Fever, chills, nausea, vomiting, diarrhea    Objective   Range of Vitals (last 24 hours)  Heart Rate:  [50-77]   Temp:  [35.4 °C (95.7 °F)-36.8 °C (98.2 °F)]   Resp:  [7-58]   BP: (116-140)/(74-96)   Height:  [170.2 cm (5' 7\")]   Weight:  [99.5 kg (219 lb 5.7 oz)]   SpO2:  [92 %-100 %]  Daily Weight  05/29/25 : 99.5 kg (219 lb 5.7 oz)   Body mass index is 34.36 kg/m².    General: Alert and awake, NAD  HEENT: conjunctival pallor, no scleral icterus  Neck:  RIJ dialysis catheter  Heart: Irregular rhythm  Abdomen: soft, non tender, non distended  Neuro: AAO x 3, B/l LE weakness,   Extremities: RLE surgical scar on the medial side  Skin: No rashes or ulcers       Antibiotics  This patient does not have an active medication from one of the medication groupers.      Relevant Results  Labs  Results from last 72 hours   Lab Units 05/29/25  0419 05/28/25  0425   WBC AUTO " "x10*3/uL 5.2 4.8   HEMOGLOBIN g/dL 8.7* 8.8*   HEMATOCRIT % 27.2* 27.1*   PLATELETS AUTO x10*3/uL 109* 113*     Results from last 72 hours   Lab Units 05/29/25  0419 05/28/25  0423 05/27/25  0424   SODIUM mmol/L 137 135* 128*   POTASSIUM mmol/L 4.2 4.1 4.8   CHLORIDE mmol/L 106 106 105   CO2 mmol/L 23 21 21   BUN mg/dL 46* 44* 39*   CREATININE mg/dL 3.77* 3.64* 3.29*   GLUCOSE mg/dL 72* 75 84   CALCIUM mg/dL 8.7 8.2* 7.9*   ANION GAP mmol/L 12 12 7*   EGFR mL/min/1.73m*2 17* 18* 20*           Estimated Creatinine Clearance: 22.8 mL/min (A) (by C-G formula based on SCr of 3.77 mg/dL (H)).  No results found for: \"CRP\"    Microbiology  Susceptibility data from last 14 days.  Collected Specimen Info Organism   05/27/25 Swab from Anterior Nares Methicillin Resistant Staphylococcus aureus (MRSA)       Imaging    XR chest 1 view  Result Date: 5/25/2025  1. Cardiomegaly with mild interstitial prominence diffusely. Consider mild CHF or pulmonary edema. Follow-up as clinically warranted.. 2. Right central line catheter as described.       MACRO: None   Signed by: Yash Cortez 5/25/2025 4:19 PM Dictation workstation:   [id number]    CT angio chest for pulmonary embolism  Result Date: 5/25/2025  1. Extremely limited exam due to suboptimal contrast phase. Although no definite large or central pulmonary embolism is seen, a more distal pulmonary embolism not excluded given the limitations of the study. 2. Cardiomegaly with low lung volumes which may be due to poor inspiration. Likely posterior atelectasis and/or minimal dependent edema. 3. Question of enhancing nodule in the gastric fundus. This is approximately 17 mm in diameter. A polyp or mass not excluded. Correlate with EGD.   MACRO: Critical Finding:  See findings. Notification was initiated on 5/25/2025 at 11:21 am by  Yash Cortez.  (**-YCF-**)   Signed by: Yash Cortez 5/25/2025 11:21 AM Dictation workstation:   [id number]    CT brain attack angio head and neck W and WO IV " contrast  Result Date: 5/25/2025  No occlusion of the visualized arteries in the head or neck. Unchanged appearance of the these arteries compared to the prior CTA study.   This study was interpreted at [facility].   MACRO: None   Signed by: Levon Waters 5/25/2025 10:33 AM Dictation workstation:   [id number]    CT brain attack head wo IV contrast  Result Date: 5/25/2025  No acute intracranial hemorrhage, mass effect, or CT apparent acute infarct. Chronic microvascular ischemia and involutional changes. Tu Hunter discussed the significance and urgency of this critical finding by telephone with  KENTON STRATTON on 5/25/2025 at 10:27 am.  (**-RCF-**) Findings:  See findings.         Signed by: Tu Hunter 5/25/2025 10:27 AM Dictation workstation:   [id number]

## 2025-05-30 NOTE — PROCEDURES
Speech-Language Pathology    Inpatient Modified Barium Swallow Study    Patient Name: Edenilson Ness  MRN: [id number]  : 1960  Today's Date: 25  Time Calculation  Start Time: 915  Stop Time: 1000  Time Calculation (min): 45 min        TECHNIQUE:  Modified Barium Swallow Study completed. Informed verbal consent obtained prior to completion of exam.     Pt given the followin trials 5ml THIN via syringe, 3 trial 20ml THIN via medicine cup, 3 trials of THIN via independent cup sip, 1 trial of THIN via single straw sip; 1 trial 5ml NECTAR via syringe, 1 trial 20ml  NECTAR via medicine cup, 1 trial of NECTAR via independent cup sip; 2 trials 5ml each HONEY via spoon; 2 trials 5ml each PUDDING via spoon; 1 trial Soft Solids (4 cubes pears/peaches drained) Pt ended up spitting the fruit out d/t inability to chew them without his dentures.  Stefanie Doone cookie not given d/t inability to chew without dentures.     In addition the pt was given the followin Trial in a Lateral View with 20ml NECTAR via medicine cup while sitting with an esophageal scan.   1 Trial in a Lateral View of 5ml PUDDING via spoon while sitting with an esophageal scan.  1 Trial 13 mm Barium Tablet with water via cup while sitting - Lateral View with an esophageal scan.  **Pt unable to stand.     A 1.9 cm or .75 inch (outer diameter) ring was placed under the chin and on the lateral, left side of the neck in order to complete objective measurements during swallowing. The anatomic structures and function of the oropharynx, larynx, hypopharynx and cervical esophagus were evaluated.      SLP: Rhea Lopez, SLP   Contact info: [facility]; phone: [phone]      Reason for Referral: pt c/o premature fullness. Limited oral intake.  Intermittent wet vocal quality during clinical bedside swallow eval.   CT chest showed CHF and pulmonary edema, but no infiltrates or consolidations.     Patient Hx: CVA in 2019 now wheelchair-bound,  diabetes, anemia, HFrEF and atrial fib on apixaban     Respiratory Status: 4lt nc.     Current diet: Easy to chew diet until MBS study.  Regular diet Texture and Thin liquids at SNF, however, pt wears dentures and they are not here.     Pain:  Pain Scale: 0-10  Ratin      FINAL SPEECH RECOMMENDATIONS    DIET:   - Soft & Bite Sized (IDDSI Level 6)  - Thin liquids (IDDSI Level 0)    Once pt returns to his facility and his dentures are available then he can be advanced back to his Regular diet Texture and Thin liquids.      Per the results of today's MBSS it is recommended pt perform the following strategies listed below:    STRATEGIES:  - Upright positioning for all PO intake  - Remain upright for >30 min after meals  - Slow rate of intake  - Small bites  - Small sips  - Small/SINGLE sips  - One bite/sip at a time  - Alternate bites and sips  - Chew thoroughly  - NO large sips, gulping or chugging  - Avoid straws     SLP PLAN:  Skilled SLP Services: Skilled SLP intervention for dysphagia is warranted.  SLP Frequency: 2x per week  Duration: 2 weeks  Treatment/Interventions:   - Oropharyngeal exercises  - Bolus trials  - Compensatory strategy training  - Patient/caregiver education      Discussed POC: Patient  Discussed Risks/Benefits: Yes  Patient/Caregiver Agreeable: Yes    Short term goals established 25:   - Pt will consume prescribed diet (current diet is Soft bite-sized diet and THIN Liquids) without overt s/sx aspiration/penetration in 95% of observed trials.   Status: Goal initiated  Progress: TBD    - Pt will demonstrate follow-through of trained compensatory strategies during a meal/snack with 90% acc independently.  Status: Goal initiated  Progress: TBD    - Pt will complete oral/pharyngeal/laryngeal strengthening exercises as directed given min verbal and visual cues from SLP in order to improve swallow function.  Status: Goal initiated  Progress: TBD    - Pt/family will demonstrate understanding  of education related to dysphagia independently.  Status: Goal initiated  Progress: TBD    Long term goals 05/30/25:   Patient will tolerate the least restrictive diet without overt difficulty or further pulmonary compromise by time of discharge.    Education Provided to: Patient   SLP discussed results and recommendations of the MBS study while utilizing the MBS study video. In addition, SLP education regarding diet, diet modifications, compensatory strategies, anatomy and physiology of the swallow mechanism as well as risk for penetration and or aspiration.  Patient asked appropriate questions and SLP answered them.  Pt verbalized understanding, Education will be reinforced during follow-up visits, as appropriate, and Needs reinforcement     Treatment Provided Today: No     Additional Medical Consults Suggested:   - No new disciplines indicated    Repeat study/dc plan:   Repeat MBS study as clinically indicated.      Patient's Presentation: Pt pleasant and cooperative with assessment. Pt seated in the [place] chair in a left lateral view. Pt fed self independently Thin and [place] consistencies.  SLP fed pt the remaining consistencies to control bolus size.  Pts dentition: edentulous for this exam d/t dentures no present at hospital.  Pt wears dentures at the SNF, but they didn't come with him.      Mechanics of the Swallow Summary:   ORAL PHASE:  Lip Closure - Interlabial escape/no progression to anterior lip   Tongue Control During Bolus Hold - Cohesive bolus between tongue to palatal seal   Bolus prep/mastication - Minimal mastication noted with majority of bolus left unchewed  - pt had to spit them out.   Bolus transport/lingual motion - Brisk tongue motion for A-P movement of the bolus   Oral residue - Trace residue lining oral structures     Penetration Before the Swallow - 20ml thin  Aspiration Before the Swallow - None    Additional Comments: N/A     PHARYNGEAL PHASE:  Initiation of pharyngeal swallow -  Bolus head at pit of pyriforms  and bolus at the laryngeal surface of the epiglottis with a 1 second delay.   Soft palate elevation - No bolus between soft palate/pharyngeal wall   Laryngeal elevation - Partial superior movement of thyroid cartilage and/or partial approximation of arytenoids to epiglottic petiole   Anterior hyoid excursion - Partial anterior movement   Epiglottic movement - Complete inversion    Laryngeal vestibule closure - Complete - no air/contrast in laryngeal vestibule   Pharyngeal stripping wave - Complete  Pharyngeal contraction (A/P view) - Not tested       Pharyngoesophageal segment opening - Complete distension and complete duration/no obstruction of flow of bolus   Tongue base retraction - No bolus between tongue base and posterior pharyngeal wall   Pharyngeal residue - Complete pharyngeal clearance     Penetration During the Swallow - fruit drained-liquid  Penetration After the Swallow - fruit drained-liquid  Aspiration During the Swallow - None  Aspiration After the Swallow - None    Penetrated material independently cleared as the swallow completed: material independently ejected   Reflexive Response - N/A -   Cued by Clinician Response - N/A -     Compensatory Strategies Attempted - None     Additional Comments - N/A     Anatomical Abnormalities - Bony prominence(s) noted @ C3      ESOPHAGEAL PHASE:  Esophageal clearance - Esophageal retention of the barium tablet at the GE junction.  Additional sips of water not effective.  5ml pudding was given after the tablet to assist with moving it through, however, it was retained in min esophagus.  Additional sip of water after pudding eventually cleared the 5ml pudding and tablet into the stomach.  Pt was given 20ml nectar and 5ml pudding prior to the tablet and those moved through the esophagus without incident.        SLP Impressions with Severity Rating:   Pt exhibited a min oropharyngeal dysphagia upon completion of modified barium  swallow study this date. Swallowing physiology is detailed above. Impairments most impacting swallowing safety and efficiency include impaired mastication without his dentures, delayed swallow onset and delayed laryngeal vestibule closure. Patient demonstrated penetration before the swallow with thin with independent ejection as the swallow completed and tr pen during the swallow on liquid from fruit that cleared independently as the swallow completed.  Pt unable to chew the fruit and eventually spit it out.  No further penetration was observed for any other consistency, and no aspiration was visualized during study. Patient demonstrated no oral and pharyngeal residue during this study.    *Of note: The A-P or Lateral bolus follow-through is not intended to be utilized as a diagnostic assessment of the esophagus, rather a tool to observe the biomechanical aspects of the swallow continuum and to inform the need for further evaluation by medical specialists, as applicable.     Images for this study are available in PACS.      OUTCOME MEASURES:  Functional Oral Intake Scale  Functional Oral Intake Scale: Level 5        total oral diet with multiple consistencies, but requires special preparations and compensations       Rosenbek's Penetration Aspiration Scale  Thin Liquids: 2. PENETRATION that CLEARS - contrast enter airway, above vocal cords, no residue  Before the Swallow    Nectar Thick Liquids: 1. NO ASPIRATION & NO PENETRATION - no aspiration, contrast does not enter airway    Honey Thick Liquids: 1. NO ASPIRATION & NO PENETRATION - no aspiration, contrast does not enter airway    Pudding/Puree: 1. NO ASPIRATION & NO PENETRATION - no aspiration, contrast does not enter airway    Soft Solids: 2. PENETRATION that CLEARS - contrast enter airway, above vocal cords, no residue  During the Swallow  After the Swallow    Hard Solids: 1. NO ASPIRATION & NO PENETRATION - no aspiration, contrast does not enter airway

## 2025-05-30 NOTE — PROGRESS NOTES
SW placed call to pt's mother, pt's aunt Lola answered and informed this writer that pt's mother is ill and unable to assist with DC planning at this time. GAVIN confirmed with aunt plan is to return to [facility] [place] when medically ready, aunt expressed family is interested in moving pt to a facility closer to [place]. This writer e-mailed a [employer] list to Lola and included the contact number for the L[facility]. Aunt was appreciative of GAVIN support, stated she plans to visit pt tomorrow, denied any concerns at this time. Pt is LTC bed hold at [facility] and can return with no barriers. Care transitions to follow as needed.    Rg Reveles, MSW, LSW ([id number])   Care Transitions

## 2025-05-30 NOTE — CARE PLAN
The patient's goals for the shift include      The clinical goals for the shift include Pt will remain hemodynamically stable throughout shift      Problem: Nutrition  Goal: Nutrient intake appropriate for maintaining nutritional needs  Outcome: Progressing     Problem: Skin  Goal: Participates in plan/prevention/treatment measures  Recent Flowsheet Documentation  Taken 5/30/2025 1202 by Flaquita Kasper RN  Participates in plan/prevention/treatment measures: Elevate heels  Goal: Prevent/manage excess moisture  Recent Flowsheet Documentation  Taken 5/30/2025 1202 by Flaquita Kasper RN  Prevent/manage excess moisture: Moisturize dry skin  Goal: Prevent/minimize sheer/friction injuries  Recent Flowsheet Documentation  Taken 5/30/2025 1202 by Flaquita Kasper RN  Prevent/minimize sheer/friction injuries: Use pull sheet

## 2025-05-30 NOTE — PROGRESS NOTES
Edenilson Ness [id number]   Service: Internal Medicine / Hospitalist Date of service: 5/30/2025                                  Full Code                     Overnight Events: No new overnight events reported by patient or nursing.      Subjective     Edenilson Ness is a 64 y.o. male with PMHx of CVA in 2019 now wheelchair-bound, diabetes, anemia, HFrEF and atrial fib on apixaban who presented from [facility]. LKW was 5am today. In the ED he was hypotensive to 62/38, with loss of urine and slurring of words. BP did not respond to 2L IV fluids and he was placed on Levophed. He drank alcohol this morning. At the bedside he is very difficult to understand. He denies pain and SOB. He says he drinks one beer a day.  Remainder of ROS reviewed and negative except as indicated in HPI.      ED workup was significant for BUN/creatinine 122/6.36, ALT/AST 72/54, , troponins 15/13, lactate 2.9/5.0, WBC 4.7k, Hgb 8.6, pH 7.14, bicarb 14.6. ETOH was 70 (it was 98 three months ago). UA was indicative of infection. CTA chest was extremely limited and revealed no definite large or central pulmonary embolism; possible cardiomegaly with likely posterior atelectasis and/or minimal dependent edema; questionable enhancing nodule in the gastric fundus. CT brain was nonacute. The pt was tachypneic to 49, hypotensive to 52/39 mmHg and hypoxic to 73%.RA. The pt received vancomycin, Zosyn and 2L Lactated Ringers IVF. He was placed on Levophed and bicarb gtts and is being admitted to the ICU.      The pt's case and plan of care was discussed with the ED provider. The ED notes were reviewed in detail, as were prior outpatient and patient notes as part of the admission chart review. The pt is at high risk for cardiovascular, respiratory, infectious and neurological events including septic shock, ETOH withdrawal, MI, stroke and ESRD due to acute hypotension requiring pressor support, lactic acidosis and acute renal failure. The  decision was made to escalate care and admit the pt under ICU status.      5/26...............     No new additional report gathered from patient's nurse on [facility] rounding patient appeared  confused requesting a new room.  No acute complaints, no reported : chest pains, nausea, vomiting, dyspnea or abdominal pain.     5/27.............  Overnight events discussed with patient's nurse/triad rounds discussion..No reported: Seizure activities, respiratory distress, chest pains, epistaxis, nausea or vomiting.     5/28................   No overnight events reported by patient's nurse.  Appeared quite engaging today.  Wondering when he can be discharged home.  No acute complaints voiced by patient at the time of evaluation this morning.  Specifically no reported: chest pains, headaches, nausea, vomiting, abdominal pain, fevers or chills.     5/29.....................     Triad rounding discussion with patient's nurse no new overnight complaints reported apart from reported swallowing difficulties voiced by patient yesterday.  Patient reported apparent yesterday that he had some difficulties with swallowing.  Speech evaluation ordered.  No reported: odynophagia, headaches, chest pains, abdominal pain, nausea or vomiting.    5/30....................     Triad rounding with patient's nurse this a.m.  Patient had no major complaints hoping that he can be discharged home soon.  No reported: Headaches, palpitations, chest pains, fevers, chills, nausea or vomiting  Review of Systems:   Review of system otherwise negative if not aforementioned above in subjective.     Objective           Physical Exam      Constitutional:       Appearance: Patient appeared in no acute cardiopulmonary distress.     Comments: Patient alert and oriented to person , place , time and situation .Patient appeared non-toxic.  HEENT:      Head: Normocephalic and atraumatic.Trachea midline      Nose:No observed congestion or rhinorrhea.     Mouth/Throat:  Mucous membranes Moist, Trachea appeared  midline.  Eyes:      Extraocular Movements: Extraocular movements intact.      Pupils: Pupils are equal, round, and reactive to light.      Comments: No scleral icterus or conjunctival injection appreciated.   Cardiovascular:      Rate and Rhythm: Normal rate and regular rhythm. No clicks rubs or gallops, normal S1 and S2.No peripheral stigmata of endocarditis appreciated.     Pulmonary:      Lung fields remained diminished  in  the bases without appreciation of adventitious sounds.  Abdominal:      General: Abdomen soft,  obese ,non-tender, active bowel sounds, no involuntary guarding or rebound tenderness appreciated.     Comments: None   Musculoskeletal:       Patient appeared to have full active range of motion for upper and lower extremities, no acute apparent joint deformity appreciated on examination.   No pitting edema or cyanosis appreciated.     Skin:     General: Skin is warm.      Coloration:  No jaundice     Findings: No abnormal appearing skin rashes or lesions that appeared acute noted on unclothed area of the skin..   Neurological:      General: No new focal sensory or motor deficits appreciated.     Cranial Nerves: Cranial nerves II to XII appearing grossly intact.        Genitals:  Deferred  Psychiatric:         The patient appeared to be displaying normal mood and affect at the time of evaluation.     Labs:     Lab Results   Component Value Date    GLUCOSE 87 05/30/2025    CALCIUM 8.4 (L) 05/30/2025     05/30/2025    K 4.0 05/30/2025    CO2 23 05/30/2025     (H) 05/30/2025    BUN 41 (H) 05/30/2025    CREATININE 3.50 (H) 05/30/2025      Lab Results   Component Value Date    WBC 5.2 05/29/2025    HGB 8.7 (L) 05/29/2025    HCT 27.2 (L) 05/29/2025    MCV 92 05/29/2025     (L) 05/29/2025         [unfilled]   [unfilled]   No results found for the last 90 days.                  X-rays/ Images     [unfilled]   Radiology Results (last  21 days)     Procedure Component Value Units Date/Time   XR chest 1 view [[id number]] Collected: 05/25/25 1620   Order Status: Completed Updated: 05/25/25 1620   Narrative:     Interpreted By:  Yash Cortez,  STUDY:  XR CHEST 1 VIEW;  5/25/2025 2:54 pm      INDICATION:  Signs/Symptoms:check RIJ CVL.          COMPARISON:  08/28/2019      ACCESSION NUMBER(S):  [id number]      ORDERING CLINICIAN:  GUNNAR KING      FINDINGS:          AP portable view of the chest is obtained.  Limited exam due to  portable nature. Magnified cardiac silhouette. Mild interstitial  prominence diffusely. No effusion or pneumothorax.. Right central  line catheter terminates at the level of the SVC       Impression:     1. Cardiomegaly with mild interstitial prominence diffusely. Consider  mild CHF or pulmonary edema. Follow-up as clinically warranted..  2. Right central line catheter as described.              MACRO:  None      Signed by: Yash Cortez 5/25/2025 4:19 PM  Dictation workstation:   [id number]   CT angio chest for pulmonary embolism [[id number]] Collected: 05/25/25 1122   Order Status: Completed Updated: 05/25/25 1122   Narrative:     Interpreted By:  Yash Cortez,  STUDY:  CT ANGIO CHEST FOR PULMONARY EMBOLISM;  5/25/2025 10:23 am      INDICATION:  Signs/Symptoms:AMS, hypotensive.          COMPARISON:  None      ACCESSION NUMBER(S):  [id number]      ORDERING CLINICIAN:  KENTON STRATTON      TECHNIQUE:  Helical data acquisition of the chest was obtained after intravenous  administration of 100 ML Omnipaque 350, as per PE protocol. Images  were reformatted in coronal and sagittal planes. Axial and coronal  maximum intensity projection (MIP) images were created and reviewed.      FINDINGS:  POTENTIAL LIMITATIONS OF THE STUDY: The exam is extremely limited  borderline nondiagnostic due to suboptimal contrast phase. Also  limited by body habitus.      HEART AND VESSELS:  There are no discrete filling defects within main  pulmonary artery  and its main right and left branches. However a more distal pulmonary  embolism cannot be excluded given suboptimal contrast phase..          Main pulmonary artery and its branches are normal in caliber.      The thoracic aorta normal in course and caliber.  No coronary artery calcifications are seen. Please note, the study is  not optimized for evaluation of coronary arteries.      Cardiomegaly.      There is no pericardial effusion seen.      MEDIASTINUM AND ERASMO, LOWER NECK AND AXILLA:  The visualized thyroid gland is within normal limits.  No evidence of thoracic lymphadenopathy by CT criteria.  Esophagus appears within normal limits as seen.      LUNGS AND AIRWAYS:  The trachea and central airways are patent. No endobronchial lesion  is seen.      The bilateral lungs are hypoinflated. This may be due to poor  inspiration. Likely bibasilar atelectasis and/or scarring.      UPPER ABDOMEN:  The visualized subdiaphragmatic structures demonstrate hypodensity  within the midpole of the right kidney which may represent a simple  cyst but not fully characterized in this exam. Question of enhancing  nodule in the gastric fundus. This is approximately 17 mm in  diameter. A polyp or mass not excluded.      CHEST WALL AND OSSEOUS STRUCTURES:  Chest wall is within normal limits.  No acute osseous pathology.There are no suspicious osseous lesions.       Impression:     1. Extremely limited exam due to suboptimal contrast phase. Although  no definite large or central pulmonary embolism is seen, a more  distal pulmonary embolism not excluded given the limitations of the  study.  2. Cardiomegaly with low lung volumes which may be due to poor  inspiration. Likely posterior atelectasis and/or minimal dependent  edema.  3. Question of enhancing nodule in the gastric fundus. This is  approximately 17 mm in diameter. A polyp or mass not excluded.  Correlate with EGD.      MACRO:  Critical Finding:  See findings.  Notification was initiated on  5/25/2025 at 11:21 am by  Yash Cortez.  (**-YCF-**)      Signed by: Yash Cortez 5/25/2025 11:21 AM  Dictation workstation:   [id number]   CT brain attack angio head and neck W and WO IV contrast [[id number]] Collected: 05/25/25 1034   Order Status: Completed Updated: 05/25/25 1034   Narrative:     Interpreted By:  Levon Waters,  STUDY:  CT BRAIN ATTACK ANGIO HEAD AND NECK W AND WO IV CONTRAST; ;  5/25/2025 10:22 am      INDICATION:  Signs/Symptoms:AMS, LKW 5am? aphasia, ? L weakness.          COMPARISON:  CTA head and neck from 03/01/2024.      ACCESSION NUMBER(S):  [id number]      ORDERING CLINICIAN:  KENTON STRATTON      TECHNIQUE:  CTA of the head and neck was performed after administration of 100 mL  of Omnipaque 350 intravenously. Segmented MIPs are provided.      FINDINGS:  CTA head:      There are small foci of atherosclerotic calcifications located within  the precavernous, cavernous, and paraophthalmic segments of the  bilateral internal carotid arteries without luminal narrowing. There  is stable more concentric atherosclerotic calcification located  within the supraclinoid segment of the left ICA which may cause mild  luminal narrowing. Otherwise, there is no striking narrowing of the  visualized internal carotid arteries.      There is no narrowing of the visualized portions of the bilateral  anterior or middle cerebral arteries. There are small foci of  atherosclerotic calcifications located within the bilateral  intradural vertebral arteries. There is at most mild narrowing  involving the distal right intradural vertebral artery, unchanged  compared to the the prior CTA and MRA head from 2019.      There is mild luminal irregularity of the basilar artery primarily  due to noncalcified plaque. There is no narrowing of the visualized  portions of the bilateral posterior cerebral arteries. The lumen of  the P1 segment of the left posterior cerebral artery is  smaller  compared to the right, likely normal variant as there is a left  posterior communicating artery which is visualized, unchanged.      There are no aneurysms.      CTA neck:      Aortic arch and origins of the great vessels from the aortic arch are  without luminal narrowing. There are atherosclerotic calcifications  within these vascular structures but there is no striking luminal  narrowing. Bilateral common carotid arteries are without luminal  narrowing. There are small foci of atherosclerotic calcification  located within the left common carotid artery. There are small foci  of atherosclerotic calcification located within the bilateral carotid  bulbs without luminal narrowing. There is retropharyngeal course of  the right internal carotid artery. Bilateral vertebral arteries arise  from the subclavian arteries and demonstrate no luminal narrowing.      Additional findings:      There are osseous degenerative changes of the cervical spine.       Impression:     No occlusion of the visualized arteries in the head or neck.  Unchanged appearance of the these arteries compared to the prior CTA  study.      This study was interpreted at [facility].      MACRO:  None      Signed by: Levon Waters 5/25/2025 10:33 AM  Dictation workstation:   [id number]   CT brain attack head wo IV contrast [[id number]] Collected: 05/25/25 1028   Order Status: Completed Updated: 05/25/25 1028   Narrative:     Interpreted By:  Tu Hunter,  STUDY:  CT BRAIN ATTACK HEAD WO IV CONTRAST;  5/25/2025 10:13 am      INDICATION:  Signs/Symptoms:AMS, LKW 5am? aphasia.      COMPARISON:  None.      ACCESSION NUMBER(S):  [id number]      ORDERING CLINICIAN:  KENTON STRATTON      TECHNIQUE:  Noncontrast axial CT scan of head was performed.      FINDINGS:  Parenchyma: There is no intracranial hemorrhage. The grey-white  differentiation is intact. There is no mass effect or midline shift.  Patchy  supratentorial hypodensity, nonspecific, but likely secondary  to mild chronic microvascular ischemia.      CSF Spaces: Mild generalized volume loss, with concordant ventricular  enlargement.      Extra-Axial Fluid: There is no extraaxial fluid collection.      Calvarium: The calvarium is unremarkable.      Paranasal sinuses: Visualized paranasal sinuses are clear.      Mastoids: Clear.      Orbits: Normal.      Soft tissues: Unremarkable.       Impression:     No acute intracranial hemorrhage, mass effect, or CT apparent acute  infarct. Chronic microvascular ischemia and involutional changes.  Tu Hunter discussed the significance and urgency of this  critical finding by telephone with  KENTON AGUIRRE on 5/25/2025 at  10:27 am.  (**-RCF-**) Findings:  See findings.                  Signed by: Tu Hunter 5/25/2025 10:27 AM  Dictation workstation:   [id number]   Point of Care Ultrasound [[id number]] Resulted: 05/25/25 1009   Order Status: Completed Updated: 05/25/25 1851   Narrative:     Kenton Aguirre DO     5/25/2025  6:51 PM    Performed by: Kenton Aguirre DO  Authorized by: Kenton Aguirre DO    Cardiac Indications: hypotension                  Procedure: Cardiac Ultrasound    Findings:   Views: parasternal long, parasternal short, apical four and subxiphoid  The pericardial space was visualized and was NEGATIVE for a significant  pericardial effusion.  Activity: Ventricular contractions were visualized.  LV: LV systolic function was NORMAL.  RV: RV size was DILATED.    Impression:  Cardiac: The focused cardiac ultrasound exam had ABNORMAL findings as  specified.      Comments: Ivc Plethoric                  Medical Problems         Problem List         * (Principal) Acute renal failure     Conjunctivitis     Weakness     CVA (cerebral vascular accident) (Multi)     Type 2 diabetes mellitus without complication, with long-term current use of insulin     Hypertensive heart disease with CHF     Anemia  "of chronic disease     Admits to alcohol consumption     Localized swelling on right hand     Diarrhea     Muscle spasticity     Ac isch multi vasc territories stroke (Multi)     UTI (urinary tract infection)     Hypokalemia     SVT (supraventricular tachycardia)     Loose stools     Pneumonia     Skin irritation     Cough     Umbilical hernia                  Above medical problems may be reflective of historical medical problems that may have resolved and may not related to acute clinical condition/medical problems.     Clinical impression/plan:        Undifferentiated shock  -Monitor offempirical  antibiotics.  -Continue stewardship as per infectious disease.          Acute renal failure, no hx CKD  Acute hypotension requiring pressor support  Pt seen by nephrology and SLED orders are in  All offending home meds held  Daily RFP  Continue empiric pending urine and blood cx, UA was +, pt does not meet SIRS/sepsis criteria     Acute hypoxic respiratory failure  Pt currently on 6L O2  Imaging does not reveal evidence of PNA  Wean O2 as tolerated to maintain O2 sat >91%, pt may need home O2 eval      ETOH abuse  Pt admits to drinking one beer a day \"my whole life\"  ETOH was 70 (it was 98 three months ago)  Start CIWA protocol     Chronic anemia  Baseline Hgb is 11-12, today is 8.6, likely etiology is ARF  Monitor Hgb and transfuse for Hgb <= 7.0     DVT ppx: apixaban        Disposition/additional care plan/interventions: 5/26/2025     Patient with high complexity characteristics and appeared to be at high risk for clinical decline/deterioration  and unpredictable clinical course.      Septic shock present on  admission     Continue stewardship and recommendation as per intensivist.     Continue weaning off vasopressor i, continue monitor hemodynamic status.     Continue empirical  antibiotics     Pathogen directed therapy in accordance with culture results and sensitivity data.     Continue stewardship as per " infectious disease.  Septic shock of unclear source.     Acute confusional state/metabolic encephalopathy provisional report of improving.     Will continue close monitoring.     Care plan discussed with nephrologist.     Continue cefepime but monitor encephalopathy.     Monitor nephrotoxins     Monitor vancomycin trough     Continue to monitor urinary output     Avoid nephrotoxins continue to monitor renal function     Monitor for alcohol withdrawal symptoms ............continue CIWA protocol     Antihypertensive therapy, metformin, chronic heart failure therapy currently on hold in light of hemodynamic disposition.     Disposition/additional care plan/interventions: 5/27/2025     Care time in excess of  55 mins     Lab data and microbiological data reviewed by me     Medication stewardship discussed with pharmacy     Worsening of renal indices today     Avoid nephrotoxins     Continue renal dosing of employed medications.     Microbiology data reviewed by me  urine culture in progress, C. difficile PCR not detected, blood cultures no growth x 1 day.     Worsening hyponatremia     Patient hemodynamic disposition continue to remain stable without need for vasopressor therapy at this time.     Continue empirical antibiotics     Continue judicious dosing of vancomycin........... monitor trough closely        Await recommendation from infectious disease multidrug-resistant organism risk.     Monitor fever curve     Monitor leukocytosis        Will discuss further antimicrobial stewardship with infectious disease.     Continue to monitor hemodynamic status closely.  Will revisit patient's chronic home medications.  Given baclofen potential for causing hypotension will hold, also recommend holding diuretics and antihypertensive therapy at this time.     Heart failure compensated, continue to hold chronic longitudinal therapy until hemodynamic status further improve patient presented with septic shock.     Continue to  monitor hemodynamic status closely     Resumption of chronic home medications in accordance with clinical disposition.        Disposition/additional care plan/interventions: 5/28/2025     Will defer to the expertise of my nephrology colleague concerning timing of renal replacement therapy.     Continue to monitor volume status closely.     Judicious reintroduction of chronic home medications, avoid hypotension     Continue monitor hemodynamic status closely        Undifferentiated shock without signs of anaphylaxis.  Continue stewardship and recommendation as per infectious disease.     Continue to monitor off antibiotics .     Disposition/additional care plan/interventions: 5/29/2025        Swallow evaluation in progress speech therapy at bedside await findings and recommendations.     Microbiological data reviewed by me:: Blood cultures no growth at 3 days, urine culture no growth, C. difficile not detected     Patient monitor off antibiotics     Await decision concerning renal replacement.......... will discuss with nephrology     Continue to hold heart failure chronic longitudinal therapy.     Mindful of patient's heart rate will continue to hold Coreg     Blood pressure improving, avoid rebound hypertension will implement hydralazine at a lower dosage      Disposition/additional care plan/interventions: 5/30/2025    Care plan discussed with nephrologist.    Interval improvement in renal indices .    Possible removal of temporary dialysis catheter in the next 24 to 48 hours if patient continues to improve.    MBS results discussed with speech therapy, diet adjusted.    Continue monitoring off antibiotics.  Monitor fever curve    Continue to monitor renal indices, nonoliguric renal failure    Continue avoid nephrotoxins.    Despite the clinical improvement appreciated will continue monitor patient's disposition closely.      Patient with high complexity characteristics and appeared to be at risk for clinical  decline/deterioration  and unpredictable clinical course.      The patient was informed of differential diagnosis , work up , plan of care and possible sequelae of clinical disposition.Patient in agreement with plan of care. Further recommendations forthcoming in accordance with patient's clinical disposition and response to care.     Discharge planning:Discharge timing to be determined.                    Dictation performed with assistance of voice recognition device therefore transcription errors are possible.

## 2025-05-30 NOTE — PROGRESS NOTES
Chart reviewed, spoke to Dr Blanco, he states Nephrology likely okay for patient to DC back to his facility tomorrow. Social Work spoke to patient's aunt to go over IMM. Care Transitions to follow for discharge.

## 2025-05-31 LAB
ANION GAP SERPL CALC-SCNC: 11 MMOL/L (ref 10–20)
BUN SERPL-MCNC: 42 MG/DL (ref 6–23)
CALCIUM SERPL-MCNC: 8.6 MG/DL (ref 8.6–10.3)
CHLORIDE SERPL-SCNC: 108 MMOL/L (ref 98–107)
CO2 SERPL-SCNC: 23 MMOL/L (ref 21–32)
CREAT SERPL-MCNC: 3.45 MG/DL (ref 0.5–1.3)
EGFRCR SERPLBLD CKD-EPI 2021: 19 ML/MIN/1.73M*2
ERYTHROCYTE [DISTWIDTH] IN BLOOD BY AUTOMATED COUNT: 14.3 % (ref 11.5–14.5)
GLUCOSE BLD MANUAL STRIP-MCNC: 66 MG/DL (ref 74–99)
GLUCOSE BLD MANUAL STRIP-MCNC: 75 MG/DL (ref 74–99)
GLUCOSE BLD MANUAL STRIP-MCNC: 81 MG/DL (ref 74–99)
GLUCOSE BLD MANUAL STRIP-MCNC: 90 MG/DL (ref 74–99)
GLUCOSE SERPL-MCNC: 78 MG/DL (ref 74–99)
HCT VFR BLD AUTO: 26.7 % (ref 41–52)
HGB BLD-MCNC: 8.9 G/DL (ref 13.5–17.5)
MCH RBC QN AUTO: 30 PG (ref 26–34)
MCHC RBC AUTO-ENTMCNC: 33.3 G/DL (ref 32–36)
MCV RBC AUTO: 90 FL (ref 80–100)
NRBC BLD-RTO: 0 /100 WBCS (ref 0–0)
PLATELET # BLD AUTO: 115 X10*3/UL (ref 150–450)
POTASSIUM SERPL-SCNC: 5.1 MMOL/L (ref 3.5–5.3)
RBC # BLD AUTO: 2.97 X10*6/UL (ref 4.5–5.9)
SODIUM SERPL-SCNC: 137 MMOL/L (ref 136–145)
WBC # BLD AUTO: 4.6 X10*3/UL (ref 4.4–11.3)

## 2025-05-31 PROCEDURE — 80048 BASIC METABOLIC PNL TOTAL CA: CPT | Performed by: HOSPITALIST

## 2025-05-31 PROCEDURE — 85027 COMPLETE CBC AUTOMATED: CPT | Performed by: HOSPITALIST

## 2025-05-31 PROCEDURE — 82947 ASSAY GLUCOSE BLOOD QUANT: CPT

## 2025-05-31 PROCEDURE — 2500000001 HC RX 250 WO HCPCS SELF ADMINISTERED DRUGS (ALT 637 FOR MEDICARE OP): Performed by: NURSE PRACTITIONER

## 2025-05-31 PROCEDURE — 99233 SBSQ HOSP IP/OBS HIGH 50: CPT | Performed by: HOSPITALIST

## 2025-05-31 PROCEDURE — 2500000001 HC RX 250 WO HCPCS SELF ADMINISTERED DRUGS (ALT 637 FOR MEDICARE OP): Performed by: HOSPITALIST

## 2025-05-31 PROCEDURE — 36415 COLL VENOUS BLD VENIPUNCTURE: CPT | Performed by: HOSPITALIST

## 2025-05-31 PROCEDURE — 1210000001 HC SEMI-PRIVATE ROOM DAILY

## 2025-05-31 RX ORDER — HYDRALAZINE HYDROCHLORIDE 10 MG/1
10 TABLET, FILM COATED ORAL 3 TIMES DAILY
Status: DISCONTINUED | OUTPATIENT
Start: 2025-05-31 | End: 2025-06-01

## 2025-05-31 RX ADMIN — APIXABAN 5 MG: 5 TABLET, FILM COATED ORAL at 08:40

## 2025-05-31 RX ADMIN — BACLOFEN 2.5 MG: 5 TABLET ORAL at 08:39

## 2025-05-31 RX ADMIN — HYDRALAZINE HYDROCHLORIDE 10 MG: 10 TABLET ORAL at 21:47

## 2025-05-31 RX ADMIN — THIAMINE HCL TAB 100 MG 100 MG: 100 TAB at 08:40

## 2025-05-31 RX ADMIN — Medication 1 TABLET: at 08:39

## 2025-05-31 RX ADMIN — HYDRALAZINE HYDROCHLORIDE 10 MG: 10 TABLET ORAL at 14:50

## 2025-05-31 RX ADMIN — APIXABAN 5 MG: 5 TABLET, FILM COATED ORAL at 21:47

## 2025-05-31 RX ADMIN — BACLOFEN 2.5 MG: 5 TABLET ORAL at 21:47

## 2025-05-31 RX ADMIN — FOLIC ACID 1 MG: 1 TABLET ORAL at 08:39

## 2025-05-31 RX ADMIN — ATORVASTATIN CALCIUM 80 MG: 40 TABLET, FILM COATED ORAL at 21:47

## 2025-05-31 RX ADMIN — Medication 10 MG: at 21:47

## 2025-05-31 RX ADMIN — HYDRALAZINE HYDROCHLORIDE 10 MG: 10 TABLET ORAL at 08:41

## 2025-05-31 ASSESSMENT — PAIN SCALES - GENERAL
PAINLEVEL_OUTOF10: 0 - NO PAIN

## 2025-05-31 ASSESSMENT — LIFESTYLE VARIABLES
ORIENTATION AND CLOUDING OF SENSORIUM: ORIENTED AND CAN DO SERIAL ADDITIONS
ANXIETY: NO ANXIETY, AT EASE
NAUSEA AND VOMITING: NO NAUSEA AND NO VOMITING
VISUAL DISTURBANCES: NOT PRESENT
AUDITORY DISTURBANCES: NOT PRESENT
AGITATION: NORMAL ACTIVITY
NAUSEA AND VOMITING: NO NAUSEA AND NO VOMITING
AGITATION: NORMAL ACTIVITY
TOTAL SCORE: 0
AUDITORY DISTURBANCES: NOT PRESENT
PAROXYSMAL SWEATS: NO SWEAT VISIBLE
TREMOR: NO TREMOR
VISUAL DISTURBANCES: NOT PRESENT
TREMOR: NO TREMOR
PAROXYSMAL SWEATS: NO SWEAT VISIBLE
HEADACHE, FULLNESS IN HEAD: NOT PRESENT
TOTAL SCORE: 0
ANXIETY: NO ANXIETY, AT EASE
HEADACHE, FULLNESS IN HEAD: NOT PRESENT
ORIENTATION AND CLOUDING OF SENSORIUM: ORIENTED AND CAN DO SERIAL ADDITIONS

## 2025-05-31 ASSESSMENT — COGNITIVE AND FUNCTIONAL STATUS - GENERAL
PERSONAL GROOMING: A LOT
DRESSING REGULAR LOWER BODY CLOTHING: TOTAL
MOBILITY SCORE: 9
DRESSING REGULAR UPPER BODY CLOTHING: A LOT
MOVING FROM LYING ON BACK TO SITTING ON SIDE OF FLAT BED WITH BEDRAILS: A LOT
HELP NEEDED FOR BATHING: TOTAL
MOVING TO AND FROM BED TO CHAIR: A LOT
TOILETING: A LOT
DAILY ACTIVITIY SCORE: 11
TURNING FROM BACK TO SIDE WHILE IN FLAT BAD: A LOT
STANDING UP FROM CHAIR USING ARMS: TOTAL
CLIMB 3 TO 5 STEPS WITH RAILING: TOTAL
EATING MEALS: A LITTLE
WALKING IN HOSPITAL ROOM: TOTAL

## 2025-05-31 ASSESSMENT — PAIN - FUNCTIONAL ASSESSMENT
PAIN_FUNCTIONAL_ASSESSMENT: 0-10
PAIN_FUNCTIONAL_ASSESSMENT: 0-10

## 2025-05-31 NOTE — CARE PLAN
The patient's goals for the shift include to be free of injury     The clinical goals for the shift include to be free of injury    Problem: Discharge Planning  Goal: Discharge to home or other facility with appropriate resources  Outcome: Progressing     Problem: Nutrition  Goal: Nutrient intake appropriate for maintaining nutritional needs  Outcome: Progressing     Problem: Diabetes  Goal: Achieve decreasing blood glucose levels by end of shift  Outcome: Progressing  Goal: Increase stability of blood glucose readings by end of shift  Outcome: Progressing  Goal: Decrease in ketones present in urine by end of shift  Outcome: Progressing  Goal: Maintain electrolyte levels within acceptable range throughout shift  Outcome: Progressing  Goal: Maintain glucose levels >70mg/dl to <250mg/dl throughout shift  Outcome: Progressing  Goal: No changes in neurological exam by end of shift  Outcome: Progressing  Goal: Learn about and adhere to nutrition recommendations by end of shift  Outcome: Progressing  Goal: Vital signs within normal range for age by end of shift  Outcome: Progressing  Goal: Increase self care and/or family involovement by end of shift  Outcome: Progressing  Goal: Receive DSME education by end of shift  Outcome: Progressing     Problem: Skin  Goal: Participates in plan/prevention/treatment measures  Outcome: Progressing  Goal: Prevent/manage excess moisture  Outcome: Progressing  Flowsheets (Taken 5/31/2025 1621)  Prevent/manage excess moisture: Cleanse incontinence/protect with barrier cream  Goal: Prevent/minimize sheer/friction injuries  Outcome: Progressing  Goal: Promote/optimize nutrition  Outcome: Progressing

## 2025-05-31 NOTE — NURSING NOTE
Whyte cath removed at 1700. Brittani care performed, male purewick applied. Patient due to void at 2300.

## 2025-05-31 NOTE — PROGRESS NOTES
"      Nephrology Progress Note  Edenilson Ness is a 64 y.o. male on day 6 of admission presenting with Acute renal failure.    Nephrology following for shae.   Events over night:     No shortness of breath      BP (!) 149/97 (BP Location: Right arm, Patient Position: Lying) Comment: RN notified  Pulse 71   Temp 36.1 °C (97 °F) (Temporal)   Resp 14   Ht 1.702 m (5' 7\")   Wt 103 kg (227 lb 1.2 oz)   SpO2 96%   BMI 35.56 kg/m²     Input / Output:  24 HR:   Intake/Output Summary (Last 24 hours) at 5/31/2025 1615  Last data filed at 5/31/2025 1430  Gross per 24 hour   Intake 120 ml   Output 1400 ml   Net -1280 ml       Physical Exam   Alert and oriented, mild dysarthria  Neck: no JVD  CV: RRR  Lungs: CTA bilaterally  Abd: soft, NT, ND   Ext: trace lower extremity edema  Whyte     Scheduled medications  Scheduled Medications[1]  Continuous medications  Continuous Medications[2]  PRN medications  PRN Medications[3]   Results from last 7 days   Lab Units 05/31/25 0413 05/26/25 0447 05/25/25  1031   SODIUM mmol/L 137   < > 138   POTASSIUM mmol/L 5.1   < > 5.3   CHLORIDE mmol/L 108*   < > 109*   CO2 mmol/L 23   < > 14*   BUN mg/dL 42*   < > 122*   CREATININE mg/dL 3.45*   < > 6.36*   CALCIUM mg/dL 8.6   < > 9.1   PROTEIN TOTAL g/dL  --   --  6.3*   BILIRUBIN TOTAL mg/dL  --   --  0.4   ALK PHOS U/L  --   --  109   ALT U/L  --   --  72*   AST U/L  --   --  54*   GLUCOSE mg/dL 78   < > 92    < > = values in this interval not displayed.      Results from last 7 days   Lab Units 05/26/25 0447   MAGNESIUM mg/dL 1.42*      Results from last 7 days   Lab Units 05/31/25 0413 05/29/25 0419 05/28/25 0423   WBC AUTO x10*3/uL 4.6 5.2 4.8   HEMOGLOBIN g/dL 8.9* 8.7* 8.8*   HEMATOCRIT % 26.7* 27.2* 27.1*   PLATELETS AUTO x10*3/uL 115* 109* 113*      Results from last 7 days   Lab Units 05/31/25  0413 05/30/25  0411 05/29/25 0419   SODIUM mmol/L 137 137 137   POTASSIUM mmol/L 5.1 4.0 4.2   CHLORIDE mmol/L 108* 110* 106   CO2 " mmol/L 23 23 23   BUN mg/dL 42* 41* 46*   CREATININE mg/dL 3.45* 3.50* 3.77*   GLUCOSE mg/dL 78 87 72*   CALCIUM mg/dL 8.6 8.4* 8.7         Assessment & Plan:       64-year-old gentleman with hx of diabetes, prior stroke, documented heart failure initially brought to the hospital concerning for stroke found to be hypotensive with concomitant renal failure hence nephrology was consulted     #1 acute kidney injury  - Last serum creatinine 0.94 mg/dL on March 2024  - suspect ATN in setting of decrease EBV in setting of entresto/aldactone along with other antihypertensives  - on presentation oliguric with serum creatinine 6.36 mg/dL significant azotemia acidemia   -R  internal jugular temp catheter  -5/26- 8 hour SLED x 1 only   -Cr trending down 3.45 MG/DL        Plan  -clinically improved  -remains non-oliguric  -electrolyte and acid base stable  -Now showing signs of renal recovery with creatinine downtrending, should be able to pull temporary dialysis catheter over the weekend  -avoid hypotension/nephrotoxins    Please message me through [employer] chat with any questions or concerns.     Vick Villafana MD  5/31/2025  4:15 PM     [facility]    [address], [address]   [place] [address]  Office: [phone]           [1] apixaban, 5 mg, oral, BID  atorvastatin, 80 mg, oral, Nightly  baclofen, 2.5 mg, oral, BID  folic acid, 1 mg, oral, Daily  heparin, 2,500 Units, hemodialysis, Once  hydrALAZINE, 10 mg, oral, TID  insulin lispro, 0-5 Units, subcutaneous, TID AC  multivitamin with minerals, 1 tablet, oral, Daily  thiamine, 100 mg, oral, Daily     [2]    [3] PRN medications: acetaminophen, alteplase, dextrose, dextrose, diazePAM, glucagon, glucagon, heparin, heparin, melatonin, sennosides-docusate sodium

## 2025-05-31 NOTE — PROGRESS NOTES
Edenilson Ness [id number]   Service: Internal Medicine / Hospitalist Date of service: 5/31/2025                                  Full Code                     Overnight Events: No new overnight events reported by patient or nursing.      Subjective     Edenilson Ness is a 64 y.o. male with PMHx of CVA in 2019 now wheelchair-bound, diabetes, anemia, HFrEF and atrial fib on apixaban who presented from [facility]. LKW was 5am today. In the ED he was hypotensive to 62/38, with loss of urine and slurring of words. BP did not respond to 2L IV fluids and he was placed on Levophed. He drank alcohol this morning. At the bedside he is very difficult to understand. He denies pain and SOB. He says he drinks one beer a day.  Remainder of ROS reviewed and negative except as indicated in HPI.      ED workup was significant for BUN/creatinine 122/6.36, ALT/AST 72/54, , troponins 15/13, lactate 2.9/5.0, WBC 4.7k, Hgb 8.6, pH 7.14, bicarb 14.6. ETOH was 70 (it was 98 three months ago). UA was indicative of infection. CTA chest was extremely limited and revealed no definite large or central pulmonary embolism; possible cardiomegaly with likely posterior atelectasis and/or minimal dependent edema; questionable enhancing nodule in the gastric fundus. CT brain was nonacute. The pt was tachypneic to 49, hypotensive to 52/39 mmHg and hypoxic to 73%.RA. The pt received vancomycin, Zosyn and 2L Lactated Ringers IVF. He was placed on Levophed and bicarb gtts and is being admitted to the ICU.      The pt's case and plan of care was discussed with the ED provider. The ED notes were reviewed in detail, as were prior outpatient and patient notes as part of the admission chart review. The pt is at high risk for cardiovascular, respiratory, infectious and neurological events including septic shock, ETOH withdrawal, MI, stroke and ESRD due to acute hypotension requiring pressor support, lactic acidosis and acute renal failure. The  decision was made to escalate care and admit the pt under ICU status.      5/26...............     No new additional report gathered from patient's nurse on [facility] rounding patient appeared  confused requesting a new room.  No acute complaints, no reported : chest pains, nausea, vomiting, dyspnea or abdominal pain.     5/27.............  Overnight events discussed with patient's nurse/triad rounds discussion..No reported: Seizure activities, respiratory distress, chest pains, epistaxis, nausea or vomiting.     5/28................   No overnight events reported by patient's nurse.  Appeared quite engaging today.  Wondering when he can be discharged home.  No acute complaints voiced by patient at the time of evaluation this morning.  Specifically no reported: chest pains, headaches, nausea, vomiting, abdominal pain, fevers or chills.     5/29.....................     Triad rounding discussion with patient's nurse no new overnight complaints reported apart from reported swallowing difficulties voiced by patient yesterday.  Patient reported apparent yesterday that he had some difficulties with swallowing.  Speech evaluation ordered.  No reported: odynophagia, headaches, chest pains, abdominal pain, nausea or vomiting.     5/30....................     [facility] rounding with patient's nurse this a.m.  Patient had no major complaints hoping that he can be discharged home soon.  No reported: Headaches, palpitations, chest pains, fevers, chills, nausea or vomiting    5/31...................Patient seen and examined in the presence of his nurse.  Niharika reported: Headaches, chest pains, nausea, vomiting, fevers, chills or palpitations.      Review of Systems:   Review of system otherwise negative if not aforementioned above in subjective.     Objective           Physical Exam      Constitutional:       Appearance: Patient appeared in no acute cardiopulmonary distress.     Comments: Patient alert and oriented to person , place ,  time and situation .  HEENT:      Head: Normocephalic and atraumatic.Trachea midline      Nose:No observed congestion or rhinorrhea.     Mouth/Throat: Mucous membranes Moist, Trachea appeared  midline.  Eyes:      Extraocular Movements: Extraocular movements intact.      Pupils: Pupils are equal, round, and reactive to light.      Comments: No scleral icterus or conjunctival injection appreciated.   Cardiovascular:      Rate and Rhythm: Normal rate and regular rhythm. No clicks rubs or gallops, normal S1 and S2.No peripheral stigmata of endocarditis appreciated.     Pulmonary:      Lung fields remained diminished  in  the bases without appreciation of adventitious sounds.  Abdominal:      General: Abdomen soft,  obese ,non-tender, active bowel sounds, no involuntary guarding or rebound tenderness appreciated.     Comments: None   Musculoskeletal:       Patient appeared to have full active range of motion for upper and lower extremities, no acute apparent joint deformity appreciated on examination.   No pitting edema or cyanosis appreciated.     Skin:     General: Skin is warm.      Coloration:  No jaundice     Findings: No abnormal appearing skin rashes or lesions that appeared acute noted on unclothed area of the skin..   Neurological:      General: No new focal sensory or motor deficits appreciated.     Cranial Nerves: Cranial nerves II to XII appearing grossly intact.   Comment:Chronic reported dysarthria     Genitals:  Deferred  Psychiatric:         The patient appeared to be displaying normal mood and affect at the time of evaluation.     Labs:           Lab Results   Component Value Date     GLUCOSE 87 05/30/2025     CALCIUM 8.4 (L) 05/30/2025      05/30/2025     K 4.0 05/30/2025     CO2 23 05/30/2025      (H) 05/30/2025     BUN 41 (H) 05/30/2025     CREATININE 3.50 (H) 05/30/2025       Lab Results   Component Value Date    GLUCOSE 78 05/31/2025    CALCIUM 8.6 05/31/2025     05/31/2025    K  5.1 05/31/2025    CO2 23 05/31/2025     (H) 05/31/2025    BUN 42 (H) 05/31/2025    CREATININE 3.45 (H) 05/31/2025         Lab Results   Component Value Date     WBC 5.2 05/29/2025     HGB 8.7 (L) 05/29/2025     HCT 27.2 (L) 05/29/2025     MCV 92 05/29/2025      (L) 05/29/2025         Lab Results   Component Value Date    WBC 4.6 05/31/2025    HGB 8.9 (L) 05/31/2025    HCT 26.7 (L) 05/31/2025    MCV 90 05/31/2025     (L) 05/31/2025      [unfilled]   [unfilled]   No results found for the last 90 days.                  X-rays/ Images     [unfilled]   Radiology Results (last 21 days)     Procedure Component Value Units Date/Time   XR chest 1 view [[id number]] Collected: 05/25/25 1620   Order Status: Completed Updated: 05/25/25 1620   Narrative:     Interpreted By:  Yash Cortez,  STUDY:  XR CHEST 1 VIEW;  5/25/2025 2:54 pm      INDICATION:  Signs/Symptoms:check RIJ CVL.          COMPARISON:  08/28/2019      ACCESSION NUMBER(S):  [id number]      ORDERING CLINICIAN:  GUNNAR KING      FINDINGS:          AP portable view of the chest is obtained.  Limited exam due to  portable nature. Magnified cardiac silhouette. Mild interstitial  prominence diffusely. No effusion or pneumothorax.. Right central  line catheter terminates at the level of the SVC       Impression:     1. Cardiomegaly with mild interstitial prominence diffusely. Consider  mild CHF or pulmonary edema. Follow-up as clinically warranted..  2. Right central line catheter as described.              MACRO:  None      Signed by: Yash Cortez 5/25/2025 4:19 PM  Dictation workstation:   [id number]   CT angio chest for pulmonary embolism [[id number]] Collected: 05/25/25 1122   Order Status: Completed Updated: 05/25/25 1122   Narrative:     Interpreted By:  Yash Cortez,  STUDY:  CT ANGIO CHEST FOR PULMONARY EMBOLISM;  5/25/2025 10:23 am      INDICATION:  Signs/Symptoms:AMS, hypotensive.          COMPARISON:  None      ACCESSION  NUMBER(S):  [id number]      ORDERING CLINICIAN:  KENTON STRATTNO      TECHNIQUE:  Helical data acquisition of the chest was obtained after intravenous  administration of 100 ML Omnipaque 350, as per PE protocol. Images  were reformatted in coronal and sagittal planes. Axial and coronal  maximum intensity projection (MIP) images were created and reviewed.      FINDINGS:  POTENTIAL LIMITATIONS OF THE STUDY: The exam is extremely limited  borderline nondiagnostic due to suboptimal contrast phase. Also  limited by body habitus.      HEART AND VESSELS:  There are no discrete filling defects within main pulmonary artery  and its main right and left branches. However a more distal pulmonary  embolism cannot be excluded given suboptimal contrast phase..          Main pulmonary artery and its branches are normal in caliber.      The thoracic aorta normal in course and caliber.  No coronary artery calcifications are seen. Please note, the study is  not optimized for evaluation of coronary arteries.      Cardiomegaly.      There is no pericardial effusion seen.      MEDIASTINUM AND ERASMO, LOWER NECK AND AXILLA:  The visualized thyroid gland is within normal limits.  No evidence of thoracic lymphadenopathy by CT criteria.  Esophagus appears within normal limits as seen.      LUNGS AND AIRWAYS:  The trachea and central airways are patent. No endobronchial lesion  is seen.      The bilateral lungs are hypoinflated. This may be due to poor  inspiration. Likely bibasilar atelectasis and/or scarring.      UPPER ABDOMEN:  The visualized subdiaphragmatic structures demonstrate hypodensity  within the midpole of the right kidney which may represent a simple  cyst but not fully characterized in this exam. Question of enhancing  nodule in the gastric fundus. This is approximately 17 mm in  diameter. A polyp or mass not excluded.      CHEST WALL AND OSSEOUS STRUCTURES:  Chest wall is within normal limits.  No acute osseous pathology.There  are no suspicious osseous lesions.       Impression:     1. Extremely limited exam due to suboptimal contrast phase. Although  no definite large or central pulmonary embolism is seen, a more  distal pulmonary embolism not excluded given the limitations of the  study.  2. Cardiomegaly with low lung volumes which may be due to poor  inspiration. Likely posterior atelectasis and/or minimal dependent  edema.  3. Question of enhancing nodule in the gastric fundus. This is  approximately 17 mm in diameter. A polyp or mass not excluded.  Correlate with EGD.      MACRO:  Critical Finding:  See findings. Notification was initiated on  5/25/2025 at 11:21 am by  Yash Cortez.  (**-YCF-**)      Signed by: Yash Cortez 5/25/2025 11:21 AM  Dictation workstation:   [id number]   CT brain attack angio head and neck W and WO IV contrast [[id number]] Collected: 05/25/25 1034   Order Status: Completed Updated: 05/25/25 1034   Narrative:     Interpreted By:  Levon Waters,  STUDY:  CT BRAIN ATTACK ANGIO HEAD AND NECK W AND WO IV CONTRAST; ;  5/25/2025 10:22 am      INDICATION:  Signs/Symptoms:AMS, LKW 5am? aphasia, ? L weakness.          COMPARISON:  CTA head and neck from 03/01/2024.      ACCESSION NUMBER(S):  [id number]      ORDERING CLINICIAN:  KENTON STRATTON      TECHNIQUE:  CTA of the head and neck was performed after administration of 100 mL  of Omnipaque 350 intravenously. Segmented MIPs are provided.      FINDINGS:  CTA head:      There are small foci of atherosclerotic calcifications located within  the precavernous, cavernous, and paraophthalmic segments of the  bilateral internal carotid arteries without luminal narrowing. There  is stable more concentric atherosclerotic calcification located  within the supraclinoid segment of the left ICA which may cause mild  luminal narrowing. Otherwise, there is no striking narrowing of the  visualized internal carotid arteries.      There is no narrowing of the visualized portions of the  bilateral  anterior or middle cerebral arteries. There are small foci of  atherosclerotic calcifications located within the bilateral  intradural vertebral arteries. There is at most mild narrowing  involving the distal right intradural vertebral artery, unchanged  compared to the the prior CTA and MRA head from 2019.      There is mild luminal irregularity of the basilar artery primarily  due to noncalcified plaque. There is no narrowing of the visualized  portions of the bilateral posterior cerebral arteries. The lumen of  the P1 segment of the left posterior cerebral artery is smaller  compared to the right, likely normal variant as there is a left  posterior communicating artery which is visualized, unchanged.      There are no aneurysms.      CTA neck:      Aortic arch and origins of the great vessels from the aortic arch are  without luminal narrowing. There are atherosclerotic calcifications  within these vascular structures but there is no striking luminal  narrowing. Bilateral common carotid arteries are without luminal  narrowing. There are small foci of atherosclerotic calcification  located within the left common carotid artery. There are small foci  of atherosclerotic calcification located within the bilateral carotid  bulbs without luminal narrowing. There is retropharyngeal course of  the right internal carotid artery. Bilateral vertebral arteries arise  from the subclavian arteries and demonstrate no luminal narrowing.      Additional findings:      There are osseous degenerative changes of the cervical spine.       Impression:     No occlusion of the visualized arteries in the head or neck.  Unchanged appearance of the these arteries compared to the prior CTA  study.      This study was interpreted at [facility].      MACRO:  None      Signed by: Levon Waters 5/25/2025 10:33 AM  Dictation workstation:   [id number]   CT brain attack head wo IV contrast  [[id number]] Collected: 05/25/25 1028   Order Status: Completed Updated: 05/25/25 1028   Narrative:     Interpreted By:  Tu Hunter,  STUDY:  CT BRAIN ATTACK HEAD WO IV CONTRAST;  5/25/2025 10:13 am      INDICATION:  Signs/Symptoms:AMS, LKW 5am? aphasia.      COMPARISON:  None.      ACCESSION NUMBER(S):  [id number]      ORDERING CLINICIAN:  KENTON AGUIRRE      TECHNIQUE:  Noncontrast axial CT scan of head was performed.      FINDINGS:  Parenchyma: There is no intracranial hemorrhage. The grey-white  differentiation is intact. There is no mass effect or midline shift.  Patchy supratentorial hypodensity, nonspecific, but likely secondary  to mild chronic microvascular ischemia.      CSF Spaces: Mild generalized volume loss, with concordant ventricular  enlargement.      Extra-Axial Fluid: There is no extraaxial fluid collection.      Calvarium: The calvarium is unremarkable.      Paranasal sinuses: Visualized paranasal sinuses are clear.      Mastoids: Clear.      Orbits: Normal.      Soft tissues: Unremarkable.       Impression:     No acute intracranial hemorrhage, mass effect, or CT apparent acute  infarct. Chronic microvascular ischemia and involutional changes.  Tu Hunter discussed the significance and urgency of this  critical finding by telephone with  KENTON AGUIRRE on 5/25/2025 at  10:27 am.  (**-RCF-**) Findings:  See findings.                  Signed by: Tu Hunter 5/25/2025 10:27 AM  Dictation workstation:   [id number]   Point of Care Ultrasound [[id number]] Resulted: 05/25/25 1009   Order Status: Completed Updated: 05/25/25 1851   Narrative:     Kenton Aguirre DO     5/25/2025  6:51 PM    Performed by: Kenton Aguirre DO  Authorized by: Kenton Aguirre DO    Cardiac Indications: hypotension                  Procedure: Cardiac Ultrasound    Findings:   Views: parasternal long, parasternal short, apical four and subxiphoid  The pericardial space was visualized and was NEGATIVE for a  "significant  pericardial effusion.  Activity: Ventricular contractions were visualized.  LV: LV systolic function was NORMAL.  RV: RV size was DILATED.    Impression:  Cardiac: The focused cardiac ultrasound exam had ABNORMAL findings as  specified.      Comments: Ivc Plethoric                  Medical Problems         Problem List         * (Principal) Acute renal failure     Conjunctivitis     Weakness     CVA (cerebral vascular accident) (Multi)     Type 2 diabetes mellitus without complication, with long-term current use of insulin     Hypertensive heart disease with CHF     Anemia of chronic disease     Admits to alcohol consumption     Localized swelling on right hand     Diarrhea     Muscle spasticity     Ac isch multi vasc territories stroke (Multi)     UTI (urinary tract infection)     Hypokalemia     SVT (supraventricular tachycardia)     Loose stools     Pneumonia     Skin irritation     Cough     Umbilical hernia                  Above medical problems may be reflective of historical medical problems that may have resolved and may not related to acute clinical condition/medical problems.     Clinical impression/plan:        Undifferentiated shock  -Monitor offempirical  antibiotics.  -Continue stewardship as per infectious disease.          Acute renal failure, no hx CKD  Acute hypotension requiring pressor support  Pt seen by nephrology and SLED orders are in  All offending home meds held  Daily RFP  Continue empiric pending urine and blood cx, UA was +, pt does not meet SIRS/sepsis criteria     Acute hypoxic respiratory failure  Pt currently on 6L O2  Imaging does not reveal evidence of PNA  Wean O2 as tolerated to maintain O2 sat >91%, pt may need home O2 eval      ETOH abuse  Pt admits to drinking one beer a day \"my whole life\"  ETOH was 70 (it was 98 three months ago)  Start CIWA protocol     Chronic anemia  Baseline Hgb is 11-12, today is 8.6, likely etiology is ARF  Monitor Hgb and transfuse for " Hgb <= 7.0     DVT ppx: apixaban        Disposition/additional care plan/interventions: 5/26/2025     Patient with high complexity characteristics and appeared to be at high risk for clinical decline/deterioration  and unpredictable clinical course.      Septic shock present on  admission     Continue stewardship and recommendation as per intensivist.     Continue weaning off vasopressor i, continue monitor hemodynamic status.     Continue empirical  antibiotics     Pathogen directed therapy in accordance with culture results and sensitivity data.     Continue stewardship as per infectious disease.  Septic shock of unclear source.     Acute confusional state/metabolic encephalopathy provisional report of improving.     Will continue close monitoring.     Care plan discussed with nephrologist.     Continue cefepime but monitor encephalopathy.     Monitor nephrotoxins     Monitor vancomycin trough     Continue to monitor urinary output     Avoid nephrotoxins continue to monitor renal function     Monitor for alcohol withdrawal symptoms ............continue CIWA protocol     Antihypertensive therapy, metformin, chronic heart failure therapy currently on hold in light of hemodynamic disposition.     Disposition/additional care plan/interventions: 5/27/2025     Care time in excess of  55 mins     Lab data and microbiological data reviewed by me     Medication stewardship discussed with pharmacy     Worsening of renal indices today     Avoid nephrotoxins     Continue renal dosing of employed medications.     Microbiology data reviewed by me  urine culture in progress, C. difficile PCR not detected, blood cultures no growth x 1 day.     Worsening hyponatremia     Patient hemodynamic disposition continue to remain stable without need for vasopressor therapy at this time.     Continue empirical antibiotics     Continue judicious dosing of vancomycin........... monitor trough closely        Await recommendation from  infectious disease multidrug-resistant organism risk.     Monitor fever curve     Monitor leukocytosis        Will discuss further antimicrobial stewardship with infectious disease.     Continue to monitor hemodynamic status closely.  Will revisit patient's chronic home medications.  Given baclofen potential for causing hypotension will hold, also recommend holding diuretics and antihypertensive therapy at this time.     Heart failure compensated, continue to hold chronic longitudinal therapy until hemodynamic status further improve patient presented with septic shock.     Continue to monitor hemodynamic status closely     Resumption of chronic home medications in accordance with clinical disposition.        Disposition/additional care plan/interventions: 5/28/2025     Will defer to the expertise of my nephrology colleague concerning timing of renal replacement therapy.     Continue to monitor volume status closely.     Judicious reintroduction of chronic home medications, avoid hypotension     Continue monitor hemodynamic status closely        Undifferentiated shock without signs of anaphylaxis.  Continue stewardship and recommendation as per infectious disease.     Continue to monitor off antibiotics .     Disposition/additional care plan/interventions: 5/29/2025        Swallow evaluation in progress speech therapy at bedside await findings and recommendations.     Microbiological data reviewed by me:: Blood cultures no growth at 3 days, urine culture no growth, C. difficile not detected     Patient monitor off antibiotics     Await decision concerning renal replacement.......... will discuss with nephrology     Continue to hold heart failure chronic longitudinal therapy.     Mindful of patient's heart rate will continue to hold Coreg     Blood pressure improving, avoid rebound hypertension will implement hydralazine at a lower dosage      Disposition/additional care plan/interventions: 5/30/2025     Care plan  discussed with nephrologist.     Interval improvement in renal indices .     Possible removal of temporary dialysis catheter in the next 24 to 48 hours if patient continues to improve.     MBS results discussed with speech therapy, diet adjusted.     Continue monitoring off antibiotics.  Monitor fever curve     Continue to monitor renal indices, nonoliguric renal failure     Continue avoid nephrotoxins.     Despite the clinical improvement appreciated will continue monitor patient's disposition closely.        Disposition/additional care plan/interventions: 5/31/2025    While patient  showing signs of renal recovery, recommend continuing close monitoring    Renal function reviewed, creatinine improved today but slightly.  Potassium up to 5.1.    It appeared prudent to not discontinue patient's temporary's catheter just as yet.  Will monitor today patient remain nonoliguric, repeat renal function in a.m.    Microbiological data reviewed, will defer to ID concerning decolonization concerning positive nares MRSA    Blood pressure elevation noted.................. escalate antihypertensive therapy    Asymptomatic bradycardia continue to monitor       Despite his vastr improvement..................Patient with high complexity characteristics and appeared to be at risk for clinical decline/deterioration  and unpredictable clinical course.      The patient was informed of differential diagnosis , work up , plan of care and possible sequelae of clinical disposition.Patient in agreement with plan of care. Further recommendations forthcoming in accordance with patient's clinical disposition and response to care.     Discharge planning:Discharge timing to be determined.                    Dictation performed with assistance of voice recognition device therefore transcription errors are possible.

## 2025-05-31 NOTE — NURSING NOTE
Received patient from ICU via bed. Alert and oriented x 3. Four eyes performed with Ngozi pca. Heel foams on bilaterally. Right ij cath noted to have some bloody drainage on it. Patient resting comfortably with no complaints. Call light within reach, bed alarm on, bed in lowest position. Patient verbalized understanding to call when needed assistance.

## 2025-05-31 NOTE — CARE PLAN
The patient's goals for the shift include  to rest    The clinical goals for the shift include to remain hemodynamicaly stable    Over the shift, the patient did not make progress toward the following goals. Barriers to progression include .   Problem: Discharge Planning  Goal: Discharge to home or other facility with appropriate resources  Outcome: Progressing     Problem: Nutrition  Goal: Nutrient intake appropriate for maintaining nutritional needs  Outcome: Progressing     Problem: Skin  Goal: Participates in plan/prevention/treatment measures  Outcome: Progressing  Goal: Prevent/manage excess moisture  Outcome: Progressing  Goal: Prevent/minimize sheer/friction injuries  Outcome: Progressing  Goal: Promote/optimize nutrition  Outcome: Progressing     Problem: Skin  Goal: Participates in plan/prevention/treatment measures  Outcome: Progressing  Goal: Prevent/manage excess moisture  Outcome: Progressing  Goal: Prevent/minimize sheer/friction injuries  Outcome: Progressing  Goal: Promote/optimize nutrition  Outcome: Progressing

## 2025-06-01 VITALS
HEIGHT: 67 IN | RESPIRATION RATE: 16 BRPM | WEIGHT: 229.9 LBS | SYSTOLIC BLOOD PRESSURE: 130 MMHG | BODY MASS INDEX: 36.08 KG/M2 | HEART RATE: 72 BPM | DIASTOLIC BLOOD PRESSURE: 85 MMHG | OXYGEN SATURATION: 96 % | TEMPERATURE: 97.6 F

## 2025-06-01 LAB
ANION GAP SERPL CALC-SCNC: 14 MMOL/L (ref 10–20)
BUN SERPL-MCNC: 40 MG/DL (ref 6–23)
CALCIUM SERPL-MCNC: 9.4 MG/DL (ref 8.6–10.3)
CHLORIDE SERPL-SCNC: 107 MMOL/L (ref 98–107)
CO2 SERPL-SCNC: 23 MMOL/L (ref 21–32)
CREAT SERPL-MCNC: 3.48 MG/DL (ref 0.5–1.3)
EGFRCR SERPLBLD CKD-EPI 2021: 19 ML/MIN/1.73M*2
ERYTHROCYTE [DISTWIDTH] IN BLOOD BY AUTOMATED COUNT: 14.6 % (ref 11.5–14.5)
GLUCOSE BLD MANUAL STRIP-MCNC: 73 MG/DL (ref 74–99)
GLUCOSE BLD MANUAL STRIP-MCNC: 74 MG/DL (ref 74–99)
GLUCOSE BLD MANUAL STRIP-MCNC: 78 MG/DL (ref 74–99)
GLUCOSE BLD MANUAL STRIP-MCNC: 93 MG/DL (ref 74–99)
GLUCOSE SERPL-MCNC: 69 MG/DL (ref 74–99)
HCT VFR BLD AUTO: 28.8 % (ref 41–52)
HGB BLD-MCNC: 9.5 G/DL (ref 13.5–17.5)
MCH RBC QN AUTO: 30 PG (ref 26–34)
MCHC RBC AUTO-ENTMCNC: 33 G/DL (ref 32–36)
MCV RBC AUTO: 91 FL (ref 80–100)
NRBC BLD-RTO: 0 /100 WBCS (ref 0–0)
PLATELET # BLD AUTO: 143 X10*3/UL (ref 150–450)
POTASSIUM SERPL-SCNC: 4.6 MMOL/L (ref 3.5–5.3)
RBC # BLD AUTO: 3.17 X10*6/UL (ref 4.5–5.9)
SODIUM SERPL-SCNC: 139 MMOL/L (ref 136–145)
WBC # BLD AUTO: 5 X10*3/UL (ref 4.4–11.3)

## 2025-06-01 PROCEDURE — 85027 COMPLETE CBC AUTOMATED: CPT | Performed by: HOSPITALIST

## 2025-06-01 PROCEDURE — 99233 SBSQ HOSP IP/OBS HIGH 50: CPT | Performed by: HOSPITALIST

## 2025-06-01 PROCEDURE — 2500000001 HC RX 250 WO HCPCS SELF ADMINISTERED DRUGS (ALT 637 FOR MEDICARE OP): Performed by: NURSE PRACTITIONER

## 2025-06-01 PROCEDURE — 82947 ASSAY GLUCOSE BLOOD QUANT: CPT

## 2025-06-01 PROCEDURE — 80048 BASIC METABOLIC PNL TOTAL CA: CPT | Performed by: HOSPITALIST

## 2025-06-01 PROCEDURE — 2500000001 HC RX 250 WO HCPCS SELF ADMINISTERED DRUGS (ALT 637 FOR MEDICARE OP): Performed by: HOSPITALIST

## 2025-06-01 PROCEDURE — 1210000001 HC SEMI-PRIVATE ROOM DAILY

## 2025-06-01 PROCEDURE — 36415 COLL VENOUS BLD VENIPUNCTURE: CPT | Performed by: HOSPITALIST

## 2025-06-01 RX ORDER — HYDRALAZINE HYDROCHLORIDE 25 MG/1
25 TABLET, FILM COATED ORAL 3 TIMES DAILY
Status: DISPENSED | OUTPATIENT
Start: 2025-06-01

## 2025-06-01 RX ADMIN — BACLOFEN 2.5 MG: 5 TABLET ORAL at 21:04

## 2025-06-01 RX ADMIN — FOLIC ACID 1 MG: 1 TABLET ORAL at 08:20

## 2025-06-01 RX ADMIN — BACLOFEN 2.5 MG: 5 TABLET ORAL at 08:20

## 2025-06-01 RX ADMIN — APIXABAN 5 MG: 5 TABLET, FILM COATED ORAL at 08:20

## 2025-06-01 RX ADMIN — Medication 1 TABLET: at 08:20

## 2025-06-01 RX ADMIN — HYDRALAZINE HYDROCHLORIDE 10 MG: 10 TABLET ORAL at 15:44

## 2025-06-01 RX ADMIN — Medication 10 MG: at 21:08

## 2025-06-01 RX ADMIN — THIAMINE HCL TAB 100 MG 100 MG: 100 TAB at 08:20

## 2025-06-01 RX ADMIN — APIXABAN 5 MG: 5 TABLET, FILM COATED ORAL at 21:05

## 2025-06-01 RX ADMIN — HYDRALAZINE HYDROCHLORIDE 10 MG: 10 TABLET ORAL at 08:20

## 2025-06-01 RX ADMIN — HYDRALAZINE HYDROCHLORIDE 25 MG: 25 TABLET ORAL at 21:04

## 2025-06-01 RX ADMIN — ATORVASTATIN CALCIUM 80 MG: 40 TABLET, FILM COATED ORAL at 21:05

## 2025-06-01 ASSESSMENT — LIFESTYLE VARIABLES
PAROXYSMAL SWEATS: NO SWEAT VISIBLE
HEADACHE, FULLNESS IN HEAD: NOT PRESENT
PAROXYSMAL SWEATS: NO SWEAT VISIBLE
AGITATION: NORMAL ACTIVITY
AGITATION: NORMAL ACTIVITY
TOTAL SCORE: 0
NAUSEA AND VOMITING: NO NAUSEA AND NO VOMITING
VISUAL DISTURBANCES: NOT PRESENT
NAUSEA AND VOMITING: NO NAUSEA AND NO VOMITING
TREMOR: NO TREMOR
TREMOR: NO TREMOR
ANXIETY: NO ANXIETY, AT EASE
AUDITORY DISTURBANCES: NOT PRESENT
ORIENTATION AND CLOUDING OF SENSORIUM: ORIENTED AND CAN DO SERIAL ADDITIONS
HEADACHE, FULLNESS IN HEAD: NOT PRESENT
TOTAL SCORE: 0
ORIENTATION AND CLOUDING OF SENSORIUM: ORIENTED AND CAN DO SERIAL ADDITIONS
VISUAL DISTURBANCES: NOT PRESENT
AUDITORY DISTURBANCES: NOT PRESENT
ANXIETY: NO ANXIETY, AT EASE

## 2025-06-01 ASSESSMENT — COGNITIVE AND FUNCTIONAL STATUS - GENERAL
MOBILITY SCORE: 8
MOVING TO AND FROM BED TO CHAIR: TOTAL
STANDING UP FROM CHAIR USING ARMS: TOTAL
WALKING IN HOSPITAL ROOM: TOTAL
DRESSING REGULAR UPPER BODY CLOTHING: A LOT
TOILETING: A LOT
TURNING FROM BACK TO SIDE WHILE IN FLAT BAD: A LOT
PERSONAL GROOMING: A LOT
DAILY ACTIVITIY SCORE: 12
CLIMB 3 TO 5 STEPS WITH RAILING: TOTAL
MOVING FROM LYING ON BACK TO SITTING ON SIDE OF FLAT BED WITH BEDRAILS: A LOT
HELP NEEDED FOR BATHING: A LOT
DRESSING REGULAR LOWER BODY CLOTHING: A LOT
EATING MEALS: A LOT

## 2025-06-01 ASSESSMENT — PAIN SCALES - GENERAL
PAINLEVEL_OUTOF10: 0 - NO PAIN
PAINLEVEL_OUTOF10: 0 - NO PAIN

## 2025-06-01 ASSESSMENT — PAIN - FUNCTIONAL ASSESSMENT: PAIN_FUNCTIONAL_ASSESSMENT: 0-10

## 2025-06-01 NOTE — NURSING NOTE
Patient remained hemodynamically stable throughout shift. Plan for discharge tomorrow back to facility. Patient anxious to be discharged. Urine output is sufficient since removing christiansen yesterday. Patients sugars remained normal, encouraged increase po intake. Patient resting comfortably with no complaints of pain. Bed alarm on and in lowest position. Call light within reach, verbalized understanding to call for help when needing assistance.

## 2025-06-01 NOTE — CARE PLAN
The patient's goals for the shift include      The clinical goals for the shift include Patient will have increased urinary production    Problem: Discharge Planning  Goal: Discharge to home or other facility with appropriate resources  Outcome: Progressing     Problem: Skin  Goal: Participates in plan/prevention/treatment measures  Outcome: Progressing  Goal: Prevent/manage excess moisture  Outcome: Progressing  Goal: Prevent/minimize sheer/friction injuries  Outcome: Progressing  Goal: Promote/optimize nutrition  Outcome: Progressing     Problem: Nutrition  Goal: Nutrient intake appropriate for maintaining nutritional needs  Outcome: Progressing     Problem: Diabetes  Goal: Achieve decreasing blood glucose levels by end of shift  Outcome: Progressing  Goal: Increase stability of blood glucose readings by end of shift  Outcome: Progressing  Goal: Decrease in ketones present in urine by end of shift  Outcome: Progressing  Goal: Maintain electrolyte levels within acceptable range throughout shift  Outcome: Progressing  Goal: Maintain glucose levels >70mg/dl to <250mg/dl throughout shift  Outcome: Progressing  Goal: No changes in neurological exam by end of shift  Outcome: Progressing  Goal: Learn about and adhere to nutrition recommendations by end of shift  Outcome: Progressing  Goal: Vital signs within normal range for age by end of shift  Outcome: Progressing  Goal: Increase self care and/or family involovement by end of shift  Outcome: Progressing  Goal: Receive DSME education by end of shift  Outcome: Progressing

## 2025-06-01 NOTE — PROGRESS NOTES
Edenilson Ness [id number]   Service: Internal Medicine / Hospitalist Date of service: 6/1/2025                                  Full Code                     Overnight Events: No new overnight events reported by patient or nursing.      Subjective     Edenilson Ness is a 64 y.o. male with PMHx of CVA in 2019 now wheelchair-bound, diabetes, anemia, HFrEF and atrial fib on apixaban who presented from [facility]. LKW was 5am today. In the ED he was hypotensive to 62/38, with loss of urine and slurring of words. BP did not respond to 2L IV fluids and he was placed on Levophed. He drank alcohol this morning. At the bedside he is very difficult to understand. He denies pain and SOB. He says he drinks one beer a day.  Remainder of ROS reviewed and negative except as indicated in HPI.      ED workup was significant for BUN/creatinine 122/6.36, ALT/AST 72/54, , troponins 15/13, lactate 2.9/5.0, WBC 4.7k, Hgb 8.6, pH 7.14, bicarb 14.6. ETOH was 70 (it was 98 three months ago). UA was indicative of infection. CTA chest was extremely limited and revealed no definite large or central pulmonary embolism; possible cardiomegaly with likely posterior atelectasis and/or minimal dependent edema; questionable enhancing nodule in the gastric fundus. CT brain was nonacute. The pt was tachypneic to 49, hypotensive to 52/39 mmHg and hypoxic to 73%.RA. The pt received vancomycin, Zosyn and 2L Lactated Ringers IVF. He was placed on Levophed and bicarb gtts and is being admitted to the ICU.      The pt's case and plan of care was discussed with the ED provider. The ED notes were reviewed in detail, as were prior outpatient and patient notes as part of the admission chart review. The pt is at high risk for cardiovascular, respiratory, infectious and neurological events including septic shock, ETOH withdrawal, MI, stroke and ESRD due to acute hypotension requiring pressor support, lactic acidosis and acute renal failure. The  decision was made to escalate care and admit the pt under ICU status.      5/26...............     No new additional report gathered from patient's nurse on [facility] rounding patient appeared  confused requesting a new room.  No acute complaints, no reported : chest pains, nausea, vomiting, dyspnea or abdominal pain.     5/27.............  Overnight events discussed with patient's nurse/triad rounds discussion..No reported: Seizure activities, respiratory distress, chest pains, epistaxis, nausea or vomiting.     5/28................   No overnight events reported by patient's nurse.  Appeared quite engaging today.  Wondering when he can be discharged home.  No acute complaints voiced by patient at the time of evaluation this morning.  Specifically no reported: chest pains, headaches, nausea, vomiting, abdominal pain, fevers or chills.     5/29.....................     Triad rounding discussion with patient's nurse no new overnight complaints reported apart from reported swallowing difficulties voiced by patient yesterday.  Patient reported apparent yesterday that he had some difficulties with swallowing.  Speech evaluation ordered.  No reported: odynophagia, headaches, chest pains, abdominal pain, nausea or vomiting.     5/30....................     [facility] rounding with patient's nurse this a.m.  Patient had no major complaints hoping that he can be discharged home soon.  No reported: Headaches, palpitations, chest pains, fevers, chills, nausea or vomiting     5/31...................Patient seen and examined in the presence of his nurse.  Niharika reported: Headaches, chest pains, nausea, vomiting, fevers, chills or palpitations.    6/1......................Patient seen and examined in the presence of his nurse and [facility] rounding.  No complaint voiced by patient.  Patient in agreement with recommendation by nephrology to attempt discharge tomorrow.  No reported: Headaches, abdominal pain, cough, fevers, chills, nausea or  vomiting.          Review of Systems:   Review of system otherwise negative if not aforementioned above in subjective.     Objective           Physical Exam      Constitutional:       Appearance: Patient appeared in no acute cardiopulmonary distress.     Comments: Patient alert and oriented to :person , place , time and situation .  HEENT:      Head: Normocephalic and atraumatic.Trachea midline      Nose:No observed congestion or rhinorrhea.     Mouth/Throat: Mucous membranes Moist, Trachea appeared  midline.  Eyes:      Extraocular Movements: Extraocular movements intact.      Pupils: Pupils are equal, round, and reactive to light.      Comments: No scleral icterus or conjunctival injection appreciated.   Cardiovascular:      Rate and Rhythm: Normal rate and regular rhythm. No clicks rubs or gallops, normal S1 and S2.No peripheral stigmata of endocarditis appreciated.     Pulmonary:      Lung fields appeared clear to auscultation without appreciation of adventitious sounds.  Abdominal:      General: Abdomen soft, non-tender, active bowel sounds, no involuntary guarding or rebound tenderness appreciated.     Comments: None   Musculoskeletal:       Patient appeared to have full active range of motion for upper extremities, no acute new apparent joint deformity appreciated on examination.   No pitting edema or cyanosis appreciated.     Skin:     General: Skin is warm.      Coloration:  No jaundice     Findings: No abnormal appearing skin rashes or lesions that appeared acute noted on unclothed area of the skin..   Neurological:      General: No new focal sensory or motor deficits appreciated.     Cranial Nerves: Cranial nerves II to XII appearing grossly intact.   Comment:Chronic reported dysarthria     Genitals:  Deferred  Psychiatric:         The patient appeared to be displaying normal mood and affect at the time of evaluation.     Labs:               Lab Results   Component Value Date     GLUCOSE 87 05/30/2025      CALCIUM 8.4 (L) 05/30/2025      05/30/2025     K 4.0 05/30/2025     CO2 23 05/30/2025      (H) 05/30/2025     BUN 41 (H) 05/30/2025     CREATININE 3.50 (H) 05/30/2025              Lab Results   Component Value Date     GLUCOSE 78 05/31/2025     CALCIUM 8.6 05/31/2025      05/31/2025     K 5.1 05/31/2025     CO2 23 05/31/2025      (H) 05/31/2025     BUN 42 (H) 05/31/2025     CREATININE 3.45 (H) 05/31/2025      Lab Results   Component Value Date    GLUCOSE 69 (L) 06/01/2025    CALCIUM 9.4 06/01/2025     06/01/2025    K 4.6 06/01/2025    CO2 23 06/01/2025     06/01/2025    BUN 40 (H) 06/01/2025    CREATININE 3.48 (H) 06/01/2025           Lab Results   Component Value Date     WBC 5.2 05/29/2025     HGB 8.7 (L) 05/29/2025     HCT 27.2 (L) 05/29/2025     MCV 92 05/29/2025      (L) 05/29/2025               Lab Results   Component Value Date     WBC 4.6 05/31/2025     HGB 8.9 (L) 05/31/2025     HCT 26.7 (L) 05/31/2025     MCV 90 05/31/2025      (L) 05/31/2025      Lab Results   Component Value Date    WBC 5.0 06/01/2025    HGB 9.5 (L) 06/01/2025    HCT 28.8 (L) 06/01/2025    MCV 91 06/01/2025     (L) 06/01/2025      [unfilled]   [unfilled]   No results found for the last 90 days.                  X-rays/ Images     [unfilled]   Radiology Results (last 21 days)     Procedure Component Value Units Date/Time   XR chest 1 view [[id number]] Collected: 05/25/25 1620   Order Status: Completed Updated: 05/25/25 1620   Narrative:     Interpreted By:  Yash Cortez,  STUDY:  XR CHEST 1 VIEW;  5/25/2025 2:54 pm      INDICATION:  Signs/Symptoms:check RIJ CVL.          COMPARISON:  08/28/2019      ACCESSION NUMBER(S):  [id number]      ORDERING CLINICIAN:  GUNNAR KING      FINDINGS:          AP portable view of the chest is obtained.  Limited exam due to  portable nature. Magnified cardiac silhouette. Mild interstitial  prominence diffusely. No effusion or  pneumothorax.. Right central  line catheter terminates at the level of the SVC       Impression:     1. Cardiomegaly with mild interstitial prominence diffusely. Consider  mild CHF or pulmonary edema. Follow-up as clinically warranted..  2. Right central line catheter as described.              MACRO:  None      Signed by: Yash Cortez 5/25/2025 4:19 PM  Dictation workstation:   [id number]   CT angio chest for pulmonary embolism [[id number]] Collected: 05/25/25 1122   Order Status: Completed Updated: 05/25/25 1122   Narrative:     Interpreted By:  Yash Cortez,  STUDY:  CT ANGIO CHEST FOR PULMONARY EMBOLISM;  5/25/2025 10:23 am      INDICATION:  Signs/Symptoms:AMS, hypotensive.          COMPARISON:  None      ACCESSION NUMBER(S):  [id number]      ORDERING CLINICIAN:  KENTON STRATTON      TECHNIQUE:  Helical data acquisition of the chest was obtained after intravenous  administration of 100 ML Omnipaque 350, as per PE protocol. Images  were reformatted in coronal and sagittal planes. Axial and coronal  maximum intensity projection (MIP) images were created and reviewed.      FINDINGS:  POTENTIAL LIMITATIONS OF THE STUDY: The exam is extremely limited  borderline nondiagnostic due to suboptimal contrast phase. Also  limited by body habitus.      HEART AND VESSELS:  There are no discrete filling defects within main pulmonary artery  and its main right and left branches. However a more distal pulmonary  embolism cannot be excluded given suboptimal contrast phase..          Main pulmonary artery and its branches are normal in caliber.      The thoracic aorta normal in course and caliber.  No coronary artery calcifications are seen. Please note, the study is  not optimized for evaluation of coronary arteries.      Cardiomegaly.      There is no pericardial effusion seen.      MEDIASTINUM AND ERASMO, LOWER NECK AND AXILLA:  The visualized thyroid gland is within normal limits.  No evidence of thoracic lymphadenopathy by CT  criteria.  Esophagus appears within normal limits as seen.      LUNGS AND AIRWAYS:  The trachea and central airways are patent. No endobronchial lesion  is seen.      The bilateral lungs are hypoinflated. This may be due to poor  inspiration. Likely bibasilar atelectasis and/or scarring.      UPPER ABDOMEN:  The visualized subdiaphragmatic structures demonstrate hypodensity  within the midpole of the right kidney which may represent a simple  cyst but not fully characterized in this exam. Question of enhancing  nodule in the gastric fundus. This is approximately 17 mm in  diameter. A polyp or mass not excluded.      CHEST WALL AND OSSEOUS STRUCTURES:  Chest wall is within normal limits.  No acute osseous pathology.There are no suspicious osseous lesions.       Impression:     1. Extremely limited exam due to suboptimal contrast phase. Although  no definite large or central pulmonary embolism is seen, a more  distal pulmonary embolism not excluded given the limitations of the  study.  2. Cardiomegaly with low lung volumes which may be due to poor  inspiration. Likely posterior atelectasis and/or minimal dependent  edema.  3. Question of enhancing nodule in the gastric fundus. This is  approximately 17 mm in diameter. A polyp or mass not excluded.  Correlate with EGD.      MACRO:  Critical Finding:  See findings. Notification was initiated on  5/25/2025 at 11:21 am by  Yash Cortez.  (**-YCF-**)      Signed by: Yash Cortez 5/25/2025 11:21 AM  Dictation workstation:   [id number]   CT brain attack angio head and neck W and WO IV contrast [[id number]] Collected: 05/25/25 1034   Order Status: Completed Updated: 05/25/25 1034   Narrative:     Interpreted By:  Levon Waters,  STUDY:  CT BRAIN ATTACK ANGIO HEAD AND NECK W AND WO IV CONTRAST; ;  5/25/2025 10:22 am      INDICATION:  Signs/Symptoms:AMS, LKW 5am? aphasia, ? L weakness.          COMPARISON:  CTA head and neck from 03/01/2024.      ACCESSION  NUMBER(S):  [id number]      ORDERING CLINICIAN:  KENTON STRATTON      TECHNIQUE:  CTA of the head and neck was performed after administration of 100 mL  of Omnipaque 350 intravenously. Segmented MIPs are provided.      FINDINGS:  CTA head:      There are small foci of atherosclerotic calcifications located within  the precavernous, cavernous, and paraophthalmic segments of the  bilateral internal carotid arteries without luminal narrowing. There  is stable more concentric atherosclerotic calcification located  within the supraclinoid segment of the left ICA which may cause mild  luminal narrowing. Otherwise, there is no striking narrowing of the  visualized internal carotid arteries.      There is no narrowing of the visualized portions of the bilateral  anterior or middle cerebral arteries. There are small foci of  atherosclerotic calcifications located within the bilateral  intradural vertebral arteries. There is at most mild narrowing  involving the distal right intradural vertebral artery, unchanged  compared to the the prior CTA and MRA head from 2019.      There is mild luminal irregularity of the basilar artery primarily  due to noncalcified plaque. There is no narrowing of the visualized  portions of the bilateral posterior cerebral arteries. The lumen of  the P1 segment of the left posterior cerebral artery is smaller  compared to the right, likely normal variant as there is a left  posterior communicating artery which is visualized, unchanged.      There are no aneurysms.      CTA neck:      Aortic arch and origins of the great vessels from the aortic arch are  without luminal narrowing. There are atherosclerotic calcifications  within these vascular structures but there is no striking luminal  narrowing. Bilateral common carotid arteries are without luminal  narrowing. There are small foci of atherosclerotic calcification  located within the left common carotid artery. There are small foci  of  atherosclerotic calcification located within the bilateral carotid  bulbs without luminal narrowing. There is retropharyngeal course of  the right internal carotid artery. Bilateral vertebral arteries arise  from the subclavian arteries and demonstrate no luminal narrowing.      Additional findings:      There are osseous degenerative changes of the cervical spine.       Impression:     No occlusion of the visualized arteries in the head or neck.  Unchanged appearance of the these arteries compared to the prior CTA  study.      This study was interpreted at [facility].      MACRO:  None      Signed by: Levon Waters 5/25/2025 10:33 AM  Dictation workstation:   [id number]   CT brain attack head wo IV contrast [[id number]] Collected: 05/25/25 1028   Order Status: Completed Updated: 05/25/25 1028   Narrative:     Interpreted By:  Tu Hunter,  STUDY:  CT BRAIN ATTACK HEAD WO IV CONTRAST;  5/25/2025 10:13 am      INDICATION:  Signs/Symptoms:AMS, LKW 5am? aphasia.      COMPARISON:  None.      ACCESSION NUMBER(S):  [id number]      ORDERING CLINICIAN:  KENTON STRATTON      TECHNIQUE:  Noncontrast axial CT scan of head was performed.      FINDINGS:  Parenchyma: There is no intracranial hemorrhage. The grey-white  differentiation is intact. There is no mass effect or midline shift.  Patchy supratentorial hypodensity, nonspecific, but likely secondary  to mild chronic microvascular ischemia.      CSF Spaces: Mild generalized volume loss, with concordant ventricular  enlargement.      Extra-Axial Fluid: There is no extraaxial fluid collection.      Calvarium: The calvarium is unremarkable.      Paranasal sinuses: Visualized paranasal sinuses are clear.      Mastoids: Clear.      Orbits: Normal.      Soft tissues: Unremarkable.       Impression:     No acute intracranial hemorrhage, mass effect, or CT apparent acute  infarct. Chronic microvascular ischemia and involutional  changes.  Tu Hunter discussed the significance and urgency of this  critical finding by telephone with  KENTON AGUIRRE on 5/25/2025 at  10:27 am.  (**-RCF-**) Findings:  See findings.                  Signed by: uT Hunter 5/25/2025 10:27 AM  Dictation workstation:   [id number]   Point of Care Ultrasound [[id number]] Resulted: 05/25/25 1009   Order Status: Completed Updated: 05/25/25 1851   Narrative:     Kenton Aguirre DO     5/25/2025  6:51 PM    Performed by: Kenton Aguirre DO  Authorized by: Kenton Aguirre DO    Cardiac Indications: hypotension                  Procedure: Cardiac Ultrasound    Findings:   Views: parasternal long, parasternal short, apical four and subxiphoid  The pericardial space was visualized and was NEGATIVE for a significant  pericardial effusion.  Activity: Ventricular contractions were visualized.  LV: LV systolic function was NORMAL.  RV: RV size was DILATED.    Impression:  Cardiac: The focused cardiac ultrasound exam had ABNORMAL findings as  specified.      Comments: Ivc Plethoric                  Medical Problems         Problem List         * (Principal) Acute renal failure     Conjunctivitis     Weakness     CVA (cerebral vascular accident) (Multi)     Type 2 diabetes mellitus without complication, with long-term current use of insulin     Hypertensive heart disease with CHF     Anemia of chronic disease     Admits to alcohol consumption     Localized swelling on right hand     Diarrhea     Muscle spasticity     Ac isch multi vasc territories stroke (Multi)     UTI (urinary tract infection)     Hypokalemia     SVT (supraventricular tachycardia)     Loose stools     Pneumonia     Skin irritation     Cough     Umbilical hernia                  Above medical problems may be reflective of historical medical problems that may have resolved and may not related to acute clinical condition/medical problems.     Clinical impression/plan:        Undifferentiated  "shock  -Monitor offempirical  antibiotics.  -Continue stewardship as per infectious disease.          Acute renal failure, no hx CKD  Acute hypotension requiring pressor support  Pt seen by nephrology and SLED orders are in  All offending home meds held  Daily RFP  Continue empiric pending urine and blood cx, UA was +, pt does not meet SIRS/sepsis criteria     Acute hypoxic respiratory failure  Pt currently on 6L O2  Imaging does not reveal evidence of PNA  Wean O2 as tolerated to maintain O2 sat >91%, pt may need home O2 eval      Alcohol use disorder  Pt admits to drinking one beer a day \"my whole life\"  ETOH was 70 (it was 98 three months ago)  Start CIWA protocol     Chronic anemia  Baseline Hgb is 11-12, today is 8.6, likely etiology is ARF  Monitor Hgb and transfuse for Hgb <= 7.0     DVT ppx: apixaban    Hypoglycemia  - Diabetic diet  -Discontinue insulin sliding scale      Acute metabolic encephalopathy resolved               Disposition/additional care plan/interventions: 5/30/2025     Care plan discussed with nephrologist.     Interval improvement in renal indices .     Possible removal of temporary dialysis catheter in the next 24 to 48 hours if patient continues to improve.     MBS results discussed with speech therapy, diet adjusted.     Continue monitoring off antibiotics.  Monitor fever curve     Continue to monitor renal indices, nonoliguric renal failure     Continue avoid nephrotoxins.     Despite the clinical improvement appreciated will continue monitor patient's disposition closely.          Disposition/additional care plan/interventions: 5/31/2025     While patient  showing signs of renal recovery, recommend continuing close monitoring     Renal function reviewed, creatinine improved today but slightly.  Potassium up to 5.1.     It appeared prudent to not discontinue patient's temporary's catheter just as yet.  Will monitor today patient remain nonoliguric, repeat renal function in a.m.   "   Microbiological data reviewed, will defer to ID concerning decolonization concerning positive nares MRSA     Blood pressure elevation noted.................. escalate antihypertensive therapy     Asymptomatic bradycardia continue to monitor        Despite his vast improvement..................Patient with high complexity characteristics and appeared to be at risk for clinical decline/deterioration  and unpredictable clinical course.     Disposition/additional care plan/interventions: 6/1/2025       Whyte catheter removed patient still having good urine output, continue to monitor.    Continue monitor off antibiotics as recommended by ID.    Given increasing blood pressure will implement hydralazine monitor heart rate closely, will continue hold beta-blocker at this time.    Timing of resumption of chronic heart failure longitudinal care in accordance with recommendations by nephrology.  Continue monitoring off Entresto, Aldactone, stop metformin, avoid nephrotoxins.    Asymptomatic hypoglycemia, currently on insulin 0 to 5 units sliding scale p.m. snacks may be of benefit.  However given his recent glucose trends and renal function recommend diabetic diet monitor off corrective sliding scale insulin.  Type II diabetic.    Continue renal diet    Anticipation of discharge tomorrow, continue temporary dialysis catheter today.      Patient reportedly ,patient chronically has  dysarthria- like speech  pattern. Initial CT scan of the head was negative........................Continue secondary stroke modifying therapy.    Undifferentiated shock resolved...... without clinical signs of anaphylactic reaction.      Despite appearing stable and doing well at this time..............................Patient with high complexity characteristics and appeared to be at  risk for clinical decline/deterioration  and unpredictable clinical course.     The patient was informed of differential diagnosis , work up , plan of care and  possible sequelae of clinical disposition.Patient in agreement with plan of care. Further recommendations forthcoming in accordance with patient's clinical disposition and response to care.     Discharge planning:Discharge timing to be determined.                    Dictation performed with assistance of voice recognition device therefore transcription errors are possible.

## 2025-06-01 NOTE — CARE PLAN
The patient's goals for the shift include to be free of injury     The clinical goals for the shift include to be free of injury

## 2025-06-01 NOTE — PROGRESS NOTES
"      Nephrology Progress Note  Edenilson Ness is a 64 y.o. male on day 6 of admission presenting with Acute renal failure.    Nephrology following for shae.   Events over night:     No shortness of breath  Appetite ok   UOP 1 L       /79 (BP Location: Right arm, Patient Position: Lying)   Pulse 76   Temp 36.1 °C (97 °F) (Temporal)   Resp 16   Ht 1.702 m (5' 7\")   Wt 104 kg (229 lb 14.4 oz)   SpO2 96%   BMI 36.01 kg/m²     Input / Output:  24 HR:   Intake/Output Summary (Last 24 hours) at 6/1/2025 1104  Last data filed at 6/1/2025 0200  Gross per 24 hour   Intake 250 ml   Output 825 ml   Net -575 ml       Physical Exam   Alert and oriented, mild dysarthria  Neck: no JVD  CV: RRR  Lungs: CTA bilaterally  Abd: soft, NT, ND   Ext: trace lower extremity edema  Whyte     Scheduled medications  Scheduled Medications[1]  Continuous medications  Continuous Medications[2]  PRN medications  PRN Medications[3]   Results from last 7 days   Lab Units 06/01/25  0654   SODIUM mmol/L 139   POTASSIUM mmol/L 4.6   CHLORIDE mmol/L 107   CO2 mmol/L 23   BUN mg/dL 40*   CREATININE mg/dL 3.48*   CALCIUM mg/dL 9.4   GLUCOSE mg/dL 69*      Results from last 7 days   Lab Units 05/26/25  0447   MAGNESIUM mg/dL 1.42*      Results from last 7 days   Lab Units 06/01/25  0654 05/31/25  0413 05/29/25  0419   WBC AUTO x10*3/uL 5.0 4.6 5.2   HEMOGLOBIN g/dL 9.5* 8.9* 8.7*   HEMATOCRIT % 28.8* 26.7* 27.2*   PLATELETS AUTO x10*3/uL 143* 115* 109*      Results from last 7 days   Lab Units 06/01/25  0654 05/31/25  0413 05/30/25  0411   SODIUM mmol/L 139 137 137   POTASSIUM mmol/L 4.6 5.1 4.0   CHLORIDE mmol/L 107 108* 110*   CO2 mmol/L 23 23 23   BUN mg/dL 40* 42* 41*   CREATININE mg/dL 3.48* 3.45* 3.50*   GLUCOSE mg/dL 69* 78 87   CALCIUM mg/dL 9.4 8.6 8.4*         Assessment & Plan:       64-year-old gentleman with hx of diabetes, prior stroke, documented heart failure initially brought to the hospital concerning for stroke found to be " hypotensive with concomitant renal failure hence nephrology was consulted     #1 acute kidney injury  - Last serum creatinine 0.94 mg/dL on March 2024  - suspect ATN in setting of decrease EBV in setting of entresto/aldactone along with other antihypertensives  - on presentation oliguric with serum creatinine 6.36 mg/dL significant azotemia acidemia   -R  internal jugular temp catheter  -5/26- 8 hour SLED x 1 only   -Cr trending down 3.45 MG/DL- stuck    Plan  -clinically improved  -remains non-oliguric  -electrolyte and acid base stable  -no need for RRT  -Creatinine seems stuck at 3.5, could take some time to get back to baseline.  Would be okay for discharge tomorrow will need close follow-up labs and keep off Entresto/Aldactone  -avoid hypotension/nephrotoxins    Please message me through [employer] chat with any questions or concerns.     Olive Dempsey DO  6/1/2025  11:04 AM     [facility]    [address], [address]   [place] [address]  Office: [phone]           [1] apixaban, 5 mg, oral, BID  atorvastatin, 80 mg, oral, Nightly  baclofen, 2.5 mg, oral, BID  folic acid, 1 mg, oral, Daily  heparin, 2,500 Units, hemodialysis, Once  hydrALAZINE, 10 mg, oral, TID  insulin lispro, 0-5 Units, subcutaneous, TID AC  multivitamin with minerals, 1 tablet, oral, Daily  thiamine, 100 mg, oral, Daily     [2]    [3] PRN medications: acetaminophen, alteplase, dextrose, dextrose, diazePAM, glucagon, glucagon, heparin, heparin, melatonin, sennosides-docusate sodium

## 2025-06-01 NOTE — CARE PLAN
The patient's goals for the shift include to be free of injury     The clinical goals for the shift include to be free of injury    Problem: Discharge Planning  Goal: Discharge to home or other facility with appropriate resources  Outcome: Progressing     Problem: Nutrition  Goal: Nutrient intake appropriate for maintaining nutritional needs  Outcome: Progressing     Problem: Diabetes  Goal: Achieve decreasing blood glucose levels by end of shift  Outcome: Progressing  Goal: Increase stability of blood glucose readings by end of shift  Outcome: Progressing  Goal: Decrease in ketones present in urine by end of shift  Outcome: Progressing  Goal: Maintain electrolyte levels within acceptable range throughout shift  Outcome: Progressing  Goal: Maintain glucose levels >70mg/dl to <250mg/dl throughout shift  Outcome: Progressing  Goal: No changes in neurological exam by end of shift  Outcome: Progressing  Goal: Learn about and adhere to nutrition recommendations by end of shift  Outcome: Progressing  Goal: Vital signs within normal range for age by end of shift  Outcome: Progressing  Goal: Increase self care and/or family involovement by end of shift  Outcome: Progressing  Goal: Receive DSME education by end of shift  Outcome: Progressing     Problem: Skin  Goal: Participates in plan/prevention/treatment measures  Outcome: Progressing  Goal: Prevent/manage excess moisture  Outcome: Progressing  Goal: Prevent/minimize sheer/friction injuries  Outcome: Progressing  Goal: Promote/optimize nutrition  Outcome: Progressing

## 2025-06-01 NOTE — PROGRESS NOTES
Edenilson Ness is a 64 y.o. male on day 7 of admission presenting with Acute renal failure.      Assessment/Plan:     #Shock, undifferentiated, POA- resolved  No obvious signs of infection.  CT chest not concerning for pneumonia.  Blood cultures pending at this time.  UA does show yeast.  Zosyn discontinued to avoid combination of Zosyn plus vancomycin 2/2 nephrotoxicity.  Cefepime, Vanco micafungin discontinued as cultures have been negative so far.  Shock likely due to acidosis and less likely due to infection     #Acute kidney injury  Nephrology consulted.  Initially got sled.  Remains non oliguric. Renal function slowly improving     #Bicytopenia  Anemia and thrombocytopenia.  Low platelets could be due to sepsis.  Management per primary     #Transaminitis  Likely secondary to sepsis.  Monitor LFTs     CVA, DM  HFrEF  A-fib     Recommendations:     - Monitor off antibiotics  - Monitor for signs of infection  - Will continue to follow      Jonathan Parham MD  Date of service: 6/1/2025  Time of service: 9:58 AM      Subjective   Interval History: No acute overnight.  Patient denies any complaint.        Review of Systems  Denies: fever, chills, nausea, vomiting, diarrhea    Objective   Range of Vitals (last 24 hours)  Heart Rate:  [56-76]   Temp:  [36 °C (96.8 °F)-36.6 °C (97.8 °F)]   Resp:  [10-18]   BP: (127-160)/()   Weight:  [104 kg (229 lb 14.4 oz)]   SpO2:  [96 %-100 %]  Daily Weight  06/01/25 : 104 kg (229 lb 14.4 oz)   Body mass index is 36.01 kg/m².    General: Alert and awake, NAD  Neck:  RIJ dialysis catheter  Abdomen: soft, non tender, non distended  Neuro: AAO x 3, B/l LE weakness,   Extremities: RLE surgical scar on the medial side  Skin: No rashes or ulcers         Antibiotics  This patient does not have an active medication from one of the medication groupers.      Relevant Results  Labs  Results from last 72 hours   Lab Units 06/01/25  0654 05/31/25  0413   WBC AUTO x10*3/uL 5.0 4.6  "  HEMOGLOBIN g/dL 9.5* 8.9*   HEMATOCRIT % 28.8* 26.7*   PLATELETS AUTO x10*3/uL 143* 115*     Results from last 72 hours   Lab Units 06/01/25  0654 05/31/25  0413 05/30/25  0411   SODIUM mmol/L 139 137 137   POTASSIUM mmol/L 4.6 5.1 4.0   CHLORIDE mmol/L 107 108* 110*   CO2 mmol/L 23 23 23   BUN mg/dL 40* 42* 41*   CREATININE mg/dL 3.48* 3.45* 3.50*   GLUCOSE mg/dL 69* 78 87   CALCIUM mg/dL 9.4 8.6 8.4*   ANION GAP mmol/L 14 11 8*   EGFR mL/min/1.73m*2 19* 19* 19*         Estimated Creatinine Clearance: 25.3 mL/min (A) (by C-G formula based on SCr of 3.48 mg/dL (H)).  No results found for: \"CRP\"    Microbiology  Susceptibility data from last 14 days.  Collected Specimen Info Organism   05/27/25 Swab from Anterior Nares Methicillin Resistant Staphylococcus aureus (MRSA)       Imaging    XR chest 1 view  Result Date: 5/25/2025  1. Cardiomegaly with mild interstitial prominence diffusely. Consider mild CHF or pulmonary edema. Follow-up as clinically warranted.. 2. Right central line catheter as described.       MACRO: None   Signed by: Yash Cortez 5/25/2025 4:19 PM Dictation workstation:   [id number]    CT angio chest for pulmonary embolism  Result Date: 5/25/2025  1. Extremely limited exam due to suboptimal contrast phase. Although no definite large or central pulmonary embolism is seen, a more distal pulmonary embolism not excluded given the limitations of the study. 2. Cardiomegaly with low lung volumes which may be due to poor inspiration. Likely posterior atelectasis and/or minimal dependent edema. 3. Question of enhancing nodule in the gastric fundus. This is approximately 17 mm in diameter. A polyp or mass not excluded. Correlate with EGD.   MACRO: Critical Finding:  See findings. Notification was initiated on 5/25/2025 at 11:21 am by  Yash Cortez.  (**-YCF-**)   Signed by: Yash Cortez 5/25/2025 11:21 AM Dictation workstation:   [id number]    CT brain attack angio head and neck W and WO IV contrast  Result " Date: 5/25/2025  No occlusion of the visualized arteries in the head or neck. Unchanged appearance of the these arteries compared to the prior CTA study.   This study was interpreted at [facility].   MACRO: None   Signed by: Levon Waters 5/25/2025 10:33 AM Dictation workstation:   [id number]    CT brain attack head wo IV contrast  Result Date: 5/25/2025  No acute intracranial hemorrhage, mass effect, or CT apparent acute infarct. Chronic microvascular ischemia and involutional changes. Tu Hunter discussed the significance and urgency of this critical finding by telephone with  KENTON STRATTON on 5/25/2025 at 10:27 am.  (**-RCF-**) Findings:  See findings.         Signed by: Tu Hunter 5/25/2025 10:27 AM Dictation workstation:   [id number]

## 2025-06-01 NOTE — DOCUMENTATION CLARIFICATION NOTE
"    PATIENT:               MELISSA BERGER  ACCT #:                  [id number]  MRN:                       [id number]  :                       1960  ADMIT DATE:       2025 9:30 AM  DISCH DATE:  RESPONDING PROVIDER #:        [id number]          PROVIDER RESPONSE TEXT:    Sepsis ruled out    CDI QUERY TEXT:    Clarification    Instruction:    Based on your assessment of the patient and the clinical information, please provide the requested documentation by clicking on the appropriate radio button and enter any additional information if prompted.    Question: Based on your medical judgment, can you please clarify which of these conditions is the most clinically supported    When answering this query, please exercise your independent professional judgment. The fact that a question is being asked, does not imply that any particular answer is desired or expected.    The patient's clinical indicators include:  Clinical Information: There is conflicting documentation in the medical record which requires clarification.    Per  ID PN, \"Shock likely due to acidosis and less likely due to infection.\"    Per  ID PN, \"Transaminitis- Likely secondary to sepsis.  Monitor LFTs.\"    Clinical Indicators: Per ID consult, \"Septic shock, unknown source.\"    Per  crit care consult, \"Septic shock with multisystem organ failure appears to be resolving.\"    Per  med PN, \"Undifferentiated shock.\"    : T 96.2, P 68-70, R 30-40, BP 62/38, MAP 49, lactate 2.9, WBC 4.7    Treatment: 1000cc LR bolus x2, pressors- now d/levy, IV cefepime- now d/levy, IV zosyn- now d/levy, IV vanco- now d/ed, monitoring labs/vitals    Risk Factors: 64yom with noted shock  Options provided:  -- Sepsis with acute organ dysfunction of septic shock  -- Sepsis ruled out  -- Other - I will add my own diagnosis  -- Refer to Clinical Documentation Reviewer    Query created by: Yris Mueller on 2025 10:00 AM      Electronically signed by:  " YONIS VELAZCO DO 6/1/2025 7:36 AM

## 2025-06-02 VITALS
HEART RATE: 89 BPM | BODY MASS INDEX: 36.08 KG/M2 | SYSTOLIC BLOOD PRESSURE: 111 MMHG | TEMPERATURE: 97.9 F | WEIGHT: 229.9 LBS | RESPIRATION RATE: 16 BRPM | DIASTOLIC BLOOD PRESSURE: 75 MMHG | HEIGHT: 67 IN | OXYGEN SATURATION: 96 %

## 2025-06-02 LAB
ANION GAP SERPL CALC-SCNC: 10 MMOL/L (ref 10–20)
BUN SERPL-MCNC: 42 MG/DL (ref 6–23)
CALCIUM SERPL-MCNC: 9.2 MG/DL (ref 8.6–10.3)
CHLORIDE SERPL-SCNC: 110 MMOL/L (ref 98–107)
CO2 SERPL-SCNC: 24 MMOL/L (ref 21–32)
CREAT SERPL-MCNC: 3.34 MG/DL (ref 0.5–1.3)
EGFRCR SERPLBLD CKD-EPI 2021: 20 ML/MIN/1.73M*2
ERYTHROCYTE [DISTWIDTH] IN BLOOD BY AUTOMATED COUNT: 14.5 % (ref 11.5–14.5)
GLUCOSE SERPL-MCNC: 71 MG/DL (ref 74–99)
HCT VFR BLD AUTO: 25.3 % (ref 41–52)
HGB BLD-MCNC: 8.3 G/DL (ref 13.5–17.5)
MCH RBC QN AUTO: 30.5 PG (ref 26–34)
MCHC RBC AUTO-ENTMCNC: 32.8 G/DL (ref 32–36)
MCV RBC AUTO: 93 FL (ref 80–100)
NRBC BLD-RTO: 0 /100 WBCS (ref 0–0)
PLATELET # BLD AUTO: 141 X10*3/UL (ref 150–450)
POTASSIUM SERPL-SCNC: 5 MMOL/L (ref 3.5–5.3)
RBC # BLD AUTO: 2.72 X10*6/UL (ref 4.5–5.9)
SODIUM SERPL-SCNC: 139 MMOL/L (ref 136–145)
WBC # BLD AUTO: 5.1 X10*3/UL (ref 4.4–11.3)

## 2025-06-02 PROCEDURE — 85027 COMPLETE CBC AUTOMATED: CPT | Performed by: HOSPITALIST

## 2025-06-02 PROCEDURE — 2500000001 HC RX 250 WO HCPCS SELF ADMINISTERED DRUGS (ALT 637 FOR MEDICARE OP): Performed by: HOSPITALIST

## 2025-06-02 PROCEDURE — 2500000001 HC RX 250 WO HCPCS SELF ADMINISTERED DRUGS (ALT 637 FOR MEDICARE OP): Performed by: NURSE PRACTITIONER

## 2025-06-02 PROCEDURE — 36415 COLL VENOUS BLD VENIPUNCTURE: CPT | Performed by: HOSPITALIST

## 2025-06-02 PROCEDURE — 99239 HOSP IP/OBS DSCHRG MGMT >30: CPT | Performed by: INTERNAL MEDICINE

## 2025-06-02 PROCEDURE — 80048 BASIC METABOLIC PNL TOTAL CA: CPT | Performed by: HOSPITALIST

## 2025-06-02 RX ORDER — FOLIC ACID 1 MG/1
1 TABLET ORAL DAILY
Qty: 30 TABLET | Refills: 11 | Status: SHIPPED | OUTPATIENT
Start: 2025-06-03

## 2025-06-02 RX ORDER — INSULIN LISPRO 100 [IU]/ML
INJECTION, SOLUTION INTRAVENOUS; SUBCUTANEOUS
Qty: 10 ML | Refills: 11 | Status: SHIPPED | OUTPATIENT
Start: 2025-06-02 | End: 2026-06-02

## 2025-06-02 RX ADMIN — APIXABAN 5 MG: 5 TABLET, FILM COATED ORAL at 09:30

## 2025-06-02 RX ADMIN — Medication 1 TABLET: at 09:30

## 2025-06-02 RX ADMIN — THIAMINE HCL TAB 100 MG 100 MG: 100 TAB at 09:30

## 2025-06-02 RX ADMIN — FOLIC ACID 1 MG: 1 TABLET ORAL at 09:30

## 2025-06-02 RX ADMIN — BACLOFEN 2.5 MG: 5 TABLET ORAL at 09:30

## 2025-06-02 RX ADMIN — HYDRALAZINE HYDROCHLORIDE 25 MG: 25 TABLET ORAL at 09:30

## 2025-06-02 ASSESSMENT — COGNITIVE AND FUNCTIONAL STATUS - GENERAL
MOVING FROM LYING ON BACK TO SITTING ON SIDE OF FLAT BED WITH BEDRAILS: A LOT
TOILETING: A LOT
TURNING FROM BACK TO SIDE WHILE IN FLAT BAD: A LOT
MOVING TO AND FROM BED TO CHAIR: TOTAL
EATING MEALS: A LOT
DRESSING REGULAR LOWER BODY CLOTHING: A LOT
PERSONAL GROOMING: A LOT
DAILY ACTIVITIY SCORE: 12
MOBILITY SCORE: 8
CLIMB 3 TO 5 STEPS WITH RAILING: TOTAL
STANDING UP FROM CHAIR USING ARMS: TOTAL
WALKING IN HOSPITAL ROOM: TOTAL
DRESSING REGULAR UPPER BODY CLOTHING: A LOT
HELP NEEDED FOR BATHING: A LOT

## 2025-06-02 ASSESSMENT — PAIN SCALES - GENERAL: PAINLEVEL_OUTOF10: 0 - NO PAIN

## 2025-06-02 NOTE — DISCHARGE SUMMARY
Discharge Diagnosis  Acute renal failure with ATN  Undifferentiated shock  TIA  Transaminitis  Frequent hypoglycemia    This discharge took greater than 35 minutes.    Test Results Pending At Discharge  Pending Labs       No current pending labs.            Hospital Course   Patient initially presented to the ER from facility for hypotension and slurring of words.  Concern for septic shock and stroke.  Started on IV fluid bolus, broad-spectrum antibiotics for sepsis and stroke workup was done.  ID was consulted.  Sepsis workup as well as stroke workup negative.  Patient was also found to have elevated BUN/creatinine consistent with REGINA-no significant improvement with treatment.  Nephrology consulted and underwent dialysis while tunnel catheter.  Blood pressure improved and maintaining without any support.  Since all septic workup were negative ID recommended discontinuing antibiotics.  Patient was observed off antibiotics on the hospital.  Renal function gradually improving with dialysis and patient is making urine.  Cleared by both ID as well as nephrology for discharge.  Patient will be discharged back to [facility] for rehabilitation/long-term care.  No need for ongoing dialysis as per nephrology but needs close monitoring of renal function.  Holding Entresto, spironolactone as well as Lasix.  Metformin discontinued secondary to increased creatinine.    Pertinent Physical Exam At Time of Discharge  Physical Exam  Patient is awake and orient, not in apparent distress  Eyes: PERRLA, no conjunctival congestion  Chest: Bilateral Air entry, no crackles or wheezing  Heart: s1S2 regular, no murmur  Abdomen: Soft, non tender, BS present  Ext:    Home Medications     Medication List      START taking these medications     folic acid 1 mg tablet; Commonly known as: Folvite; Take 1 tablet (1 mg)   by mouth once daily.; Start taking on: Bri 3, 2025     CONTINUE taking these medications     acetaminophen 325 mg tablet; Commonly  known as: Tylenol   apixaban 5 mg tablet; Commonly known as: Eliquis   atorvastatin 80 mg tablet; Commonly known as: Lipitor   baclofen 5 mg tablet; Commonly known as: Lioresal   bisacodyl 10 mg suppository; Commonly known as: Dulcolax   carvedilol 25 mg tablet; Commonly known as: Coreg   FLEET ENEMA RECT   Amira-Tussin 100 mg/5 mL syrup; Generic drug: guaiFENesin   GLUCAGON (HCL) EMERGENCY KIT INJ   Glucose GeL 40 % gel oral gel; Generic drug: glucose   Guaifenesin-DM  mg/5 mL liquid; Generic drug:   dextromethorphan-guaifenesin   hydrALAZINE 25 mg tablet; Commonly known as: Apresoline   isosorbide dinitrate 20 mg tablet; Commonly known as: Isordil   magnesium hydroxide 400 mg/5 mL suspension; Commonly known as: Milk of   Magnesia   melatonin 10 mg tablet   sennosides 8.6 mg tablet; Commonly known as: Senokot   thiamine 100 mg tablet; Commonly known as: Vitamin B-1   zinc oxide 20 % ointment     STOP taking these medications     furosemide 40 mg tablet; Commonly known as: Lasix   loperamide 2 mg tablet; Commonly known as: Imodium A-D   metFORMIN 500 mg tablet; Commonly known as: Glucophage   sacubitriL-valsartan 24-26 mg tablet; Commonly known as: Entresto   spironolactone 25 mg tablet; Commonly known as: Aldactone       Outpatient Follow-Up  No follow-ups on file.     Juan Joseph MD  6/2/2025  12:17 PM

## 2025-06-02 NOTE — CARE PLAN
The patient's goals for the shift include  free from falls    The clinical goals for the shift include patient to remain free fro injury and falls throughout shift, verbalize any discomfort or pain, participate and acceptance of turning every two hours to prevent skin issues

## 2025-06-02 NOTE — PROGRESS NOTES
Edenilson Ness is a 64 y.o. male on day 8 of admission presenting with Acute renal failure.      Assessment/Plan:     #Shock, undifferentiated, POA- resolved  No obvious signs of infection.  CT chest not concerning for pneumonia.  Blood cultures pending at this time.  UA does show yeast.  Zosyn discontinued to avoid combination of Zosyn plus vancomycin 2/2 nephrotoxicity.  Cefepime, Vanco micafungin discontinued as cultures have been negative so far.  Shock likely due to acidosis and less likely due to infection     #Acute kidney injury  Nephrology consulted.  Initially got sled.  Remains non oliguric. Renal function slowly improving     #Bicytopenia  Anemia and thrombocytopenia.  Low platelets could be due to sepsis.  Management per primary     #Transaminitis  Likely secondary to sepsis.  Monitor LFTs     CVA, DM  HFrEF  A-fib     Recommendations:     - Monitor off antibiotics  - Monitor for signs of infection  - Will continue to follow      Jonathan Parham MD  Date of service: 6/2/2025  Time of service: 10:41 AM      Subjective   Interval History: No acute overnight.  Patient denies any complaint.        Review of Systems  Denies: fever, chills, nausea, vomiting, diarrhea    Objective   Range of Vitals (last 24 hours)  Heart Rate:  [64-74]   Temp:  [36.2 °C (97.1 °F)-37 °C (98.6 °F)]   Resp:  [14-16]   BP: (113-151)/(70-97)   SpO2:  [92 %-98 %]  Daily Weight  06/01/25 : 104 kg (229 lb 14.4 oz)   Body mass index is 36.01 kg/m².    General: Alert and awake, NAD  Neck:  RIJ dialysis catheter  Abdomen: soft, non tender, non distended  Neuro: AAO x 3, B/l LE weakness,   Extremities: RLE surgical scar on the medial side  Skin: No rashes or ulcers         Antibiotics  This patient does not have an active medication from one of the medication groupers.      Relevant Results  Labs  Results from last 72 hours   Lab Units 06/02/25  0355 06/01/25  0654 05/31/25  0413   WBC AUTO x10*3/uL 5.1 5.0 4.6   HEMOGLOBIN g/dL 8.3* 9.5*  "8.9*   HEMATOCRIT % 25.3* 28.8* 26.7*   PLATELETS AUTO x10*3/uL 141* 143* 115*     Results from last 72 hours   Lab Units 06/02/25  0355 06/01/25  0654 05/31/25  0413   SODIUM mmol/L 139 139 137   POTASSIUM mmol/L 5.0 4.6 5.1   CHLORIDE mmol/L 110* 107 108*   CO2 mmol/L 24 23 23   BUN mg/dL 42* 40* 42*   CREATININE mg/dL 3.34* 3.48* 3.45*   GLUCOSE mg/dL 71* 69* 78   CALCIUM mg/dL 9.2 9.4 8.6   ANION GAP mmol/L 10 14 11   EGFR mL/min/1.73m*2 20* 19* 19*         Estimated Creatinine Clearance: 26.4 mL/min (A) (by C-G formula based on SCr of 3.34 mg/dL (H)).  No results found for: \"CRP\"    Microbiology  Susceptibility data from last 14 days.  Collected Specimen Info Organism   05/27/25 Swab from Anterior Nares Methicillin Resistant Staphylococcus aureus (MRSA)       Imaging    XR chest 1 view  Result Date: 5/25/2025  1. Cardiomegaly with mild interstitial prominence diffusely. Consider mild CHF or pulmonary edema. Follow-up as clinically warranted.. 2. Right central line catheter as described.       MACRO: None   Signed by: Yash Cortez 5/25/2025 4:19 PM Dictation workstation:   [id number]    CT angio chest for pulmonary embolism  Result Date: 5/25/2025  1. Extremely limited exam due to suboptimal contrast phase. Although no definite large or central pulmonary embolism is seen, a more distal pulmonary embolism not excluded given the limitations of the study. 2. Cardiomegaly with low lung volumes which may be due to poor inspiration. Likely posterior atelectasis and/or minimal dependent edema. 3. Question of enhancing nodule in the gastric fundus. This is approximately 17 mm in diameter. A polyp or mass not excluded. Correlate with EGD.   MACRO: Critical Finding:  See findings. Notification was initiated on 5/25/2025 at 11:21 am by  Yash Cortez.  (**-YCF-**)   Signed by: Yash Cortez 5/25/2025 11:21 AM Dictation workstation:   [id number]    CT brain attack angio head and neck W and WO IV contrast  Result Date: " 5/25/2025  No occlusion of the visualized arteries in the head or neck. Unchanged appearance of the these arteries compared to the prior CTA study.   This study was interpreted at [facility].   MACRO: None   Signed by: Levon Waetrs 5/25/2025 10:33 AM Dictation workstation:   [id number]    CT brain attack head wo IV contrast  Result Date: 5/25/2025  No acute intracranial hemorrhage, mass effect, or CT apparent acute infarct. Chronic microvascular ischemia and involutional changes. Tu Hunter discussed the significance and urgency of this critical finding by telephone with  KENTON STRATTON on 5/25/2025 at 10:27 am.  (**-RCF-**) Findings:  See findings.         Signed by: Tu Hunter 5/25/2025 10:27 AM Dictation workstation:   [id number]

## 2025-06-02 NOTE — CARE PLAN
The patient's goals for the shift include going back to his facility     The clinical goals for the shift include patient to remain free from injury and falls throughout shift    Over the shift, the patient did not make progress toward the following goals. Barriers to progression include inability to ambulate. Recommendations to address these barriers include frequent pt rounding.

## 2025-06-02 NOTE — PROGRESS NOTES
Updated RN in regards to discharge and transport pending. RN had concerns about patients non-tunneled dialysis cath. TCC contacted Dr. Joseph. Dr. Joseph requested TCC to reach out to nephrology for confirmation. TCC spoke with Dr. Chu who confirmed that non tunneled cath can be removed. TCC requested removal order. TCC updated RN and provided direction to contact ICU for a RN to come to bedside to remove cath. TCC also instructed RN to update TCC if there are any delays since  was scheduled for 430 pm. TCC following.     1305: Discharge transport confirmed for 430 pm via [facility]. Transport time and report # sent to nursing via Secure Chat. Ambulance form pending.     1320: Discussed with Suzanne Brower RN about line needing pulled. Suzanne advised to that RN can call ICU to have a RN come up and pull the patient's line.     1400: Suzanne Brower RN asked TCC about changing pickup time to earlier. TCC advised that non-tunnel cath still needed removed so  time could not be changed at this time. Suzanne Brower RN updated TCC that IV RN would be coming up shortly to remove line. Unit RN aware. TCC following.     1521: Confirmed with Suzanne Brower RN that cath was removed. Cath was removed and patient to discharge as scheduled at 430 pm.

## 2025-06-02 NOTE — PROGRESS NOTES
"      Nephrology Progress Note  Edenilson Ness is a 64 y.o. male on day 6 of admission presenting with Acute renal failure.    Nephrology following for shae.   Events over night: none    No shortness of breath  Appetite ok   UOP is ok      /70 (BP Location: Right arm, Patient Position: Lying)   Pulse 74   Temp 37 °C (98.6 °F) (Temporal)   Resp 16   Ht 1.702 m (5' 7\")   Wt 104 kg (229 lb 14.4 oz)   SpO2 92%   BMI 36.01 kg/m²     Input / Output:  24 HR:   Intake/Output Summary (Last 24 hours) at 6/2/2025 1200  Last data filed at 6/2/2025 0500  Gross per 24 hour   Intake 200 ml   Output 700 ml   Net -500 ml       Physical Exam   Alert and oriented, mild dysarthria  Neck: no JVD  CV: RRR  Lungs: CTA bilaterally  Abd: soft, NT, ND   Ext: trace lower extremity edema  Whyte     Scheduled medications  Scheduled Medications[1]  Continuous medications  Continuous Medications[2]  PRN medications  PRN Medications[3]   Results from last 7 days   Lab Units 06/02/25  0355   SODIUM mmol/L 139   POTASSIUM mmol/L 5.0   CHLORIDE mmol/L 110*   CO2 mmol/L 24   BUN mg/dL 42*   CREATININE mg/dL 3.34*   CALCIUM mg/dL 9.2   GLUCOSE mg/dL 71*             Results from last 7 days   Lab Units 06/02/25  0355 06/01/25  0654 05/31/25  0413   WBC AUTO x10*3/uL 5.1 5.0 4.6   HEMOGLOBIN g/dL 8.3* 9.5* 8.9*   HEMATOCRIT % 25.3* 28.8* 26.7*   PLATELETS AUTO x10*3/uL 141* 143* 115*      Results from last 7 days   Lab Units 06/02/25  0355 06/01/25  0654 05/31/25  0413   SODIUM mmol/L 139 139 137   POTASSIUM mmol/L 5.0 4.6 5.1   CHLORIDE mmol/L 110* 107 108*   CO2 mmol/L 24 23 23   BUN mg/dL 42* 40* 42*   CREATININE mg/dL 3.34* 3.48* 3.45*   GLUCOSE mg/dL 71* 69* 78   CALCIUM mg/dL 9.2 9.4 8.6         Assessment & Plan:       64-year-old gentleman with hx of diabetes, prior stroke, documented heart failure initially brought to the hospital concerning for stroke found to be hypotensive with concomitant renal failure hence nephrology was " consulted     #1 acute kidney injury  - Last serum creatinine 0.94 mg/dL on March 2024  - suspect ATN in setting of decrease EBV in setting of entresto/aldactone along with other antihypertensives  - on presentation oliguric with serum creatinine 6.36 mg/dL significant azotemia acidemia   -R  internal jugular temp catheter  -5/26- 8 hour SLED x 1 only   -Cr trending down 3.34 MG/DL    Plan  -clinically improved  -remains non-oliguric  -electrolyte and acid base stable  -no need for RRT  -Creatinine slowly improving to 3.34 today , could take some time to get back to baseline.  Would be okay for discharge today but will need close follow-up labs and keep off Entresto/Aldactone  -avoid hypotension/nephrotoxins  -Will arrange office follow up     Please message me through [employer] chat with any questions or concerns.     Curry Cherry PA-C  6/2/2025  12:00 PM     [facility]    [address], [address]   [place] [address]  Office: [phone]           [1] apixaban, 5 mg, oral, BID  atorvastatin, 80 mg, oral, Nightly  baclofen, 2.5 mg, oral, BID  folic acid, 1 mg, oral, Daily  heparin, 2,500 Units, hemodialysis, Once  hydrALAZINE, 25 mg, oral, TID  multivitamin with minerals, 1 tablet, oral, Daily  thiamine, 100 mg, oral, Daily     [2]    [3] PRN medications: acetaminophen, alteplase, diazePAM, glucagon, glucagon, glucagon, glucagon, heparin, heparin, melatonin, sennosides-docusate sodium

## 2025-06-02 NOTE — CONSULTS
Vascular Access Team  Consult     Visit Date: 6/2/2025      Patient Name: Edenilson Ness         MRN: [id number]                Reason for Consult: Trialysis removal for discharge      Plan: RIJ triple lumen non tunneled catheter removed. No adverse s/s noted. Site clean and dry.        Rama Pat RN  6/2/2025  2:50 PM

## 2025-06-09 DIAGNOSIS — N17.9 ACUTE RENAL FAILURE: ICD-10-CM

## 2025-08-05 ENCOUNTER — APPOINTMENT (OUTPATIENT)
Facility: CLINIC | Age: 65
End: 2025-08-05
Payer: MEDICARE

## 2025-08-05 VITALS
HEIGHT: 67 IN | OXYGEN SATURATION: 94 % | WEIGHT: 227 LBS | BODY MASS INDEX: 35.63 KG/M2 | SYSTOLIC BLOOD PRESSURE: 155 MMHG | DIASTOLIC BLOOD PRESSURE: 103 MMHG | HEART RATE: 54 BPM

## 2025-08-05 DIAGNOSIS — D63.8 ANEMIA OF CHRONIC DISEASE: Primary | ICD-10-CM

## 2025-08-05 DIAGNOSIS — R19.5 FECAL OCCULT BLOOD TEST POSITIVE: ICD-10-CM

## 2025-08-05 PROCEDURE — 3077F SYST BP >= 140 MM HG: CPT | Performed by: INTERNAL MEDICINE

## 2025-08-05 PROCEDURE — 3008F BODY MASS INDEX DOCD: CPT | Performed by: INTERNAL MEDICINE

## 2025-08-05 PROCEDURE — 3080F DIAST BP >= 90 MM HG: CPT | Performed by: INTERNAL MEDICINE

## 2025-08-05 PROCEDURE — 99203 OFFICE O/P NEW LOW 30 MIN: CPT | Performed by: INTERNAL MEDICINE

## 2025-08-05 NOTE — PROGRESS NOTES
[facility] Gastroenterology    ASSESSMENT and PLAN:       Edenilson Ness is a 64 y.o. male with a significant past medical history of A-fib on Eliquis, history of CVA in 2019 wheelchair-bound, diabetes mellitus, history of chronic anemia, who presents for consultation requested by his primary care provider (Alfredo Bro MD) for the evaluation of anemia, positive fecal occult stools.       Anemia/positive Hemoccult stools  - Patient presents from nursing home for evaluation of positive Hemoccult stools  - Patient also anemic on most recent lab work done while in the hospital  - EGD and colonoscopy discussed with the patient in detail he declines endoscopic evaluation risks of continued anemia with missed cancer was explained in detail and he continues to decline  - Patient instructed to call my office if he wishes to undergo EGD or colonoscopy for further evaluation he voiced understanding        Rg Santo DO         Gastroenterology    [facility]            Subjective   HISTORY OF PRESENT ILLNESS:     Chief Complaint  No chief complaint on file.    History Of Present Illness:      ASSESSMENT and PLAN:       Edenilson Ness is a 64 y.o. male with a significant past medical history of A-fib on Eliquis, history of CVA in 2019 wheelchair-bound, diabetes mellitus, history of chronic anemia, who presents for consultation requested by his primary care provider (Alfredo Bro MD) for the evaluation of anemia, positive fecal occult stools.     Patient presents from nursing home where he has resided for appears the last 2 years after CVA.  Patient had positive fecal call while at the nursing facility.  Patient also was anemic during his heart last hospitalization.  Patient was self denies any blood in the stool denies abdominal pain nausea or vomiting.  Denies any diarrhea.  Overall patient has no complaints of GI point of view today.    Patient denies  "any heartburn/GERD, N/V, dysphagia, odynophagia, abdominal pain, diarrhea, constipation, hematemesis, hematochezia, melena, or weight loss.      Endoscopy History:        Review of systems:   Review of Systems      I performed a complete 10 point review of systems and it is negative except as noted in HPI or above.        PAST HISTORIES:       Past Medical History:  He has a past medical history of Anemia, Atrial fibrillation (Multi), Diabetes mellitus (Multi), HFrEF (heart failure with reduced ejection fraction), and Stroke (Multi) (09/20/2019).    Past Surgical History:  He has a past surgical history that includes Knee surgery; MR angio head wo IV contrast (08/29/2019); and MR angio neck wo IV contrast (08/29/2019).      Social History:  He reports that he has quit smoking. His smoking use included cigarettes. He has never used smokeless tobacco. He reports that he does not use drugs. No history on file for alcohol use.    Family History:  No known GI disease, specifically denies pancreatitis, Crohn's, colon cancer, gastroesophageal cancer, or ulcerative colitis.    Family History[1]     Allergies:  Patient has no known allergies.        Objective   OBJECTIVE:       Last Recorded Vitals:  Vitals:    08/05/25 1112   BP: (!) 155/103   BP Location: Left arm   Patient Position: Sitting   Pulse: 54   SpO2: 94%   Weight: 103 kg (227 lb)   Height: 1.702 m (5' 7\")     BP (!) 155/103 (BP Location: Left arm, Patient Position: Sitting)   Pulse 54   Ht 1.702 m (5' 7\")   Wt 103 kg (227 lb)   SpO2 94%   BMI 35.55 kg/m²      Physical Exam:    Physical Exam  Constitutional:       Appearance: Normal appearance.      Comments: In a wheelchair   HENT:      Mouth/Throat:      Mouth: Mucous membranes are moist.     Eyes:      Extraocular Movements: Extraocular movements intact.       Cardiovascular:      Rate and Rhythm: Normal rate and regular rhythm.      Heart sounds: Normal heart sounds.   Pulmonary:      Effort: Pulmonary " effort is normal. No respiratory distress.      Breath sounds: Normal breath sounds. No wheezing.   Abdominal:      General: Abdomen is flat. Bowel sounds are normal. There is no distension.      Palpations: Abdomen is soft.      Tenderness: There is no abdominal tenderness. There is no rebound.     Musculoskeletal:         General: Normal range of motion.     Skin:     General: Skin is warm and dry.     Neurological:      General: No focal deficit present.      Mental Status: He is alert and oriented to person, place, and time. Mental status is at baseline.     Psychiatric:         Mood and Affect: Mood normal.         Behavior: Behavior normal.             Home Medications:  Prior to Admission medications   Medication Sig Start Date End Date Taking? Authorizing Provider   acetaminophen (Tylenol) 325 mg tablet Take 2 tablets (650 mg) by mouth every 6 hours if needed for mild pain (1 - 3).    Historical Provider, MD   apixaban (Eliquis) 5 mg tablet Take 1 tablet (5 mg) by mouth 2 times a day.    Historical Provider, MD   atorvastatin (Lipitor) 80 mg tablet Take 1 tablet (80 mg) by mouth once daily at bedtime.    Historical Provider, MD   baclofen (Lioresal) 5 mg tablet Take 0.5 tablets (2.5 mg) by mouth 2 times a day.    Historical Provider, MD   bisacodyl (Dulcolax) 10 mg suppository Insert 1 suppository (10 mg) into the rectum if needed for constipation.    Historical Provider, MD   carvedilol (Coreg) 25 mg tablet Take 2 tablets (50 mg) by mouth 2 times daily (morning and late afternoon).    Historical Provider, MD   dextromethorphan-guaifenesin (Guaifenesin-DM)  mg/5 mL liquid Take 10 mL by mouth 3 times a day as needed for cough.    Historical Provider, MD   folic acid (Folvite) 1 mg tablet Take 1 tablet (1 mg) by mouth once daily. 6/3/25   Juan Joseph MD   GLUCAGON, HCL, EMERGENCY KIT INJ Inject 1 mL into the muscle if needed (low blood sugar).    Historical Provider, MD   glucose (Glucose GeL) 40 %  "gel oral gel Take by mouth. GIVE 1 APPLICATION BY MOUTH PRN    Historical Provider, MD   guaiFENesin (Amira-Tussin) 100 mg/5 mL syrup Take 10 mL (200 mg) by mouth 3 times a day as needed for cough.    Historical Provider, MD   hydrALAZINE (Apresoline) 25 mg tablet Take 1 tablet (25 mg) by mouth every 8 hours.    Historical Provider, MD   insulin lispro (HumaLOG U-100 Insulin) 100 unit/mL injection Inject 1-4 times a day as directed. 6/2/25 6/2/26  Juan Joseph MD   isosorbide dinitrate (Isordil) 20 mg tablet Take 1 tablet (20 mg) by mouth 3 times a day.    Historical Provider, MD   magnesium hydroxide (Milk of Magnesia) 400 mg/5 mL suspension GIVE 30ML EVERY 72 HOURS PRN    Historical Provider, MD   melatonin 10 mg tablet Take 1 tablet (10 mg) by mouth once daily at bedtime.    Historical Provider, MD   sennosides (Senokot) 8.6 mg tablet Take 1 tablet (8.6 mg) by mouth once daily as needed for constipation.    Historical Provider, MD   sodium phosphate,mono-dibasic (FLEET ENEMA RECT) Insert into the rectum. INSERT 1 UNIT  RECTALLY PRN    Historical Provider, MD   thiamine 100 mg tablet Take 1 tablet (100 mg) by mouth once daily.    Historical Provider, MD   zinc oxide 20 % ointment Apply topically. APPLY TO SCROTUM  AND GERARD AREA TOPICALLY DAILY EVERY DAY AND NIGHT SHIFT    Historical Provider, MD         Relevant Results Recent labs reviewed in the EMR.  Lab Results   Component Value Date    HGB 8.3 (L) 06/02/2025    HGB 9.5 (L) 06/01/2025    HGB 8.9 (L) 05/31/2025    MCV 93 06/02/2025    MCV 91 06/01/2025    MCV 90 05/31/2025     (L) 06/02/2025     (L) 06/01/2025     (L) 05/31/2025       No results found for: \"FERRITIN\", \"IRON\"    Lab Results   Component Value Date     06/02/2025    K 5.0 06/02/2025     (H) 06/02/2025    BUN 42 (H) 06/02/2025    CREATININE 3.34 (H) 06/02/2025       Lab Results   Component Value Date    BILITOT 0.4 05/25/2025     Lab Results   Component Value " "Date    ALT 72 (H) 05/25/2025    ALT 11 03/01/2024    ALT 14 09/04/2019    ALT 26 08/28/2019    AST 54 (H) 05/25/2025    AST 11 03/01/2024    AST 13 09/04/2019    AST 21 08/28/2019    ALKPHOS 109 05/25/2025    ALKPHOS 98 03/01/2024    ALKPHOS 99 09/04/2019    ALKPHOS 108 08/28/2019       No results found for: \"CRP\"    No results found for: \"CALPS\"    Radiology: Reviewed imaging reviewed in the EMR.  Imaging  No results found.    Cardiology, Vascular, and Other Imaging  No other imaging results found for the past 7 days             [1]   Family History  Problem Relation Name Age of Onset    No Known Problems Mother      No Known Problems Father       "

## 2025-08-05 NOTE — PATIENT INSTRUCTIONS
You were sent here from the nursing home due to positive fecal occult blood.      My recommendation would be colonoscopy and EGD for further evaluation however at this time you have declined.    The risk of this or missed cancers and ongoing anemia you are of sound mind and as you have declined will not move forward with further endoscopic evaluation    Please call my office for any further service.      If you have any questions please call my office at [phone].

## 2025-08-07 ENCOUNTER — TELEPHONE (OUTPATIENT)
Facility: CLINIC | Age: 65
End: 2025-08-07
Payer: MEDICARE

## 2025-08-07 DIAGNOSIS — R19.5 FECAL OCCULT BLOOD TEST POSITIVE: Primary | ICD-10-CM

## 2025-08-07 DIAGNOSIS — D64.9 ANEMIA, UNSPECIFIED TYPE: ICD-10-CM

## 2025-08-07 RX ORDER — POLYETHYLENE GLYCOL 3350, SODIUM SULFATE ANHYDROUS, SODIUM BICARBONATE, SODIUM CHLORIDE, POTASSIUM CHLORIDE 236; 22.74; 6.74; 5.86; 2.97 G/4L; G/4L; G/4L; G/4L; G/4L
4000 POWDER, FOR SOLUTION ORAL ONCE
Qty: 4000 ML | Refills: 0 | Status: SHIPPED | OUTPATIENT
Start: 2025-08-07 | End: 2025-08-07

## 2025-08-07 NOTE — TELEPHONE ENCOUNTER
According to Everardo from the nursing home, the patient is only agreeing to the colonoscopy but we can double check that when scheduling.

## 2025-08-07 NOTE — PROGRESS NOTES
Patient is now in his agreement with EGD and colonoscopy moving forward for evaluation of positive fecal occult and anemia orders placed for EGD and colonoscopy in the OR with [facility].    Rg Santo,   1:55 PM

## 2025-08-07 NOTE — TELEPHONE ENCOUNTER
Left message on machine to return call     Please get confirmation that the Patient is only wanting to do the Colonoscopy or if he is ok with getting both the EGD and Colonoscopy

## 2025-08-07 NOTE — TELEPHONE ENCOUNTER
Confirmed that patient is ONLY wanting the colonoscopy.  Packet/golytely prep faxed to Everardo at the nursing home at [phone].  Pt advised to hold eliquis for 2-3 days prior.

## 2025-08-07 NOTE — TELEPHONE ENCOUNTER
Everardo from nursing [place] called in to schedule the colonoscopy, the patient decided to have it done after speaking to the NP at the facility.  Can you put the order in for this and advise of location/meds to hold/etc.  Thanks.